# Patient Record
Sex: FEMALE | Race: WHITE | Employment: OTHER | URBAN - METROPOLITAN AREA
[De-identification: names, ages, dates, MRNs, and addresses within clinical notes are randomized per-mention and may not be internally consistent; named-entity substitution may affect disease eponyms.]

---

## 2019-02-12 ENCOUNTER — HOSPITAL ENCOUNTER (OUTPATIENT)
Dept: MAMMOGRAPHY | Age: 74
Discharge: HOME | End: 2019-02-12
Attending: FAMILY MEDICINE
Payer: MEDICARE

## 2019-02-12 DIAGNOSIS — N63.20 BREAST MASS, LEFT: ICD-10-CM

## 2019-02-12 DIAGNOSIS — R92.8 ABNORMAL FINDING ON RADIOLOGICAL EXAMINATION OF BREAST: ICD-10-CM

## 2019-02-12 PROCEDURE — 76642 ULTRASOUND BREAST LIMITED: CPT

## 2019-02-12 PROCEDURE — 77066 DX MAMMO INCL CAD BI: CPT

## 2019-02-14 ENCOUNTER — HOSPITAL ENCOUNTER (OUTPATIENT)
Dept: MAMMOGRAPHY | Age: 74
Discharge: HOME | End: 2019-02-14
Attending: FAMILY MEDICINE
Payer: MEDICARE

## 2019-02-14 ENCOUNTER — HOSPITAL ENCOUNTER (OUTPATIENT)
Dept: MAMMOGRAPHY | Age: 74
Discharge: HOME | End: 2019-02-14
Payer: MEDICARE

## 2019-02-14 DIAGNOSIS — N63.20 LEFT BREAST MASS: ICD-10-CM

## 2019-02-14 PROCEDURE — 74011000250 HC RX REV CODE- 250: Performed by: RADIOLOGY

## 2019-02-14 PROCEDURE — 88360 TUMOR IMMUNOHISTOCHEM/MANUAL: CPT

## 2019-02-14 PROCEDURE — A4648 IMPLANTABLE TISSUE MARKER: HCPCS

## 2019-02-14 PROCEDURE — 77065 DX MAMMO INCL CAD UNI: CPT

## 2019-02-14 PROCEDURE — 74011250636 HC RX REV CODE- 250/636: Performed by: RADIOLOGY

## 2019-02-14 RX ORDER — LIDOCAINE HYDROCHLORIDE AND EPINEPHRINE 10; 10 MG/ML; UG/ML
8 INJECTION, SOLUTION INFILTRATION; PERINEURAL ONCE
Status: COMPLETED | OUTPATIENT
Start: 2019-02-14 | End: 2019-02-14

## 2019-02-14 RX ORDER — SODIUM BICARBONATE 42 MG/ML
5 INJECTION, SOLUTION INTRAVENOUS
Status: COMPLETED | OUTPATIENT
Start: 2019-02-14 | End: 2019-02-14

## 2019-02-14 RX ORDER — LIDOCAINE HYDROCHLORIDE 10 MG/ML
12 INJECTION INFILTRATION; PERINEURAL
Status: COMPLETED | OUTPATIENT
Start: 2019-02-14 | End: 2019-02-14

## 2019-02-14 RX ADMIN — LIDOCAINE HYDROCHLORIDE AND EPINEPHRINE 80 MG: 10; 10 INJECTION, SOLUTION INFILTRATION; PERINEURAL at 10:42

## 2019-02-14 RX ADMIN — SODIUM BICARBONATE 210 MG: 42 SOLUTION INTRAVENOUS at 10:39

## 2019-02-14 RX ADMIN — LIDOCAINE HYDROCHLORIDE 12 ML: 10 INJECTION, SOLUTION INFILTRATION; PERINEURAL at 10:39

## 2019-02-14 NOTE — PROGRESS NOTES
10:00 am patient received for left breast ultrasound guided biopsy. Procedure explained to patient as well as risks associated with procedure. Post biopsy discharge instructions reviewed with patient. Procedure complete patient tolerated well. Minimal bleeding noted. Pressure held to biopsy site for 5 minutes. Patient sent for breast clip mammogram.  Dressing remained dry and intact post mammogram.  Ice pack applied over transparent dressing. Post biopsy discharge instructions reviewed with patient. Patient prefers to be contacted by phone with pathology results. Patient given a copy of discharge instructions and additional ice packs.

## 2019-02-19 PROBLEM — C50.912 BREAST CANCER, LEFT (HCC): Status: ACTIVE | Noted: 2019-02-19

## 2019-02-21 ENCOUNTER — TELEPHONE (OUTPATIENT)
Dept: SURGERY | Age: 74
End: 2019-02-21

## 2019-02-21 ENCOUNTER — OFFICE VISIT (OUTPATIENT)
Dept: SURGERY | Age: 74
End: 2019-02-21

## 2019-02-21 VITALS
DIASTOLIC BLOOD PRESSURE: 69 MMHG | HEART RATE: 82 BPM | HEIGHT: 66 IN | SYSTOLIC BLOOD PRESSURE: 133 MMHG | BODY MASS INDEX: 29.25 KG/M2 | WEIGHT: 182 LBS

## 2019-02-21 DIAGNOSIS — Z17.0 MALIGNANT NEOPLASM OF UPPER-OUTER QUADRANT OF LEFT BREAST IN FEMALE, ESTROGEN RECEPTOR POSITIVE (HCC): Primary | ICD-10-CM

## 2019-02-21 DIAGNOSIS — C50.412 MALIGNANT NEOPLASM OF UPPER-OUTER QUADRANT OF LEFT BREAST IN FEMALE, ESTROGEN RECEPTOR POSITIVE (HCC): Primary | ICD-10-CM

## 2019-02-21 RX ORDER — PRAVASTATIN SODIUM 40 MG/1
40 TABLET ORAL
COMMUNITY
Start: 2018-12-18

## 2019-02-21 RX ORDER — LOSARTAN POTASSIUM AND HYDROCHLOROTHIAZIDE 25; 100 MG/1; MG/1
0.5 TABLET ORAL DAILY
COMMUNITY
Start: 2019-02-12 | End: 2020-09-30

## 2019-02-21 RX ORDER — ASPIRIN 81 MG/1
TABLET ORAL EVERY OTHER DAY
COMMUNITY

## 2019-02-21 RX ORDER — VENLAFAXINE 50 MG/1
50 TABLET ORAL DAILY
COMMUNITY
Start: 2018-12-18

## 2019-02-21 RX ORDER — GLUCOSAMINE SULFATE 1500 MG
2000 POWDER IN PACKET (EA) ORAL DAILY
COMMUNITY

## 2019-02-21 RX ORDER — MULTIVITAMIN
1 TABLET ORAL
COMMUNITY

## 2019-02-21 RX ORDER — TIOTROPIUM BROMIDE AND OLODATEROL 3.124; 2.736 UG/1; UG/1
2 SPRAY, METERED RESPIRATORY (INHALATION)
COMMUNITY
Start: 2019-02-13

## 2019-02-21 NOTE — PATIENT INSTRUCTIONS
Learning About Breast Cancer Surgery  What is breast cancer surgery? Surgery is a key part of treatment for breast cancer. The type of surgery you have depends on the size, location, and type of the cancer. It also depends on your health and what is important to you. Your doctor may combine treatments. This is a common way to treat breast cancer. You may have surgery to remove all the cancer that can be seen. After surgery you may also need radiation, chemotherapy, or hormone therapy. These treatments get rid of any cancer cells that may be left. During the surgery, the doctor may remove lymph nodes from the armpit. The lymph nodes will be looked at under a microscope. This is used to check if cancer has spread from the breast into the lymph nodes. What is a lumpectomy? Lumpectomy    Lumpectomy removes the tumor in the breast. The incision is made close to the tumor. This shows common places where the cut is made. Simple mastectomy    Simple mastectomy removes the whole breast, including nipple and skin. This shows where the cut is often made. Nipple-sparing mastectomy with inframammary cut    Nipple-sparing mastectomy removes the whole breast but leaves the skin and nipple. This shows the cut in the curve under the breast (inframammary). Nipple-sparing mastectomy with lateral cut    Nipple-sparing mastectomy removes the whole breast but leaves the skin and nipple. This shows the cut from the nipple along the side of the breast toward the armpit (lateral). Nipple-sparing mastectomy with periareolar cut    Nipple-sparing mastectomy removes the whole breast but leaves the skin and nipple. This shows the cut around the top of the nipple and along the side of the breast toward the armpit (periareolar).   Skin-sparing mastectomy with periareolar cut    Skin-sparing mastectomy removes the whole breast and the nipple, but it keeps the skin that covers the breast. This shows the cut around the nipple (periareloar). Skin-sparing mastectomy with teardrop cut    Skin-sparing mastectomy removes the whole breast and the nipple, but it keeps the skin that covers the breast. This shows the cut around the nipple in a teardrop shape. Skin-sparing mastectomy with tennis racket cut    Skin-sparing mastectomy removes the whole breast and the nipple, but it keeps the skin that covers the breast. This shows the cut around the nipple and along the side of the breast toward the armpit (tennis racket shape). What can you expect after surgery? Your doctor will send the breast tissue to a lab for testing. This will help the doctor know more about the type of cancer you have. It may take up to a week or more to get the results back. Your doctor will discuss the results with you. You may meet with a doctor who specializes in cancer treatment (an oncologist). This doctor can help you decide about any other treatment you may need. Your needs and values are important when choosing a treatment that is right for you. The amount of time you will need to recover depends on the type of surgery you had. It also depends on whether you need any more treatment. If you had a lumpectomy, you will probably look the same in a bra. But your breasts may not match in size or shape after surgery. This depends on the size of your breasts and how much tissue was removed. Surgery to create a new breast is called reconstruction. This may be done at the same time as a mastectomy. Or it may be done later. Some women choose not to do reconstruction at all. You choose what feels right for you. No matter what kind of surgery you have, you will get information about your treatment. This includes how to prepare, what to expect, and what to do afterward. Follow-up care is a key part of your treatment and safety. Be sure to make and go to all appointments, and call your doctor if you are having problems.  It's also a good idea to know your test results and keep a list of the medicines you take. Where can you learn more? Go to http://manuel-pricila.info/. Enter P020 in the search box to learn more about \"Learning About Breast Cancer Surgery. \"  Current as of: March 27, 2018  Content Version: 11.9  © 8507-6224 "[employer]. Care instructions adapted under license by [employer] (which disclaims liability or warranty for this information). If you have questions about a medical condition or this instruction, always ask your healthcare professional. [address] any warranty or liability for your use of this information.

## 2019-02-21 NOTE — H&P (VIEW-ONLY)
HISTORY OF PRESENT ILLNESS Radha Rosas is a 74 y.o. female. HPI NEW patient referral for consultation by Dr. Bernardino Hankins for a new LEFT breast cancer. Pt noticed a mass 3 weeks ago. She had a mammogram and ultrasound and biopsy. The patient denies any nipple inversion or discharge. No axillary pain. She is here to discuss her breast cancer surgical options. 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 %, Her 2 equivocal 
 
 
OB History Obstetric Comments Menarche 13, LMP age 52, # of children 1 of 1st delivery 25, Hysterectomy/oophorectomy no/no, Breast bx yes, history of breast feeding no, BCP none, Hormone therapy none Family history - paternal aunt  breast cancer Imaging - Diagnostic mammogram and LEFT ultrasound 2/12/2019 ULTRASOUND:  Corresponding to the mammographic density there is a 2.4 x 3.1 x 
3.7 cm hypoechoic lobulated mass at about the 12:00 position and 3 cm radial 
distance from the nipple. No other suspicious cystic or solid lesions 
identified. IMPRESSION: Suspicious left breast mass corresponding to site of clinical 
concern. BI-RADS Assessment Category 5: Highly suggestive of malignancy- 
Appropriate action should be taken Review of Systems Constitutional: Negative. Respiratory: Positive for cough. Gastrointestinal: Positive for heartburn. Genitourinary: Negative. Musculoskeletal: Positive for back pain and joint pain. Skin: Negative. Neurological: Negative. Endo/Heme/Allergies: Negative. Psychiatric/Behavioral: Negative. Physical Exam  
Constitutional: She is oriented to person, place, and time. She appears well-developed and well-nourished. Cardiovascular: Normal rate, regular rhythm and normal heart sounds. Pulmonary/Chest: Effort normal and breath sounds normal. Right breast exhibits no mass, no nipple discharge, no skin change and no tenderness. Left breast exhibits mass and skin change (ecchymosis at biopsy site). Left breast exhibits no nipple discharge and no tenderness. Breasts are symmetrical.  
 
 
Lymphadenopathy:  
  She has no cervical adenopathy. She has no axillary adenopathy. Right: No supraclavicular adenopathy present. Left: No supraclavicular adenopathy present. Neurological: She is alert and oriented to person, place, and time. Skin: Skin is warm and dry. BREAST ULTRASOUND Indication: Left  breast mass UOQ/periareolar Technique: The area was scanned using a high-frequency linear-array near-field transducer Findings: 3.7 cm lobulated mass. The actual size of the mass is likely larger due to satelite lesions. 2 large, suspicious axillary nodes identified on ultrasound. 1.7cm and 1.2cm Impression: LEFT breast cancer with clinically positive LEFT axillary lymph nodes Disposition: refer to med onc to discuss neoadjuvant chemotherapy Past Medical History:  
Diagnosis Date  Hypertension Past Surgical History:  
Procedure Laterality Date  HX BREAST BIOPSY Right years ago  
 neg; needle bx  HX ORTHOPAEDIC    
 carpal tunnel Family History Problem Relation Age of Onset  Breast Cancer Maternal Aunt  Hypertension Mother  Hypertension Father  Cancer Brother   
     leukemia Social History Tobacco Use  Smoking status: Current Every Day Smoker Packs/day: 0.50 Years: 50.00 Pack years: 25.00  Smokeless tobacco: Never Used Substance Use Topics  Alcohol use: Yes Drinks per session: 5 or 6 Binge frequency: Weekly Prior to Admission medications Medication Sig Start Date End Date Taking?  Authorizing Provider  
losartan-hydroCHLOROthiazide [facility]) 100-25 mg per tablet  2/12/19  Yes Provider, Historical  
pravastatin (PRAVACHOL) 40 mg tablet  12/18/18  Yes Provider, Historical  
 STIOLTO RESPIMAT 2.5-2.5 mcg/actuation inhaler  2/13/19  Yes Provider, Historical  
venlafaxine (EFFEXOR) 50 mg tablet  12/18/18  Yes Provider, Historical  
Cyanocobalamin-Cobamamide (B12) 5,000-100 mcg lozg by SubLINGual route. Yes Provider, Historical  
cholecalciferol (VITAMIN D3) 1,000 unit cap Take  by mouth daily. Yes Provider, Historical  
calcium-cholecalciferol, D3, (CALTRATE 600+D) tablet Take 1 Tab by mouth daily. Yes Provider, Historical  
aspirin delayed-release 81 mg tablet Take  by mouth daily. Yes Provider, Historical  
  
Allergies Allergen Reactions  Keflex [Cephalexin] Hives  Penicillins Rash ASSESSMENT and PLAN 
  ICD-10-CM ICD-9-CM 1. Malignant neoplasm of upper-outer quadrant of left breast in female, estrogen receptor positive (HCC) C50.412 174.4   
 Z17.0 V86.0   
 
 
30 minutes were spent face-to-face with the patient during this encounter and >50% of that time was spent on counseling and coordination of care. 1. Discussed lumpectomy and radiation vs mastectomy. The tumor is large, but could be easily removed with a lumpectomy. 2. Discussed lymph node dissection 3. Pt is Her2 equivocal.  If she is Her 2 positive she may benefit from neoadjuvant chemotherapy 4. Discussed post op radiation which she should have if she has a mastectomy or a lumpectomy. 5. She lives alone with her dog. Her daughter lives in the area. Plan; 
Her2 pending Med onc appt

## 2019-02-21 NOTE — PROGRESS NOTES
HISTORY OF PRESENT ILLNESS  Ansley Floyd is a 74 y.o. female. HPI NEW patient referral for consultation by Dr. Melanie Rinne for a new LEFT breast cancer. Pt noticed a mass 3 weeks ago. She had a mammogram and ultrasound and biopsy. The patient denies any nipple inversion or discharge. No axillary pain. She is here to discuss her breast cancer surgical options. 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 %, Her 2 equivocal      OB History     Obstetric Comments    Menarche 13, LMP age 52, # of children 1 of 1st delivery 25, Hysterectomy/oophorectomy no/no, Breast bx yes, history of breast feeding no, BCP none, Hormone therapy none      Family history - paternal aunt  breast cancer  Imaging - Diagnostic mammogram and LEFT ultrasound 2/12/2019  ULTRASOUND:  Corresponding to the mammographic density there is a 2.4 x 3.1 x  3.7 cm hypoechoic lobulated mass at about the 12:00 position and 3 cm radial  distance from the nipple. No other suspicious cystic or solid lesions  identified. IMPRESSION: Suspicious left breast mass corresponding to site of clinical  concern. BI-RADS Assessment Category 5: Highly suggestive of malignancy-  Appropriate action should be taken    Review of Systems   Constitutional: Negative. Respiratory: Positive for cough. Gastrointestinal: Positive for heartburn. Genitourinary: Negative. Musculoskeletal: Positive for back pain and joint pain. Skin: Negative. Neurological: Negative. Endo/Heme/Allergies: Negative. Psychiatric/Behavioral: Negative. Physical Exam   Constitutional: She is oriented to person, place, and time. She appears well-developed and well-nourished. Cardiovascular: Normal rate, regular rhythm and normal heart sounds. Pulmonary/Chest: Effort normal and breath sounds normal. Right breast exhibits no mass, no nipple discharge, no skin change and no tenderness.  Left breast exhibits mass and skin change (ecchymosis at biopsy site). Left breast exhibits no nipple discharge and no tenderness. Breasts are symmetrical.       Lymphadenopathy:     She has no cervical adenopathy. She has no axillary adenopathy. Right: No supraclavicular adenopathy present. Left: No supraclavicular adenopathy present. Neurological: She is alert and oriented to person, place, and time. Skin: Skin is warm and dry. BREAST ULTRASOUND  Indication: Left  breast mass UOQ/periareolar  Technique: The area was scanned using a high-frequency linear-array near-field transducer  Findings: 3.7 cm lobulated mass. The actual size of the mass is likely larger due to satelite lesions. 2 large, suspicious axillary nodes identified on ultrasound. 1.7cm and 1.2cm  Impression: LEFT breast cancer with clinically positive LEFT axillary lymph nodes   Disposition: refer to med onc to discuss neoadjuvant chemotherapy    Past Medical History:   Diagnosis Date    Hypertension      Past Surgical History:   Procedure Laterality Date    HX BREAST BIOPSY Right years ago    neg; needle bx    HX ORTHOPAEDIC      carpal tunnel      Family History   Problem Relation Age of Onset    Breast Cancer Maternal Aunt     Hypertension Mother     Hypertension Father     Cancer Brother         leukemia     Social History     Tobacco Use    Smoking status: Current Every Day Smoker     Packs/day: 0.50     Years: 50.00     Pack years: 25.00    Smokeless tobacco: Never Used   Substance Use Topics    Alcohol use: Yes     Drinks per session: 5 or 6     Binge frequency: Weekly      Prior to Admission medications    Medication Sig Start Date End Date Taking?  Authorizing Provider   losartan-hydroCHLOROthiazide [facility]) 100-25 mg per tablet  2/12/19  Yes Provider, Historical   pravastatin (PRAVACHOL) 40 mg tablet  12/18/18  Yes Provider, Historical   STIOLTO RESPIMAT 2.5-2.5 mcg/actuation inhaler  2/13/19  Yes Provider, Historical   venlafaxine (EFFEXOR) 50 mg tablet  12/18/18 Yes Provider, Historical   Cyanocobalamin-Cobamamide (B12) 5,000-100 mcg lozg by SubLINGual route. Yes Provider, Historical   cholecalciferol (VITAMIN D3) 1,000 unit cap Take  by mouth daily. Yes Provider, Historical   calcium-cholecalciferol, D3, (CALTRATE 600+D) tablet Take 1 Tab by mouth daily. Yes Provider, Historical   aspirin delayed-release 81 mg tablet Take  by mouth daily. Yes Provider, Historical      Allergies   Allergen Reactions    Keflex [Cephalexin] Hives    Penicillins Rash         ASSESSMENT and PLAN    ICD-10-CM ICD-9-CM    1. Malignant neoplasm of upper-outer quadrant of left breast in female, estrogen receptor positive (HCC) C50.412 174.4     Z17.0 V86.0        30 minutes were spent face-to-face with the patient during this encounter and >50% of that time was spent on counseling and coordination of care. 1. Discussed lumpectomy and radiation vs mastectomy. The tumor is large, but could be easily removed with a lumpectomy. 2. Discussed lymph node dissection  3. Pt is Her2 equivocal.  If she is Her 2 positive she may benefit from neoadjuvant chemotherapy  4. Discussed post op radiation which she should have if she has a mastectomy or a lumpectomy. 5. She lives alone with her dog. Her daughter lives in the area.     Plan;  Her2 pending  Med onc appt

## 2019-02-22 ENCOUNTER — TELEPHONE (OUTPATIENT)
Dept: SURGERY | Age: 74
End: 2019-02-22

## 2019-02-22 NOTE — TELEPHONE ENCOUNTER
Patient called, asking \"if she has a mastectomy, would she be able to forego chemotherapy and radiation? \" Per Dr. Dale Guerra breast talk note from yesterday, 2/21/19, it sounds like she will still need these things. I told her that our office is still working on getting med onc and rad onc appts set up for her. I will route this to Dr. Dale Guerra to make her aware that the patient called asking this.

## 2019-02-25 ENCOUNTER — OFFICE VISIT (OUTPATIENT)
Dept: ONCOLOGY | Age: 74
End: 2019-02-25

## 2019-02-25 VITALS
HEIGHT: 66 IN | BODY MASS INDEX: 29.57 KG/M2 | SYSTOLIC BLOOD PRESSURE: 126 MMHG | WEIGHT: 184 LBS | RESPIRATION RATE: 16 BRPM | OXYGEN SATURATION: 94 % | DIASTOLIC BLOOD PRESSURE: 78 MMHG | TEMPERATURE: 97.2 F | HEART RATE: 80 BPM

## 2019-02-25 DIAGNOSIS — Z17.0 MALIGNANT NEOPLASM OF CENTRAL PORTION OF LEFT BREAST IN FEMALE, ESTROGEN RECEPTOR POSITIVE (HCC): Primary | ICD-10-CM

## 2019-02-25 DIAGNOSIS — C50.912 MALIGNANT NEOPLASM OF LEFT FEMALE BREAST, UNSPECIFIED ESTROGEN RECEPTOR STATUS, UNSPECIFIED SITE OF BREAST (HCC): ICD-10-CM

## 2019-02-25 DIAGNOSIS — C50.112 MALIGNANT NEOPLASM OF CENTRAL PORTION OF LEFT BREAST IN FEMALE, ESTROGEN RECEPTOR POSITIVE (HCC): Primary | ICD-10-CM

## 2019-02-25 RX ORDER — PROCHLORPERAZINE MALEATE 10 MG
10 TABLET ORAL
Qty: 50 TAB | Refills: 5 | Status: SHIPPED | OUTPATIENT
Start: 2019-02-25 | End: 2019-11-20

## 2019-02-25 RX ORDER — DOXYCYCLINE 100 MG/1
100 CAPSULE ORAL 2 TIMES DAILY
Qty: 84 CAP | Refills: 0 | Status: ON HOLD | OUTPATIENT
Start: 2019-02-25 | End: 2019-03-13

## 2019-02-25 RX ORDER — ONDANSETRON HYDROCHLORIDE 8 MG/1
8 TABLET, FILM COATED ORAL
Qty: 24 TAB | Refills: 3 | Status: SHIPPED | OUTPATIENT
Start: 2019-02-25 | End: 2019-07-08

## 2019-02-25 RX ORDER — LIDOCAINE AND PRILOCAINE 25; 25 MG/G; MG/G
CREAM TOPICAL AS NEEDED
Qty: 30 G | Refills: 0 | Status: SHIPPED | OUTPATIENT
Start: 2019-02-25 | End: 2020-01-24

## 2019-02-25 RX ORDER — DEXAMETHASONE 4 MG/1
TABLET ORAL
Qty: 32 TAB | Refills: 3 | Status: ON HOLD | OUTPATIENT
Start: 2019-02-25 | End: 2019-03-13

## 2019-02-25 NOTE — PROGRESS NOTES
[facility] at [address], [address] [facility] [address] [facility] W: [phone]  F: [phone] Reason for Visit:  
Vanessa Barnes is a 74 y.o. female who is seen in consultation at the request of Dr. Kandee Ganser for evaluation of therapy for breast cancer. Treatment History:  
· 2/15/19 Left core bx:   IDC, gr 2, 1.1 cm, ER + at 100%, MT + at 20%, HER 2 POSITIVE (IHC 2+; FISH ratio 3.9; sig/cell 9.2) History of Present Illness:  
Pt noticed the left breast mass herself in Feb 2019, leading to the pathology above FH:  Paternal aunt breast cancer in her [de-identified]; no ovarian cancer, no prostate or pancreas cancer Past Medical History:  
Diagnosis Date  Hypertension Past Surgical History:  
Procedure Laterality Date  HX BREAST BIOPSY Right years ago  
 neg; needle bx  HX ORTHOPAEDIC    
 carpal tunnel Social History Tobacco Use  Smoking status: Current Every Day Smoker Packs/day: 0.50 Years: 55.00 Pack years: 27.50  Smokeless tobacco: Never Used Substance Use Topics  Alcohol use: Yes Drinks per session: 5 or 6 Binge frequency: Weekly Comment: Patient stated that she puts a splash of bourbon in her afternoon coffee around 5-6 days per week Family History Problem Relation Age of Onset  Breast Cancer Maternal Aunt  Hypertension Mother  Hypertension Father  Cancer Father Lung  Cancer Brother   
     leukemia  Diabetes Brother   
     2 brothers with diabetes  Hypertension Sister 2 sisters and 4 brothers with HTN Current Outpatient Medications Medication Sig  
 naproxen sodium (ALEVE PO) Take  by mouth.  losartan-hydroCHLOROthiazide (HYZAAR) 100-25 mg per tablet  pravastatin (PRAVACHOL) 40 mg tablet  STIOLTO RESPIMAT 2.5-2.5 mcg/actuation inhaler  venlafaxine (EFFEXOR) 50 mg tablet  Cyanocobalamin-Cobamamide (B12) 5,000-100 mcg lozg by SubLINGual route.  cholecalciferol (VITAMIN D3) 1,000 unit cap Take  by mouth daily.  calcium-cholecalciferol, D3, (CALTRATE 600+D) tablet Take 1 Tab by mouth daily.  aspirin delayed-release 81 mg tablet Take  by mouth daily. No current facility-administered medications for this visit. Allergies Allergen Reactions  Keflex [Cephalexin] Hives  Penicillins Rash Review of Systems: A complete review of systems was obtained, negative except as described above. Physical Exam:  
 
Visit Vitals /78 (BP 1 Location: Left arm, BP Patient Position: Sitting) Pulse 80 Temp 97.2 °F (36.2 °C) (Oral) Resp 16 Ht 5' 6\" (1.676 m) Wt 184 lb (83.5 kg) SpO2 94% BMI 29.70 kg/m² ECOG PS: 0 General: No distress Eyes: PERRLA, anicteric sclerae HENT: Atraumatic, OP clear Neck: Supple Lymphatic: No cervical, supraclavicular adenopathy Respiratory: exp wheezing CV: Normal rate, regular rhythm, no murmurs, no peripheral edema GI: Soft, nontender, nondistended, no masses, no hepatomegaly, no splenomegaly; + BS 
MS:  Digits without clubbing or cyanosis. Skin: No rashes, ecchymoses, or petechiae. Normal temperature, turgor, and texture. Psych: Alert, oriented, appropriate affect, normal judgment/insight Breasts:  L breast 12:00, 3 cm FN, 3.7 cm mass, 1.5 cm L ax LN Results: No results found for: WBC, HGB, HCT, PLT, MCV, ANEU, HGBPOC, HCTPOC, HGBEXT, HCTEXT, PLTEXT, HGBEXT, HCTEXT, PLTEXT No results found for: NA, K, CL, CO2, GLU, BUN, CREA, GFRAA, GFRNA, CA, NAPOC, KPOCT, CLPOC, GLUCPOC, IBUN, CREAPOC, ICAI No results found for: TBILI, ALT, SGOT, AP, TP, ALB, GLOB 
 
2/12/19 mammogram and US 
MAMMOGRAPHY:  The breast demonstrates stable scattered density breast 
architecture. Underlying the site of clinical concern in the left breast is 
masslike focal asymmetry in the anterior upper left breast. There is no other dominant mass lesion, architectural distortion, asymmetry, or calcification. There is no skin thickening or nipple retraction. 
  
ULTRASOUND:  Corresponding to the mammographic density there is a 2.4 x 3.1 x 
3.7 cm hypoechoic lobulated mass at about the 12:00 position and 3 cm radial 
distance from the nipple. No other suspicious cystic or solid lesions Identified. Dr. Mary Farley has identified 2 large, suspicious ax LN on US:  1.7 cm and 1.2 cm Records reviewed and summarized above. Pathology report(s) reviewed above. Radiology report(s) reviewed above. Assessment/plan: 1. Left central IDC, gr 2, ER +, HI +, HER 2 positive:  cT2 cN1a cM0, stage IIB, prognostic stage IB We explained to the patient that the goal of systemic adjuvant therapy is to improve the chances for cure and decrease the risk of relapse. We explained why a patient can have microscopic cancer spread now even though physical examination, laboratory studies and imaging studies are negative for cancer. We explained that the same treatments used now as adjuvant or preventive treatments rarely if ever are curative in women who develop metastases. We discussed that there is no difference in overall survival between neoadjuvant and adjuvant chemotherapy. We discussed the rationale for neoadjuvant chemotherapy, if chemotherapy is warranted, as it is in this case: to avoid any potential delays in giving chemotherapy following surgery, to be able to see the response of the tumor to chemotherapy, and to potentially downstage the tumor prior to surgery. Using eprognosis. org, her 5 year risk of all cause mortality is 20%, her risk in 10 years is 52-58%, average OS is 9-10 years. Therapy discussion is warranted. Nilam Sharpe suggests equivalency between q.3 week Adriamycin, Cytoxan followed by weekly paclitaxel and trastuzumab compared with the [facility] regimen.  However, this study was not powered to show a difference between these two regimens, and in the [facility] [place] publication, the AC-TH arm showed a numerically advantange (though not statistically significant) to the [facility] arm. In this patient, it is completely reasonable to use [facility] approach and avoid the potential cardiotoxicity of the anthracyclines as well as the potential for leukemia. We discussed the toxicities of docetaxel and carboplatin chemotherapy in detail. This chemotherapy frequently causes a low white blood cell count and hospital admissions for treatment of neutropenic fever. We explained that we consider the use of growth factors to minimize this risk. We explained to the patient that some side effects if they occur only last a few days including nausea, vomiting, stomatitis, arthralgia, myalgia,and allergic reactions to Taxotere. We told the patient that severe nausea and vomiting were uncommon and that some side effects,if they occur, will last longer; this includes hair loss, which will be seen in all patients treated with these agents and fatigue,which will be seen in most.  We also informed that for the patient that heart damage is rare with these agents. We explained that carboplatin can rarely cause kidney damage and high frequency hearing loss. We provided the patient in detail her information concerning the toxicities of this regimen in addition to her overall discussion. Rational for therapy with pertuzumab was also discussed with the patient including a nearly 20% improvement seen in pCR in the neoadjuvant setting with the addition of this medication. Additional AE discussed including an acne rash (and the use of doxycyline 100 mg bid to help prevent) and diarrhea and consideration of additional cardiomyopathy.  
 
Rationale for therapy with trastuzumab was also discussed with the patient including a 50% proportional improvement in disease free survival and also an improvement in overall survival in patients receiving trastuzumab and chemotherapy for HER-2 positive breast cancer. The side effects of trastuzumab were discussed including a 4%-5% risk of dropping her ejection fraction while on treatment and about a 1% risk of CHF. We discussed that this drug will be used every 3 weeks for remainder of a year following the chemotherapy cycles. We will check her EF before chemotherapy and every 3 months while she is receiving trastuzumab. · TCH-P (Trastuzumab 8mg/kg load with cycle 1 then 6mg/kg, Docetaxel 75mg/m2, Carboplatin AUC 6, Pertuzumab 840mg load with cycle 1 then 420mg) given every 3 weeks x 6 cycles · Labs: CBC, CMP prior to each treatment. · Antiemetic Prophylaxis: Palonosetron and dexamethasone prior to chemo · PRN Antiemetics: Ondansetron, Compazine · Swelling prophylaxis: Dexmethasone 8mg bid the day before and day after chemotherapy · TTE prior to chemotherapy and every 12 weeks while on Trastuzumab · Neulasta 24-72 hours after each treatment The patient was given presciptions for emla cream, decadron to take 8 mg bid the day before and day after chemo, zofran and compazine. Neulasta the following day. After this discussion, she is agreeable to TCH-P, will start on 3/6, she will sign informed consent at the next visit. Cryotherapy discussed with docetaxel as well I will ask Dr. Ashlie Robles if she can FNA the LN. TTE ordered She will need a port with Dr. Ashlie Robles We will plan to see the patient in follow up at least once per cycle, or sooner if symptoms warrant. 2. Emotional well being:  She has excellent support and is coping well with her disease 3. tob use:  Smokes 1/2 ppd; counseled to quit 
 
> 60 min were spent with this patient with > 50% of that time spent in face to face counseling. I appreciate the opportunity to participate in Ms. Monica Krause's care. Signed By: Buddy Swift MD   
 
No orders of the defined types were placed in this encounter.

## 2019-02-25 NOTE — PATIENT INSTRUCTIONS
Stage 2, estrogen and progesterone receptor positive, HER 2 receptor positive Common Side Effects of Chemotherapy Decreased Blood Counts Your blood counts can decrease temporarily due to chemotherapy, they will recover over time. This is an expected side effect that your Doctor will be monitoring. 
- If you experience fevers (temperature >100.4°F), bleeding or unexplained bruising, please call the office right away Risk of Infection Your white blood cells can decrease temporarily due to chemotherapy and can put you at higher risk of infection. Washing hands frequently with soap and avoiding sick contacts can reduce your risk of infection. 
- If you experience fevers (temperature >100.4°F), shaking chills, or any signs of infection, please call the office immediately Anemia Chemotherapy can cause your red blood cells to temporarily decrease; this is an expected side effect that your Doctor will be monitoring. 
- You may experience fatigue if this occurs, please notify the office if you experience bleeding, shortness of breath with minimal exertion or at rest, rapid heartbeat, or feeling as though you may lose consciousness. Hair Loss Chemotherapy can affect your hair follicles and cause you to lose hair. This can occur on your scalp hair but also all over your body including eyebrows and eye lashes Nausea  You have been prescribed nausea medication to take if needed. Please follow the directions given to you by your Doctor. - Please call the office if the medications you have been given are not relieving nausea. Vomiting Make sure you are taking anti-nausea medication as prescribed. Eating small amounts of bland foods frequently can help. - Please call the office right away if you are vomiting more than 4 times per day or are unable to keep down food or fluids Diarrhea Eating small amounts of bland foods frequently can help, increase your fluid intake. It is usually ok to take Imodium for diarrhea. - Please call the office right away if you experience more than 4 episodes of watery diarrhea or if you are feeling dehydrated. Female patients of childbearing age need to avoid pregnancy during chemotherapy. You can reach Medical Oncology at [address] [place] [address] with further questions or concerns at: [phone]. 
- Calls during normal business hours will reach our office. 
- Calls after hours or on the weekend will reach an answering service and the on-call Oncologist will return your call. Prognosis: Good This is our best current assessment. Cancers respond differently to treatment. Overall prognosis depends on many factors including other conditions, cancer stage, side effects, and other unforeseen events. Goal of therapy: Curative Expected response to treatment:  Very good: Anticipate remission (no sign of cancer) and possible cancer cure Treatment benefits and harms:  We discussed potential short term side effects to include:see handout Long term side effects of treatment:  see handout Quality of life: Quality of life concerns have been addressed. Treatment as outlined is expected to have minimal impact on patients quality of life. The patient was given presciptions for a wig, emla cream, decadron to take 8 mg twice a day, the day before and day after chemo, zofran and compazine. Also doxycycline Chemo first, then month break, then surgery, then herceptin continues for a year, with radiation, then anti-estrogen pill

## 2019-02-25 NOTE — PROGRESS NOTES
2/25/19 5:32 PM: Provided patient with a chemotherapy treatment education packet including a handout on breast cancer diagnosis printed from cancer. net and a handout on TCHP chemotherapy regimen. Packet also included a handout on Common Side Effects of Chemotherapy. Patient stated she was feeling overwhelmed and did not wish to review the education packet with RN at this time. Patient stated she would review the packet at home and call this office with questions or concerns.

## 2019-02-26 ENCOUNTER — TELEPHONE (OUTPATIENT)
Dept: SURGERY | Age: 74
End: 2019-02-26

## 2019-02-26 ENCOUNTER — TELEPHONE (OUTPATIENT)
Dept: CARDIOLOGY CLINIC | Age: 74
End: 2019-02-26

## 2019-02-26 DIAGNOSIS — Z17.0 MALIGNANT NEOPLASM OF UPPER-OUTER QUADRANT OF LEFT BREAST IN FEMALE, ESTROGEN RECEPTOR POSITIVE (HCC): Primary | ICD-10-CM

## 2019-02-26 DIAGNOSIS — C50.412 MALIGNANT NEOPLASM OF UPPER-OUTER QUADRANT OF LEFT BREAST IN FEMALE, ESTROGEN RECEPTOR POSITIVE (HCC): Primary | ICD-10-CM

## 2019-02-26 NOTE — TELEPHONE ENCOUNTER
----- Message from Venancio Mancuso MD sent at 2/26/2019  4:10 PM EST -----  Mena Moreira, Please schedule. Thanks. VR.       ----- Message -----  From: Misa Thorpe MD  Sent: 2/25/2019   5:18 PM  To:  Venancio Mancuso MD    Another echo, please by end of of next week

## 2019-02-26 NOTE — TELEPHONE ENCOUNTER
SCHEDULED SURGERY AT [facility] IN [place] ON 3-1-19 AT 12:30 PM; PATIENT WILL ARRIVE AT 10:30 AM AND CHECK IN ON THE 2ND FL AT PATIENT REGISTRATION    PREADMISSION TESTING AT [facility] ON 2-28-19 AT 11 AM; PATIENT WILL ARRIVE AT 10:30 AM AND CHECK IN ON THE 2ND FL AT PATIENT REGISTRATION    PATIENT HAS BEEN CALLED AND GIVEN INSTRUCTIONS

## 2019-02-26 NOTE — TELEPHONE ENCOUNTER
Pt needs Onc Echo by end of next week. Please call and schedule per Dr. Sharda Maya. DX: BCA, Cardiac monitoring for cardiotoxic tx     Moe to place order; Lisandra to read.

## 2019-02-27 ENCOUNTER — TELEPHONE (OUTPATIENT)
Dept: SURGERY | Age: 74
End: 2019-02-27

## 2019-02-27 NOTE — TELEPHONE ENCOUNTER
Patient called to inquire if she could work on Saturday, 3/2/19, after having a portacath insertion and LEFT axillary lymph node biopsy on Friday, 3/1/19. I explained that pain varies from person to person, but that she would probably be fairly sore from both sites. 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 She agreed and was appreciative of call.

## 2019-02-28 ENCOUNTER — HOSPITAL ENCOUNTER (OUTPATIENT)
Dept: PREADMISSION TESTING | Age: 74
Discharge: HOME | End: 2019-02-28
Payer: MEDICARE

## 2019-02-28 ENCOUNTER — HOSPITAL ENCOUNTER (OUTPATIENT)
Dept: NON INVASIVE DIAGNOSTICS | Age: 74
Discharge: HOME | End: 2019-02-28
Attending: SURGERY
Payer: MEDICARE

## 2019-02-28 ENCOUNTER — ANESTHESIA EVENT (OUTPATIENT)
Dept: SURGERY | Age: 74
End: 2019-02-28
Payer: MEDICARE

## 2019-02-28 VITALS
RESPIRATION RATE: 20 BRPM | HEIGHT: 66 IN | TEMPERATURE: 97.8 F | OXYGEN SATURATION: 97 % | SYSTOLIC BLOOD PRESSURE: 133 MMHG | HEART RATE: 81 BPM | DIASTOLIC BLOOD PRESSURE: 75 MMHG | BODY MASS INDEX: 29.5 KG/M2 | WEIGHT: 183.56 LBS

## 2019-02-28 DIAGNOSIS — Z17.0 MALIGNANT NEOPLASM OF UPPER-OUTER QUADRANT OF LEFT BREAST IN FEMALE, ESTROGEN RECEPTOR POSITIVE (HCC): ICD-10-CM

## 2019-02-28 DIAGNOSIS — C50.412 MALIGNANT NEOPLASM OF UPPER-OUTER QUADRANT OF LEFT BREAST IN FEMALE, ESTROGEN RECEPTOR POSITIVE (HCC): ICD-10-CM

## 2019-02-28 LAB
ANION GAP SERPL CALC-SCNC: 6 MMOL/L (ref 5–15)
ATRIAL RATE: 74 BPM
BUN SERPL-MCNC: 19 MG/DL (ref 6–20)
BUN/CREAT SERPL: 21 (ref 12–20)
CALCIUM SERPL-MCNC: 9.9 MG/DL (ref 8.5–10.1)
CALCULATED P AXIS, ECG09: 14 DEGREES
CALCULATED R AXIS, ECG10: -2 DEGREES
CALCULATED T AXIS, ECG11: 54 DEGREES
CHLORIDE SERPL-SCNC: 102 MMOL/L (ref 97–108)
CO2 SERPL-SCNC: 30 MMOL/L (ref 21–32)
CREAT SERPL-MCNC: 0.9 MG/DL (ref 0.55–1.02)
DIAGNOSIS, 93000: NORMAL
GLUCOSE SERPL-MCNC: 89 MG/DL (ref 65–100)
P-R INTERVAL, ECG05: 162 MS
POTASSIUM SERPL-SCNC: 4 MMOL/L (ref 3.5–5.1)
Q-T INTERVAL, ECG07: 372 MS
QRS DURATION, ECG06: 96 MS
QTC CALCULATION (BEZET), ECG08: 412 MS
SODIUM SERPL-SCNC: 138 MMOL/L (ref 136–145)
VENTRICULAR RATE, ECG03: 74 BPM

## 2019-02-28 PROCEDURE — 36415 COLL VENOUS BLD VENIPUNCTURE: CPT

## 2019-02-28 PROCEDURE — 80048 BASIC METABOLIC PNL TOTAL CA: CPT

## 2019-02-28 PROCEDURE — 93005 ELECTROCARDIOGRAM TRACING: CPT

## 2019-02-28 RX ORDER — ONDANSETRON 2 MG/ML
8 INJECTION INTRAMUSCULAR; INTRAVENOUS AS NEEDED
Status: CANCELLED | OUTPATIENT
Start: 2019-03-06

## 2019-02-28 RX ORDER — DIPHENHYDRAMINE HYDROCHLORIDE 50 MG/ML
50 INJECTION, SOLUTION INTRAMUSCULAR; INTRAVENOUS AS NEEDED
Status: CANCELLED
Start: 2019-03-06

## 2019-02-28 RX ORDER — DIPHENHYDRAMINE HYDROCHLORIDE 50 MG/ML
12.5 INJECTION, SOLUTION INTRAMUSCULAR; INTRAVENOUS AS NEEDED
Status: CANCELLED | OUTPATIENT
Start: 2019-02-28 | End: 2019-02-28

## 2019-02-28 RX ORDER — SODIUM CHLORIDE 0.9 % (FLUSH) 0.9 %
10 SYRINGE (ML) INJECTION AS NEEDED
Status: CANCELLED
Start: 2019-03-06

## 2019-02-28 RX ORDER — HYDROMORPHONE HYDROCHLORIDE 1 MG/ML
.25-1 INJECTION, SOLUTION INTRAMUSCULAR; INTRAVENOUS; SUBCUTANEOUS
Status: CANCELLED | OUTPATIENT
Start: 2019-02-28

## 2019-02-28 RX ORDER — SODIUM CHLORIDE 9 MG/ML
25 INJECTION, SOLUTION INTRAVENOUS CONTINUOUS
Status: CANCELLED | OUTPATIENT
Start: 2019-03-06

## 2019-02-28 RX ORDER — ALBUTEROL SULFATE 0.83 MG/ML
2.5 SOLUTION RESPIRATORY (INHALATION) AS NEEDED
Status: CANCELLED
Start: 2019-03-06

## 2019-02-28 RX ORDER — HYDROCORTISONE SODIUM SUCCINATE 100 MG/2ML
100 INJECTION, POWDER, FOR SOLUTION INTRAMUSCULAR; INTRAVENOUS AS NEEDED
Status: CANCELLED | OUTPATIENT
Start: 2019-03-06

## 2019-02-28 RX ORDER — PALONOSETRON 0.05 MG/ML
0.25 INJECTION, SOLUTION INTRAVENOUS ONCE
Status: CANCELLED
Start: 2019-03-06 | End: 2019-03-06

## 2019-02-28 RX ORDER — SODIUM CHLORIDE 9 MG/ML
10 INJECTION INTRAMUSCULAR; INTRAVENOUS; SUBCUTANEOUS AS NEEDED
Status: CANCELLED | OUTPATIENT
Start: 2019-03-06

## 2019-02-28 RX ORDER — SODIUM CHLORIDE, SODIUM LACTATE, POTASSIUM CHLORIDE, CALCIUM CHLORIDE 600; 310; 30; 20 MG/100ML; MG/100ML; MG/100ML; MG/100ML
125 INJECTION, SOLUTION INTRAVENOUS CONTINUOUS
Status: CANCELLED | OUTPATIENT
Start: 2019-02-28

## 2019-02-28 RX ORDER — DEXAMETHASONE SODIUM PHOSPHATE 100 MG/10ML
10 INJECTION INTRAMUSCULAR; INTRAVENOUS ONCE
Status: CANCELLED | OUTPATIENT
Start: 2019-03-06 | End: 2019-03-06

## 2019-02-28 RX ORDER — EPINEPHRINE 1 MG/ML
0.3 INJECTION, SOLUTION, CONCENTRATE INTRAVENOUS AS NEEDED
Status: CANCELLED | OUTPATIENT
Start: 2019-03-06

## 2019-02-28 RX ORDER — HEPARIN 100 UNIT/ML
300-500 SYRINGE INTRAVENOUS AS NEEDED
Status: CANCELLED
Start: 2019-03-06

## 2019-02-28 RX ORDER — ACETAMINOPHEN 325 MG/1
650 TABLET ORAL AS NEEDED
Status: CANCELLED
Start: 2019-03-06

## 2019-02-28 NOTE — PERIOP NOTES
[address], [address] [place] [address] MAIN OR                                  [phone] MAIN PRE OP                          [phone]                                                                                AMBULATORY PRE OP          [phone] PRE-ADMISSION TESTING    21  Surgery Date:   Friday 3/1/19 Is surgery arrival time given by surgeon? NO If [facility] staff will call you (Thursday) between 3 and 7pm the day before your surgery with your arrival time. (If your surgery is on a Monday, we will call you the Friday before.) Call [phone] after 7pm Monday-Friday if you did not receive your arrival time. INSTRUCTIONS BEFORE YOUR SURGERY When You 
Arrive Arrive at the 2nd [address] [facility] on the day of your surgery Have your insurance card, photo ID, and any copayment (if needed) Food 
 and  
Drink NO food or drink after midnight the night before surgery This means NO water, gum, mints, coffee, juice, etc. 
No alcohol (beer, wine, liquor) 24 hours before and after surgery Medications to TAKE Morning of Surgery 1) Home medications reviewed and verified with patient during PAT encounter: 
 
2) MEDICATIONS TO TAKE THE MORNING OF SURGERY WITH A SIP OF WATER:  
? Velafaxine Medications To 
STOP      7 days before surgery ? Non-Steroidal anti-inflammatory Drugs (NSAID's): for example, Ibuprofen (Advil, Motrin), Naproxen (Aleve) ? Aspirin, if taking for pain ? Herbal supplements, vitamins, and fish oil Blood Thinners ? If you take  Aspirin, Plavix, Coumadin, or any blood-thinning or anti-blood clot medicine, talk to the doctor who prescribed the medications for pre-operative instructions. Bathing Clothing Jewelry Valuables ?   If you shower the morning of surgery, please do not apply anything to your skin (lotions, powders, deodorant, or makeup, especially mascara) ? Do not shave or trim anywhere 24 hours before surgery ? Wear your hair loose or down; no pony-tails, buns, or metal hair clips ? Wear loose, comfortable, clean clothes ? Wear glasses instead of contacts ? Leave money, valuables, and jewelry, including body piercings, at home Going Home       or Spending the Night ? SAME-DAY SURGERY: You must have a responsible adult drive you home and stay with you 24 hours after surgery ? ADMITS: If your doctor is keeping you into the hospital after surgery, leave personal belongings/luggage in your car until you have a hospital room number. Hospital discharge time is 12 noon Drivers must be here before 12 noon unless you are told differently Special Instructions Free  parking 7a-5p. Bring completed medication list on day of surgery. Follow all instructions so your surgery wont be cancelled. Please, be on time. If a situation occurs and you are delayed the day of surgery, call [phone] or          [phone]. If your physical condition changes (like a fever, cold, flu, etc.) call your surgeon. The patient was contacted  in person. Home medication reviewed and verified during PAT appointment. The patient verbalizes understanding of all instructions and does not  need reinforcement.

## 2019-03-01 ENCOUNTER — APPOINTMENT (OUTPATIENT)
Dept: GENERAL RADIOLOGY | Age: 74
End: 2019-03-01
Attending: SURGERY
Payer: MEDICARE

## 2019-03-01 ENCOUNTER — ANESTHESIA (OUTPATIENT)
Dept: SURGERY | Age: 74
End: 2019-03-01
Payer: MEDICARE

## 2019-03-01 ENCOUNTER — HOSPITAL ENCOUNTER (OUTPATIENT)
Age: 74
Setting detail: OUTPATIENT SURGERY
Discharge: HOME | End: 2019-03-01
Attending: SURGERY | Admitting: SURGERY
Payer: MEDICARE

## 2019-03-01 VITALS
DIASTOLIC BLOOD PRESSURE: 81 MMHG | HEART RATE: 67 BPM | HEIGHT: 67 IN | SYSTOLIC BLOOD PRESSURE: 124 MMHG | WEIGHT: 192.02 LBS | RESPIRATION RATE: 18 BRPM | BODY MASS INDEX: 30.14 KG/M2 | TEMPERATURE: 97.5 F | OXYGEN SATURATION: 96 %

## 2019-03-01 DIAGNOSIS — C50.112 MALIGNANT NEOPLASM OF CENTRAL PORTION OF LEFT BREAST IN FEMALE, ESTROGEN RECEPTOR POSITIVE (HCC): Primary | ICD-10-CM

## 2019-03-01 DIAGNOSIS — Z17.0 MALIGNANT NEOPLASM OF CENTRAL PORTION OF LEFT BREAST IN FEMALE, ESTROGEN RECEPTOR POSITIVE (HCC): Primary | ICD-10-CM

## 2019-03-01 PROCEDURE — 77030039266 HC ADH SKN EXOFIN S2SG -A: Performed by: SURGERY

## 2019-03-01 PROCEDURE — 88374 M/PHMTRC ALYS ISHQUANT/SEMIQ: CPT

## 2019-03-01 PROCEDURE — 77030002996 HC SUT SLK J&J -A: Performed by: SURGERY

## 2019-03-01 PROCEDURE — 77030003497 HC NDL BIOP TISS BARD -B: Performed by: SURGERY

## 2019-03-01 PROCEDURE — 74011250636 HC RX REV CODE- 250/636

## 2019-03-01 PROCEDURE — 88305 TISSUE EXAM BY PATHOLOGIST: CPT

## 2019-03-01 PROCEDURE — 77030020782 HC GWN BAIR PAWS FLX 3M -B

## 2019-03-01 PROCEDURE — 88360 TUMOR IMMUNOHISTOCHEM/MANUAL: CPT

## 2019-03-01 PROCEDURE — 77030003497 HC NDL BIOP TISS BARD -B

## 2019-03-01 PROCEDURE — 77030002933 HC SUT MCRYL J&J -A: Performed by: SURGERY

## 2019-03-01 PROCEDURE — 76210000046 HC AMBSU PH II REC FIRST 0.5 HR: Performed by: SURGERY

## 2019-03-01 PROCEDURE — 74011250636 HC RX REV CODE- 250/636: Performed by: ANESTHESIOLOGY

## 2019-03-01 PROCEDURE — 88342 IMHCHEM/IMCYTCHM 1ST ANTB: CPT

## 2019-03-01 PROCEDURE — 76030000000 HC AMB SURG OR TIME 0.5 TO 1: Performed by: SURGERY

## 2019-03-01 PROCEDURE — 77030020256 HC SOL INJ NACL 0.9%  500ML: Performed by: SURGERY

## 2019-03-01 PROCEDURE — 77030031139 HC SUT VCRL2 J&J -A: Performed by: SURGERY

## 2019-03-01 PROCEDURE — 88377 M/PHMTRC ALYS ISHQUANT/SEMIQ: CPT

## 2019-03-01 PROCEDURE — 76210000034 HC AMBSU PH I REC 0.5 TO 1 HR: Performed by: SURGERY

## 2019-03-01 PROCEDURE — 76060000061 HC AMB SURG ANES 0.5 TO 1 HR: Performed by: SURGERY

## 2019-03-01 PROCEDURE — 77030032490 HC SLV COMPR SCD KNE COVD -B: Performed by: SURGERY

## 2019-03-01 PROCEDURE — C1788 PORT, INDWELLING, IMP: HCPCS | Performed by: SURGERY

## 2019-03-01 PROCEDURE — 88341 IMHCHEM/IMCYTCHM EA ADD ANTB: CPT

## 2019-03-01 PROCEDURE — 77030018836 HC SOL IRR NACL ICUM -A: Performed by: SURGERY

## 2019-03-01 PROCEDURE — 74011250636 HC RX REV CODE- 250/636: Performed by: SURGERY

## 2019-03-01 PROCEDURE — 76000 FLUOROSCOPY <1 HR PHYS/QHP: CPT

## 2019-03-01 DEVICE — SYSTEM INFUS PRT CATH 8FR L66CM INTRO 8FR CHST TI SGL LUMN: Type: IMPLANTABLE DEVICE | Site: CHEST | Status: FUNCTIONAL

## 2019-03-01 RX ORDER — SODIUM CHLORIDE, SODIUM LACTATE, POTASSIUM CHLORIDE, CALCIUM CHLORIDE 600; 310; 30; 20 MG/100ML; MG/100ML; MG/100ML; MG/100ML
125 INJECTION, SOLUTION INTRAVENOUS CONTINUOUS
Status: DISCONTINUED | OUTPATIENT
Start: 2019-03-01 | End: 2019-03-01 | Stop reason: HOSPADM

## 2019-03-01 RX ORDER — LIDOCAINE HYDROCHLORIDE 10 MG/ML
30 INJECTION INFILTRATION; PERINEURAL
Status: CANCELLED | OUTPATIENT
Start: 2019-03-01 | End: 2019-03-02

## 2019-03-01 RX ORDER — LIDOCAINE HYDROCHLORIDE 10 MG/ML
INJECTION INFILTRATION; PERINEURAL AS NEEDED
Status: DISCONTINUED | OUTPATIENT
Start: 2019-03-01 | End: 2019-03-01 | Stop reason: HOSPADM

## 2019-03-01 RX ORDER — NALOXONE HYDROCHLORIDE 0.4 MG/ML
0.2 INJECTION, SOLUTION INTRAMUSCULAR; INTRAVENOUS; SUBCUTANEOUS
Status: DISCONTINUED | OUTPATIENT
Start: 2019-03-01 | End: 2019-03-01 | Stop reason: HOSPADM

## 2019-03-01 RX ORDER — HEPARIN SODIUM (PORCINE) LOCK FLUSH IV SOLN 100 UNIT/ML 100 UNIT/ML
SOLUTION INTRAVENOUS AS NEEDED
Status: DISCONTINUED | OUTPATIENT
Start: 2019-03-01 | End: 2019-03-01 | Stop reason: HOSPADM

## 2019-03-01 RX ORDER — PROPOFOL 10 MG/ML
INJECTION, EMULSION INTRAVENOUS
Status: DISCONTINUED | OUTPATIENT
Start: 2019-03-01 | End: 2019-03-01 | Stop reason: HOSPADM

## 2019-03-01 RX ORDER — FLUMAZENIL 0.1 MG/ML
0.2 INJECTION INTRAVENOUS
Status: DISCONTINUED | OUTPATIENT
Start: 2019-03-01 | End: 2019-03-01 | Stop reason: HOSPADM

## 2019-03-01 RX ORDER — LIDOCAINE HYDROCHLORIDE 10 MG/ML
0.1 INJECTION, SOLUTION EPIDURAL; INFILTRATION; INTRACAUDAL; PERINEURAL AS NEEDED
Status: DISCONTINUED | OUTPATIENT
Start: 2019-03-01 | End: 2019-03-01 | Stop reason: HOSPADM

## 2019-03-01 RX ORDER — HYDROCODONE BITARTRATE AND ACETAMINOPHEN 5; 325 MG/1; MG/1
1 TABLET ORAL
Qty: 10 TAB | Refills: 0 | Status: SHIPPED | OUTPATIENT
Start: 2019-03-01 | End: 2019-03-04

## 2019-03-01 RX ORDER — HEPARIN SODIUM (PORCINE) LOCK FLUSH IV SOLN 100 UNIT/ML 100 UNIT/ML
500 SOLUTION INTRAVENOUS AS NEEDED
Status: CANCELLED | OUTPATIENT
Start: 2019-03-01

## 2019-03-01 RX ORDER — MIDAZOLAM HYDROCHLORIDE 1 MG/ML
INJECTION, SOLUTION INTRAMUSCULAR; INTRAVENOUS AS NEEDED
Status: DISCONTINUED | OUTPATIENT
Start: 2019-03-01 | End: 2019-03-01 | Stop reason: HOSPADM

## 2019-03-01 RX ORDER — FENTANYL CITRATE 50 UG/ML
INJECTION, SOLUTION INTRAMUSCULAR; INTRAVENOUS AS NEEDED
Status: DISCONTINUED | OUTPATIENT
Start: 2019-03-01 | End: 2019-03-01 | Stop reason: HOSPADM

## 2019-03-01 RX ADMIN — FENTANYL CITRATE 50 MCG: 50 INJECTION, SOLUTION INTRAMUSCULAR; INTRAVENOUS at 12:45

## 2019-03-01 RX ADMIN — SODIUM CHLORIDE, POTASSIUM CHLORIDE, SODIUM LACTATE AND CALCIUM CHLORIDE: 600; 310; 30; 20 INJECTION, SOLUTION INTRAVENOUS at 12:22

## 2019-03-01 RX ADMIN — MIDAZOLAM HYDROCHLORIDE 2 MG: 1 INJECTION, SOLUTION INTRAMUSCULAR; INTRAVENOUS at 12:45

## 2019-03-01 RX ADMIN — PROPOFOL 75 MCG/KG/MIN: 10 INJECTION, EMULSION INTRAVENOUS at 12:25

## 2019-03-01 RX ADMIN — MIDAZOLAM HYDROCHLORIDE 1 MG: 1 INJECTION, SOLUTION INTRAMUSCULAR; INTRAVENOUS at 12:50

## 2019-03-01 RX ADMIN — MIDAZOLAM HYDROCHLORIDE 2 MG: 1 INJECTION, SOLUTION INTRAMUSCULAR; INTRAVENOUS at 12:25

## 2019-03-01 RX ADMIN — FENTANYL CITRATE 50 MCG: 50 INJECTION, SOLUTION INTRAMUSCULAR; INTRAVENOUS at 12:25

## 2019-03-01 NOTE — DISCHARGE INSTRUCTIONS
DISCHARGE SUMMARY from your Nurse      PATIENT INSTRUCTIONS    After general anesthesia or intravenous sedation, for 24 hours or while taking prescription Narcotics:  · Limit your activities  · Do not drive and operate hazardous machinery  · Do not make important personal or business decisions  · Do  not drink alcoholic beverages  · If you have not urinated within 8 hours after discharge, please contact your surgeon on call. Report the following to your surgeon:  · Excessive pain, swelling, redness or odor of or around the surgical area  · Temperature over 100.5  · Nausea and vomiting lasting longer than 4 hours or if unable to take medications  · Any signs of decreased circulation or nerve impairment to extremity: change in color, persistent  numbness, tingling, coldness or increase pain  · Any questions      COUGH AND DEEP BREATHE    Breathing deeply and coughing are very important exercises to do after surgery. Deep breathing and coughing open the little air tubes and air sacks in your lungs. You take deep breaths every day. You may not even notice - it is just something you do when you sigh or yawn. It is a natural exercise you do to keep these air passages open. After surgery, take deep breaths and cough, on purpose. DIRECTIONS:  · Take 10 to 15 slow deep breaths every hour while awake. · Breathe in deeply, and hold it for 2 seconds. · Exhale slowly through puckered lips, like blowing up a balloon. · After every 4th or 5th deep breath, hug your pillow to your chest or belly and give a hard, deep cough. Yes, it will probably hurt. But doing this exercise is a very important part of healing after surgery. Take your pain medicine to help you do this exercise without too much pain. Coughing and deep breathing help prevent bronchitis and pneumonia after surgery.   If you had chest or belly surgery, use a pillow as a \"hug buddy\" and hold it tightly to your chest or belly when you cough.       ANKLE PUMPS    Ankle pumps increase the circulation of oxygenated blood to your lower extremities and decrease your risk for circulation problems such as blood clots. They also stretch the muscles, tendons and ligaments in your foot and ankle, and prevent joint contracture in the ankle and foot, especially after surgeries on the legs. It is important to do ankle pump exercises regularly after surgery because immobility increases your risk for developing a blood clot. Your doctor may also have you take an Aspirin for the next few days as well. If your doctor did not ask you to take an Aspirin, consult with him before starting Aspirin therapy on your own. The exercise is quite simple. · Slowly point your foot forward, feeling the muscles on the top of your lower leg stretch, and hold this position for 5 seconds. · Next, pull your foot back toward you as far as possible, stretching the calf muscles, and hold that position for 5 seconds. · Repeat with the other foot. · Perform 10 repetitions every hour while awake for both ankles if possible (down and then up with the foot once is one repetition). You should feel gentle stretching of the muscles in your lower leg when doing this exercise. If you feel pain, or your range of motion is limited, don't push too hard. Only go the limit your joint and muscles will let you go. If you have increasing pain, progressively worsening leg warmth or swelling, STOP the exercise and call your doctor. MEDICATION AND   SIDE EFFECT GUIDE    The [employer] MEDICATION AND SIDE EFFECT GUIDE was provided to the PATIENT AND CARE PROVIDER. Information provided includes instruction about drug purpose and common side effects for the following medications:   · Norco        These are general instructions for a healthy lifestyle:    *   Please give a list of your current medications to your Primary Care Provider.   * Please update this list whenever your medications are discontinued, doses are changed, or new medications (including over-the-counter products) are added. *   Please carry medication information at all times in case of emergency situations. About Smoking  No smoking / No tobacco products  Avoid exposure to second hand smoke     Surgeon General's Warning:  Quitting smoking now greatly reduces serious risk to your health. Obesity, smoking, and sedentary lifestyle greatly increases your risk for illness and disease. A healthy diet, regular physical exercise & weight monitoring are important for maintaining a healthy lifestyle. Congestive Heart Failure  You may be retaining fluid if you have a history of heart failure or if you experience any of the following symptoms:  Weight gain of 3 pounds or more overnight or 5 pounds in a week, increased swelling in your hands or feet or shortness of breath while lying flat in bed. Please call your doctor as soon as you notice any of these symptoms; do not wait until your next office visit. Recognize signs and symptoms of STROKE:  F -  Face looks uneven  A -  Arms unable to move or move evenly  S -  Speech slurred or non-existent  T -  Time-call 911 as soon as signs and symptoms begin-DO NOT go          back to bed or wait to see if you get better-TIME IS BRAIN. Warning Signs of HEART ATTACK   Call 911 if you have these symptoms:     Chest discomfort. Most heart attacks involve discomfort in the center of the chest that lasts more than a few minutes, or that goes away and comes back. It can feel like uncomfortable pressure, squeezing, fullness, or pain.  Discomfort in other areas of the upper body. Symptoms can include pain or discomfort in one or both arms, the back, neck, jaw, or stomach.  Shortness of breath with or without chest discomfort.  Other signs may include breaking out in a cold sweat, nausea, or lightheadedness.     Don't wait more than five minutes to call 911 - MINUTES MATTER! Fast action can save your life. Calling 911 is almost always the fastest way to get lifesaving treatment. Emergency Medical Services staff can begin treatment when they arrive -- up to an hour sooner than if someone gets to the hospital by car. The discharge information has been reviewed with the patient and friend. Any questions and concerns from the patient and friend have been addressed. The patient and friend verbalized understanding. Other information in your discharge envelope:  []     PRESCRIPTIONS  []     PHYSICAL THERAPY PRESCRIPTION  []     APPOINTMENT CARDS  []     Regional Anesthesia Pamphlet for block or block with On-Q Catheter from   Anesthesia Service  []     Medical device information sheets/pamphlets from their    []     School/work excuse note. []     /parent work excuse note. The following personal items collected during your admission are returned to you:   Dental Appliance: Dental Appliances: None  Vision: Visual Aid: Glasses  Hearing Aid:    Jewelry: Jewelry: None  Clothing: Clothing: Shirt, Undergarments, Pants, Footwear, Jacket/Coat  Other Valuables: Other Valuables: Eyeglasses  Valuables sent to safe: Personal Items Sent to Safe: none                    Discharge Instructions from Dr. Estelita Bess    Diet:Regular  Activity: As tolerated. Wound care: Okay to shower or take a bath. Dressing: The skin glue is waterproof. It is okay to wash normally at this site. If the glue is still present after 10 days you should peel it off. Pain:  You may use an ice pack and over the counter pain medication or hydrocodone if needed. Problems/Questions: Call Ginger Peña MD on my cell phone.  [phone]

## 2019-03-01 NOTE — ANESTHESIA PREPROCEDURE EVALUATION
Anesthetic History No history of anesthetic complications Review of Systems / Medical History Patient summary reviewed, nursing notes reviewed and pertinent labs reviewed Pulmonary Within defined limits Neuro/Psych Within defined limits Cardiovascular Within defined limits Hypertension Exercise tolerance: >4 METS 
  
GI/Hepatic/Renal 
Within defined limits GERD Endo/Other Cancer Other Findings Comments: Breast ca Physical Exam 
 
Airway Mallampati: II 
 
Neck ROM: normal range of motion Mouth opening: Normal 
 
 Cardiovascular Regular rate and rhythm,  S1 and S2 normal,  no murmur, click, rub, or gallop Rhythm: regular Rate: normal 
 
 
 
 Dental 
No notable dental hx Pulmonary Breath sounds clear to auscultation Abdominal 
GI exam deferred Other Findings Anesthetic Plan ASA: 2 Anesthesia type: MAC Induction: Intravenous Anesthetic plan and risks discussed with: Patient

## 2019-03-01 NOTE — INTERVAL H&P NOTE
H&P Update: Desiree Roberts was seen and examined. History and physical has been reviewed. The patient has been examined.  There have been no significant clinical changes since the completion of the originally dated History and Physical. 
 
Signed By: Kiki Flood MD   
 March 1, 2019 11:57 AM

## 2019-03-01 NOTE — ANESTHESIA POSTPROCEDURE EVALUATION
Procedure(s): PORTACATH INSERTION AND LEFT AXILLARY LYMPHNODE CORE BIOPSY. Anesthesia Post Evaluation Multimodal analgesia: multimodal analgesia not used between 6 hours prior to anesthesia start to PACU discharge Patient location during evaluation: bedside Patient participation: complete - patient participated Level of consciousness: awake Pain management: adequate Airway patency: patent Anesthetic complications: no 
Cardiovascular status: acceptable Respiratory status: acceptable Hydration status: acceptable Post anesthesia nausea and vomiting:  controlled Visit Vitals /81 Pulse 67 Temp 36.4 °C (97.5 °F) Resp 18 Ht 5' 6.5\" (1.689 m) Wt 87.1 kg (192 lb 0.3 oz) SpO2 96% BMI 30.53 kg/m²

## 2019-03-01 NOTE — OP NOTES
Mik Garcia [facility] [address] [facility] [address]    Name: Giana Merino  : 1945  Portacath Insertion   Operative Report     Date of Surgery: 3/1/2019  Preoperative Diagnosis: Left breast cancer, central breast, and enlarged LEFT axillary lymph node  Postoperative Diagnosis: same   Surgeon(s) and Role:     Gay Hayden MD - Primary   Anesthesia: MAC  Procedure: Portacath insertion and LEFT axillary lymph node needle biopsy    Procedure Note:   The patient was sedated with intravenous sedation. The RIGHT neck and chest and LEFT axillar were prepped and draped in the usual sterile fashion. The patient was placed in Trendelenburg. Lidocaine 1% plain was used for local anesthesia. The LEFT axillary lymph node was identified with ultrasound. 3 core biopsies were obtained with a 20 gauge needle. The specimen was sent to pathology. The RIGHT internal jugular vein was identified with ultrasound, and under ultrasound guidance an 18 gauge needle was inserted into the internal jugular vein. A guidewire was passed through the needle and under fluoroscopic guidance was advanced into the superior vena cava. A 3cm horizontal incision was made just inferior to the clavicle and a pocket was created for the port. The catheter was tunneled from the chest to the neck incision. A dilator with pull-away sheath was inserted over the wire and the wire and dilator were removed leaving the pull-away sheath in place. Under fluoroscopic guidance the catheter was inserted and positioned at the junction of the right atrium and the superior vena cava. The pull-away sheath was removed . The catheter was trimmed to size and attached to the port. The port was tacked to the pectoralis major fascia with 3-0 Vicryl suture. The port was flushed with Heparin. The dermis was re-approximated with 3-0 Vicryl and the skin was closed with 4-0 Monocryl.  The wound was dressed with skin glue and the patient was awakened and taken to the recovery room. Sponge needle and instrument counts were reported to be correct. A portable chest x-ray was ordered in the PACU.   Estimated Blood Loss:  10 cc  Complications: none  Implants: Angiodynamics Smartport 8 fr  Assistant: none    Signed:  Yvonne Arteaga MD

## 2019-03-05 DIAGNOSIS — Z79.899 ENCOUNTER FOR MONITORING CARDIOTOXIC DRUG THERAPY: ICD-10-CM

## 2019-03-05 DIAGNOSIS — Z51.81 ENCOUNTER FOR MONITORING CARDIOTOXIC DRUG THERAPY: ICD-10-CM

## 2019-03-05 DIAGNOSIS — C50.919 MALIGNANT NEOPLASM OF FEMALE BREAST, UNSPECIFIED ESTROGEN RECEPTOR STATUS, UNSPECIFIED LATERALITY, UNSPECIFIED SITE OF BREAST (HCC): Primary | ICD-10-CM

## 2019-03-05 RX ORDER — PALONOSETRON 0.05 MG/ML
0.25 INJECTION, SOLUTION INTRAVENOUS ONCE
Status: CANCELLED
Start: 2019-05-29 | End: 2019-05-08

## 2019-03-05 RX ORDER — DEXAMETHASONE SODIUM PHOSPHATE 100 MG/10ML
10 INJECTION INTRAMUSCULAR; INTRAVENOUS ONCE
Status: CANCELLED | OUTPATIENT
Start: 2019-05-08 | End: 2019-04-17

## 2019-03-05 RX ORDER — SODIUM CHLORIDE 9 MG/ML
25 INJECTION, SOLUTION INTRAVENOUS CONTINUOUS
Status: CANCELLED | OUTPATIENT
Start: 2019-05-08

## 2019-03-05 RX ORDER — SODIUM CHLORIDE 0.9 % (FLUSH) 0.9 %
10 SYRINGE (ML) INJECTION AS NEEDED
Status: CANCELLED
Start: 2019-05-29

## 2019-03-05 RX ORDER — DIPHENHYDRAMINE HYDROCHLORIDE 50 MG/ML
50 INJECTION, SOLUTION INTRAMUSCULAR; INTRAVENOUS AS NEEDED
Status: CANCELLED
Start: 2019-03-27

## 2019-03-05 RX ORDER — SODIUM CHLORIDE 9 MG/ML
25 INJECTION, SOLUTION INTRAVENOUS CONTINUOUS
Status: CANCELLED | OUTPATIENT
Start: 2019-07-10

## 2019-03-05 RX ORDER — DIPHENHYDRAMINE HYDROCHLORIDE 50 MG/ML
50 INJECTION, SOLUTION INTRAMUSCULAR; INTRAVENOUS AS NEEDED
Status: CANCELLED
Start: 2019-05-29

## 2019-03-05 RX ORDER — DEXAMETHASONE SODIUM PHOSPHATE 100 MG/10ML
10 INJECTION INTRAMUSCULAR; INTRAVENOUS ONCE
Status: CANCELLED | OUTPATIENT
Start: 2019-03-27 | End: 2019-03-27

## 2019-03-05 RX ORDER — SODIUM CHLORIDE 9 MG/ML
10 INJECTION INTRAMUSCULAR; INTRAVENOUS; SUBCUTANEOUS AS NEEDED
Status: CANCELLED | OUTPATIENT
Start: 2019-06-19

## 2019-03-05 RX ORDER — EPINEPHRINE 1 MG/ML
0.3 INJECTION, SOLUTION, CONCENTRATE INTRAVENOUS AS NEEDED
Status: CANCELLED | OUTPATIENT
Start: 2019-07-10

## 2019-03-05 RX ORDER — DEXAMETHASONE SODIUM PHOSPHATE 100 MG/10ML
10 INJECTION INTRAMUSCULAR; INTRAVENOUS ONCE
Status: CANCELLED | OUTPATIENT
Start: 2019-06-19 | End: 2019-05-29

## 2019-03-05 RX ORDER — ONDANSETRON 2 MG/ML
8 INJECTION INTRAMUSCULAR; INTRAVENOUS AS NEEDED
Status: CANCELLED | OUTPATIENT
Start: 2019-05-29

## 2019-03-05 RX ORDER — HEPARIN 100 UNIT/ML
300-500 SYRINGE INTRAVENOUS AS NEEDED
Status: CANCELLED
Start: 2019-06-19

## 2019-03-05 RX ORDER — ALBUTEROL SULFATE 0.83 MG/ML
2.5 SOLUTION RESPIRATORY (INHALATION) AS NEEDED
Status: CANCELLED
Start: 2019-06-19

## 2019-03-05 RX ORDER — DIPHENHYDRAMINE HYDROCHLORIDE 50 MG/ML
50 INJECTION, SOLUTION INTRAMUSCULAR; INTRAVENOUS
Status: CANCELLED | OUTPATIENT
Start: 2019-05-29

## 2019-03-05 RX ORDER — EPINEPHRINE 1 MG/ML
0.3 INJECTION, SOLUTION, CONCENTRATE INTRAVENOUS AS NEEDED
Status: CANCELLED | OUTPATIENT
Start: 2019-05-08

## 2019-03-05 RX ORDER — HEPARIN 100 UNIT/ML
300-500 SYRINGE INTRAVENOUS AS NEEDED
Status: CANCELLED
Start: 2019-05-08

## 2019-03-05 RX ORDER — DIPHENHYDRAMINE HYDROCHLORIDE 50 MG/ML
50 INJECTION, SOLUTION INTRAMUSCULAR; INTRAVENOUS
Status: CANCELLED | OUTPATIENT
Start: 2019-03-27

## 2019-03-05 RX ORDER — DIPHENHYDRAMINE HYDROCHLORIDE 50 MG/ML
50 INJECTION, SOLUTION INTRAMUSCULAR; INTRAVENOUS AS NEEDED
Status: CANCELLED
Start: 2019-07-10

## 2019-03-05 RX ORDER — SODIUM CHLORIDE 9 MG/ML
10 INJECTION INTRAMUSCULAR; INTRAVENOUS; SUBCUTANEOUS AS NEEDED
Status: CANCELLED | OUTPATIENT
Start: 2019-07-10

## 2019-03-05 RX ORDER — DIPHENHYDRAMINE HYDROCHLORIDE 50 MG/ML
50 INJECTION, SOLUTION INTRAMUSCULAR; INTRAVENOUS AS NEEDED
Status: CANCELLED
Start: 2019-05-08

## 2019-03-05 RX ORDER — DEXAMETHASONE SODIUM PHOSPHATE 100 MG/10ML
10 INJECTION INTRAMUSCULAR; INTRAVENOUS ONCE
Status: CANCELLED | OUTPATIENT
Start: 2019-05-29 | End: 2019-05-08

## 2019-03-05 RX ORDER — ACETAMINOPHEN 325 MG/1
650 TABLET ORAL AS NEEDED
Status: CANCELLED
Start: 2019-07-10

## 2019-03-05 RX ORDER — ACETAMINOPHEN 325 MG/1
650 TABLET ORAL
Status: CANCELLED | OUTPATIENT
Start: 2019-05-08

## 2019-03-05 RX ORDER — ACETAMINOPHEN 325 MG/1
650 TABLET ORAL
Status: CANCELLED | OUTPATIENT
Start: 2019-05-29

## 2019-03-05 RX ORDER — DIPHENHYDRAMINE HYDROCHLORIDE 50 MG/ML
50 INJECTION, SOLUTION INTRAMUSCULAR; INTRAVENOUS AS NEEDED
Status: CANCELLED
Start: 2019-06-19

## 2019-03-05 RX ORDER — ALBUTEROL SULFATE 0.83 MG/ML
2.5 SOLUTION RESPIRATORY (INHALATION) AS NEEDED
Status: CANCELLED
Start: 2019-05-08

## 2019-03-05 RX ORDER — DIPHENHYDRAMINE HYDROCHLORIDE 50 MG/ML
50 INJECTION, SOLUTION INTRAMUSCULAR; INTRAVENOUS
Status: CANCELLED | OUTPATIENT
Start: 2019-06-19

## 2019-03-05 RX ORDER — HYDROCORTISONE SODIUM SUCCINATE 100 MG/2ML
100 INJECTION, POWDER, FOR SOLUTION INTRAMUSCULAR; INTRAVENOUS AS NEEDED
Status: CANCELLED | OUTPATIENT
Start: 2019-03-27

## 2019-03-05 RX ORDER — HEPARIN 100 UNIT/ML
300-500 SYRINGE INTRAVENOUS AS NEEDED
Status: CANCELLED
Start: 2019-07-10

## 2019-03-05 RX ORDER — ONDANSETRON 2 MG/ML
8 INJECTION INTRAMUSCULAR; INTRAVENOUS AS NEEDED
Status: CANCELLED | OUTPATIENT
Start: 2019-03-27

## 2019-03-05 RX ORDER — SODIUM CHLORIDE 9 MG/ML
10 INJECTION INTRAMUSCULAR; INTRAVENOUS; SUBCUTANEOUS AS NEEDED
Status: CANCELLED | OUTPATIENT
Start: 2019-03-27

## 2019-03-05 RX ORDER — HYDROCORTISONE SODIUM SUCCINATE 100 MG/2ML
100 INJECTION, POWDER, FOR SOLUTION INTRAMUSCULAR; INTRAVENOUS AS NEEDED
Status: CANCELLED | OUTPATIENT
Start: 2019-05-08

## 2019-03-05 RX ORDER — HYDROCORTISONE SODIUM SUCCINATE 100 MG/2ML
100 INJECTION, POWDER, FOR SOLUTION INTRAMUSCULAR; INTRAVENOUS AS NEEDED
Status: CANCELLED | OUTPATIENT
Start: 2019-06-19

## 2019-03-05 RX ORDER — PALONOSETRON 0.05 MG/ML
0.25 INJECTION, SOLUTION INTRAVENOUS ONCE
Status: CANCELLED
Start: 2019-07-10 | End: 2019-06-19

## 2019-03-05 RX ORDER — EPINEPHRINE 1 MG/ML
0.3 INJECTION, SOLUTION, CONCENTRATE INTRAVENOUS AS NEEDED
Status: CANCELLED | OUTPATIENT
Start: 2019-05-29

## 2019-03-05 RX ORDER — PALONOSETRON 0.05 MG/ML
0.25 INJECTION, SOLUTION INTRAVENOUS ONCE
Status: CANCELLED
Start: 2019-06-19 | End: 2019-05-29

## 2019-03-05 RX ORDER — ONDANSETRON 2 MG/ML
8 INJECTION INTRAMUSCULAR; INTRAVENOUS AS NEEDED
Status: CANCELLED | OUTPATIENT
Start: 2019-07-10

## 2019-03-05 RX ORDER — DEXAMETHASONE SODIUM PHOSPHATE 100 MG/10ML
10 INJECTION INTRAMUSCULAR; INTRAVENOUS ONCE
Status: CANCELLED | OUTPATIENT
Start: 2019-07-10 | End: 2019-06-19

## 2019-03-05 RX ORDER — ACETAMINOPHEN 325 MG/1
650 TABLET ORAL
Status: CANCELLED | OUTPATIENT
Start: 2019-03-27

## 2019-03-05 RX ORDER — PALONOSETRON 0.05 MG/ML
0.25 INJECTION, SOLUTION INTRAVENOUS ONCE
Status: CANCELLED
Start: 2019-05-08 | End: 2019-04-17

## 2019-03-05 RX ORDER — SODIUM CHLORIDE 0.9 % (FLUSH) 0.9 %
10 SYRINGE (ML) INJECTION AS NEEDED
Status: CANCELLED
Start: 2019-07-10

## 2019-03-05 RX ORDER — ACETAMINOPHEN 325 MG/1
650 TABLET ORAL
Status: CANCELLED | OUTPATIENT
Start: 2019-06-19

## 2019-03-05 RX ORDER — SODIUM CHLORIDE 9 MG/ML
10 INJECTION INTRAMUSCULAR; INTRAVENOUS; SUBCUTANEOUS AS NEEDED
Status: CANCELLED | OUTPATIENT
Start: 2019-05-08

## 2019-03-05 RX ORDER — ACETAMINOPHEN 325 MG/1
650 TABLET ORAL
Status: CANCELLED | OUTPATIENT
Start: 2019-07-10

## 2019-03-05 RX ORDER — EPINEPHRINE 1 MG/ML
0.3 INJECTION, SOLUTION, CONCENTRATE INTRAVENOUS AS NEEDED
Status: CANCELLED | OUTPATIENT
Start: 2019-06-19

## 2019-03-05 RX ORDER — ALBUTEROL SULFATE 0.83 MG/ML
2.5 SOLUTION RESPIRATORY (INHALATION) AS NEEDED
Status: CANCELLED
Start: 2019-05-29

## 2019-03-05 RX ORDER — EPINEPHRINE 1 MG/ML
0.3 INJECTION, SOLUTION, CONCENTRATE INTRAVENOUS AS NEEDED
Status: CANCELLED | OUTPATIENT
Start: 2019-03-27

## 2019-03-05 RX ORDER — SODIUM CHLORIDE 9 MG/ML
25 INJECTION, SOLUTION INTRAVENOUS CONTINUOUS
Status: CANCELLED | OUTPATIENT
Start: 2019-05-29

## 2019-03-05 RX ORDER — SODIUM CHLORIDE 0.9 % (FLUSH) 0.9 %
10 SYRINGE (ML) INJECTION AS NEEDED
Status: CANCELLED
Start: 2019-03-27

## 2019-03-05 RX ORDER — ONDANSETRON 2 MG/ML
8 INJECTION INTRAMUSCULAR; INTRAVENOUS AS NEEDED
Status: CANCELLED | OUTPATIENT
Start: 2019-06-19

## 2019-03-05 RX ORDER — ACETAMINOPHEN 325 MG/1
650 TABLET ORAL AS NEEDED
Status: CANCELLED
Start: 2019-03-27

## 2019-03-05 RX ORDER — HEPARIN 100 UNIT/ML
300-500 SYRINGE INTRAVENOUS AS NEEDED
Status: CANCELLED
Start: 2019-05-29

## 2019-03-05 RX ORDER — HYDROCORTISONE SODIUM SUCCINATE 100 MG/2ML
100 INJECTION, POWDER, FOR SOLUTION INTRAMUSCULAR; INTRAVENOUS AS NEEDED
Status: CANCELLED | OUTPATIENT
Start: 2019-05-29

## 2019-03-05 RX ORDER — DIPHENHYDRAMINE HYDROCHLORIDE 50 MG/ML
50 INJECTION, SOLUTION INTRAMUSCULAR; INTRAVENOUS
Status: CANCELLED | OUTPATIENT
Start: 2019-05-08

## 2019-03-05 RX ORDER — HYDROCORTISONE SODIUM SUCCINATE 100 MG/2ML
100 INJECTION, POWDER, FOR SOLUTION INTRAMUSCULAR; INTRAVENOUS AS NEEDED
Status: CANCELLED | OUTPATIENT
Start: 2019-07-10

## 2019-03-05 RX ORDER — ACETAMINOPHEN 325 MG/1
650 TABLET ORAL AS NEEDED
Status: CANCELLED
Start: 2019-05-29

## 2019-03-05 RX ORDER — ONDANSETRON 2 MG/ML
8 INJECTION INTRAMUSCULAR; INTRAVENOUS AS NEEDED
Status: CANCELLED | OUTPATIENT
Start: 2019-05-08

## 2019-03-05 RX ORDER — ACETAMINOPHEN 325 MG/1
650 TABLET ORAL AS NEEDED
Status: CANCELLED
Start: 2019-06-19

## 2019-03-05 RX ORDER — PALONOSETRON 0.05 MG/ML
0.25 INJECTION, SOLUTION INTRAVENOUS ONCE
Status: CANCELLED
Start: 2019-03-27 | End: 2019-03-27

## 2019-03-05 RX ORDER — DIPHENHYDRAMINE HYDROCHLORIDE 50 MG/ML
50 INJECTION, SOLUTION INTRAMUSCULAR; INTRAVENOUS
Status: CANCELLED | OUTPATIENT
Start: 2019-07-10

## 2019-03-05 RX ORDER — SODIUM CHLORIDE 0.9 % (FLUSH) 0.9 %
10 SYRINGE (ML) INJECTION AS NEEDED
Status: CANCELLED
Start: 2019-06-19

## 2019-03-05 RX ORDER — ACETAMINOPHEN 325 MG/1
650 TABLET ORAL AS NEEDED
Status: CANCELLED
Start: 2019-05-08

## 2019-03-05 RX ORDER — SODIUM CHLORIDE 0.9 % (FLUSH) 0.9 %
10 SYRINGE (ML) INJECTION AS NEEDED
Status: CANCELLED
Start: 2019-05-08

## 2019-03-05 RX ORDER — SODIUM CHLORIDE 9 MG/ML
25 INJECTION, SOLUTION INTRAVENOUS CONTINUOUS
Status: CANCELLED | OUTPATIENT
Start: 2019-06-19

## 2019-03-05 RX ORDER — SODIUM CHLORIDE 9 MG/ML
10 INJECTION INTRAMUSCULAR; INTRAVENOUS; SUBCUTANEOUS AS NEEDED
Status: CANCELLED | OUTPATIENT
Start: 2019-05-29

## 2019-03-05 RX ORDER — HEPARIN 100 UNIT/ML
300-500 SYRINGE INTRAVENOUS AS NEEDED
Status: CANCELLED
Start: 2019-03-27

## 2019-03-05 RX ORDER — ALBUTEROL SULFATE 0.83 MG/ML
2.5 SOLUTION RESPIRATORY (INHALATION) AS NEEDED
Status: CANCELLED
Start: 2019-07-10

## 2019-03-05 RX ORDER — ALBUTEROL SULFATE 0.83 MG/ML
2.5 SOLUTION RESPIRATORY (INHALATION) AS NEEDED
Status: CANCELLED
Start: 2019-03-27

## 2019-03-05 RX ORDER — SODIUM CHLORIDE 9 MG/ML
25 INJECTION, SOLUTION INTRAVENOUS CONTINUOUS
Status: CANCELLED | OUTPATIENT
Start: 2019-03-27

## 2019-03-06 ENCOUNTER — OFFICE VISIT (OUTPATIENT)
Dept: ONCOLOGY | Age: 74
End: 2019-03-06

## 2019-03-06 ENCOUNTER — HOSPITAL ENCOUNTER (OUTPATIENT)
Dept: INFUSION THERAPY | Age: 74
Discharge: HOME | End: 2019-03-06
Payer: MEDICARE

## 2019-03-06 VITALS
SYSTOLIC BLOOD PRESSURE: 133 MMHG | BODY MASS INDEX: 28.63 KG/M2 | RESPIRATION RATE: 18 BRPM | TEMPERATURE: 96.9 F | DIASTOLIC BLOOD PRESSURE: 84 MMHG | HEART RATE: 89 BPM | OXYGEN SATURATION: 97 % | HEIGHT: 67 IN | WEIGHT: 182.4 LBS

## 2019-03-06 VITALS
SYSTOLIC BLOOD PRESSURE: 137 MMHG | RESPIRATION RATE: 18 BRPM | DIASTOLIC BLOOD PRESSURE: 77 MMHG | HEART RATE: 86 BPM | TEMPERATURE: 96.9 F | HEIGHT: 66 IN | BODY MASS INDEX: 29.32 KG/M2 | WEIGHT: 182.4 LBS

## 2019-03-06 DIAGNOSIS — Z17.0 MALIGNANT NEOPLASM OF CENTRAL PORTION OF LEFT BREAST IN FEMALE, ESTROGEN RECEPTOR POSITIVE (HCC): Primary | ICD-10-CM

## 2019-03-06 DIAGNOSIS — C50.912 MALIGNANT NEOPLASM OF LEFT FEMALE BREAST, UNSPECIFIED ESTROGEN RECEPTOR STATUS, UNSPECIFIED SITE OF BREAST (HCC): Primary | ICD-10-CM

## 2019-03-06 DIAGNOSIS — C50.112 MALIGNANT NEOPLASM OF CENTRAL PORTION OF LEFT BREAST IN FEMALE, ESTROGEN RECEPTOR POSITIVE (HCC): Primary | ICD-10-CM

## 2019-03-06 DIAGNOSIS — Z51.11 CHEMOTHERAPY MANAGEMENT, ENCOUNTER FOR: ICD-10-CM

## 2019-03-06 DIAGNOSIS — Z72.0 TOBACCO USE: ICD-10-CM

## 2019-03-06 LAB
ALBUMIN SERPL-MCNC: 3.7 G/DL (ref 3.5–5)
ALBUMIN/GLOB SERPL: 0.9 {RATIO} (ref 1.1–2.2)
ALP SERPL-CCNC: 76 U/L (ref 45–117)
ALT SERPL-CCNC: 30 U/L (ref 12–78)
ANION GAP SERPL CALC-SCNC: 9 MMOL/L (ref 5–15)
AST SERPL-CCNC: 14 U/L (ref 15–37)
BASOPHILS # BLD: 0 K/UL (ref 0–0.1)
BASOPHILS NFR BLD: 0 % (ref 0–1)
BILIRUB SERPL-MCNC: 0.5 MG/DL (ref 0.2–1)
BUN SERPL-MCNC: 21 MG/DL (ref 6–20)
BUN/CREAT SERPL: 23 (ref 12–20)
CALCIUM SERPL-MCNC: 9.4 MG/DL (ref 8.5–10.1)
CHLORIDE SERPL-SCNC: 104 MMOL/L (ref 97–108)
CO2 SERPL-SCNC: 29 MMOL/L (ref 21–32)
CREAT SERPL-MCNC: 0.9 MG/DL (ref 0.55–1.02)
DIFFERENTIAL METHOD BLD: ABNORMAL
EOSINOPHIL # BLD: 0 K/UL (ref 0–0.4)
EOSINOPHIL NFR BLD: 0 % (ref 0–7)
ERYTHROCYTE [DISTWIDTH] IN BLOOD BY AUTOMATED COUNT: 13.3 % (ref 11.5–14.5)
GLOBULIN SER CALC-MCNC: 4.1 G/DL (ref 2–4)
GLUCOSE SERPL-MCNC: 107 MG/DL (ref 65–100)
HCT VFR BLD AUTO: 45.2 % (ref 35–47)
HGB BLD-MCNC: 14.9 G/DL (ref 11.5–16)
IMM GRANULOCYTES # BLD AUTO: 0.1 K/UL (ref 0–0.04)
IMM GRANULOCYTES NFR BLD AUTO: 0 % (ref 0–0.5)
LYMPHOCYTES # BLD: 1.3 K/UL (ref 0.8–3.5)
LYMPHOCYTES NFR BLD: 10 % (ref 12–49)
MCH RBC QN AUTO: 29.9 PG (ref 26–34)
MCHC RBC AUTO-ENTMCNC: 33 G/DL (ref 30–36.5)
MCV RBC AUTO: 90.8 FL (ref 80–99)
MONOCYTES # BLD: 0.5 K/UL (ref 0–1)
MONOCYTES NFR BLD: 4 % (ref 5–13)
NEUTS SEG # BLD: 10.2 K/UL (ref 1.8–8)
NEUTS SEG NFR BLD: 85 % (ref 32–75)
NRBC # BLD: 0 K/UL (ref 0–0.01)
NRBC BLD-RTO: 0 PER 100 WBC
PLATELET # BLD AUTO: 350 K/UL (ref 150–400)
PMV BLD AUTO: 8.9 FL (ref 8.9–12.9)
POTASSIUM SERPL-SCNC: 3.9 MMOL/L (ref 3.5–5.1)
PROT SERPL-MCNC: 7.8 G/DL (ref 6.4–8.2)
RBC # BLD AUTO: 4.98 M/UL (ref 3.8–5.2)
SODIUM SERPL-SCNC: 142 MMOL/L (ref 136–145)
WBC # BLD AUTO: 12.1 K/UL (ref 3.6–11)

## 2019-03-06 PROCEDURE — 96367 TX/PROPH/DG ADDL SEQ IV INF: CPT

## 2019-03-06 PROCEDURE — 96377 APPLICATON ON-BODY INJECTOR: CPT

## 2019-03-06 PROCEDURE — 96415 CHEMO IV INFUSION ADDL HR: CPT

## 2019-03-06 PROCEDURE — 74011000258 HC RX REV CODE- 258: Performed by: INTERNAL MEDICINE

## 2019-03-06 PROCEDURE — 80053 COMPREHEN METABOLIC PANEL: CPT

## 2019-03-06 PROCEDURE — 74011250636 HC RX REV CODE- 250/636: Performed by: INTERNAL MEDICINE

## 2019-03-06 PROCEDURE — 36415 COLL VENOUS BLD VENIPUNCTURE: CPT

## 2019-03-06 PROCEDURE — 96417 CHEMO IV INFUS EACH ADDL SEQ: CPT

## 2019-03-06 PROCEDURE — 85025 COMPLETE CBC W/AUTO DIFF WBC: CPT

## 2019-03-06 PROCEDURE — 96413 CHEMO IV INFUSION 1 HR: CPT

## 2019-03-06 PROCEDURE — 77030012965 HC NDL HUBR BBMI -A

## 2019-03-06 PROCEDURE — 74011250636 HC RX REV CODE- 250/636

## 2019-03-06 PROCEDURE — 96375 TX/PRO/DX INJ NEW DRUG ADDON: CPT

## 2019-03-06 RX ORDER — PALONOSETRON 0.05 MG/ML
0.25 INJECTION, SOLUTION INTRAVENOUS ONCE
Status: COMPLETED | OUTPATIENT
Start: 2019-03-06 | End: 2019-03-06

## 2019-03-06 RX ORDER — SODIUM CHLORIDE 0.9 % (FLUSH) 0.9 %
10 SYRINGE (ML) INJECTION AS NEEDED
Status: ACTIVE | OUTPATIENT
Start: 2019-03-06 | End: 2019-03-06

## 2019-03-06 RX ORDER — HEPARIN 100 UNIT/ML
300-500 SYRINGE INTRAVENOUS AS NEEDED
Status: ACTIVE | OUTPATIENT
Start: 2019-03-06 | End: 2019-03-06

## 2019-03-06 RX ORDER — DEXAMETHASONE SODIUM PHOSPHATE 100 MG/10ML
10 INJECTION INTRAMUSCULAR; INTRAVENOUS ONCE
Status: COMPLETED | OUTPATIENT
Start: 2019-03-06 | End: 2019-03-06

## 2019-03-06 RX ORDER — SODIUM CHLORIDE 9 MG/ML
10 INJECTION INTRAMUSCULAR; INTRAVENOUS; SUBCUTANEOUS AS NEEDED
Status: ACTIVE | OUTPATIENT
Start: 2019-03-06 | End: 2019-03-06

## 2019-03-06 RX ORDER — SODIUM CHLORIDE 9 MG/ML
25 INJECTION, SOLUTION INTRAVENOUS CONTINUOUS
Status: DISPENSED | OUTPATIENT
Start: 2019-03-06 | End: 2019-03-06

## 2019-03-06 RX ADMIN — PERTUZUMAB 840 MG: 30 INJECTION, SOLUTION, CONCENTRATE INTRAVENOUS at 12:01

## 2019-03-06 RX ADMIN — PALONOSETRON 0.25 MG: 0.05 INJECTION, SOLUTION INTRAVENOUS at 09:53

## 2019-03-06 RX ADMIN — TRASTUZUMAB 668 MG: 150 INJECTION, POWDER, LYOPHILIZED, FOR SOLUTION INTRAVENOUS at 10:26

## 2019-03-06 RX ADMIN — SODIUM CHLORIDE 25 ML/HR: 900 INJECTION, SOLUTION INTRAVENOUS at 09:43

## 2019-03-06 RX ADMIN — Medication 10 ML: at 15:42

## 2019-03-06 RX ADMIN — SODIUM CHLORIDE 150 MG: 900 INJECTION, SOLUTION INTRAVENOUS at 10:04

## 2019-03-06 RX ADMIN — PEGFILGRASTIM 6 MG: KIT SUBCUTANEOUS at 15:51

## 2019-03-06 RX ADMIN — DEXAMETHASONE SODIUM PHOSPHATE 10 MG: 10 INJECTION INTRAMUSCULAR; INTRAVENOUS at 09:54

## 2019-03-06 RX ADMIN — DOCETAXEL ANHYDROUS 148 MG: 10 INJECTION, SOLUTION INTRAVENOUS at 14:05

## 2019-03-06 RX ADMIN — Medication 500 UNITS: at 15:42

## 2019-03-06 RX ADMIN — CARBOPLATIN 573 MG: 10 INJECTION, SOLUTION INTRAVENOUS at 15:09

## 2019-03-06 NOTE — PROGRESS NOTES
[facility] at [address], [address] [facility] [address] [facility]  Zenaida Netters: [phone]  F: [phone]      Reason for Visit:   Yolette Rushing is a 74 y.o. female who is seen in consultation at the request of Dr. Delfina Dooley for evaluation of therapy for breast cancer. Treatment History:   · 2/15/19 Left core bx:   IDC, gr 2, 1.1 cm, ER + at 100%, DC + at 20%, HER 2 POSITIVE (IHC 2+; FISH ratio 3.9; sig/cell 9.2)  · 3/1/19 Left ax LN core bx:  + for breast cancer, ER + at 100%, DC negative, HER 2 (IHC 2+, FISH PENDING)  · [facility]-P 3/6/19-    History of Present Illness:   Pt noticed the left breast mass herself in Feb 2019, leading to the pathology above    Interval history:  In today for follow up and treatment. Complains of gr 1 cough. FH:  Paternal aunt breast cancer in her [de-identified]; no ovarian cancer, no prostate or pancreas cancer    Past Medical History:   Diagnosis Date    Adverse effect of anesthesia     \"difficulty waking up from anesthethia\"    Cancer [facility]) 02/2019    left breast    Depression     GERD (gastroesophageal reflux disease)     High cholesterol     Hypertension       Past Surgical History:   Procedure Laterality Date    HX BREAST BIOPSY Right years ago    neg; needle bx    HX CATARACT REMOVAL      HX COLONOSCOPY      HX ORTHOPAEDIC      carpal tunnel      Social History     Tobacco Use    Smoking status: Current Every Day Smoker     Packs/day: 0.50     Years: 55.00     Pack years: 27.50    Smokeless tobacco: Never Used   Substance Use Topics    Alcohol use:  Yes     Alcohol/week: 0.6 oz     Types: 1 Shots of liquor per week     Drinks per session: 5 or 6     Binge frequency: Weekly     Comment: Patient stated that she puts a splash of bourbon in her afternoon coffee around 5-6 days per week      Family History   Problem Relation Age of Onset    Breast Cancer Maternal Aunt     Hypertension Mother     Hypertension Father     Cancer Father Lung    Cancer Brother         leukemia    Diabetes Brother         2 brothers with diabetes    Hypertension Sister         2 sisters and 4 brothers with HTN     Current Outpatient Medications   Medication Sig    dexamethasone (DECADRON) 4 mg tablet 8 mg bid the day before and day after chemo    doxycycline (VIBRAMYCIN) 100 mg capsule Take 1 Cap by mouth two (2) times a day for 42 days.  losartan-hydroCHLOROthiazide (HYZAAR) 100-25 mg per tablet Take 1 Tab by mouth daily.  pravastatin (PRAVACHOL) 40 mg tablet Take 40 mg by mouth nightly.  STIOLTO RESPIMAT 2.5-2.5 mcg/actuation inhaler Take 2 Puffs by inhalation nightly.  venlafaxine (EFFEXOR) 50 mg tablet Take 50 mg by mouth daily.  Cyanocobalamin-Cobamamide (B12) 5,000-100 mcg lozg by SubLINGual route daily.  cholecalciferol (VITAMIN D3) 1,000 unit cap Take  by mouth daily.  calcium-cholecalciferol, D3, (CALTRATE 600+D) tablet Take 1 Tab by mouth daily.  aspirin delayed-release 81 mg tablet Take  by mouth daily.  naproxen sodium (ALEVE PO) Take  by mouth two (2) times daily as needed.  lidocaine-prilocaine (EMLA) topical cream Apply  to affected area as needed for Pain.  prochlorperazine (COMPAZINE) 10 mg tablet Take 1 Tab by mouth every six (6) hours as needed for Nausea.  ondansetron hcl (ZOFRAN) 8 mg tablet Take 1 Tab by mouth every twelve (12) hours as needed for Nausea. No current facility-administered medications for this visit. Allergies   Allergen Reactions    Doxycycline Diarrhea    Keflex [Cephalexin] Hives    Penicillins Rash        Review of Systems: A complete review of systems was obtained, negative except as described above.     Physical Exam:     Visit Vitals  /84   Pulse 89   Temp 96.9 °F (36.1 °C) (Temporal)   Resp 18   Ht 5' 6.5\" (1.689 m)   Wt 182 lb 6.4 oz (82.7 kg)   SpO2 97%   BMI 29.00 kg/m²     ECOG PS: 0  General: No distress  Eyes: PERRLA, anicteric sclerae  HENT: Atraumatic, OP clear  Neck: Supple  Lymphatic: No cervical, supraclavicular adenopathy  Respiratory: exp wheezing  CV: Normal rate, regular rhythm, no murmurs, no peripheral edema  GI: Soft, nontender, nondistended, no masses, no hepatomegaly, no splenomegaly; + BS  MS:  Digits without clubbing or cyanosis. Skin: No rashes, ecchymoses, or petechiae. Normal temperature, turgor, and texture. Psych: Alert, oriented, appropriate affect, normal judgment/insight  Breasts:  L breast 12:00, 3 cm FN, 3.7 cm mass, 1.5 cm L ax LN (last exam, deferred today)    Results:     Lab Results   Component Value Date/Time    WBC 12.1 (H) 03/06/2019 08:14 AM    HGB 14.9 03/06/2019 08:14 AM    HCT 45.2 03/06/2019 08:14 AM    PLATELET 305 45/64/2444 08:14 AM    MCV 90.8 03/06/2019 08:14 AM    ABS. NEUTROPHILS 10.2 (H) 03/06/2019 08:14 AM     Lab Results   Component Value Date/Time    Sodium 138 02/28/2019 11:53 AM    Potassium 4.0 02/28/2019 11:53 AM    Chloride 102 02/28/2019 11:53 AM    CO2 30 02/28/2019 11:53 AM    Glucose 89 02/28/2019 11:53 AM    BUN 19 02/28/2019 11:53 AM    Creatinine 0.90 02/28/2019 11:53 AM    GFR est AA >60 02/28/2019 11:53 AM    GFR est non-AA >60 02/28/2019 11:53 AM    Calcium 9.9 02/28/2019 11:53 AM     No results found for: TBILI, ALT, SGOT, AP, TP, ALB, GLOB    2/12/19 mammogram and US  MAMMOGRAPHY:  The breast demonstrates stable scattered density breast  architecture. Underlying the site of clinical concern in the left breast is  masslike focal asymmetry in the anterior upper left breast. There is no other  dominant mass lesion, architectural distortion, asymmetry, or calcification. There is no skin thickening or nipple retraction.     ULTRASOUND:  Corresponding to the mammographic density there is a 2.4 x 3.1 x  3.7 cm hypoechoic lobulated mass at about the 12:00 position and 3 cm radial  distance from the nipple. No other suspicious cystic or solid lesions  Identified.     Dr. Josy Goodson has identified 2 large, suspicious ax LN on US:  1.7 cm and 1.2 cm    Records reviewed and summarized above. Pathology report(s) reviewed above. Radiology report(s) reviewed above. Assessment/plan:   1. Left central IDC, gr 2, ER +, MA +, HER 2 positive:  cT2 cN1a cM0, stage IIB, prognostic stage IB    We explained to the patient that the goal of systemic adjuvant therapy is to improve the chances for cure and decrease the risk of relapse. We explained why a patient can have microscopic cancer spread now even though physical examination, laboratory studies and imaging studies are negative for cancer. We explained that the same treatments used now as adjuvant or preventive treatments rarely if ever are curative in women who develop metastases. We discussed that there is no difference in overall survival between neoadjuvant and adjuvant chemotherapy. We discussed the rationale for neoadjuvant chemotherapy, if chemotherapy is warranted, as it is in this case: to avoid any potential delays in giving chemotherapy following surgery, to be able to see the response of the tumor to chemotherapy, and to potentially downstage the tumor prior to surgery. Using eprognosis. org, her 5 year risk of all cause mortality is 20%, her risk in 10 years is 52-58%, average OS is 9-10 years. Therapy discussion is warranted. Prince Amin suggests equivalency between q.3 week Adriamycin, Cytoxan followed by weekly paclitaxel and trastuzumab compared with the [facility] regimen. However, this study was not powered to show a difference between these two regimens, and in the [facility] publication, the AC-TH arm showed a numerically advantange (though not statistically significant) to the [facility] arm. In this patient, it is completely reasonable to use [facility] approach and avoid the potential cardiotoxicity of the anthracyclines as well as the potential for leukemia. We discussed the toxicities of docetaxel and carboplatin chemotherapy in detail.   This chemotherapy frequently causes a low white blood cell count and hospital admissions for treatment of neutropenic fever. We explained that we consider the use of growth factors to minimize this risk. We explained to the patient that some side effects if they occur only last a few days including nausea, vomiting, stomatitis, arthralgia, myalgia,and allergic reactions to Taxotere. We told the patient that severe nausea and vomiting were uncommon and that some side effects,if they occur, will last longer; this includes hair loss, which will be seen in all patients treated with these agents and fatigue,which will be seen in most.  We also informed that for the patient that heart damage is rare with these agents. We explained that carboplatin can rarely cause kidney damage and high frequency hearing loss. We provided the patient in detail her information concerning the toxicities of this regimen in addition to her overall discussion. Rational for therapy with pertuzumab was also discussed with the patient including a nearly 20% improvement seen in pCR in the neoadjuvant setting with the addition of this medication. Additional AE discussed including an acne rash (and the use of doxycyline 100 mg bid to help prevent) and diarrhea and consideration of additional cardiomyopathy. Rationale for therapy with trastuzumab was also discussed with the patient including a 50% proportional improvement in disease free survival and also an improvement in overall survival in patients receiving trastuzumab and chemotherapy for HER-2 positive breast cancer. The side effects of trastuzumab were discussed including a 4%-5% risk of dropping her ejection fraction while on treatment and about a 1% risk of CHF. We discussed that this drug will be used every 3 weeks for remainder of a year following the chemotherapy cycles. We will check her EF before chemotherapy and every 3 months while she is receiving trastuzumab.     · [facility]-P (Trastuzumab 8mg/kg load with cycle 1 then 6mg/kg, Docetaxel 75mg/m2, Carboplatin AUC 6, Pertuzumab 840mg load with cycle 1 then 420mg) given every 3 weeks x 6 cycles  · Labs: CBC, CMP prior to each treatment. · Antiemetic Prophylaxis: Palonosetron and dexamethasone prior to chemo  · PRN Antiemetics: Ondansetron, Compazine  · Swelling prophylaxis: Dexmethasone 8mg bid the day before and day after chemotherapy  · TTE prior to chemotherapy and every 12 weeks while on Trastuzumab  · Neulasta 24-72 hours after each treatment    The patient was given presciptions for emla cream, decadron to take 8 mg bid the day before and day after chemo, zofran and compazine. Neulasta the following day. After this discussion, she is agreeable to [facility], will start on 3/6, she has signed informed consent. Cryotherapy discussed with docetaxel as well    LN + by core, she may be eligible for the [facility] study if her LN clinically resolves    TTE 3/6/19 EF 66-70%. Port has been placed. We will plan to see the patient in follow up at least once per cycle, or sooner if symptoms warrant. 2. Emotional well being:  She has excellent support and is coping well with her disease    3. tob use:  Smokes 1/2 ppd; counseled to quit      > 25 minutes were spent with this patient with > 50% of that time spent in face to face counseling. I appreciate the opportunity to participate in Ms. Umm Krause's care. Signed By: Savana Cisse MD      No orders of the defined types were placed in this encounter.

## 2019-03-06 NOTE — PROGRESS NOTES
[facility]otherapy Progress Note    [id number] Pt admit to [address] [facility] ambulatory in stable condition. Assessment completed. No new concerns voiced. Port accessed with positive blood return. Labs drawn per order and sent. Line flushed, clamped, Curos Cap applied to end clave. Pt to MD for appointment  Labs reviewed, medications ordered  Chemo discussed with pt including potential side effects and possibility of infusion reactions. Pt verbalized understanding    Visit Vitals  /77   Pulse 86   Temp 96.9 °F (36.1 °C)   Resp 18   Ht 5' 6.5\" (1.689 m)   Wt 82.7 kg (182 lb 6.4 oz)   Breastfeeding? No   BMI 29.00 kg/m²       Medications:  NS @ KVO  Decadron  Aloxi  Emend  Herceptin  Perjeta  Docetaxel  Carboplatin  Neulasta OBI sq left arm\    Educated pt regarding Neulasta OBI. Instructed pt on medication delivery time, removal time, and what to do in the event her device malfuctions. Pt verbalized understanding    1555 Pt tolerated treatment well. Port maintained positive blood return throughout treatment, flushed with positive blood return at conclusion and heparinized and removed prior to discharge. D/c home ambulatory in no distress. Pt aware of next [facility] appointment scheduled for 3/27/19. Recent Results (from the past 12 hour(s))   CBC WITH AUTOMATED DIFF    Collection Time: 03/06/19  8:14 AM   Result Value Ref Range    WBC 12.1 (H) 3.6 - 11.0 K/uL    RBC 4.98 3.80 - 5.20 M/uL    HGB 14.9 11.5 - 16.0 g/dL    HCT 45.2 35.0 - 47.0 %    MCV 90.8 80.0 - 99.0 FL    MCH 29.9 26.0 - 34.0 PG    MCHC 33.0 30.0 - 36.5 g/dL    RDW 13.3 11.5 - 14.5 %    PLATELET 484 883 - 577 K/uL    MPV 8.9 8.9 - 12.9 FL    NRBC 0.0 0  WBC    ABSOLUTE NRBC 0.00 0.00 - 0.01 K/uL    NEUTROPHILS 85 (H) 32 - 75 %    LYMPHOCYTES 10 (L) 12 - 49 %    MONOCYTES 4 (L) 5 - 13 %    EOSINOPHILS 0 0 - 7 %    BASOPHILS 0 0 - 1 %    IMMATURE GRANULOCYTES 0 0.0 - 0.5 %    ABS.  NEUTROPHILS 10.2 (H) 1.8 - 8.0 K/UL ABS. LYMPHOCYTES 1.3 0.8 - 3.5 K/UL    ABS. MONOCYTES 0.5 0.0 - 1.0 K/UL    ABS. EOSINOPHILS 0.0 0.0 - 0.4 K/UL    ABS. BASOPHILS 0.0 0.0 - 0.1 K/UL    ABS. IMM. GRANS. 0.1 (H) 0.00 - 0.04 K/UL    DF AUTOMATED     METABOLIC PANEL, COMPREHENSIVE    Collection Time: 03/06/19  8:14 AM   Result Value Ref Range    Sodium 142 136 - 145 mmol/L    Potassium 3.9 3.5 - 5.1 mmol/L    Chloride 104 97 - 108 mmol/L    CO2 29 21 - 32 mmol/L    Anion gap 9 5 - 15 mmol/L    Glucose 107 (H) 65 - 100 mg/dL    BUN 21 (H) 6 - 20 MG/DL    Creatinine 0.90 0.55 - 1.02 MG/DL    BUN/Creatinine ratio 23 (H) 12 - 20      GFR est AA >60 >60 ml/min/1.73m2    GFR est non-AA >60 >60 ml/min/1.73m2    Calcium 9.4 8.5 - 10.1 MG/DL    Bilirubin, total 0.5 0.2 - 1.0 MG/DL    ALT (SGPT) 30 12 - 78 U/L    AST (SGOT) 14 (L) 15 - 37 U/L    Alk.  phosphatase 76 45 - 117 U/L    Protein, total 7.8 6.4 - 8.2 g/dL    Albumin 3.7 3.5 - 5.0 g/dL    Globulin 4.1 (H) 2.0 - 4.0 g/dL    A-G Ratio 0.9 (L) 1.1 - 2.2

## 2019-03-08 ENCOUNTER — HOSPITAL ENCOUNTER (EMERGENCY)
Age: 74
Discharge: HOME | End: 2019-03-09
Attending: EMERGENCY MEDICINE
Payer: MEDICARE

## 2019-03-08 ENCOUNTER — TELEPHONE (OUTPATIENT)
Dept: ONCOLOGY | Age: 74
End: 2019-03-08

## 2019-03-08 ENCOUNTER — APPOINTMENT (OUTPATIENT)
Dept: GENERAL RADIOLOGY | Age: 74
End: 2019-03-08
Attending: EMERGENCY MEDICINE
Payer: MEDICARE

## 2019-03-08 DIAGNOSIS — K57.92 DIVERTICULITIS: Primary | ICD-10-CM

## 2019-03-08 LAB
ALBUMIN SERPL-MCNC: 3.5 G/DL (ref 3.5–5)
ALBUMIN/GLOB SERPL: 1 {RATIO} (ref 1.1–2.2)
ALP SERPL-CCNC: 77 U/L (ref 45–117)
ALT SERPL-CCNC: 28 U/L (ref 12–78)
ANION GAP SERPL CALC-SCNC: 10 MMOL/L (ref 5–15)
APPEARANCE UR: CLEAR
AST SERPL-CCNC: 25 U/L (ref 15–37)
BACTERIA URNS QL MICRO: NEGATIVE /HPF
BASOPHILS # BLD: 0 K/UL (ref 0–0.1)
BASOPHILS NFR BLD: 0 % (ref 0–1)
BILIRUB SERPL-MCNC: 0.9 MG/DL (ref 0.2–1)
BILIRUB UR QL: NEGATIVE
BUN SERPL-MCNC: 23 MG/DL (ref 6–20)
BUN/CREAT SERPL: 26 (ref 12–20)
CALCIUM SERPL-MCNC: 8.9 MG/DL (ref 8.5–10.1)
CHLORIDE SERPL-SCNC: 96 MMOL/L (ref 97–108)
CO2 SERPL-SCNC: 29 MMOL/L (ref 21–32)
COLOR UR: NORMAL
CREAT SERPL-MCNC: 0.9 MG/DL (ref 0.55–1.02)
DIFFERENTIAL METHOD BLD: ABNORMAL
EOSINOPHIL # BLD: 0 K/UL (ref 0–0.4)
EOSINOPHIL NFR BLD: 0 % (ref 0–7)
EPITH CASTS URNS QL MICRO: NORMAL /LPF
ERYTHROCYTE [DISTWIDTH] IN BLOOD BY AUTOMATED COUNT: 13.5 % (ref 11.5–14.5)
GLOBULIN SER CALC-MCNC: 3.5 G/DL (ref 2–4)
GLUCOSE SERPL-MCNC: 112 MG/DL (ref 65–100)
GLUCOSE UR STRIP.AUTO-MCNC: NEGATIVE MG/DL
HCT VFR BLD AUTO: 45.5 % (ref 35–47)
HGB BLD-MCNC: 15.7 G/DL (ref 11.5–16)
HGB UR QL STRIP: NEGATIVE
IMM GRANULOCYTES # BLD AUTO: 0 K/UL (ref 0–0.04)
IMM GRANULOCYTES NFR BLD AUTO: 0 % (ref 0–0.5)
KETONES UR QL STRIP.AUTO: NEGATIVE MG/DL
LEUKOCYTE ESTERASE UR QL STRIP.AUTO: NEGATIVE
LYMPHOCYTES # BLD: 2.2 K/UL (ref 0.8–3.5)
LYMPHOCYTES NFR BLD: 7 % (ref 12–49)
MCH RBC QN AUTO: 30.8 PG (ref 26–34)
MCHC RBC AUTO-ENTMCNC: 34.5 G/DL (ref 30–36.5)
MCV RBC AUTO: 89.4 FL (ref 80–99)
METAMYELOCYTES NFR BLD MANUAL: 4 %
MONOCYTES # BLD: 0.3 K/UL (ref 0–1)
MONOCYTES NFR BLD: 1 % (ref 5–13)
MYELOCYTES NFR BLD MANUAL: 6 %
NEUTS BAND NFR BLD MANUAL: 4 %
NEUTS SEG # BLD: 25.4 K/UL (ref 1.8–8)
NEUTS SEG NFR BLD: 78 % (ref 32–75)
NITRITE UR QL STRIP.AUTO: NEGATIVE
PH UR STRIP: 6 [PH] (ref 5–8)
PLATELET # BLD AUTO: 250 K/UL (ref 150–400)
PMV BLD AUTO: 10 FL (ref 8.9–12.9)
POTASSIUM SERPL-SCNC: 3.2 MMOL/L (ref 3.5–5.1)
PROT SERPL-MCNC: 7 G/DL (ref 6.4–8.2)
PROT UR STRIP-MCNC: NEGATIVE MG/DL
RBC # BLD AUTO: 5.09 M/UL (ref 3.8–5.2)
RBC #/AREA URNS HPF: NORMAL /HPF (ref 0–5)
RBC MORPH BLD: ABNORMAL
RBC MORPH BLD: ABNORMAL
SODIUM SERPL-SCNC: 135 MMOL/L (ref 136–145)
SP GR UR REFRACTOMETRY: 1.02 (ref 1–1.03)
UR CULT HOLD, URHOLD: NORMAL
UROBILINOGEN UR QL STRIP.AUTO: 0.2 EU/DL (ref 0.2–1)
WBC # BLD AUTO: 31 K/UL (ref 3.6–11)
WBC URNS QL MICRO: NORMAL /HPF (ref 0–4)

## 2019-03-08 PROCEDURE — 99285 EMERGENCY DEPT VISIT HI MDM: CPT

## 2019-03-08 PROCEDURE — 74011250636 HC RX REV CODE- 250/636: Performed by: EMERGENCY MEDICINE

## 2019-03-08 PROCEDURE — 85025 COMPLETE CBC W/AUTO DIFF WBC: CPT

## 2019-03-08 PROCEDURE — 74019 RADEX ABDOMEN 2 VIEWS: CPT

## 2019-03-08 PROCEDURE — 81001 URINALYSIS AUTO W/SCOPE: CPT

## 2019-03-08 PROCEDURE — 96361 HYDRATE IV INFUSION ADD-ON: CPT

## 2019-03-08 PROCEDURE — 96374 THER/PROPH/DIAG INJ IV PUSH: CPT

## 2019-03-08 PROCEDURE — 80053 COMPREHEN METABOLIC PANEL: CPT

## 2019-03-08 PROCEDURE — 36415 COLL VENOUS BLD VENIPUNCTURE: CPT

## 2019-03-08 RX ORDER — ONDANSETRON 2 MG/ML
4 INJECTION INTRAMUSCULAR; INTRAVENOUS
Status: COMPLETED | OUTPATIENT
Start: 2019-03-08 | End: 2019-03-08

## 2019-03-08 RX ADMIN — ONDANSETRON 4 MG: 2 INJECTION INTRAMUSCULAR; INTRAVENOUS at 22:48

## 2019-03-08 RX ADMIN — SODIUM CHLORIDE 1000 ML: 900 INJECTION, SOLUTION INTRAVENOUS at 22:47

## 2019-03-08 NOTE — TELEPHONE ENCOUNTER
[facility]  [phone]      03/08/19 11:10 AM Spoke with patient. States her last bowel movement was Wednesday (03/06). Advised that patient can take an over the counter stool softener and to make sure she is drinking plenty of fluids. Patient verbalized understanding. Asked that patient call office if the above does not alleviate symptoms. Will also forward above to Pratik Wong, NP in case of any further recommendations. 11:54 AM Called patient, advised per Pratikromain Wong, that patient may also take an over the counter senna in addition to a stool softener. Explained that this medication is a laxative. Also encouraged patient to walk as well to increase motility. Patient verbalized understanding. No further questions or concerns at this time.

## 2019-03-08 NOTE — TELEPHONE ENCOUNTER
Patient called and stated that she had her first chemo on Wednesday and is now constipated. Patient would like to know what she can take.  # [phone]

## 2019-03-09 ENCOUNTER — APPOINTMENT (OUTPATIENT)
Dept: CT IMAGING | Age: 74
End: 2019-03-09
Attending: EMERGENCY MEDICINE
Payer: MEDICARE

## 2019-03-09 VITALS
OXYGEN SATURATION: 95 % | TEMPERATURE: 98.2 F | SYSTOLIC BLOOD PRESSURE: 128 MMHG | BODY MASS INDEX: 28.94 KG/M2 | HEART RATE: 77 BPM | DIASTOLIC BLOOD PRESSURE: 48 MMHG | RESPIRATION RATE: 20 BRPM | WEIGHT: 182 LBS

## 2019-03-09 PROCEDURE — 74011636320 HC RX REV CODE- 636/320: Performed by: EMERGENCY MEDICINE

## 2019-03-09 PROCEDURE — 74011250637 HC RX REV CODE- 250/637: Performed by: EMERGENCY MEDICINE

## 2019-03-09 PROCEDURE — 74177 CT ABD & PELVIS W/CONTRAST: CPT

## 2019-03-09 RX ORDER — METRONIDAZOLE 500 MG/1
500 TABLET ORAL 3 TIMES DAILY
Qty: 30 TAB | Refills: 0 | Status: SHIPPED | OUTPATIENT
Start: 2019-03-09 | End: 2019-03-21

## 2019-03-09 RX ORDER — LEVOFLOXACIN 500 MG/1
500 TABLET, FILM COATED ORAL
Status: COMPLETED | OUTPATIENT
Start: 2019-03-09 | End: 2019-03-09

## 2019-03-09 RX ORDER — LEVOFLOXACIN 500 MG/1
500 TABLET, FILM COATED ORAL DAILY
Qty: 10 TAB | Refills: 0 | Status: ON HOLD | OUTPATIENT
Start: 2019-03-09 | End: 2019-03-13

## 2019-03-09 RX ORDER — METRONIDAZOLE 250 MG/1
500 TABLET ORAL
Status: COMPLETED | OUTPATIENT
Start: 2019-03-09 | End: 2019-03-09

## 2019-03-09 RX ADMIN — METRONIDAZOLE 500 MG: 250 TABLET ORAL at 02:10

## 2019-03-09 RX ADMIN — LEVOFLOXACIN 500 MG: 500 TABLET, FILM COATED ORAL at 02:10

## 2019-03-09 RX ADMIN — IOPAMIDOL 100 ML: 755 INJECTION, SOLUTION INTRAVENOUS at 00:30

## 2019-03-09 NOTE — ED PROVIDER NOTES
Gwen Wang is a 75 yo F who was recently diagnosed with breast cancer and started chemotherapy this week who presents to the ED with 3 day history of constipation and nausea that has worsened this evening. She has had nausea but no vomiting. She took one of her nausea medications last night but she felt like it made her crazy. She denies fever or chills. Past Medical History:   Diagnosis Date    Adverse effect of anesthesia     \"difficulty waking up from anesthethia\"    Cancer ([address] 02/2019    left breast    Depression     GERD (gastroesophageal reflux disease)     High cholesterol     Hypertension        Past Surgical History:   Procedure Laterality Date    HX BREAST BIOPSY Right years ago    neg; needle bx    HX CATARACT REMOVAL      HX COLONOSCOPY      HX ORTHOPAEDIC      carpal tunnel         Family History:   Problem Relation Age of Onset    Breast Cancer Maternal Aunt     Hypertension Mother     Hypertension Father     Cancer Father         Lung    Cancer Brother         leukemia    Diabetes Brother         2 brothers with diabetes    Hypertension Sister         2 sisters and 4 brothers with HTN       Social History     Socioeconomic History    Marital status:      Spouse name: Not on file    Number of children: Not on file    Years of education: Not on file    Highest education level: Not on file   Social Needs    Financial resource strain: Not on file    Food insecurity - worry: Not on file    Food insecurity - inability: Not on file   [employer] needs - medical: Not on file   [employer] needs - non-medical: Not on file   Occupational History    Not on file   Tobacco Use    Smoking status: Current Every Day Smoker     Packs/day: 0.50     Years: 55.00     Pack years: 27.50    Smokeless tobacco: Never Used   Substance and Sexual Activity    Alcohol use:  Yes     Alcohol/week: 0.6 oz     Types: 1 Shots of liquor per week     Drinks per session: 5 or 6     Binge frequency: Weekly     Comment: Patient stated that she puts a splash of bourbon in her afternoon coffee around 5-6 days per week    Drug use: No    Sexual activity: Not on file   Other Topics Concern    Not on file   Social History Narrative    Not on file         ALLERGIES: Doxycycline; Keflex [cephalexin]; and Penicillins    Review of Systems   Constitutional: Negative for fever. HENT: Negative for sore throat. Eyes: Negative for visual disturbance. Respiratory: Negative for cough. Cardiovascular: Negative for chest pain. Gastrointestinal: Positive for abdominal pain, constipation and nausea. Genitourinary: Negative for dysuria. Musculoskeletal: Negative for back pain. Skin: Negative for rash. Neurological: Negative for headaches. Vitals:    03/09/19 0001 03/09/19 0030 03/09/19 0045 03/09/19 0106   BP: 112/56 177/72 (!) 135/94 128/48   Pulse:       Resp:  20 20 20   Temp:       SpO2: 95% 96% 94% 95%   Weight:                Physical Exam   Constitutional: She appears well-developed and well-nourished. No distress. HENT:   Head: Normocephalic and atraumatic. Mouth/Throat: Oropharynx is clear and moist.   Eyes: Conjunctivae and EOM are normal.   Neck: Normal range of motion and phonation normal.   Cardiovascular: Normal rate. Pulmonary/Chest: Effort normal. No respiratory distress. portacath site healing well, no erythema   Abdominal: Normal appearance. She exhibits no distension. There is no tenderness. There is no rebound and no guarding. Musculoskeletal: Normal range of motion. She exhibits no tenderness. Neurological: She is alert. She is not disoriented. She exhibits normal muscle tone. Skin: Skin is warm and dry. Capillary refill takes less than 2 seconds. Nursing note and vitals reviewed. [facility]     11:51 PM  Patient reassessed and states that she is feeling a little better but pain now more focused in LLQ.   WBC elevated at 31,000 but patient confirms that she has received neulasta following her chemo on 3/6    2:01 AM  CT abd/pelvis reveals mild diverticulitis. PAtient reassessed and remains afabrile, in no distress. Discussed with Dr. Marquis Phillips, oncology, Agrees with plan for discharge home with levaquin and metronidazole (patient is allergic to amoxicillin)  Discussed patient on course of doxycycline prophylacticly. He recommends holding doxycycline until she completes her ABX for diverticulitis.     Procedures

## 2019-03-09 NOTE — DISCHARGE INSTRUCTIONS

## 2019-03-09 NOTE — ED TRIAGE NOTES
Triage note:  Pt arrived via [place] [id number] ambulance with c/o abd pain. Pt stated she started Chemo 2 days ago and hasn't had a BM since.

## 2019-03-09 NOTE — ED NOTES
Discharge note: The patient was discharged home in stable condition. The patient is alert and oriented, is in no respiratory distress and has vital signs within normal limits. The patient's diagnosis, condition and treatment were explained to patient by Dr Hellen Salas. The patient expressed understanding of discharge instructions, prescriptions, and plan of care. A discharge plan has been developed. A  was not involved in the process. Patient offered a wheelchair to ED lobby for discharge but declined at this time. Patient ambulated with a steady gate to ED lobby to wait for ride home.

## 2019-03-10 ENCOUNTER — TELEPHONE (OUTPATIENT)
Dept: ONCOLOGY | Age: 74
End: 2019-03-10

## 2019-03-10 NOTE — TELEPHONE ENCOUNTER
Heme/Onc On Call Note  Patient reports no bowel movement since Wednesday, the day of her chemotherapy. She usually goes 2-3x/day. Having lower abdominal pain, feeling miserable. Has tried suppository and enema and colace. She was in the ED Friday night, diagnosed with diverticulitis, prescribed abx which she is taking. We discussed taking senna and miralax this afternoon. I encouraged her to push PO fluids. Call the office in the morning if no success.

## 2019-03-11 ENCOUNTER — TELEPHONE (OUTPATIENT)
Dept: ONCOLOGY | Age: 74
End: 2019-03-11

## 2019-03-11 NOTE — TELEPHONE ENCOUNTER
3/11/19 9:48 AM: Called patient to check on her after she was diagnosed with diverticulitis over the weekend. Patient stated that she is miserable and that she has not had a bowel movement since Wednesday morning. Stated that she vomited last night. Also stated that the on-call physician advised her to take Miralax and she was able to pass gas after taking it. Also stated that she is \"very sore and weak\" but has been able to \"drink Jello. \" Advised patient that per Dr. Venkata Morel, she should take docusate, senna, and Miralax, and if she still hasn't had a bowel movement by this afternoon, to try magnesium citrate. Also advised that if she hasn't had a bowel movement by 4:30 today, she should call this office back to be added on to tomorrow's schedule for an office appointment. Patient verbalized understanding and denied further questions or concerns. 3/11/19 5:22 PM: Called patient to see if she was able to have a bowel movement today.  Patient stated that she hasn't had a bowel movement yet but didn't take

## 2019-03-12 ENCOUNTER — APPOINTMENT (OUTPATIENT)
Dept: CT IMAGING | Age: 74
DRG: 330 | End: 2019-03-12
Attending: PHYSICIAN ASSISTANT
Payer: MEDICARE

## 2019-03-12 ENCOUNTER — APPOINTMENT (OUTPATIENT)
Dept: GENERAL RADIOLOGY | Age: 74
DRG: 330 | End: 2019-03-12
Attending: PHYSICIAN ASSISTANT
Payer: MEDICARE

## 2019-03-12 ENCOUNTER — HOSPITAL ENCOUNTER (INPATIENT)
Age: 74
LOS: 9 days | Discharge: NURSING, REHAB OR LONG-TERM CARE | DRG: 330 | End: 2019-03-21
Attending: STUDENT IN AN ORGANIZED HEALTH CARE EDUCATION/TRAINING PROGRAM | Admitting: SURGERY
Payer: MEDICARE

## 2019-03-12 ENCOUNTER — OFFICE VISIT (OUTPATIENT)
Dept: ONCOLOGY | Age: 74
End: 2019-03-12

## 2019-03-12 VITALS
OXYGEN SATURATION: 95 % | WEIGHT: 178 LBS | SYSTOLIC BLOOD PRESSURE: 119 MMHG | HEART RATE: 99 BPM | BODY MASS INDEX: 27.94 KG/M2 | RESPIRATION RATE: 18 BRPM | HEIGHT: 67 IN | TEMPERATURE: 97.6 F | DIASTOLIC BLOOD PRESSURE: 66 MMHG

## 2019-03-12 DIAGNOSIS — K59.00 CONSTIPATION, UNSPECIFIED CONSTIPATION TYPE: ICD-10-CM

## 2019-03-12 DIAGNOSIS — Z17.0 MALIGNANT NEOPLASM OF CENTRAL PORTION OF LEFT BREAST IN FEMALE, ESTROGEN RECEPTOR POSITIVE (HCC): ICD-10-CM

## 2019-03-12 DIAGNOSIS — C50.112 MALIGNANT NEOPLASM OF CENTRAL PORTION OF LEFT BREAST IN FEMALE, ESTROGEN RECEPTOR POSITIVE (HCC): ICD-10-CM

## 2019-03-12 DIAGNOSIS — K65.1 INTRA-ABDOMINAL ABSCESS (HCC): Primary | ICD-10-CM

## 2019-03-12 DIAGNOSIS — Z72.0 TOBACCO USE: ICD-10-CM

## 2019-03-12 DIAGNOSIS — C50.912 MALIGNANT NEOPLASM OF LEFT FEMALE BREAST, UNSPECIFIED ESTROGEN RECEPTOR STATUS, UNSPECIFIED SITE OF BREAST (HCC): Primary | ICD-10-CM

## 2019-03-12 DIAGNOSIS — R10.9 ABDOMINAL PAIN, UNSPECIFIED ABDOMINAL LOCATION: ICD-10-CM

## 2019-03-12 PROBLEM — I10 HYPERTENSION: Status: ACTIVE | Noted: 2019-03-12

## 2019-03-12 PROBLEM — L02.91 ABSCESS: Status: ACTIVE | Noted: 2019-03-12

## 2019-03-12 PROBLEM — C80.1 CANCER (HCC): Status: ACTIVE | Noted: 2019-02-01

## 2019-03-12 PROBLEM — E87.1 HYPONATREMIA: Status: ACTIVE | Noted: 2019-03-12

## 2019-03-12 PROBLEM — K57.92 DIVERTICULITIS: Status: ACTIVE | Noted: 2019-03-12

## 2019-03-12 PROBLEM — K21.9 GERD (GASTROESOPHAGEAL REFLUX DISEASE): Status: ACTIVE | Noted: 2019-03-12

## 2019-03-12 PROBLEM — E78.00 HIGH CHOLESTEROL: Status: ACTIVE | Noted: 2019-03-12

## 2019-03-12 PROBLEM — E87.6 HYPOKALEMIA: Status: ACTIVE | Noted: 2019-03-12

## 2019-03-12 PROBLEM — T41.45XA ADVERSE EFFECT OF ANESTHESIA: Status: ACTIVE | Noted: 2019-03-12

## 2019-03-12 PROBLEM — F32.A DEPRESSION: Status: ACTIVE | Noted: 2019-03-12

## 2019-03-12 LAB
ALBUMIN SERPL-MCNC: 2.6 G/DL (ref 3.5–5)
ALBUMIN/GLOB SERPL: 0.6 {RATIO} (ref 1.1–2.2)
ALP SERPL-CCNC: 83 U/L (ref 45–117)
ALT SERPL-CCNC: 25 U/L (ref 12–78)
ANION GAP SERPL CALC-SCNC: 7 MMOL/L (ref 5–15)
APPEARANCE UR: CLEAR
AST SERPL-CCNC: 21 U/L (ref 15–37)
BACTERIA URNS QL MICRO: NEGATIVE /HPF
BASOPHILS # BLD: 0.1 K/UL (ref 0–0.1)
BASOPHILS NFR BLD: 3 % (ref 0–1)
BILIRUB SERPL-MCNC: 0.8 MG/DL (ref 0.2–1)
BILIRUB UR QL: NEGATIVE
BUN SERPL-MCNC: 19 MG/DL (ref 6–20)
BUN/CREAT SERPL: 21 (ref 12–20)
CALCIUM SERPL-MCNC: 8.6 MG/DL (ref 8.5–10.1)
CHLORIDE SERPL-SCNC: 87 MMOL/L (ref 97–108)
CO2 SERPL-SCNC: 33 MMOL/L (ref 21–32)
COLOR UR: ABNORMAL
CREAT SERPL-MCNC: 0.91 MG/DL (ref 0.55–1.02)
DIFFERENTIAL METHOD BLD: ABNORMAL
EOSINOPHIL # BLD: 0.1 K/UL (ref 0–0.4)
EOSINOPHIL NFR BLD: 3 % (ref 0–7)
EPITH CASTS URNS QL MICRO: ABNORMAL /LPF
ERYTHROCYTE [DISTWIDTH] IN BLOOD BY AUTOMATED COUNT: 13 % (ref 11.5–14.5)
GLOBULIN SER CALC-MCNC: 4.3 G/DL (ref 2–4)
GLUCOSE SERPL-MCNC: 111 MG/DL (ref 65–100)
GLUCOSE UR STRIP.AUTO-MCNC: NEGATIVE MG/DL
HCT VFR BLD AUTO: 40.2 % (ref 35–47)
HGB BLD-MCNC: 14.2 G/DL (ref 11.5–16)
HGB UR QL STRIP: NEGATIVE
HYALINE CASTS URNS QL MICRO: ABNORMAL /LPF (ref 0–5)
IMM GRANULOCYTES # BLD AUTO: 0 K/UL
IMM GRANULOCYTES NFR BLD AUTO: 0 %
KETONES UR QL STRIP.AUTO: NEGATIVE MG/DL
LACTATE BLD-SCNC: 1.23 MMOL/L (ref 0.4–2)
LEUKOCYTE ESTERASE UR QL STRIP.AUTO: ABNORMAL
LIPASE SERPL-CCNC: 35 U/L (ref 73–393)
LYMPHOCYTES # BLD: 0.9 K/UL (ref 0.8–3.5)
LYMPHOCYTES NFR BLD: 30 % (ref 12–49)
MAGNESIUM SERPL-MCNC: 2.5 MG/DL (ref 1.6–2.4)
MCH RBC QN AUTO: 31.2 PG (ref 26–34)
MCHC RBC AUTO-ENTMCNC: 35.3 G/DL (ref 30–36.5)
MCV RBC AUTO: 88.4 FL (ref 80–99)
MONOCYTES # BLD: 0.5 K/UL (ref 0–1)
MONOCYTES NFR BLD: 16 % (ref 5–13)
NEUTS SEG # BLD: 1.4 K/UL (ref 1.8–8)
NEUTS SEG NFR BLD: 48 % (ref 32–75)
NITRITE UR QL STRIP.AUTO: NEGATIVE
NRBC # BLD: 0 K/UL (ref 0–0.01)
NRBC BLD-RTO: 0 PER 100 WBC
PH UR STRIP: 6 [PH] (ref 5–8)
PLATELET # BLD AUTO: 228 K/UL (ref 150–400)
PMV BLD AUTO: 10.1 FL (ref 8.9–12.9)
POTASSIUM SERPL-SCNC: 2.7 MMOL/L (ref 3.5–5.1)
PROT SERPL-MCNC: 6.9 G/DL (ref 6.4–8.2)
PROT UR STRIP-MCNC: 30 MG/DL
RBC # BLD AUTO: 4.55 M/UL (ref 3.8–5.2)
RBC #/AREA URNS HPF: ABNORMAL /HPF (ref 0–5)
RBC MORPH BLD: ABNORMAL
SODIUM SERPL-SCNC: 127 MMOL/L (ref 136–145)
SP GR UR REFRACTOMETRY: 1.01 (ref 1–1.03)
UR CULT HOLD, URHOLD: NORMAL
UROBILINOGEN UR QL STRIP.AUTO: 0.2 EU/DL (ref 0.2–1)
WBC # BLD AUTO: 3 K/UL (ref 3.6–11)
WBC URNS QL MICRO: ABNORMAL /HPF (ref 0–4)

## 2019-03-12 PROCEDURE — 74011250636 HC RX REV CODE- 250/636: Performed by: PHYSICIAN ASSISTANT

## 2019-03-12 PROCEDURE — 83605 ASSAY OF LACTIC ACID: CPT

## 2019-03-12 PROCEDURE — 99285 EMERGENCY DEPT VISIT HI MDM: CPT

## 2019-03-12 PROCEDURE — 96361 HYDRATE IV INFUSION ADD-ON: CPT

## 2019-03-12 PROCEDURE — 74011250636 HC RX REV CODE- 250/636: Performed by: INTERNAL MEDICINE

## 2019-03-12 PROCEDURE — 80053 COMPREHEN METABOLIC PANEL: CPT

## 2019-03-12 PROCEDURE — 83735 ASSAY OF MAGNESIUM: CPT

## 2019-03-12 PROCEDURE — 74011250637 HC RX REV CODE- 250/637: Performed by: INTERNAL MEDICINE

## 2019-03-12 PROCEDURE — 74011250636 HC RX REV CODE- 250/636: Performed by: SURGERY

## 2019-03-12 PROCEDURE — 74011000258 HC RX REV CODE- 258: Performed by: INTERNAL MEDICINE

## 2019-03-12 PROCEDURE — 74011636320 HC RX REV CODE- 636/320

## 2019-03-12 PROCEDURE — 96375 TX/PRO/DX INJ NEW DRUG ADDON: CPT

## 2019-03-12 PROCEDURE — 65270000029 HC RM PRIVATE

## 2019-03-12 PROCEDURE — 74019 RADEX ABDOMEN 2 VIEWS: CPT

## 2019-03-12 PROCEDURE — 83690 ASSAY OF LIPASE: CPT

## 2019-03-12 PROCEDURE — 74177 CT ABD & PELVIS W/CONTRAST: CPT

## 2019-03-12 PROCEDURE — 36415 COLL VENOUS BLD VENIPUNCTURE: CPT

## 2019-03-12 PROCEDURE — 74011250636 HC RX REV CODE- 250/636: Performed by: STUDENT IN AN ORGANIZED HEALTH CARE EDUCATION/TRAINING PROGRAM

## 2019-03-12 PROCEDURE — 74011250637 HC RX REV CODE- 250/637: Performed by: PHYSICIAN ASSISTANT

## 2019-03-12 PROCEDURE — 96365 THER/PROPH/DIAG IV INF INIT: CPT

## 2019-03-12 PROCEDURE — 81001 URINALYSIS AUTO W/SCOPE: CPT

## 2019-03-12 PROCEDURE — 85025 COMPLETE CBC W/AUTO DIFF WBC: CPT

## 2019-03-12 RX ORDER — ALBUTEROL SULFATE 0.83 MG/ML
2.5 SOLUTION RESPIRATORY (INHALATION)
Status: DISCONTINUED | OUTPATIENT
Start: 2019-03-12 | End: 2019-03-21 | Stop reason: HOSPADM

## 2019-03-12 RX ORDER — POTASSIUM CHLORIDE 750 MG/1
40 TABLET, FILM COATED, EXTENDED RELEASE ORAL
Status: COMPLETED | OUTPATIENT
Start: 2019-03-12 | End: 2019-03-12

## 2019-03-12 RX ORDER — ENOXAPARIN SODIUM 100 MG/ML
40 INJECTION SUBCUTANEOUS EVERY 24 HOURS
Status: DISCONTINUED | OUTPATIENT
Start: 2019-03-12 | End: 2019-03-21 | Stop reason: HOSPADM

## 2019-03-12 RX ORDER — SODIUM CHLORIDE 0.9 % (FLUSH) 0.9 %
5-40 SYRINGE (ML) INJECTION AS NEEDED
Status: DISCONTINUED | OUTPATIENT
Start: 2019-03-12 | End: 2019-03-21 | Stop reason: HOSPADM

## 2019-03-12 RX ORDER — CIPROFLOXACIN 2 MG/ML
400 INJECTION, SOLUTION INTRAVENOUS
Status: DISCONTINUED | OUTPATIENT
Start: 2019-03-12 | End: 2019-03-12 | Stop reason: CLARIF

## 2019-03-12 RX ORDER — VENLAFAXINE 25 MG/1
50 TABLET ORAL DAILY
Status: DISCONTINUED | OUTPATIENT
Start: 2019-03-13 | End: 2019-03-21 | Stop reason: HOSPADM

## 2019-03-12 RX ORDER — SODIUM CHLORIDE 0.9 % (FLUSH) 0.9 %
5-40 SYRINGE (ML) INJECTION EVERY 8 HOURS
Status: DISCONTINUED | OUTPATIENT
Start: 2019-03-12 | End: 2019-03-21 | Stop reason: HOSPADM

## 2019-03-12 RX ORDER — LEVOFLOXACIN 5 MG/ML
750 INJECTION, SOLUTION INTRAVENOUS EVERY 24 HOURS
Status: DISCONTINUED | OUTPATIENT
Start: 2019-03-13 | End: 2019-03-15 | Stop reason: ALTCHOICE

## 2019-03-12 RX ORDER — CIPROFLOXACIN 2 MG/ML
400 INJECTION, SOLUTION INTRAVENOUS EVERY 12 HOURS
Status: DISCONTINUED | OUTPATIENT
Start: 2019-03-12 | End: 2019-03-12 | Stop reason: CLARIF

## 2019-03-12 RX ORDER — NALOXONE HYDROCHLORIDE 0.4 MG/ML
0.4 INJECTION, SOLUTION INTRAMUSCULAR; INTRAVENOUS; SUBCUTANEOUS AS NEEDED
Status: DISCONTINUED | OUTPATIENT
Start: 2019-03-12 | End: 2019-03-13 | Stop reason: SDUPTHER

## 2019-03-12 RX ORDER — LEVOFLOXACIN 5 MG/ML
750 INJECTION, SOLUTION INTRAVENOUS
Status: COMPLETED | OUTPATIENT
Start: 2019-03-12 | End: 2019-03-12

## 2019-03-12 RX ORDER — SODIUM CHLORIDE 0.9 % (FLUSH) 0.9 %
5-40 SYRINGE (ML) INJECTION AS NEEDED
Status: DISCONTINUED | OUTPATIENT
Start: 2019-03-12 | End: 2019-03-13

## 2019-03-12 RX ORDER — METRONIDAZOLE 500 MG/100ML
500 INJECTION, SOLUTION INTRAVENOUS EVERY 12 HOURS
Status: DISCONTINUED | OUTPATIENT
Start: 2019-03-12 | End: 2019-03-20

## 2019-03-12 RX ORDER — POTASSIUM CHLORIDE AND SODIUM CHLORIDE 900; 300 MG/100ML; MG/100ML
INJECTION, SOLUTION INTRAVENOUS CONTINUOUS
Status: DISCONTINUED | OUTPATIENT
Start: 2019-03-12 | End: 2019-03-16

## 2019-03-12 RX ORDER — HYDROCODONE BITARTRATE AND ACETAMINOPHEN 5; 325 MG/1; MG/1
1 TABLET ORAL
Status: DISCONTINUED | OUTPATIENT
Start: 2019-03-12 | End: 2019-03-21 | Stop reason: HOSPADM

## 2019-03-12 RX ORDER — HYDROMORPHONE HYDROCHLORIDE 2 MG/ML
0.5 INJECTION, SOLUTION INTRAMUSCULAR; INTRAVENOUS; SUBCUTANEOUS
Status: DISCONTINUED | OUTPATIENT
Start: 2019-03-12 | End: 2019-03-21 | Stop reason: HOSPADM

## 2019-03-12 RX ORDER — ONDANSETRON 2 MG/ML
4 INJECTION INTRAMUSCULAR; INTRAVENOUS
Status: DISCONTINUED | OUTPATIENT
Start: 2019-03-12 | End: 2019-03-21 | Stop reason: HOSPADM

## 2019-03-12 RX ORDER — PRAVASTATIN SODIUM 20 MG/1
40 TABLET ORAL
Status: DISCONTINUED | OUTPATIENT
Start: 2019-03-12 | End: 2019-03-21 | Stop reason: HOSPADM

## 2019-03-12 RX ORDER — ONDANSETRON 2 MG/ML
4 INJECTION INTRAMUSCULAR; INTRAVENOUS
Status: COMPLETED | OUTPATIENT
Start: 2019-03-12 | End: 2019-03-12

## 2019-03-12 RX ORDER — ACETAMINOPHEN 325 MG/1
650 TABLET ORAL
Status: DISCONTINUED | OUTPATIENT
Start: 2019-03-12 | End: 2019-03-21 | Stop reason: HOSPADM

## 2019-03-12 RX ORDER — METRONIDAZOLE 500 MG/100ML
500 INJECTION, SOLUTION INTRAVENOUS
Status: DISCONTINUED | OUTPATIENT
Start: 2019-03-12 | End: 2019-03-12

## 2019-03-12 RX ORDER — DEXTROSE, SODIUM CHLORIDE, AND POTASSIUM CHLORIDE 5; .45; .15 G/100ML; G/100ML; G/100ML
100 INJECTION INTRAVENOUS CONTINUOUS
Status: DISCONTINUED | OUTPATIENT
Start: 2019-03-12 | End: 2019-03-12

## 2019-03-12 RX ORDER — NALOXONE HYDROCHLORIDE 0.4 MG/ML
0.4 INJECTION, SOLUTION INTRAMUSCULAR; INTRAVENOUS; SUBCUTANEOUS AS NEEDED
Status: DISCONTINUED | OUTPATIENT
Start: 2019-03-12 | End: 2019-03-21 | Stop reason: HOSPADM

## 2019-03-12 RX ORDER — POLYETHYLENE GLYCOL 3350 17 G/17G
17 POWDER, FOR SOLUTION ORAL DAILY
Status: ON HOLD | COMMUNITY
End: 2019-03-21 | Stop reason: SDUPTHER

## 2019-03-12 RX ORDER — LEVOFLOXACIN 5 MG/ML
500 INJECTION, SOLUTION INTRAVENOUS
Status: DISCONTINUED | OUTPATIENT
Start: 2019-03-12 | End: 2019-03-12

## 2019-03-12 RX ORDER — METRONIDAZOLE 500 MG/100ML
500 INJECTION, SOLUTION INTRAVENOUS EVERY 8 HOURS
Status: DISCONTINUED | OUTPATIENT
Start: 2019-03-12 | End: 2019-03-12 | Stop reason: SDUPTHER

## 2019-03-12 RX ORDER — DIPHENHYDRAMINE HYDROCHLORIDE 50 MG/ML
12.5 INJECTION, SOLUTION INTRAMUSCULAR; INTRAVENOUS
Status: ACTIVE | OUTPATIENT
Start: 2019-03-12 | End: 2019-03-13

## 2019-03-12 RX ADMIN — POTASSIUM CHLORIDE 40 MEQ: 750 TABLET, EXTENDED RELEASE ORAL at 18:48

## 2019-03-12 RX ADMIN — SODIUM CHLORIDE 1000 ML: 900 INJECTION, SOLUTION INTRAVENOUS at 18:40

## 2019-03-12 RX ADMIN — AZTREONAM 1 G: 1 INJECTION, POWDER, LYOPHILIZED, FOR SOLUTION INTRAMUSCULAR; INTRAVENOUS at 23:23

## 2019-03-12 RX ADMIN — IOPAMIDOL 100 ML: 755 INJECTION, SOLUTION INTRAVENOUS at 18:26

## 2019-03-12 RX ADMIN — LEVOFLOXACIN 750 MG: 5 INJECTION, SOLUTION INTRAVENOUS at 19:37

## 2019-03-12 RX ADMIN — PRAVASTATIN SODIUM 40 MG: 20 TABLET ORAL at 23:23

## 2019-03-12 RX ADMIN — ENOXAPARIN SODIUM 40 MG: 40 INJECTION SUBCUTANEOUS at 23:23

## 2019-03-12 RX ADMIN — Medication 10 ML: at 23:26

## 2019-03-12 RX ADMIN — METRONIDAZOLE 500 MG: 500 INJECTION, SOLUTION INTRAVENOUS at 22:00

## 2019-03-12 RX ADMIN — ONDANSETRON 4 MG: 2 INJECTION INTRAMUSCULAR; INTRAVENOUS at 18:40

## 2019-03-12 RX ADMIN — LEVOFLOXACIN 500 MG: 5 INJECTION, SOLUTION INTRAVENOUS at 19:42

## 2019-03-12 RX ADMIN — SODIUM CHLORIDE AND POTASSIUM CHLORIDE: 9; 2.98 INJECTION, SOLUTION INTRAVENOUS at 23:23

## 2019-03-12 NOTE — PROGRESS NOTES
[facility] [place] [facility] 88  [address] [facility], [address]  Lina Medico: [phone]  F: [phone]      Reason for Visit:   Radha Rosas is a 74 y.o. female who is seen in consultation at the request of Dr. Josy Goodson for evaluation of therapy for breast cancer. Treatment History:   · 2/15/19 Left core bx:   IDC, gr 2, 1.1 cm, ER + at 100%, OR + at 20%, HER 2 POSITIVE (IHC 2+; FISH ratio 3.9; sig/cell 9.2)  · 3/1/19 Left ax LN core bx:  + for breast cancer, ER + at 100%, OR negative, HER 2 POSITIVE (IHC 2+, FISH ratio 2; sig/cell 5.8)  · TCH-P 3/6/19-    History of Present Illness:   Pt noticed the left breast mass herself in Feb 2019, leading to the pathology above    Interval history:  In today for follow up. Complains of gr 2 constipation, gr 2 nausea, gr 1 vomiting, gr 3 insomnia, gr 2 pain 8/10 pain to lower abdomen, gr 1 cough. She was seen in ED on 3/8/19 for abdominal pain and constipation CT abd/pelvis showed diverticulitis and received levaquin and flagyl. KUB also showed nonobstructive bowel gas pattern. Reports last good BM was 3/6 prior to chemo. She states on 3/10 and 3/12 had small watery BM. FH:  Paternal aunt breast cancer in her [de-identified]; no ovarian cancer, no prostate or pancreas cancer    Past Medical History:   Diagnosis Date    Adverse effect of anesthesia     \"difficulty waking up from anesthethia\"    Cancer [facility]) 02/2019    left breast    Depression     GERD (gastroesophageal reflux disease)     High cholesterol     Hypertension       Past Surgical History:   Procedure Laterality Date    HX BREAST BIOPSY Right years ago    neg; needle bx    HX CATARACT REMOVAL      HX COLONOSCOPY      HX ORTHOPAEDIC      carpal tunnel      Social History     Tobacco Use    Smoking status: Current Every Day Smoker     Packs/day: 0.50     Years: 55.00     Pack years: 27.50    Smokeless tobacco: Never Used   Substance Use Topics    Alcohol use:  Yes Alcohol/week: 0.6 oz     Types: 1 Shots of liquor per week     Drinks per session: 5 or 6     Binge frequency: Weekly     Comment: Patient stated that she puts a splash of bourbon in her afternoon coffee around 5-6 days per week      Family History   Problem Relation Age of Onset    Breast Cancer Maternal Aunt     Hypertension Mother     Hypertension Father     Cancer Father         Lung    Cancer Brother         leukemia    Diabetes Brother         2 brothers with diabetes    Hypertension Sister         2 sisters and 4 brothers with HTN     Current Outpatient Medications   Medication Sig    polyethylene glycol (MIRALAX) 17 gram packet Take 17 g by mouth daily.  metroNIDAZOLE (FLAGYL) 500 mg tablet Take 1 Tab by mouth three (3) times daily for 10 days.  levoFLOXacin (LEVAQUIN) 500 mg tablet Take 1 Tab by mouth daily for 10 days.  dexamethasone (DECADRON) 4 mg tablet 8 mg bid the day before and day after chemo    doxycycline (VIBRAMYCIN) 100 mg capsule Take 1 Cap by mouth two (2) times a day for 42 days.  losartan-hydroCHLOROthiazide (HYZAAR) 100-25 mg per tablet Take 1 Tab by mouth daily.  pravastatin (PRAVACHOL) 40 mg tablet Take 40 mg by mouth nightly.  STIOLTO RESPIMAT 2.5-2.5 mcg/actuation inhaler Take 2 Puffs by inhalation nightly.  venlafaxine (EFFEXOR) 50 mg tablet Take 50 mg by mouth daily.  naproxen sodium (ALEVE PO) Take  by mouth two (2) times daily as needed.  lidocaine-prilocaine (EMLA) topical cream Apply  to affected area as needed for Pain.  prochlorperazine (COMPAZINE) 10 mg tablet Take 1 Tab by mouth every six (6) hours as needed for Nausea.  ondansetron hcl (ZOFRAN) 8 mg tablet Take 1 Tab by mouth every twelve (12) hours as needed for Nausea.  Cyanocobalamin-Cobamamide (B12) 5,000-100 mcg lozg by SubLINGual route daily.  cholecalciferol (VITAMIN D3) 1,000 unit cap Take  by mouth daily.     calcium-cholecalciferol, D3, (CALTRATE 600+D) tablet Take 1 Tab by mouth daily.  aspirin delayed-release 81 mg tablet Take  by mouth daily. No current facility-administered medications for this visit. Allergies   Allergen Reactions    Doxycycline Diarrhea    Keflex [Cephalexin] Hives    Penicillins Rash        Review of Systems: A complete review of systems was obtained, negative except as described above. Physical Exam:     Visit Vitals  /66   Pulse 99   Temp 97.6 °F (36.4 °C) (Oral)   Resp 18   Ht 5' 6.5\" (1.689 m)   Wt 178 lb (80.7 kg)   BMI 28.30 kg/m²     ECOG PS: 0  General: No distress  Eyes: PERRLA, anicteric sclerae  HENT: Atraumatic, OP clear  Neck: Supple  Lymphatic: No cervical, supraclavicular adenopathy  Respiratory: exp wheezing  CV: Normal rate, regular rhythm, no murmurs, no peripheral edema  GI: Soft, tender, distended, no masses, +BS  MS:  Digits without clubbing or cyanosis. Skin: No rashes, ecchymoses, or petechiae. Normal temperature, turgor, and texture. Psych: Alert, oriented, appropriate affect, normal judgment/insight  Breasts:  L breast 12:00, 3 cm FN, 3.7 cm mass, 1.5 cm L ax LN (last exam, deferred today)    Results:     Lab Results   Component Value Date/Time    WBC 31.0 (H) 03/08/2019 10:39 PM    HGB 15.7 03/08/2019 10:39 PM    HCT 45.5 03/08/2019 10:39 PM    PLATELET 190 78/73/1904 10:39 PM    MCV 89.4 03/08/2019 10:39 PM    ABS.  NEUTROPHILS 25.4 (H) 03/08/2019 10:39 PM     Lab Results   Component Value Date/Time    Sodium 135 (L) 03/08/2019 11:11 PM    Potassium 3.2 (L) 03/08/2019 11:11 PM    Chloride 96 (L) 03/08/2019 11:11 PM    CO2 29 03/08/2019 11:11 PM    Glucose 112 (H) 03/08/2019 11:11 PM    BUN 23 (H) 03/08/2019 11:11 PM    Creatinine 0.90 03/08/2019 11:11 PM    GFR est AA >60 03/08/2019 11:11 PM    GFR est non-AA >60 03/08/2019 11:11 PM    Calcium 8.9 03/08/2019 11:11 PM     Lab Results   Component Value Date/Time    Bilirubin, total 0.9 03/08/2019 11:11 PM    ALT (SGPT) 28 03/08/2019 11:11 PM    AST (SGOT) 25 03/08/2019 11:11 PM    Alk. phosphatase 77 03/08/2019 11:11 PM    Protein, total 7.0 03/08/2019 11:11 PM    Albumin 3.5 03/08/2019 11:11 PM    Globulin 3.5 03/08/2019 11:11 PM       2/12/19 mammogram and US  MAMMOGRAPHY:  The breast demonstrates stable scattered density breast  architecture. Underlying the site of clinical concern in the left breast is  masslike focal asymmetry in the anterior upper left breast. There is no other  dominant mass lesion, architectural distortion, asymmetry, or calcification. There is no skin thickening or nipple retraction.     ULTRASOUND:  Corresponding to the mammographic density there is a 2.4 x 3.1 x  3.7 cm hypoechoic lobulated mass at about the 12:00 position and 3 cm radial  distance from the nipple. No other suspicious cystic or solid lesions  Identified. Dr. Max Sanchez has identified 2 large, suspicious ax LN on US:  1.7 cm and 1.2 cm    Records reviewed and summarized above. Pathology report(s) reviewed above. Radiology report(s) reviewed above. Assessment/plan:   1. Left central IDC, gr 2, ER +, MS +, HER 2 positive:  cT2 cN1a cM0, stage IIB, prognostic stage IB    We explained to the patient that the goal of systemic adjuvant therapy is to improve the chances for cure and decrease the risk of relapse. We explained why a patient can have microscopic cancer spread now even though physical examination, laboratory studies and imaging studies are negative for cancer. We explained that the same treatments used now as adjuvant or preventive treatments rarely if ever are curative in women who develop metastases. We discussed that there is no difference in overall survival between neoadjuvant and adjuvant chemotherapy.   We discussed the rationale for neoadjuvant chemotherapy, if chemotherapy is warranted, as it is in this case: to avoid any potential delays in giving chemotherapy following surgery, to be able to see the response of the tumor to chemotherapy, and to potentially downstage the tumor prior to surgery. Using eprognosis. org, her 5 year risk of all cause mortality is 20%, her risk in 10 years is 52-58%, average OS is 9-10 years. Therapy discussion is warranted. Cristel Atwood suggests equivalency between q.3 week Adriamycin, Cytoxan followed by weekly paclitaxel and trastuzumab compared with the [facility] regimen. However, this study was not powered to show a difference between these two regimens, and in the [place] publication, the AC-TH arm showed a numerically advantange (though not statistically significant) to the [facility] arm. In this patient, it is completely reasonable to use [facility] approach and avoid the potential cardiotoxicity of the anthracyclines as well as the potential for leukemia. We discussed the toxicities of docetaxel and carboplatin chemotherapy in detail. This chemotherapy frequently causes a low white blood cell count and hospital admissions for treatment of neutropenic fever. We explained that we consider the use of growth factors to minimize this risk. We explained to the patient that some side effects if they occur only last a few days including nausea, vomiting, stomatitis, arthralgia, myalgia,and allergic reactions to Taxotere. We told the patient that severe nausea and vomiting were uncommon and that some side effects,if they occur, will last longer; this includes hair loss, which will be seen in all patients treated with these agents and fatigue,which will be seen in most.  We also informed that for the patient that heart damage is rare with these agents. We explained that carboplatin can rarely cause kidney damage and high frequency hearing loss. We provided the patient in detail her information concerning the toxicities of this regimen in addition to her overall discussion.       Rational for therapy with pertuzumab was also discussed with the patient including a nearly 20% improvement seen in pCR in the neoadjuvant setting with the addition of this medication. Additional AE discussed including an acne rash (and the use of doxycyline 100 mg bid to help prevent) and diarrhea and consideration of additional cardiomyopathy. Rationale for therapy with trastuzumab was also discussed with the patient including a 50% proportional improvement in disease free survival and also an improvement in overall survival in patients receiving trastuzumab and chemotherapy for HER-2 positive breast cancer. The side effects of trastuzumab were discussed including a 4%-5% risk of dropping her ejection fraction while on treatment and about a 1% risk of CHF. We discussed that this drug will be used every 3 weeks for remainder of a year following the chemotherapy cycles. We will check her EF before chemotherapy and every 3 months while she is receiving trastuzumab. · TCH-P (Trastuzumab 8mg/kg load with cycle 1 then 6mg/kg, Docetaxel 75mg/m2, Carboplatin AUC 6, Pertuzumab 840mg load with cycle 1 then 420mg) given every 3 weeks x 6 cycles  · Labs: CBC, CMP prior to each treatment. · Antiemetic Prophylaxis: Palonosetron and dexamethasone prior to chemo  · PRN Antiemetics: Ondansetron, Compazine  · Swelling prophylaxis: Dexmethasone 8mg bid the day before and day after chemotherapy  · TTE prior to chemotherapy and every 12 weeks while on Trastuzumab  · Neulasta 24-72 hours after each treatment    The patient was given presciptions for emla cream, decadron to take 8 mg bid the day before and day after chemo, zofran and compazine. Neulasta the following day. After this discussion, she is agreeable to TCH-P, cycle 1 given on 3/6, she has signed informed consent. Cryotherapy discussed with docetaxel as well    LN + by core, she may be eligible for the [facility] SLN study if her LN clinically resolves    TTE 3/6/19 EF 66-70%. Port has been placed. We will plan to see the patient in follow up at least once per cycle, or sooner if symptoms warrant.     2. Emotional well being:  She has excellent support and is coping well with her disease    3. tob use:  Smokes 1/2 ppd; counseled to quit    4. Abdominal pain:  Severe and worsening; Seen in ED 3/8/19 for abdominal pain and constipation. CT abd/pelvis showed diverticulitis and KUB showed nonobstructive bowel gas pattern and small amount of stool. She continues her Levaquin and flagyl. Her symptoms have worsened since her last ED visit on 3/8/19, will send patient to ED for further evaluation. Recommend repeat KUB and possible CT to eval for obstruction. She states she is not passing gas    5. Constipation with bloating:  Last good BM was 3/6/19 prior to chemo. Reports watery stool on 3/10 and 3/12. States she is not passing gas. Currently taking Miralax, colace, and senna, and prune juice every day with no relief. Also reports taking Mag citrate with no relief. Her symptoms have worsened since her last ED visit on 3/8/19, will  send patient to ED for further evaluation. 6. Expiratory wheeze: Denies SOB. She has inhalers at home. States she has not been using lately due to her abdominal pain. Encouraged patient to utilize inhalers. Will monitor. 7. Fatigue: 2/2 her abdominal pain and constipation. Plan for IV hydration later this week if needed. Encouraged hydration. Will monitor. This patient was seen in conjunction with Umm Veliz NP. Discussed with ED. I appreciate the opportunity to participate in Ms. Vianey Krause's care. Signed By: Shabbir Baker MD      No orders of the defined types were placed in this encounter.

## 2019-03-12 NOTE — ED NOTES
Bedside and Verbal shift change report given to Cathryn Tovar RN (oncoming nurse) by Giovany Walton RN (offgoing nurse). Report included the following information SBAR, ED Summary, Procedure Summary, Intake/Output, MAR and Recent Results.

## 2019-03-12 NOTE — PROGRESS NOTES
Diverticulitis failing abx, now with abscess. CT a/p shows the following:    BOWEL AND MESENTERY: Small hiatus hernia. Stomach otherwise unremarkable. No  small bowel abnormalities. Colonic diverticulosis with inflammatory change  adjacent to the sigmoid colon. No focal fluid collection containing a tiny  amount of gas located in the cul-de-sac adjacent to the inflamed sigmoid colon,  and consistent with a diverticular abscess. This measures 4.1 cm AP, 3.6 cm  transverse, and 2.7 cm craniocaudad. No mesenteric mass or adenopathy. Appendix  normal. No bowel obstruction.       PERITONEUM: Localized cul-de-sac fluid collection as described above. Suspect 2  additional fluid and gas collections which are not as clearly defined, best seen  posterior to the uterus on the right on series 2 image 69 and coronal image 62  and on the left on series 2, image 71 and on coronal image 62. .     RETROPERITONEUM: Aorta  tapers without aneurysm. No retroperitoneal adenopathy  or mass. No pelvic mass or adenopathy.     PELVIS:  Reproductive organs:  Unremarkable. Bladder: No abnormality.      BONES AND SOFT TISSUES: Degenerative changes in the spine. No destructive  osseous lesions.     IMPRESSION  IMPRESSION:      Acute diverticulitis of the sigmoid colon. Development of a focal fluid and gas  collection in the cul-de-sac, consistent with an abscess. Suspected 2 additional  smaller developing abscesses in the pelvis. Recommend evaluation by admission to general surgery team.   Hospitalist service will be happy to see as consult.

## 2019-03-12 NOTE — ED PROVIDER NOTES
74 y.o. female with past medical history significant for breast CA (followed by Dr. Doe Hoang oncology) presents as a referral from oncology d/t Pressure\" in lower abdomen since chemotherapy last Wednesday. Mild nausea. Dec appetite. Watery stools this AM. Evaluated at [facility] ED on 03/08/19 for diverticulitis. No other complaints at this time. I have evaluated the patient as the Provider in Triage. I have reviewed Her vital signs and the triage nurse assessment. I have talked with the patient and any available family and advised that I am the provider in triage and have ordered the appropriate study to initiate their work up based on the clinical presentation during my assessment. I have advised that the patient will be accommodated in the Main ED as soon as possible. I have also requested to contact the triage nurse or myself immediately if the patient experiences any changes in their condition during this brief waiting period.     Note written by Amador Mccann, as dictated by Markos Cruz MD 4:42 PM               Past Medical History:   Diagnosis Date    Adverse effect of anesthesia     \"difficulty waking up from anesthethia\"    Cancer [facility]) 02/2019    left breast    Depression     GERD (gastroesophageal reflux disease)     High cholesterol     Hypertension        Past Surgical History:   Procedure Laterality Date    HX BREAST BIOPSY Right years ago    neg; needle bx    HX CATARACT REMOVAL      HX COLONOSCOPY      HX ORTHOPAEDIC      carpal tunnel         Family History:   Problem Relation Age of Onset    Breast Cancer Maternal Aunt     Hypertension Mother     Hypertension Father     Cancer Father         Lung    Cancer Brother         leukemia    Diabetes Brother         2 brothers with diabetes    Hypertension Sister         2 sisters and 4 brothers with HTN       Social History     Socioeconomic History    Marital status:      Spouse name: Not on file    Number of children: Not on file    Years of education: Not on file    Highest education level: Not on file   Social Needs    Financial resource strain: Not on file    Food insecurity - worry: Not on file    Food insecurity - inability: Not on file    Transportation needs - medical: Not on file   [employer] needs - non-medical: Not on file   Occupational History    Not on file   Tobacco Use    Smoking status: Current Every Day Smoker     Packs/day: 0.50     Years: 55.00     Pack years: 27.50    Smokeless tobacco: Never Used   Substance and Sexual Activity    Alcohol use: Yes     Alcohol/week: 0.6 oz     Types: 1 Shots of liquor per week     Drinks per session: 5 or 6     Binge frequency: Weekly     Comment: Patient stated that she puts a splash of bourbon in her afternoon coffee around 5-6 days per week    Drug use: No    Sexual activity: Not on file   Other Topics Concern    Not on file   Social History Narrative    Not on file         ALLERGIES: Doxycycline; Keflex [cephalexin]; and Penicillins    Review of Systems   Constitutional: Negative for chills, diaphoresis and fever. HENT: Negative for congestion, postnasal drip, rhinorrhea and sore throat. Eyes: Negative for photophobia, discharge, redness and visual disturbance. Respiratory: Negative for cough, chest tightness, shortness of breath and wheezing. Cardiovascular: Negative for chest pain, palpitations and leg swelling. Gastrointestinal: Positive for abdominal pain and nausea. Negative for abdominal distention, blood in stool, constipation, diarrhea and vomiting. Genitourinary: Negative for difficulty urinating, dysuria, frequency, hematuria and urgency. Musculoskeletal: Negative for arthralgias, back pain, joint swelling and myalgias. Skin: Negative for color change and rash. Neurological: Negative for dizziness, speech difficulty, weakness, light-headedness, numbness and headaches.    Psychiatric/Behavioral: Negative for confusion. The patient is not nervous/anxious. All other systems reviewed and are negative. There were no vitals filed for this visit. Physical Exam   Constitutional: She is oriented to person, place, and time. She appears well-developed and well-nourished. No distress. HENT:   Head: Normocephalic and atraumatic. Head is without raccoon's eyes, without Barbosa's sign and without laceration. Right Ear: Hearing, tympanic membrane, external ear and ear canal normal. No foreign bodies. Tympanic membrane is not bulging. No hemotympanum. Left Ear: Hearing, tympanic membrane, external ear and ear canal normal. No foreign bodies. Tympanic membrane is not bulging. No hemotympanum. Nose: Nose normal. No mucosal edema or rhinorrhea. Right sinus exhibits no maxillary sinus tenderness and no frontal sinus tenderness. Left sinus exhibits no maxillary sinus tenderness and no frontal sinus tenderness. Mouth/Throat: Uvula is midline, oropharynx is clear and moist and mucous membranes are normal. No tonsillar abscesses. Eyes: Conjunctivae and EOM are normal. Pupils are equal, round, and reactive to light. Right eye exhibits no discharge. Left eye exhibits no discharge. Neck: Normal range of motion. Neck supple. Cardiovascular: Normal rate, regular rhythm and normal heart sounds. Exam reveals no gallop and no friction rub. No murmur heard. Regular rate and rhythm. No murmurs, gallops, rubs, or clicks. Pulmonary/Chest: Effort normal and breath sounds normal. No respiratory distress. She has no wheezes. She has no rales. No stridor or wheezes. No accessory muscle usage. No nasal flaring. Breath Sounds equal bilaterally. Abdominal: Soft. Bowel sounds are normal. She exhibits no distension. There is tenderness. There is no rebound and no guarding. + TTP over lower abdomen. Musculoskeletal: Normal range of motion. She exhibits no edema, tenderness or deformity.    Neurological: She is alert and oriented to person, place, and time. Skin: She is not diaphoretic. Nursing note and vitals reviewed. MDM  Number of Diagnoses or Management Options  Intra-abdominal abscess [facility]):   Diagnosis management comments: CT revealed diverticulitis with abscess. Started IV abx. Spoke to general surgery (Dr. Angelika Blum) who will admit to his service.   Jeannie Adler PA-C         Procedures

## 2019-03-13 ENCOUNTER — APPOINTMENT (OUTPATIENT)
Dept: CT IMAGING | Age: 74
DRG: 330 | End: 2019-03-13
Attending: PHYSICIAN ASSISTANT
Payer: MEDICARE

## 2019-03-13 LAB
ALBUMIN SERPL-MCNC: 2.3 G/DL (ref 3.5–5)
ALBUMIN/GLOB SERPL: 0.6 {RATIO} (ref 1.1–2.2)
ALP SERPL-CCNC: 69 U/L (ref 45–117)
ALT SERPL-CCNC: 20 U/L (ref 12–78)
ANION GAP SERPL CALC-SCNC: 2 MMOL/L (ref 5–15)
AST SERPL-CCNC: 18 U/L (ref 15–37)
BASOPHILS # BLD: 0.1 K/UL (ref 0–0.1)
BASOPHILS NFR BLD: 2 % (ref 0–1)
BILIRUB SERPL-MCNC: 0.5 MG/DL (ref 0.2–1)
BUN SERPL-MCNC: 15 MG/DL (ref 6–20)
BUN/CREAT SERPL: 20 (ref 12–20)
CALCIUM SERPL-MCNC: 8.1 MG/DL (ref 8.5–10.1)
CHLORIDE SERPL-SCNC: 95 MMOL/L (ref 97–108)
CO2 SERPL-SCNC: 36 MMOL/L (ref 21–32)
CREAT SERPL-MCNC: 0.75 MG/DL (ref 0.55–1.02)
DIFFERENTIAL METHOD BLD: ABNORMAL
EOSINOPHIL # BLD: 0.1 K/UL (ref 0–0.4)
EOSINOPHIL NFR BLD: 3 % (ref 0–7)
ERYTHROCYTE [DISTWIDTH] IN BLOOD BY AUTOMATED COUNT: 12.9 % (ref 11.5–14.5)
GLOBULIN SER CALC-MCNC: 3.6 G/DL (ref 2–4)
GLUCOSE BLD STRIP.AUTO-MCNC: 110 MG/DL (ref 65–100)
GLUCOSE BLD STRIP.AUTO-MCNC: 98 MG/DL (ref 65–100)
GLUCOSE SERPL-MCNC: 104 MG/DL (ref 65–100)
HCT VFR BLD AUTO: 38.2 % (ref 35–47)
HGB BLD-MCNC: 12.7 G/DL (ref 11.5–16)
IMM GRANULOCYTES # BLD AUTO: 0 K/UL
IMM GRANULOCYTES NFR BLD AUTO: 0 %
LYMPHOCYTES # BLD: 0.8 K/UL (ref 0.8–3.5)
LYMPHOCYTES NFR BLD: 22 % (ref 12–49)
MAGNESIUM SERPL-MCNC: 2.5 MG/DL (ref 1.6–2.4)
MCH RBC QN AUTO: 29.6 PG (ref 26–34)
MCHC RBC AUTO-ENTMCNC: 33.2 G/DL (ref 30–36.5)
MCV RBC AUTO: 89 FL (ref 80–99)
METAMYELOCYTES NFR BLD MANUAL: 3 %
MONOCYTES # BLD: 1 K/UL (ref 0–1)
MONOCYTES NFR BLD: 28 % (ref 5–13)
MYELOCYTES NFR BLD MANUAL: 2 %
NEUTS BAND NFR BLD MANUAL: 5 % (ref 0–6)
NEUTS SEG # BLD: 1.4 K/UL (ref 1.8–8)
NEUTS SEG NFR BLD: 35 % (ref 32–75)
NRBC # BLD: 0 K/UL (ref 0–0.01)
NRBC BLD-RTO: 0 PER 100 WBC
PHOSPHATE SERPL-MCNC: 1.3 MG/DL (ref 2.6–4.7)
PLATELET # BLD AUTO: 214 K/UL (ref 150–400)
PMV BLD AUTO: 9.9 FL (ref 8.9–12.9)
POTASSIUM SERPL-SCNC: 3 MMOL/L (ref 3.5–5.1)
PROT SERPL-MCNC: 5.9 G/DL (ref 6.4–8.2)
RBC # BLD AUTO: 4.29 M/UL (ref 3.8–5.2)
RBC MORPH BLD: ABNORMAL
SERVICE CMNT-IMP: ABNORMAL
SERVICE CMNT-IMP: NORMAL
SODIUM SERPL-SCNC: 133 MMOL/L (ref 136–145)
WBC # BLD AUTO: 3.5 K/UL (ref 3.6–11)

## 2019-03-13 PROCEDURE — 77030010546 HC BG URIN DRNG URES -B

## 2019-03-13 PROCEDURE — 0W9J3ZX DRAINAGE OF PELVIC CAVITY, PERCUTANEOUS APPROACH, DIAGNOSTIC: ICD-10-PCS | Performed by: RADIOLOGY

## 2019-03-13 PROCEDURE — C1769 GUIDE WIRE: HCPCS

## 2019-03-13 PROCEDURE — 77030014115

## 2019-03-13 PROCEDURE — 74011250636 HC RX REV CODE- 250/636: Performed by: RADIOLOGY

## 2019-03-13 PROCEDURE — 74011250637 HC RX REV CODE- 250/637: Performed by: INTERNAL MEDICINE

## 2019-03-13 PROCEDURE — 99153 MOD SED SAME PHYS/QHP EA: CPT

## 2019-03-13 PROCEDURE — C1729 CATH, DRAINAGE: HCPCS

## 2019-03-13 PROCEDURE — 74011000250 HC RX REV CODE- 250: Performed by: INTERNAL MEDICINE

## 2019-03-13 PROCEDURE — 77030003526 HC NDL BLNT PERC MDT -B

## 2019-03-13 PROCEDURE — 84100 ASSAY OF PHOSPHORUS: CPT

## 2019-03-13 PROCEDURE — 74011000258 HC RX REV CODE- 258: Performed by: INTERNAL MEDICINE

## 2019-03-13 PROCEDURE — 87077 CULTURE AEROBIC IDENTIFY: CPT

## 2019-03-13 PROCEDURE — 87186 SC STD MICRODIL/AGAR DIL: CPT

## 2019-03-13 PROCEDURE — 85025 COMPLETE CBC W/AUTO DIFF WBC: CPT

## 2019-03-13 PROCEDURE — 87205 SMEAR GRAM STAIN: CPT

## 2019-03-13 PROCEDURE — 77030011229 HC DIL VESL COON COOK -A

## 2019-03-13 PROCEDURE — 82962 GLUCOSE BLOOD TEST: CPT

## 2019-03-13 PROCEDURE — 80053 COMPREHEN METABOLIC PANEL: CPT

## 2019-03-13 PROCEDURE — 77030002996 HC SUT SLK J&J -A

## 2019-03-13 PROCEDURE — 74011250636 HC RX REV CODE- 250/636: Performed by: SURGERY

## 2019-03-13 PROCEDURE — 65270000029 HC RM PRIVATE

## 2019-03-13 PROCEDURE — 49406 IMAGE CATH FLUID PERI/RETRO: CPT

## 2019-03-13 PROCEDURE — 87075 CULTR BACTERIA EXCEPT BLOOD: CPT

## 2019-03-13 PROCEDURE — 99152 MOD SED SAME PHYS/QHP 5/>YRS: CPT

## 2019-03-13 PROCEDURE — 74011250636 HC RX REV CODE- 250/636

## 2019-03-13 PROCEDURE — 36415 COLL VENOUS BLD VENIPUNCTURE: CPT

## 2019-03-13 PROCEDURE — 83735 ASSAY OF MAGNESIUM: CPT

## 2019-03-13 PROCEDURE — 87185 SC STD ENZYME DETCJ PER NZM: CPT

## 2019-03-13 PROCEDURE — 74011250636 HC RX REV CODE- 250/636: Performed by: INTERNAL MEDICINE

## 2019-03-13 RX ORDER — MIDAZOLAM HYDROCHLORIDE 1 MG/ML
5 INJECTION, SOLUTION INTRAMUSCULAR; INTRAVENOUS
Status: DISCONTINUED | OUTPATIENT
Start: 2019-03-13 | End: 2019-03-13

## 2019-03-13 RX ORDER — FENTANYL CITRATE 50 UG/ML
100 INJECTION, SOLUTION INTRAMUSCULAR; INTRAVENOUS
Status: DISCONTINUED | OUTPATIENT
Start: 2019-03-13 | End: 2019-03-13

## 2019-03-13 RX ORDER — LIDOCAINE HYDROCHLORIDE 10 MG/ML
10 INJECTION, SOLUTION EPIDURAL; INFILTRATION; INTRACAUDAL; PERINEURAL
Status: COMPLETED | OUTPATIENT
Start: 2019-03-13 | End: 2019-03-13

## 2019-03-13 RX ORDER — LIDOCAINE HYDROCHLORIDE 10 MG/ML
INJECTION, SOLUTION EPIDURAL; INFILTRATION; INTRACAUDAL; PERINEURAL
Status: COMPLETED
Start: 2019-03-13 | End: 2019-03-13

## 2019-03-13 RX ORDER — POTASSIUM CHLORIDE 7.45 MG/ML
10 INJECTION INTRAVENOUS
Status: DISCONTINUED | OUTPATIENT
Start: 2019-03-13 | End: 2019-03-13

## 2019-03-13 RX ADMIN — METRONIDAZOLE 500 MG: 500 INJECTION, SOLUTION INTRAVENOUS at 11:43

## 2019-03-13 RX ADMIN — SODIUM CHLORIDE AND POTASSIUM CHLORIDE: 9; 2.98 INJECTION, SOLUTION INTRAVENOUS at 11:43

## 2019-03-13 RX ADMIN — AZTREONAM 1 G: 1 INJECTION, POWDER, LYOPHILIZED, FOR SOLUTION INTRAMUSCULAR; INTRAVENOUS at 17:14

## 2019-03-13 RX ADMIN — METRONIDAZOLE 500 MG: 500 INJECTION, SOLUTION INTRAVENOUS at 23:02

## 2019-03-13 RX ADMIN — SODIUM CHLORIDE: 900 INJECTION, SOLUTION INTRAVENOUS at 10:04

## 2019-03-13 RX ADMIN — Medication 10 ML: at 23:03

## 2019-03-13 RX ADMIN — PRAVASTATIN SODIUM 40 MG: 20 TABLET ORAL at 23:02

## 2019-03-13 RX ADMIN — LIDOCAINE HYDROCHLORIDE: 10 INJECTION, SOLUTION EPIDURAL; INFILTRATION; INTRACAUDAL; PERINEURAL at 15:00

## 2019-03-13 RX ADMIN — VENLAFAXINE 50 MG: 25 TABLET ORAL at 10:04

## 2019-03-13 RX ADMIN — MIDAZOLAM 1 MG: 1 INJECTION INTRAMUSCULAR; INTRAVENOUS at 14:16

## 2019-03-13 RX ADMIN — Medication 10 ML: at 21:05

## 2019-03-13 RX ADMIN — FENTANYL CITRATE 25 MCG: 50 INJECTION, SOLUTION INTRAMUSCULAR; INTRAVENOUS at 14:24

## 2019-03-13 RX ADMIN — ACETAMINOPHEN 650 MG: 325 TABLET ORAL at 11:43

## 2019-03-13 RX ADMIN — FENTANYL CITRATE 25 MCG: 50 INJECTION, SOLUTION INTRAMUSCULAR; INTRAVENOUS at 14:16

## 2019-03-13 RX ADMIN — Medication 5 ML: at 14:00

## 2019-03-13 RX ADMIN — MIDAZOLAM 1 MG: 1 INJECTION INTRAMUSCULAR; INTRAVENOUS at 14:08

## 2019-03-13 RX ADMIN — AZTREONAM 1 G: 1 INJECTION, POWDER, LYOPHILIZED, FOR SOLUTION INTRAMUSCULAR; INTRAVENOUS at 23:03

## 2019-03-13 RX ADMIN — LIDOCAINE HYDROCHLORIDE 10 ML: 10 INJECTION, SOLUTION EPIDURAL; INFILTRATION; INTRACAUDAL; PERINEURAL at 14:17

## 2019-03-13 RX ADMIN — Medication 10 ML: at 17:16

## 2019-03-13 RX ADMIN — FENTANYL CITRATE 25 MCG: 50 INJECTION, SOLUTION INTRAMUSCULAR; INTRAVENOUS at 14:08

## 2019-03-13 RX ADMIN — AZTREONAM 1 G: 1 INJECTION, POWDER, LYOPHILIZED, FOR SOLUTION INTRAMUSCULAR; INTRAVENOUS at 06:00

## 2019-03-13 RX ADMIN — ENOXAPARIN SODIUM 40 MG: 40 INJECTION SUBCUTANEOUS at 22:50

## 2019-03-13 RX ADMIN — LEVOFLOXACIN 750 MG: 5 INJECTION, SOLUTION INTRAVENOUS at 20:58

## 2019-03-13 NOTE — PROGRESS NOTES
Patient had questioned the abx ordered for her condition. This nurse contacted family practice spoke with Dr Bharathi Hunter, leave abx as ordered.

## 2019-03-13 NOTE — PROGRESS NOTES
Daily Progress Note: 3/13/2019  Collin Mackay MD    Assessment/Plan:   Diverticulitis: failing antibiotics. Now with abscess evident on CT a/p. General surgery to see. ---Keep NPO   ---HOLDING DVT chemoprophylaxis and ASA.   ---Would like to use Zosyn or meropenem however allergy to PCN/Keflex(hives) noted. Agree           with IV Levaquin and Flagyl, but will add aztreonam too, for add'l GN coverage.     Hyponatremia / Hypokalemia:   ---suspect from IVVD and thiazide diuretic use.   ---HOLD HCTZ.   ---Start IVF with KCl.   ---Follow BMP  ---Check MAg     Hypertension: controlled.   ---Hold home antihypertensives for now     High cholesterol:   ---continue statin     Depression: continue Cymbalta     GERD (gastroesophageal reflux disease):   ---PPI PRN     Cancer: left breast. Left central IDC, gr 2, ER +, AK +, HER 2 positive:  cT2 cN1a cM0, stage IIB, prognostic stage IB.   ---Followed by Dr. Lucero Cerna.   ---Outpatient follow up     Wheeze: mild. Smoker. Advise smoking cessation. Albuterol PRN     Adverse effect of anesthesia: Overview: \"difficulty waking up from anesthethia\".  Will need to monitor closely in PACU as appropriate                     Problem List:  Problem List as of 3/13/2019 Date Reviewed: 3/12/2019          Codes Class Noted - Resolved    Hypertension ICD-10-CM: I10  ICD-9-CM: 401.9  3/12/2019 - Present        High cholesterol ICD-10-CM: E78.00  ICD-9-CM: 272.0  3/12/2019 - Present        GERD (gastroesophageal reflux disease) ICD-10-CM: K21.9  ICD-9-CM: 530.81  3/12/2019 - Present        Depression ICD-10-CM: F32.9  ICD-9-CM: 577  3/12/2019 - Present        Adverse effect of anesthesia ICD-10-CM: T41.45XA  ICD-9-CM: 995.22  3/12/2019 - Present    Overview Signed 3/12/2019  7:39 PM by Parker Cobb MD     \"difficulty waking up from anesthethia\"             Diverticulitis ICD-10-CM: R02.24  ICD-9-CM: 562.11  3/12/2019 - Present        Hyponatremia ICD-10-CM: E87.1  ICD-9-CM: 276.1  3/12/2019 - Present        Hypokalemia ICD-10-CM: E87.6  ICD-9-CM: 276.8  3/12/2019 - Present        Abscess ICD-10-CM: L02.91  ICD-9-CM: 682.9  3/12/2019 - Present        Breast cancer, left ([facility] [address] ICD-10-CM: C50.912  ICD-9-CM: 174.9  2019 - Present    Overview Addendum 3/12/2019  4:58 PM by Scar Adams MD     2019 LEFT invasive ductal carcinoma, grade 2, %. KS 20%, Her 2 positive  Positive axillary node, %. KS neg, Her 2 positive  TCH-P               Cancer [facility]) ICD-10-CM: C80.1  ICD-9-CM: 199.1  2019 - Present    Overview Signed 3/12/2019  7:39 PM by Kimber Chávez MD     left breast                   HPI:   Ms. Darvin Riggs is a 74 y.o.  female who is admitted to the Gen Surg Service for diverticulitis with abscess. We are asked to evaluate for medical mgmt. Ms. Darvin Riggs is complaining of abdominal pain. Ongoing for about a week now. Seen in ED a few days ago, sent home on oral abx (levaquin/Flagyl) however did not improve. (Dr Pradeep Johnson)    3/13: Feeling a little better. Abd feels less tight. No BM for about 6 days but has had little po. K+ low so will replete. Review of Systems:   A comprehensive review of systems was negative except for that written in the HPI. Objective:   Physical Exam:     Visit Vitals  /62 (BP 1 Location: Right arm, BP Patient Position: At rest)   Pulse 82   Temp 98 °F (36.7 °C)   Resp 15   Ht 5' 6\" (1.676 m)   Wt 178 lb (80.7 kg)   SpO2 94%   BMI 28.73 kg/m²      O2 Device: Room air    Temp (24hrs), Av.2 °F (36.8 °C), Min:97.6 °F (36.4 °C), Max:98.8 °F (37.1 °C)    No intake/output data recorded. 1 -  0700  In: 1200 [I.V.:1200]  Out: -     General:  Alert, cooperative, no distress, appears stated age. Head:  Normocephalic, without obvious abnormality, atraumatic. Eyes:  Conjunctivae/corneas clear. PERRL, EOMs intact. Nose: Nares normal. Septum midline. Mucosa normal. No drainage or sinus tenderness.    Throat: Lips, mucosa, and tongue normal. Teeth and gums normal.   Neck: Supple, symmetrical, trachea midline, no adenopathy, thyroid: no enlargement/tenderness/nodules, no carotid bruit and no JVD. Back:   Symmetric, no curvature. ROM normal. No CVA tenderness. Lungs:   Clear to auscultation bilaterally. Chest wall:  No tenderness or deformity. Heart:  Regular rate and rhythm, S1, S2 normal, no murmur, click, rub or gallop. Abdomen:   Soft, tender LLQ, no rebound or guarding. Bowel sounds present. No masses,  No organomegaly. Extremities: Extremities normal, atraumatic, no cyanosis or edema. No calf tenderness or cords. Pulses: 2+ and symmetric all extremities. Skin: Skin color, texture, turgor normal. No rashes or lesions   Neurologic: CNII-XII intact. Alert and oriented X 3. Fine motor of hands and fingers normal.   equal.  No cogwheeling or rigidity. Gait not tested at this time. Sensation grossly normal to touch. Gross motor of extremities normal.       Data Review:       Recent Days:  Recent Labs     03/13/19  0506 03/12/19  1652   WBC 3.5* 3.0*   HGB 12.7 14.2   HCT 38.2 40.2    228     Recent Labs     03/13/19  0506 03/12/19  1652   * 127*   K 3.0* 2.7*   CL 95* 87*   CO2 36* 33*   * 111*   BUN 15 19   CREA 0.75 0.91   CA 8.1* 8.6   MG 2.5* 2.5*   PHOS 1.3*  --    ALB 2.3* 2.6*   TBILI 0.5 0.8   SGOT 18 21   ALT 20 25     No results for input(s): PH, PCO2, PO2, HCO3, FIO2 in the last 72 hours.     24 Hour Results:  Recent Results (from the past 24 hour(s))   CBC WITH AUTOMATED DIFF    Collection Time: 03/12/19  4:52 PM   Result Value Ref Range    WBC 3.0 (L) 3.6 - 11.0 K/uL    RBC 4.55 3.80 - 5.20 M/uL    HGB 14.2 11.5 - 16.0 g/dL    HCT 40.2 35.0 - 47.0 %    MCV 88.4 80.0 - 99.0 FL    MCH 31.2 26.0 - 34.0 PG    MCHC 35.3 30.0 - 36.5 g/dL    RDW 13.0 11.5 - 14.5 %    PLATELET 456 261 - 581 K/uL    MPV 10.1 8.9 - 12.9 FL    NRBC 0.0 0  WBC    ABSOLUTE NRBC 0.00 0.00 - 0.01 K/uL    NEUTROPHILS 48 32 - 75 %    LYMPHOCYTES 30 12 - 49 %    MONOCYTES 16 (H) 5 - 13 %    EOSINOPHILS 3 0 - 7 %    BASOPHILS 3 (H) 0 - 1 %    IMMATURE GRANULOCYTES 0 %    ABS. NEUTROPHILS 1.4 (L) 1.8 - 8.0 K/UL    ABS. LYMPHOCYTES 0.9 0.8 - 3.5 K/UL    ABS. MONOCYTES 0.5 0.0 - 1.0 K/UL    ABS. EOSINOPHILS 0.1 0.0 - 0.4 K/UL    ABS. BASOPHILS 0.1 0.0 - 0.1 K/UL    ABS. IMM. GRANS. 0.0 K/UL    DF MANUAL      RBC COMMENTS NORMOCYTIC, NORMOCHROMIC     METABOLIC PANEL, COMPREHENSIVE    Collection Time: 03/12/19  4:52 PM   Result Value Ref Range    Sodium 127 (L) 136 - 145 mmol/L    Potassium 2.7 (LL) 3.5 - 5.1 mmol/L    Chloride 87 (L) 97 - 108 mmol/L    CO2 33 (H) 21 - 32 mmol/L    Anion gap 7 5 - 15 mmol/L    Glucose 111 (H) 65 - 100 mg/dL    BUN 19 6 - 20 MG/DL    Creatinine 0.91 0.55 - 1.02 MG/DL    BUN/Creatinine ratio 21 (H) 12 - 20      GFR est AA >60 >60 ml/min/1.73m2    GFR est non-AA >60 >60 ml/min/1.73m2    Calcium 8.6 8.5 - 10.1 MG/DL    Bilirubin, total 0.8 0.2 - 1.0 MG/DL    ALT (SGPT) 25 12 - 78 U/L    AST (SGOT) 21 15 - 37 U/L    Alk.  phosphatase 83 45 - 117 U/L    Protein, total 6.9 6.4 - 8.2 g/dL    Albumin 2.6 (L) 3.5 - 5.0 g/dL    Globulin 4.3 (H) 2.0 - 4.0 g/dL    A-G Ratio 0.6 (L) 1.1 - 2.2     LIPASE    Collection Time: 03/12/19  4:52 PM   Result Value Ref Range    Lipase 35 (L) 73 - 393 U/L   MAGNESIUM    Collection Time: 03/12/19  4:52 PM   Result Value Ref Range    Magnesium 2.5 (H) 1.6 - 2.4 mg/dL   POC LACTIC ACID    Collection Time: 03/12/19  5:01 PM   Result Value Ref Range    Lactic Acid (POC) 1.23 0.40 - 2.00 mmol/L   URINALYSIS W/MICROSCOPIC    Collection Time: 03/12/19  5:09 PM   Result Value Ref Range    Color DARK YELLOW      Appearance CLEAR CLEAR      Specific gravity 1.015 1.003 - 1.030      pH (UA) 6.0 5.0 - 8.0      Protein 30 (A) NEG mg/dL    Glucose NEGATIVE  NEG mg/dL    Ketone NEGATIVE  NEG mg/dL    Bilirubin NEGATIVE  NEG      Blood NEGATIVE  NEG      Urobilinogen 0.2 0.2 - 1.0 EU/dL    Nitrites NEGATIVE  NEG      Leukocyte Esterase TRACE (A) NEG      WBC 5-10 0 - 4 /hpf    RBC 0-5 0 - 5 /hpf    Epithelial cells FEW FEW /lpf    Bacteria NEGATIVE  NEG /hpf    Hyaline cast 2-5 0 - 5 /lpf   URINE CULTURE HOLD SAMPLE    Collection Time: 03/12/19  5:09 PM   Result Value Ref Range    Urine culture hold        URINE ON HOLD IN MICROBIOLOGY DEPT FOR 3 DAYS. IF UNPRESERVED URINE IS SUBMITTED, IT CANNOT BE USED FOR ADDITIONAL TESTING AFTER 24 HRS, RECOLLECTION WILL BE REQUIRED. METABOLIC PANEL, COMPREHENSIVE    Collection Time: 03/13/19  5:06 AM   Result Value Ref Range    Sodium 133 (L) 136 - 145 mmol/L    Potassium 3.0 (L) 3.5 - 5.1 mmol/L    Chloride 95 (L) 97 - 108 mmol/L    CO2 36 (H) 21 - 32 mmol/L    Anion gap 2 (L) 5 - 15 mmol/L    Glucose 104 (H) 65 - 100 mg/dL    BUN 15 6 - 20 MG/DL    Creatinine 0.75 0.55 - 1.02 MG/DL    BUN/Creatinine ratio 20 12 - 20      GFR est AA >60 >60 ml/min/1.73m2    GFR est non-AA >60 >60 ml/min/1.73m2    Calcium 8.1 (L) 8.5 - 10.1 MG/DL    Bilirubin, total 0.5 0.2 - 1.0 MG/DL    ALT (SGPT) 20 12 - 78 U/L    AST (SGOT) 18 15 - 37 U/L    Alk.  phosphatase 69 45 - 117 U/L    Protein, total 5.9 (L) 6.4 - 8.2 g/dL    Albumin 2.3 (L) 3.5 - 5.0 g/dL    Globulin 3.6 2.0 - 4.0 g/dL    A-G Ratio 0.6 (L) 1.1 - 2.2     CBC WITH AUTOMATED DIFF    Collection Time: 03/13/19  5:06 AM   Result Value Ref Range    WBC 3.5 (L) 3.6 - 11.0 K/uL    RBC 4.29 3.80 - 5.20 M/uL    HGB 12.7 11.5 - 16.0 g/dL    HCT 38.2 35.0 - 47.0 %    MCV 89.0 80.0 - 99.0 FL    MCH 29.6 26.0 - 34.0 PG    MCHC 33.2 30.0 - 36.5 g/dL    RDW 12.9 11.5 - 14.5 %    PLATELET 504 230 - 084 K/uL    MPV 9.9 8.9 - 12.9 FL    NRBC 0.0 0  WBC    ABSOLUTE NRBC 0.00 0.00 - 0.01 K/uL    NEUTROPHILS 35 32 - 75 %    BAND NEUTROPHILS 5 0 - 6 %    LYMPHOCYTES 22 12 - 49 %    MONOCYTES 28 (H) 5 - 13 %    EOSINOPHILS 3 0 - 7 %    BASOPHILS 2 (H) 0 - 1 %    METAMYELOCYTES 3 (H) 0 %    MYELOCYTES 2 (H) 0 % IMMATURE GRANULOCYTES 0 %    ABS. NEUTROPHILS 1.4 (L) 1.8 - 8.0 K/UL    ABS. LYMPHOCYTES 0.8 0.8 - 3.5 K/UL    ABS. MONOCYTES 1.0 0.0 - 1.0 K/UL    ABS. EOSINOPHILS 0.1 0.0 - 0.4 K/UL    ABS. BASOPHILS 0.1 0.0 - 0.1 K/UL    ABS. IMM.  GRANS. 0.0 K/UL    DF MANUAL      RBC COMMENTS NORMOCYTIC, NORMOCHROMIC     MAGNESIUM    Collection Time: 03/13/19  5:06 AM   Result Value Ref Range    Magnesium 2.5 (H) 1.6 - 2.4 mg/dL   PHOSPHORUS    Collection Time: 03/13/19  5:06 AM   Result Value Ref Range    Phosphorus 1.3 (L) 2.6 - 4.7 MG/DL   GLUCOSE, POC    Collection Time: 03/13/19  7:23 AM   Result Value Ref Range    Glucose (POC) 110 (H) 65 - 100 mg/dL    Performed by Jimena Persaud (PCT)        Medications reviewed  Current Facility-Administered Medications   Medication Dose Route Frequency    sodium chloride (NS) flush 5-40 mL  5-40 mL IntraVENous Q8H    sodium chloride (NS) flush 5-40 mL  5-40 mL IntraVENous PRN    acetaminophen (TYLENOL) tablet 650 mg  650 mg Oral Q6H PRN    naloxone (NARCAN) injection 0.4 mg  0.4 mg IntraVENous PRN    sodium chloride (NS) flush 5-40 mL  5-40 mL IntraVENous Q8H    sodium chloride (NS) flush 5-40 mL  5-40 mL IntraVENous PRN    naloxone (NARCAN) injection 0.4 mg  0.4 mg IntraVENous PRN    HYDROcodone-acetaminophen (NORCO) 5-325 mg per tablet 1 Tab  1 Tab Oral Q4H PRN    HYDROmorphone (PF) (DILAUDID) injection 0.5 mg  0.5 mg IntraVENous Q4H PRN    ondansetron (ZOFRAN) injection 4 mg  4 mg IntraVENous Q4H PRN    diphenhydrAMINE (BENADRYL) injection 12.5 mg  12.5 mg IntraVENous ONCE PRN    enoxaparin (LOVENOX) injection 40 mg  40 mg SubCUTAneous Q24H    pravastatin (PRAVACHOL) tablet 40 mg  40 mg Oral QHS    venlafaxine (EFFEXOR) tablet 50 mg  50 mg Oral DAILY    levoFLOXacin (LEVAQUIN) 750 mg in D5W IVPB  750 mg IntraVENous Q24H    metroNIDAZOLE (FLAGYL) IVPB premix 500 mg  500 mg IntraVENous Q12H    0.9% sodium chloride with KCl 40 mEq/L infusion   IntraVENous CONTINUOUS  aztreonam (AZACTAM) 1 g in 0.9% sodium chloride (MBP/ADV) 100 mL  1 g IntraVENous Q8H    albuterol (PROVENTIL VENTOLIN) nebulizer solution 2.5 mg  2.5 mg Nebulization Q6H PRN    Cyanocobalamin-Cobamamide 5,000-100 mcg lozg 1 Tab (Patient Supplied)  1 Tab SubLINGual DAILY       Care Plan discussed with: Patient/Family    Total time spent with patient and review of records: 30 minutes.     Sam Bowens MD

## 2019-03-13 NOTE — ED NOTES
TRANSFER - OUT REPORT:    Verbal report given to Brendia Meckel (name) on [facility]  being transferred to 4th floor(unit) for routine progression of care       Report consisted of patients Situation, Background, Assessment and   Recommendations(SBAR). Information from the following report(s) SBAR, ED Summary, MAR, Recent Results and Cardiac Rhythm NSR was reviewed with the receiving nurse. Lines:   Venous Access Device Portacath Upper chest (subclavicular area, right (Active)       Peripheral IV 03/12/19 Left Wrist (Active)   Site Assessment Clean, dry, & intact 3/12/2019  5:00 PM   Phlebitis Assessment 0 3/12/2019  5:00 PM   Infiltration Assessment 0 3/12/2019  5:00 PM   Dressing Status Clean, dry, & intact 3/12/2019  5:00 PM   Dressing Type Tape;Transparent 3/12/2019  5:00 PM   Hub Color/Line Status Pink;Flushed 3/12/2019  5:00 PM   Action Taken Blood drawn 3/12/2019  5:00 PM   Alcohol Cap Used Yes 3/12/2019  5:00 PM        Opportunity for questions and clarification was provided.       Patient transported with:   [employer]

## 2019-03-13 NOTE — PROGRESS NOTES
Gave verbal sign-out over the phone at 7:16 AM to Dr. Raúl Gottlieb whose practice has accepted care of this patient.       Demond Newman MD  3/13/2019

## 2019-03-13 NOTE — ACP (ADVANCE CARE PLANNING)
Request by patient to assist with advance medical directive. Consulted with patients nurse. Explained document to patient and [place], who were present in the room. Assisted patient in completing the document. Made copy for patients chart and placed a copy in the patient's chart. Returned original document to the patient. Rev.  Todd Nicolas, [address] [place] [address] paging service: [phone]

## 2019-03-13 NOTE — PROGRESS NOTES
Medication Reconciliation   Unable to complete medication reconciliation prior to departure at [place] [id number] unit secretary and left message for Felicia Arroyo to complete medication history during her shift. Darren Levi, Pharm. D.   [address]

## 2019-03-13 NOTE — H&P
Surgery History and Physical    Subjective: Marcia Duggan is a 74 y.o. female w/ history of breast cancer who presented to the ED on date of admission w/ c/o abdominal pain. She was diagnosed with breast cancer last month and underwent her first neoadjuvent chemo treatment a week ago. That day she developed lower abdominal cramping and \"fullness\" which has persisted. She was seen in the ED on 3/9 and CT showed mild diverticulitis and she was discharged home on Levaquin/Flagyl. Symptoms did not improve and on re-imaging last night CT showed sigmoid diverticulitis with development of several pelvic abscesses. Currently patient states she feels about the same. She has mild nausea and continue abdominal fullness and pain. No vomiting. Denies fever. She has no prior hx of diverticulitis. Last colonoscopy 8-10 years ago.      Past Medical History:   Diagnosis Date    Adverse effect of anesthesia     \"difficulty waking up from anesthethia\"    Cancer ([address] 02/2019    left breast    Depression     GERD (gastroesophageal reflux disease)     High cholesterol     Hypertension      Past Surgical History:   Procedure Laterality Date    HX BREAST BIOPSY Right years ago    neg; needle bx    HX CATARACT REMOVAL      HX COLONOSCOPY      HX ORTHOPAEDIC      carpal tunnel      Family History   Problem Relation Age of Onset    Breast Cancer Maternal Aunt     Hypertension Mother     Hypertension Father     Cancer Father         Lung    Cancer Brother         leukemia    Diabetes Brother         2 brothers with diabetes    Hypertension Sister         2 sisters and 4 brothers with HTN     Social History     Socioeconomic History    Marital status:      Spouse name: Not on file    Number of children: Not on file    Years of education: Not on file    Highest education level: Not on file   Tobacco Use    Smoking status: Current Every Day Smoker     Packs/day: 0.50     Years: 55.00     Pack years: 27.50    Smokeless tobacco: Never Used   Substance and Sexual Activity    Alcohol use:  Yes     Alcohol/week: 0.6 oz     Types: 1 Shots of liquor per week     Drinks per session: 5 or 6     Binge frequency: Weekly     Comment: Patient stated that she puts a splash of bourbon in her afternoon coffee around 5-6 days per week    Drug use: No      Current Facility-Administered Medications   Medication Dose Route Frequency    potassium phosphate 30 mmol in 0.9% sodium chloride 250 mL infusion   IntraVENous ONCE    sodium chloride (NS) flush 5-40 mL  5-40 mL IntraVENous Q8H    sodium chloride (NS) flush 5-40 mL  5-40 mL IntraVENous PRN    acetaminophen (TYLENOL) tablet 650 mg  650 mg Oral Q6H PRN    naloxone (NARCAN) injection 0.4 mg  0.4 mg IntraVENous PRN    sodium chloride (NS) flush 5-40 mL  5-40 mL IntraVENous Q8H    sodium chloride (NS) flush 5-40 mL  5-40 mL IntraVENous PRN    naloxone (NARCAN) injection 0.4 mg  0.4 mg IntraVENous PRN    HYDROcodone-acetaminophen (NORCO) 5-325 mg per tablet 1 Tab  1 Tab Oral Q4H PRN    HYDROmorphone (PF) (DILAUDID) injection 0.5 mg  0.5 mg IntraVENous Q4H PRN    ondansetron (ZOFRAN) injection 4 mg  4 mg IntraVENous Q4H PRN    diphenhydrAMINE (BENADRYL) injection 12.5 mg  12.5 mg IntraVENous ONCE PRN    enoxaparin (LOVENOX) injection 40 mg  40 mg SubCUTAneous Q24H    pravastatin (PRAVACHOL) tablet 40 mg  40 mg Oral QHS    venlafaxine (EFFEXOR) tablet 50 mg  50 mg Oral DAILY    levoFLOXacin (LEVAQUIN) 750 mg in D5W IVPB  750 mg IntraVENous Q24H    metroNIDAZOLE (FLAGYL) IVPB premix 500 mg  500 mg IntraVENous Q12H    0.9% sodium chloride with KCl 40 mEq/L infusion   IntraVENous CONTINUOUS    aztreonam (AZACTAM) 1 g in 0.9% sodium chloride (MBP/ADV) 100 mL  1 g IntraVENous Q8H    albuterol (PROVENTIL VENTOLIN) nebulizer solution 2.5 mg  2.5 mg Nebulization Q6H PRN    Cyanocobalamin-Cobamamide 5,000-100 mcg lozg 1 Tab (Patient Supplied)  1 Tab SubLINGual DAILY Allergies   Allergen Reactions    Doxycycline Diarrhea    Keflex [Cephalexin] Hives    Penicillins Rash       Review of Systems:REVIEW OF SYSTEMS:     []     Unable to obtain  ROS due to  []    mental status change  []    sedated   []    intubated   []    Total of 12 systems reviewed as follows:    Constitutional: neg for fevers, chills, weight loss, malaise  Eyes: negative for blurry vision  Ears, nose, mouth, throat, and face: negative for sore throat  Respiratory: negative for SOB  Cardiovascular: negative for CP  Gastrointestinal: + for nausea, abdominal pain, negative for vomiting, diarrhea, constipation, melena, hematochezia  Genitourinary: negative for dysuria  Integument/breast: neg for skin rash  Hematologic/lymphatic: neg for bruising  Musculoskeletal: negative for muscle aches  Neurological: no dizziness or h/a    Objective:        Patient Vitals for the past 8 hrs:   BP Temp Pulse Resp SpO2   19 0702 117/62 98 °F (36.7 °C) 82 15 94 %   19 0330 114/51 98.6 °F (37 °C) 81 16 98 %       Temp (24hrs), Av.2 °F (36.8 °C), Min:97.6 °F (36.4 °C), Max:98.8 °F (37.1 °C)      Physical Exam:  General:  Alert, cooperative, no distress, appears stated age. Eyes:  Conjunctivae/corneas clear. Nose: Nares normal. Septum midline   Mouth/Throat: Lips, mucosa, and tongue normal.    Neck: Supple, symmetrical, trachea midline   Lungs:   Clear to auscultation bilaterally. Heart:  Regular rate and rhythm   Abdomen:   Soft, very mild LLQ and suprapubic tenderness w/o guarding or rebound, non-distended   Extremities: Extremities normal, atraumatic, no cyanosis or edema.    Skin: Skin color, texture, turgor normal. No rashes or lesions   Neuro: Alert, oriented, speech clear     Labs:   Recent Labs     19  0506   WBC 3.5*   HGB 12.7   HCT 38.2        Recent Labs     19  0506   *   K 3.0*   CL 95*   CO2 36*   *   BUN 15   CREA 0.75   CA 8.1*   MG 2.5*   PHOS 1.3*   ALB 2.3* TBILI 0.5   SGOT 18   ALT 20     No results for input(s): INR in the last 72 hours. No lab exists for component: INREXT      Assessment and Plan:     Acute sigmoid diverticulitis w/ abscess   Breast CA- stage IIb  Immunocompromised state- undergoing neoadjuvant chemo     Sigmoid diverticulitis with developing abscesses. Abdomen is soft, minimal tenderness but she is at risk for worsening given immunocompromised state. She does not require emergent surgery at this time but would have low threshold for surgery should her condition decline. Continue liquids as tolerated, IV ABX, agree with broader coverage, replete lytes. I reviewed CT with radiology. Only one of the three collections is accessible by percutaneous drainage. Typically would attempt continued conservative treatment and consider elective resection in several months but this would significantly delay her chemotherapy. Will discuss further with Dr. Angelika Blum and have oncology see as well. Further surgical plan to follow.            Signed By: ROSALBA Dexter Si     March 13, 2019

## 2019-03-13 NOTE — CONSULTS
[facility]  [address] [facility], [facility] [address]  [phone]    Hospitalist Consult Note      NAME:  Jasson Ryan   :   1945   MRN:  [id number]     Requesting Physician Marina Liao MD   Reason for Consult: Diverticulitis with abscess     PCP:  Isauro Babb MD     Date/Time:  3/12/2019 9:25 PM         Assessment / Plan:         Diverticulitis: failing antibiotics. Now with abscess evident on CT a/p. General surgery to see. Keep NPO, more likely than not will need surgical intervention. HOLDING DVT chemoprophylaxis and ASA. Would like to use Zosyn or meropenem however allergy to PCN/Keflex(hives) noted. Agree with IV Levaquin and Flagyl, but will add aztreonam too, for add'l GN coverage. Hyponatremia / Hypokalemia: suspect from IVVD and thiazide diuretic use. HOLD HCTZ. Start IVF with KCl. Already given PO K+ in the ED. Follow BMP, would check Mg as well. Hypertension: controlled. Hold home antihypertensives for now      High cholesterol: continue statin      Depression: continue Cymbalta      GERD (gastroesophageal reflux disease): PPI PRN      Cancer: left breast. Left central IDC, gr 2, ER +, CO +, HER 2 positive:  cT2 cN1a cM0, stage IIB, prognostic stage IB. Followed by Dr. Michael Paz. Outpatient follow up      Wheeze: mild. Smoker. Advise smoking cessation. Albuterol PRN      Adverse effect of anesthesia: Overview: \"difficulty waking up from anesthethia\". Will need to monitor closely in PACU as appropriate         70 minutes was spent in the care of this patient including reviewing records, discussing with nursing, obtaining history and examining the patient, and discussing treatment plans, with >50% time spent counseling/coordinating care. Discussed with ED team and advised need for gen surgery evaluation. Discussed with Dr. Michael Paz    Thank you for the opportunity to participate in Ms. Krause's care.          Subjective:     CHIEF COMPLAINT: abdominal pain    HISTORY OF PRESENT ILLNESS:     Ms. Jaiden Romero is a 74 y.o.  female who is admitted to the Gen Surg Service for diverticulitis with abscess. We are asked to evaluate for medical mgmt. Ms. Jaiden Romero is complaining of abdominal pain. Ongoing for about a week now. Seen in ED a few days ago, sent home on oral abx (levaquin/Flagyl) however did not improve. Past Medical History:   Diagnosis Date    Adverse effect of anesthesia     \"difficulty waking up from anesthethia\"    Cancer [facility]) 02/2019    left breast    Depression     GERD (gastroesophageal reflux disease)     High cholesterol     Hypertension         Past Surgical History:   Procedure Laterality Date    HX BREAST BIOPSY Right years ago    neg; needle bx    HX CATARACT REMOVAL      HX COLONOSCOPY      HX ORTHOPAEDIC      carpal tunnel       Social History     Tobacco Use    Smoking status: Current Every Day Smoker     Packs/day: 0.50     Years: 55.00     Pack years: 27.50    Smokeless tobacco: Never Used   Substance Use Topics    Alcohol use: Yes     Alcohol/week: 0.6 oz     Types: 1 Shots of liquor per week     Drinks per session: 5 or 6     Binge frequency: Weekly     Comment: Patient stated that she puts a splash of bourbon in her afternoon coffee around 5-6 days per week        Family History   Problem Relation Age of Onset    Breast Cancer Maternal Aunt     Hypertension Mother     Hypertension Father     Cancer Father         Lung    Cancer Brother         leukemia    Diabetes Brother         2 brothers with diabetes    Hypertension Sister         2 sisters and 4 brothers with HTN        Allergies   Allergen Reactions    Doxycycline Diarrhea    Keflex [Cephalexin] Hives    Penicillins Rash        Prior to Admission medications    Medication Sig Start Date End Date Taking? Authorizing Provider   polyethylene glycol (MIRALAX) 17 gram packet Take 17 g by mouth daily.     Provider, Historical   metroNIDAZOLE (FLAGYL) 500 mg tablet Take 1 Tab by mouth three (3) times daily for 10 days. 3/9/19 3/19/19  Camryn Padilla MD   levoFLOXacin (LEVAQUIN) 500 mg tablet Take 1 Tab by mouth daily for 10 days. 3/9/19 3/19/19  Camryn Padilla MD   naproxen sodium (ALEVE PO) Take  by mouth two (2) times daily as needed. Provider, Historical   lidocaine-prilocaine (EMLA) topical cream Apply  to affected area as needed for Pain. 2/25/19   Cecilio Seth MD   dexamethasone (DECADRON) 4 mg tablet 8 mg bid the day before and day after chemo 2/25/19   Cecilio Seth MD   prochlorperazine (COMPAZINE) 10 mg tablet Take 1 Tab by mouth every six (6) hours as needed for Nausea. 2/25/19   Cecilio Seth MD   ondansetron hcl [facility]) 8 mg tablet Take 1 Tab by mouth every twelve (12) hours as needed for Nausea. 2/25/19   Cecilio Seth MD   doxycycline (VIBRAMYCIN) 100 mg capsule Take 1 Cap by mouth two (2) times a day for 42 days. 2/25/19 4/8/19  Cecilio Seth MD   losartan-hydroCHLOROthiazide [facility]) 100-25 mg per tablet Take 1 Tab by mouth daily. 2/12/19   Provider, Historical   pravastatin (PRAVACHOL) 40 mg tablet Take 40 mg by mouth nightly. 12/18/18   Provider, Historical   STIOLTO RESPIMAT 2.5-2.5 mcg/actuation inhaler Take 2 Puffs by inhalation nightly. 2/13/19   Provider, Historical   venlafaxine (EFFEXOR) 50 mg tablet Take 50 mg by mouth daily. 12/18/18   Provider, Historical   Cyanocobalamin-Cobamamide (B12) 5,000-100 mcg lozg by SubLINGual route daily. Provider, Historical   cholecalciferol (VITAMIN D3) 1,000 unit cap Take  by mouth daily. Provider, Historical   calcium-cholecalciferol, D3, (CALTRATE 600+D) tablet Take 1 Tab by mouth daily. Provider, Historical   aspirin delayed-release 81 mg tablet Take  by mouth daily.     Provider, Historical       Current Facility-Administered Medications   Medication Dose Route Frequency    sodium chloride (NS) flush 5-40 mL  5-40 mL IntraVENous Q8H    sodium chloride (NS) flush 5-40 mL  5-40 mL IntraVENous PRN    acetaminophen (TYLENOL) tablet 650 mg  650 mg Oral Q6H PRN    naloxone (NARCAN) injection 0.4 mg  0.4 mg IntraVENous PRN     Current Outpatient Medications   Medication Sig    polyethylene glycol (MIRALAX) 17 gram packet Take 17 g by mouth daily.  metroNIDAZOLE (FLAGYL) 500 mg tablet Take 1 Tab by mouth three (3) times daily for 10 days.  levoFLOXacin (LEVAQUIN) 500 mg tablet Take 1 Tab by mouth daily for 10 days.  naproxen sodium (ALEVE PO) Take  by mouth two (2) times daily as needed.  lidocaine-prilocaine (EMLA) topical cream Apply  to affected area as needed for Pain.  dexamethasone (DECADRON) 4 mg tablet 8 mg bid the day before and day after chemo    prochlorperazine (COMPAZINE) 10 mg tablet Take 1 Tab by mouth every six (6) hours as needed for Nausea.  ondansetron hcl (ZOFRAN) 8 mg tablet Take 1 Tab by mouth every twelve (12) hours as needed for Nausea.  doxycycline (VIBRAMYCIN) 100 mg capsule Take 1 Cap by mouth two (2) times a day for 42 days.  losartan-hydroCHLOROthiazide (HYZAAR) 100-25 mg per tablet Take 1 Tab by mouth daily.  pravastatin (PRAVACHOL) 40 mg tablet Take 40 mg by mouth nightly.  STIOLTO RESPIMAT 2.5-2.5 mcg/actuation inhaler Take 2 Puffs by inhalation nightly.  venlafaxine (EFFEXOR) 50 mg tablet Take 50 mg by mouth daily.  Cyanocobalamin-Cobamamide (B12) 5,000-100 mcg lozg by SubLINGual route daily.  cholecalciferol (VITAMIN D3) 1,000 unit cap Take  by mouth daily.  calcium-cholecalciferol, D3, (CALTRATE 600+D) tablet Take 1 Tab by mouth daily.  aspirin delayed-release 81 mg tablet Take  by mouth daily.          Review of Systems:  (bold if positive, if negative)    Gen:  Eyes:  ENT:  CVS:  Pulm:  GI:  Abdominal pain, nausea,constipation  :    MS:  Skin:  Psych:  Endo:    Hem:  Renal:    Neuro:            Objective:      VITALS:    Vital signs reviewed; most recent are:    Visit Vitals  /67   Pulse 88   Temp 98.8 °F (37.1 °C)   Resp 18   Ht 5' 6\" (1.676 m)   Wt 80.7 kg (178 lb)   SpO2 95%   BMI 28.73 kg/m²     SpO2 Readings from Last 6 Encounters:   03/12/19 95%   03/12/19 95%   03/09/19 95%   03/06/19 97%   03/01/19 96%   02/28/19 97%            Intake/Output Summary (Last 24 hours) at 3/12/2019 2125  Last data filed at 3/12/2019 2051  Gross per 24 hour   Intake 1100 ml   Output    Net 1100 ml            Exam:     Physical Exam:    Gen:  Well-developed, well-nourished, in no acute distress  HEENT:  No scleral icterus, hearing intact to voice, moist mucous membranes  Neck:  Supple, without masses. Resp:  No accessory muscle use, clear breath sounds with minimal wheezing. No rales, or rhonchi  Card: Rate 80s, regular rhythm. Normal S1 and S2 without murmurs, rubs, or gallops. No peripheral lower extremity edema  Abd:  Normoactive bowel sounds. Soft, TTP, lower quadrants. Non-distended. No rebound, voluntary guarding without involuntary guarding  Musc:  No cyanosis or clubbing  Skin:  No rashes or ulcers; turgor intact   Neuro:  Cranial nerves are grossly intact, no focal motor weakness, follows commands appropriately  Psych:  Good insight, normal affect. Alert, oriented x 3. Answers questions appropriately       Preoperative Labs:    Recent Labs     03/12/19  1652   WBC 3.0*   HGB 14.2   HCT 40.2        Recent Labs     03/12/19  1652   *   K 2.7*   CL 87*   CO2 33*   *   BUN 19   CREA 0.91   CA 8.6   MG 2.5*   ALB 2.6*   SGOT 21   ALT 25     No components found for: GLPOC  No results for input(s): PH, PCO2, PO2, HCO3, FIO2 in the last 72 hours. No results for input(s): INR in the last 72 hours.     No lab exists for component: INREXT    No results found for: SDES  No results found for: CULT    ___________________________________________________    Attending Physician: Grayson Simon MD

## 2019-03-13 NOTE — PROGRESS NOTES
[facility] Dosing Services: 3/12/19    Pharmacist Renal Dosing Progress Note for Dr Bedolla Salvage      The following medication: Lovenox was automatically dose-adjusted per [facility] P&T Committee Protocol, with respect to renal function. Pt Weight:   Wt Readings from Last 1 Encounters:   03/12/19 80.7 kg (178 lb)         Previous Regimen  30mg sq q 24h   Serum Creatinine Lab Results   Component Value Date/Time    Creatinine 0.91 03/12/2019 04:52 PM       Creatinine Clearance Estimated Creatinine Clearance: 57.3 mL/min (based on SCr of 0.91 mg/dL). BUN Lab Results   Component Value Date/Time    BUN 19 03/12/2019 04:52 PM       Dosage changed to:  40mg sq q24h          Pharmacy to continue to monitor patient daily. Will make dosage adjustments based upon changing renal function.   Migel Neville, PHARMD. Contact information:  [phone]

## 2019-03-13 NOTE — PROGRESS NOTES
Spiritual Care Assessment/Progress Note  [address]      NAME: Bryan Fish      MRN: [id number]  AGE: 74 y.o.  SEX: female  Adventism Affiliation: Amish   Language: English     3/13/2019     Total Time (in minutes): 37     Spiritual Assessment begun in [facility] POST SURG ORT 2 through conversation with:         [x]Patient        [x] Family    [] Friend(s)        Reason for Consult: Advance medical directive consult, Initial/Spiritual assessment, patient floor     Spiritual beliefs: (Please include comment if needed)     [x] Identifies with a kathi tradition: Amish        [] Supported by a kathi community:            [] Claims no spiritual orientation:           [] Seeking spiritual identity:                [] Adheres to an individual form of spirituality:           [] Not able to assess:                           Identified resources for coping:      [] Prayer                               [] Music                  [] Guided Imagery     [x] Family/friends                 [] Pet visits     [] Devotional reading                         [] Unknown     [] Other:                                               Interventions offered during this visit: (See comments for more details)    Patient Interventions: Advance medical directive completed, Advance medical directive consult, Affirmation of emotions/emotional suffering, Catharsis/review of pertinent events in supportive environment, Coping skills reviewed/reinforced, End of life issues discussed, Iconic (affirming the presence of God/Higher Power)     Family/Friend(s): Advance medical directive completed, Advance medical directive consult, End of life issues discussed, Iconic (affirming the presence of God/Higher Power)(Meredith Pinto)     Plan of Care:     [] Support spiritual and/or cultural needs    [x] Support AMD and/or advance care planning process      [] Support grieving process   [] Coordinate Rites and/or Rituals    [] Coordination with community clergy   [] No spiritual needs identified at this time   [] Detailed Plan of Care below (See Comments)  [] Make referral to Music Therapy  [] Make referral to Pet Therapy     [] Make referral to Addiction services  [] Make referral to [facility]  [] Make referral to Spiritual Care Partner  [] No future visits requested        [x] Follow up visits as needed     Comments:  visit for initial spiritual assessment and Advance Directive (AMD) consult. Patient sitting up in bed, good eye contact, smiling, friendly. Says she is feeling okay today. Provided spiritual presence and listening as she spoke of her present thoughts, feelings, and concerns. Describes active kathi and good family support. Request by patient to assist with advance medical directive. Consulted with patients nurse. Explained document to patient and [place], who were present in the room. Assisted patient in completing the document. Made copy for patients chart and placed a copy in the patient's chart. Returned original document to the patient. Rev.  Kathleen Lei, [address] [place] [address] paging service: [phone])

## 2019-03-13 NOTE — PROGRESS NOTES
3/13/2019 10:06 AM Met with pt. Charted address and phone numbers confirmed. Reason for Admission: Emergency admit, diverticulitis                      RRAT Score: 5                    Plan for utilizing home health:  Tbd, no history                     Likelihood of Readmission:  low                         Transition of Care Plan: home with outpatient follow up    Pt lives alone in a 1 story home in [place], there are 3 steps to enter. Pt does not own any dme. Pt was independent with adls and driving prior to admission. Pt has rx coverage and fills her scripts at [place]. Pt's friend, Shayan Crystal will transport pt home. CM will follow for final discharge needs.  TIRSO Mckenzie  Care Management Interventions  PCP Verified by CM: Carol Aguilera, no nurse navigator )  Transition of Care Consult (CM Consult): Discharge Planning  MyChart Signup: No  Discharge Durable Medical Equipment: No  Physical Therapy Consult: No  Occupational Therapy Consult: No  Speech Therapy Consult: No  Current Support Network: Own Home, Lives Alone

## 2019-03-13 NOTE — CONSULTS
[facility] at [address], [address] [facility] [address] [facility]  Phillip Simsra: [phone]  F: [phone]      Reason for Visit:   Ansley Floyd is a 74 y.o. female who is seen in consultation at the request of Dr. Maria Elena Blood for continuity of care. Hematology / Oncology Treatment History:   · 2/15/19 Left core bx:   IDC, gr 2, 1.1 cm, ER + at 100%, ID + at 20%, HER 2 POSITIVE (IHC 2+; FISH ratio 3.9; sig/cell 9.2)  · 3/1/19 Left ax LN core bx:  + for breast cancer, ER + at 100%, ID negative, HER 2 POSITIVE (IHC 2+, FISH ratio 2; sig/cell 5.8)  · [facility]-P 3/6/19-      History of Present Illness:     Seen in the ED on 3/8/2019 for abd pain and constipation. ED CT  Sowed diverticulitis; was initiated on levaquin and flagyl. Seen  In our clinic yesterday with worsening abd pain; therefore was directed to ED for further eval. ED CT scan shows acute diverticulitis with abscesses. Admitted for further eval and management. Ms Linda Hunter feeling better this morning. Reports abd \"discomfort/ bloating/pressure\". Rates pain 6/10. Does not want hydrocodone but will try some Tylenol. Had some loose stool this am. Denies any SOB. Denies N/V at present. Sister at bedside. Past Medical History:   Diagnosis Date    Adverse effect of anesthesia     \"difficulty waking up from anesthethia\"    Cancer [facility]) 02/2019    left breast    Depression     GERD (gastroesophageal reflux disease)     High cholesterol     Hypertension       Past Surgical History:   Procedure Laterality Date    HX BREAST BIOPSY Right years ago    neg; needle bx    HX CATARACT REMOVAL      HX COLONOSCOPY      HX ORTHOPAEDIC      carpal tunnel      Social History     Tobacco Use    Smoking status: Current Every Day Smoker     Packs/day: 0.50     Years: 55.00     Pack years: 27.50    Smokeless tobacco: Never Used   Substance Use Topics    Alcohol use:  Yes     Alcohol/week: 0.6 oz     Types: 1 Shots of liquor per week     Drinks per session: 5 or 6     Binge frequency: Weekly     Comment: Patient stated that she puts a splash of bourbon in her afternoon coffee around 5-6 days per week      Family History   Problem Relation Age of Onset    Breast Cancer Maternal Aunt     Hypertension Mother     Hypertension Father     Cancer Father         Lung    Cancer Brother         leukemia    Diabetes Brother         2 brothers with diabetes    Hypertension Sister         2 sisters and 4 brothers with HTN     Current Facility-Administered Medications   Medication Dose Route Frequency    potassium phosphate 30 mmol in 0.9% sodium chloride 250 mL infusion   IntraVENous ONCE    sodium chloride (NS) flush 5-40 mL  5-40 mL IntraVENous Q8H    sodium chloride (NS) flush 5-40 mL  5-40 mL IntraVENous PRN    acetaminophen (TYLENOL) tablet 650 mg  650 mg Oral Q6H PRN    naloxone (NARCAN) injection 0.4 mg  0.4 mg IntraVENous PRN    sodium chloride (NS) flush 5-40 mL  5-40 mL IntraVENous Q8H    sodium chloride (NS) flush 5-40 mL  5-40 mL IntraVENous PRN    naloxone (NARCAN) injection 0.4 mg  0.4 mg IntraVENous PRN    HYDROcodone-acetaminophen (NORCO) 5-325 mg per tablet 1 Tab  1 Tab Oral Q4H PRN    HYDROmorphone (PF) (DILAUDID) injection 0.5 mg  0.5 mg IntraVENous Q4H PRN    ondansetron (ZOFRAN) injection 4 mg  4 mg IntraVENous Q4H PRN    diphenhydrAMINE (BENADRYL) injection 12.5 mg  12.5 mg IntraVENous ONCE PRN    enoxaparin (LOVENOX) injection 40 mg  40 mg SubCUTAneous Q24H    pravastatin (PRAVACHOL) tablet 40 mg  40 mg Oral QHS    venlafaxine (EFFEXOR) tablet 50 mg  50 mg Oral DAILY    levoFLOXacin (LEVAQUIN) 750 mg in D5W IVPB  750 mg IntraVENous Q24H    metroNIDAZOLE (FLAGYL) IVPB premix 500 mg  500 mg IntraVENous Q12H    0.9% sodium chloride with KCl 40 mEq/L infusion   IntraVENous CONTINUOUS    aztreonam (AZACTAM) 1 g in 0.9% sodium chloride (MBP/ADV) 100 mL  1 g IntraVENous Q8H    albuterol (PROVENTIL VENTOLIN) nebulizer solution 2.5 mg  2.5 mg Nebulization Q6H PRN    Cyanocobalamin-Cobamamide 5,000-100 mcg lozg 1 Tab (Patient Supplied)  1 Tab SubLINGual DAILY      Allergies   Allergen Reactions    Doxycycline Diarrhea    Keflex [Cephalexin] Hives    Penicillins Rash        Review of Systems: A complete review of systems was obtained, negative except as described above. Physical Exam:     Visit Vitals  /62 (BP 1 Location: Right arm, BP Patient Position: At rest)   Pulse 82   Temp 98 °F (36.7 °C)   Resp 15   Ht 5' 6\" (1.676 m)   Wt 80.7 kg (178 lb)   SpO2 94%   Breastfeeding? No   BMI 28.73 kg/m²     ECOG PS: 1  General: No distress  Eyes: PERRLA, anicteric sclerae  HENT: Atraumatic with normal appearance of ears and nose; OP clear  Neck: Supple; no thyromegaly   Lymphatic: No cervical, supraclavicular, or axillary adenopathy  Respiratory: Exp wheezing bilaterally in upper bilateral lobes;  normal respiratory effort  CV: Normal rate, regular rhythm, no murmurs, no peripheral edema  GI: Soft, nontender, nondistended, no masses, no hepatomegaly, no splenomegaly  MS: Digits without clubbing or cyanosis. Skin: No rashes, ecchymoses, or petechiae. Normal temperature, turgor, and texture. Neuro/Psych: Alert, oriented, appropriate affect, normal judgment/insight      Results:     Lab Results   Component Value Date/Time    WBC 3.5 (L) 03/13/2019 05:06 AM    HGB 12.7 03/13/2019 05:06 AM    HCT 38.2 03/13/2019 05:06 AM    PLATELET 720 92/08/2274 05:06 AM    MCV 89.0 03/13/2019 05:06 AM    ABS.  NEUTROPHILS 1.4 (L) 03/13/2019 05:06 AM     Lab Results   Component Value Date/Time    Sodium 133 (L) 03/13/2019 05:06 AM    Potassium 3.0 (L) 03/13/2019 05:06 AM    Chloride 95 (L) 03/13/2019 05:06 AM    CO2 36 (H) 03/13/2019 05:06 AM    Glucose 104 (H) 03/13/2019 05:06 AM    BUN 15 03/13/2019 05:06 AM    Creatinine 0.75 03/13/2019 05:06 AM    GFR est AA >60 03/13/2019 05:06 AM    GFR est non-AA >60 03/13/2019 05:06 AM Calcium 8.1 (L) 03/13/2019 05:06 AM    Glucose (POC) 110 (H) 03/13/2019 07:23 AM     Lab Results   Component Value Date/Time    Bilirubin, total 0.5 03/13/2019 05:06 AM    ALT (SGPT) 20 03/13/2019 05:06 AM    AST (SGOT) 18 03/13/2019 05:06 AM    Alk. phosphatase 69 03/13/2019 05:06 AM    Protein, total 5.9 (L) 03/13/2019 05:06 AM    Albumin 2.3 (L) 03/13/2019 05:06 AM    Globulin 3.6 03/13/2019 05:06 AM     Lab Results   Component Value Date/Time    Lipase 35 (L) 03/12/2019 04:52 PM     No results found for: INR, APTT, DDIMSQ, DDIME, 524034, Linnell Guild, FDPLT, Edwinna Carlos A, [address] [facility], [address] [facility], [address] [facility] [address], [address] [facility], [id number], [facility] 5334, [id number], [id number], [id number], [id number]    No results found for: CEA, 381381, C199LT, C125, JVFY780, 2729LT, C153LT, PSA, PSALT, LDH, [id number], HCGTLT, AFP, CHRGLT    TTE 3/6/19 EF 66-70%. 3/12/2019 XR ABD ACUTE  IMPRESSION: No acute process in the abdomen. 2/12/2019 CT ABD PELV W CONT    IMPRESSION:    Acute diverticulitis of the sigmoid colon. Development of a focal fluid and gas  collection in the cul-de-sac, consistent with an abscess. Suspected 2 additional  smaller developing abscesses in the pelvis. Assessment and Recommendations:     1. Diverticulitis/Abd abscesses  Failing oral abx  Gen surgery consulted: discussed with PA      2. Left central IDC, gr 2, ER +, NE +, HER 2 positive:  cT2 cN1a cM0, stage IIB, prognostic stage IB   The goal of systemic adjuvant therapy is to improve the chances for cure and decrease the risk of relapse    3/6 [id number] (Trastuzumab,  Docetaxel,  Carboplatin , Pertuzumab) given every 3 weeks x 6 cycles; Neulasta following each treatment. Next cycle scheduled for 3/27. 3. Hypokalemia  Receiving repletion    4.  Pain  Currently managed with prn meds      The above exam and treatment plan were reviewed with Dr Williams Frank    Signed By: Yajaira Jung NP

## 2019-03-13 NOTE — PROGRESS NOTES
1315 pt brought to [facility] for drain placement. Confirmed NPO since 8am.  Had little juice and broth at 8am.   Jenny Pike. S1S2.    1338 Dr Abundio Rojas in [facility] to [facility] procedure and sedation plan with patient and RN. Allergies reviewed. Consent signed. 1405 To CT for procedure. Time out at 1423. Procedure started at 1425 and ended at [phone]. Pt received a total of 2mg of versed and 75mcg of fentanyl over the course of the procedure. Pt tolerated procedure well. Pain 0/10. Resolve 8.5 Fr pigtail catheter accordian drain placed in right gluteal buttock. 30cc brown drainage of output. Order received to flush drain with 7cc NS every 12hours. VS monitored throughout procedure. 1502 Pt returned to [facility]. Fluids offered. Pt declined. Pt denies pain. Dressing to drain site CDI. 1530 Pt resting. 1545 report called to [facility]. 1558 pt transported back to floor.

## 2019-03-14 LAB
ANION GAP SERPL CALC-SCNC: 6 MMOL/L (ref 5–15)
BASOPHILS # BLD: 0.1 K/UL (ref 0–0.1)
BASOPHILS NFR BLD: 1 % (ref 0–1)
BUN SERPL-MCNC: 11 MG/DL (ref 6–20)
BUN/CREAT SERPL: 19 (ref 12–20)
CALCIUM SERPL-MCNC: 7.7 MG/DL (ref 8.5–10.1)
CHLORIDE SERPL-SCNC: 100 MMOL/L (ref 97–108)
CO2 SERPL-SCNC: 31 MMOL/L (ref 21–32)
CREAT SERPL-MCNC: 0.59 MG/DL (ref 0.55–1.02)
DIFFERENTIAL METHOD BLD: ABNORMAL
EOSINOPHIL # BLD: 0.2 K/UL (ref 0–0.4)
EOSINOPHIL NFR BLD: 2 % (ref 0–7)
ERYTHROCYTE [DISTWIDTH] IN BLOOD BY AUTOMATED COUNT: 13.5 % (ref 11.5–14.5)
GLUCOSE BLD STRIP.AUTO-MCNC: 89 MG/DL (ref 65–100)
GLUCOSE BLD STRIP.AUTO-MCNC: 89 MG/DL (ref 65–100)
GLUCOSE BLD STRIP.AUTO-MCNC: 92 MG/DL (ref 65–100)
GLUCOSE BLD STRIP.AUTO-MCNC: 97 MG/DL (ref 65–100)
GLUCOSE SERPL-MCNC: 91 MG/DL (ref 65–100)
HCT VFR BLD AUTO: 34.9 % (ref 35–47)
HGB BLD-MCNC: 11.8 G/DL (ref 11.5–16)
IMM GRANULOCYTES # BLD AUTO: 0 K/UL
IMM GRANULOCYTES NFR BLD AUTO: 0 %
LYMPHOCYTES # BLD: 2.1 K/UL (ref 0.8–3.5)
LYMPHOCYTES NFR BLD: 19 % (ref 12–49)
MCH RBC QN AUTO: 30.9 PG (ref 26–34)
MCHC RBC AUTO-ENTMCNC: 33.8 G/DL (ref 30–36.5)
MCV RBC AUTO: 91.4 FL (ref 80–99)
METAMYELOCYTES NFR BLD MANUAL: 1 %
MONOCYTES # BLD: 1.6 K/UL (ref 0–1)
MONOCYTES NFR BLD: 15 % (ref 5–13)
NEUTS SEG # BLD: 6.7 K/UL (ref 1.8–8)
NEUTS SEG NFR BLD: 62 % (ref 32–75)
NRBC # BLD: 0 K/UL (ref 0–0.01)
NRBC BLD-RTO: 0 PER 100 WBC
PLATELET # BLD AUTO: 222 K/UL (ref 150–400)
PMV BLD AUTO: 9.7 FL (ref 8.9–12.9)
POTASSIUM SERPL-SCNC: 3.4 MMOL/L (ref 3.5–5.1)
RBC # BLD AUTO: 3.82 M/UL (ref 3.8–5.2)
RBC MORPH BLD: ABNORMAL
SERVICE CMNT-IMP: NORMAL
SODIUM SERPL-SCNC: 137 MMOL/L (ref 136–145)
WBC # BLD AUTO: 10.8 K/UL (ref 3.6–11)

## 2019-03-14 PROCEDURE — 74011250636 HC RX REV CODE- 250/636: Performed by: FAMILY MEDICINE

## 2019-03-14 PROCEDURE — 65270000029 HC RM PRIVATE

## 2019-03-14 PROCEDURE — 74011250636 HC RX REV CODE- 250/636: Performed by: SURGERY

## 2019-03-14 PROCEDURE — 80048 BASIC METABOLIC PNL TOTAL CA: CPT

## 2019-03-14 PROCEDURE — 85025 COMPLETE CBC W/AUTO DIFF WBC: CPT

## 2019-03-14 PROCEDURE — 74011250637 HC RX REV CODE- 250/637: Performed by: NURSE PRACTITIONER

## 2019-03-14 PROCEDURE — 82962 GLUCOSE BLOOD TEST: CPT

## 2019-03-14 PROCEDURE — 74011250637 HC RX REV CODE- 250/637: Performed by: INTERNAL MEDICINE

## 2019-03-14 PROCEDURE — 74011000258 HC RX REV CODE- 258: Performed by: INTERNAL MEDICINE

## 2019-03-14 PROCEDURE — 36415 COLL VENOUS BLD VENIPUNCTURE: CPT

## 2019-03-14 PROCEDURE — 74011250637 HC RX REV CODE- 250/637: Performed by: SURGERY

## 2019-03-14 PROCEDURE — 51798 US URINE CAPACITY MEASURE: CPT

## 2019-03-14 PROCEDURE — 74011250636 HC RX REV CODE- 250/636: Performed by: INTERNAL MEDICINE

## 2019-03-14 RX ORDER — POTASSIUM CHLORIDE 14.9 MG/ML
10 INJECTION INTRAVENOUS
Status: COMPLETED | OUTPATIENT
Start: 2019-03-14 | End: 2019-03-14

## 2019-03-14 RX ORDER — POLYETHYLENE GLYCOL 3350 17 G/17G
17 POWDER, FOR SOLUTION ORAL DAILY PRN
Status: DISCONTINUED | OUTPATIENT
Start: 2019-03-14 | End: 2019-03-16

## 2019-03-14 RX ORDER — AMOXICILLIN 250 MG
1 CAPSULE ORAL 2 TIMES DAILY
Status: DISCONTINUED | OUTPATIENT
Start: 2019-03-14 | End: 2019-03-16

## 2019-03-14 RX ADMIN — VENLAFAXINE 50 MG: 25 TABLET ORAL at 08:36

## 2019-03-14 RX ADMIN — AZTREONAM 1 G: 1 INJECTION, POWDER, LYOPHILIZED, FOR SOLUTION INTRAMUSCULAR; INTRAVENOUS at 06:22

## 2019-03-14 RX ADMIN — ACETAMINOPHEN 650 MG: 325 TABLET ORAL at 08:35

## 2019-03-14 RX ADMIN — HYDROCODONE BITARTRATE AND ACETAMINOPHEN 1 TABLET: 5; 325 TABLET ORAL at 20:13

## 2019-03-14 RX ADMIN — HYDROCODONE BITARTRATE AND ACETAMINOPHEN 1 TABLET: 5; 325 TABLET ORAL at 15:47

## 2019-03-14 RX ADMIN — METRONIDAZOLE 500 MG: 500 INJECTION, SOLUTION INTRAVENOUS at 22:37

## 2019-03-14 RX ADMIN — Medication 10 ML: at 15:48

## 2019-03-14 RX ADMIN — Medication 10 ML: at 22:38

## 2019-03-14 RX ADMIN — AZTREONAM 1 G: 1 INJECTION, POWDER, LYOPHILIZED, FOR SOLUTION INTRAMUSCULAR; INTRAVENOUS at 14:58

## 2019-03-14 RX ADMIN — POTASSIUM CHLORIDE 10 MEQ: 200 INJECTION, SOLUTION INTRAVENOUS at 08:45

## 2019-03-14 RX ADMIN — POTASSIUM CHLORIDE 10 MEQ: 200 INJECTION, SOLUTION INTRAVENOUS at 10:52

## 2019-03-14 RX ADMIN — LEVOFLOXACIN 750 MG: 5 INJECTION, SOLUTION INTRAVENOUS at 20:09

## 2019-03-14 RX ADMIN — AZTREONAM 1 G: 1 INJECTION, POWDER, LYOPHILIZED, FOR SOLUTION INTRAMUSCULAR; INTRAVENOUS at 22:37

## 2019-03-14 RX ADMIN — ENOXAPARIN SODIUM 40 MG: 40 INJECTION SUBCUTANEOUS at 22:37

## 2019-03-14 RX ADMIN — PRAVASTATIN SODIUM 40 MG: 20 TABLET ORAL at 22:37

## 2019-03-14 RX ADMIN — Medication 10 ML: at 06:22

## 2019-03-14 RX ADMIN — HYDROCODONE BITARTRATE AND ACETAMINOPHEN 1 TABLET: 5; 325 TABLET ORAL at 09:40

## 2019-03-14 RX ADMIN — SENNOSIDES AND DOCUSATE SODIUM 1 TABLET: 8.6; 5 TABLET ORAL at 17:23

## 2019-03-14 RX ADMIN — SENNOSIDES AND DOCUSATE SODIUM 1 TABLET: 8.6; 5 TABLET ORAL at 10:47

## 2019-03-14 RX ADMIN — METRONIDAZOLE 500 MG: 500 INJECTION, SOLUTION INTRAVENOUS at 10:47

## 2019-03-14 NOTE — PROGRESS NOTES
Daily Progress Note: 3/14/2019  Manisha Carpio MD    Assessment/Plan:   Diverticulitis: failing antibiotics. Now with abscess evident on CT a/p. General surgery to see. ---Keep NPO   ---HOLDING DVT chemoprophylaxis and ASA.   ---IV Levaquin, Flagyl, aztreonam     Hyponatremia / Hypokalemia:   ---suspect from IVVD and thiazide diuretic use.   ---HOLD HCTZ.   ---Start IVF with KCl.   ---Follow BMP  ---Check MAg     Hypertension: controlled.   ---Hold home antihypertensives for now     High cholesterol:   ---continue statin     Depression: continue Cymbalta     GERD (gastroesophageal reflux disease):   ---PPI PRN     Cancer: left breast. Left central IDC, gr 2, ER +, MT +, HER 2 positive:  cT2 cN1a cM0, stage IIB, prognostic stage IB.   ---Followed by Dr. Paula Anderson.   ---Outpatient follow up     Wheeze: mild. Smoker. Advise smoking cessation. Albuterol PRN     Adverse effect of anesthesia: Overview: \"difficulty waking up from anesthethia\".  Will need to monitor closely in PACU as appropriate                     Problem List:  Problem List as of 3/14/2019 Date Reviewed: 3/12/2019          Codes Class Noted - Resolved    Hypertension ICD-10-CM: I10  ICD-9-CM: 401.9  3/12/2019 - Present        High cholesterol ICD-10-CM: E78.00  ICD-9-CM: 272.0  3/12/2019 - Present        GERD (gastroesophageal reflux disease) ICD-10-CM: K21.9  ICD-9-CM: 530.81  3/12/2019 - Present        Depression ICD-10-CM: F32.9  ICD-9-CM: 451  3/12/2019 - Present        Adverse effect of anesthesia ICD-10-CM: T41.45XA  ICD-9-CM: 995.22  3/12/2019 - Present    Overview Signed 3/12/2019  7:39 PM by Benita Dukes MD     \"difficulty waking up from anesthethia\"             Diverticulitis ICD-10-CM: Y80.54  ICD-9-CM: 562.11  3/12/2019 - Present        Hyponatremia ICD-10-CM: E87.1  ICD-9-CM: 276.1  3/12/2019 - Present        Hypokalemia ICD-10-CM: E87.6  ICD-9-CM: 276.8  3/12/2019 - Present        Abscess ICD-10-CM: L02.91  ICD-9-CM: 682.9 3/12/2019 - Present        Breast cancer, left ([facility] [address] ICD-10-CM: C50.912  ICD-9-CM: 174.9  2019 - Present    Overview Addendum 3/12/2019  4:58 PM by Alize David MD     2019 LEFT invasive ductal carcinoma, grade 2, %. OK 20%, Her 2 positive  Positive axillary node, %. OK neg, Her 2 positive  TCH-P               Cancer [facility]) ICD-10-CM: C80.1  ICD-9-CM: 199.1  2019 - Present    Overview Signed 3/12/2019  7:39 PM by Tariq Vargas MD     left breast                   HPI:   Ms. Jumaan Abebe is a 74 y.o.  female who is admitted to the Gen Surg Service for diverticulitis with abscess. We are asked to evaluate for medical mgmt. Ms. Jumana Abebe is complaining of abdominal pain. Ongoing for about a week now. Seen in ED a few days ago, sent home on oral abx (levaquin/Flagyl) however did not improve. (Dr Xu Guevara)    3/13: Feeling a little better. Abd feels less tight. No BM for about 6 days but has had little po. K+ low so will replete. 3/14:  K+ better at 3.4. Afeb. Feeling a little better. Review of Systems:   A comprehensive review of systems was negative except for that written in the HPI. Objective:   Physical Exam:     Visit Vitals  /53 (BP 1 Location: Right arm, BP Patient Position: Lying right side)   Pulse 90   Temp 98.4 °F (36.9 °C)   Resp 18   Ht 5' 6\" (1.676 m)   Wt 80.7 kg (178 lb)   SpO2 91%   Breastfeeding? No   BMI 28.73 kg/m²    O2 Flow Rate (L/min): 2 l/min O2 Device: Room air    Temp (24hrs), Av °F (36.7 °C), Min:97.6 °F (36.4 °C), Max:98.4 °F (36.9 °C)    No intake/output data recorded.  1901 -  0700  In: 1200 [I.V.:1200]  Out: 30 [Drains:30]    General:  Alert, cooperative, no distress, appears stated age. Head:  Normocephalic, without obvious abnormality, atraumatic. Eyes:  Conjunctivae/corneas clear. PERRL, EOMs intact. Nose: Nares normal. Septum midline. Mucosa normal. No drainage or sinus tenderness.    Throat: Lips, mucosa, and tongue normal. Teeth and gums normal.   Neck: Supple, symmetrical, trachea midline, no adenopathy, thyroid: no enlargement/tenderness/nodules, no carotid bruit and no JVD. Back:   Symmetric, no curvature. ROM normal. No CVA tenderness. Lungs:   Clear to auscultation bilaterally. Chest wall:  No tenderness or deformity. Heart:  Regular rate and rhythm, S1, S2 normal, no murmur, click, rub or gallop. Abdomen:   Soft, tender LLQ, no rebound or guarding. Bowel sounds present. No masses,  No organomegaly. Extremities: Extremities normal, atraumatic, no cyanosis or edema. No calf tenderness or cords. Pulses: 2+ and symmetric all extremities. Skin: Skin color, texture, turgor normal. No rashes or lesions   Neurologic: CNII-XII intact. Alert and oriented X 3. Fine motor of hands and fingers normal.   equal.  No cogwheeling or rigidity. Gait not tested at this time. Sensation grossly normal to touch. Gross motor of extremities normal.       Data Review:       Recent Days:  Recent Labs     03/14/19  0310 03/13/19  0506 03/12/19  1652   WBC 10.8 3.5* 3.0*   HGB 11.8 12.7 14.2   HCT 34.9* 38.2 40.2    214 228     Recent Labs     03/14/19  0310 03/13/19  0506 03/12/19  1652    133* 127*   K 3.4* 3.0* 2.7*    95* 87*   CO2 31 36* 33*   GLU 91 104* 111*   BUN 11 15 19   CREA 0.59 0.75 0.91   CA 7.7* 8.1* 8.6   MG  --  2.5* 2.5*   PHOS  --  1.3*  --    ALB  --  2.3* 2.6*   TBILI  --  0.5 0.8   SGOT  --  18 21   ALT  --  20 25     No results for input(s): PH, PCO2, PO2, HCO3, FIO2 in the last 72 hours.     24 Hour Results:  Recent Results (from the past 24 hour(s))   CULTURE, BODY FLUID W GRAM STAIN    Collection Time: 03/13/19  3:01 PM   Result Value Ref Range    Special Requests: NO SPECIAL REQUESTS      GRAM STAIN FEW WBCS SEEN      GRAM STAIN 2+ GRAM NEGATIVE RODS      GRAM STAIN 1+ GRAM POSITIVE RODS      GRAM STAIN MODERATE GRAM POSITIVE COCCI IN CHAINS      Culture result: PENDING GLUCOSE, POC    Collection Time: 03/13/19  9:03 PM   Result Value Ref Range    Glucose (POC) 98 65 - 100 mg/dL    Performed by Saúl Dao (PCT)    CBC WITH AUTOMATED DIFF    Collection Time: 03/14/19  3:10 AM   Result Value Ref Range    WBC 10.8 3.6 - 11.0 K/uL    RBC 3.82 3.80 - 5.20 M/uL    HGB 11.8 11.5 - 16.0 g/dL    HCT 34.9 (L) 35.0 - 47.0 %    MCV 91.4 80.0 - 99.0 FL    MCH 30.9 26.0 - 34.0 PG    MCHC 33.8 30.0 - 36.5 g/dL    RDW 13.5 11.5 - 14.5 %    PLATELET 407 247 - 079 K/uL    MPV 9.7 8.9 - 12.9 FL    NRBC 0.0 0  WBC    ABSOLUTE NRBC 0.00 0.00 - 0.01 K/uL    NEUTROPHILS 62 32 - 75 %    LYMPHOCYTES 19 12 - 49 %    MONOCYTES 15 (H) 5 - 13 %    EOSINOPHILS 2 0 - 7 %    BASOPHILS 1 0 - 1 %    METAMYELOCYTES 1 (H) 0 %    IMMATURE GRANULOCYTES 0 %    ABS. NEUTROPHILS 6.7 1.8 - 8.0 K/UL    ABS. LYMPHOCYTES 2.1 0.8 - 3.5 K/UL    ABS. MONOCYTES 1.6 (H) 0.0 - 1.0 K/UL    ABS. EOSINOPHILS 0.2 0.0 - 0.4 K/UL    ABS. BASOPHILS 0.1 0.0 - 0.1 K/UL    ABS. IMM.  GRANS. 0.0 K/UL    DF MANUAL      RBC COMMENTS NORMOCYTIC, NORMOCHROMIC     METABOLIC PANEL, BASIC    Collection Time: 03/14/19  3:10 AM   Result Value Ref Range    Sodium 137 136 - 145 mmol/L    Potassium 3.4 (L) 3.5 - 5.1 mmol/L    Chloride 100 97 - 108 mmol/L    CO2 31 21 - 32 mmol/L    Anion gap 6 5 - 15 mmol/L    Glucose 91 65 - 100 mg/dL    BUN 11 6 - 20 MG/DL    Creatinine 0.59 0.55 - 1.02 MG/DL    BUN/Creatinine ratio 19 12 - 20      GFR est AA >60 >60 ml/min/1.73m2    GFR est non-AA >60 >60 ml/min/1.73m2    Calcium 7.7 (L) 8.5 - 10.1 MG/DL   GLUCOSE, POC    Collection Time: 03/14/19  6:48 AM   Result Value Ref Range    Glucose (POC) 92 65 - 100 mg/dL    Performed by Saúl Dao (PCT)        Medications reviewed  Current Facility-Administered Medications   Medication Dose Route Frequency    sodium chloride (NS) flush 5-40 mL  5-40 mL IntraVENous Q8H    acetaminophen (TYLENOL) tablet 650 mg  650 mg Oral Q6H PRN    sodium chloride (NS) flush 5-40 mL  5-40 mL IntraVENous Q8H    sodium chloride (NS) flush 5-40 mL  5-40 mL IntraVENous PRN    naloxone (NARCAN) injection 0.4 mg  0.4 mg IntraVENous PRN    HYDROcodone-acetaminophen (NORCO) 5-325 mg per tablet 1 Tab  1 Tab Oral Q4H PRN    HYDROmorphone (PF) (DILAUDID) injection 0.5 mg  0.5 mg IntraVENous Q4H PRN    ondansetron (ZOFRAN) injection 4 mg  4 mg IntraVENous Q4H PRN    enoxaparin (LOVENOX) injection 40 mg  40 mg SubCUTAneous Q24H    pravastatin (PRAVACHOL) tablet 40 mg  40 mg Oral QHS    venlafaxine (EFFEXOR) tablet 50 mg  50 mg Oral DAILY    levoFLOXacin (LEVAQUIN) 750 mg in D5W IVPB  750 mg IntraVENous Q24H    metroNIDAZOLE (FLAGYL) IVPB premix 500 mg  500 mg IntraVENous Q12H    0.9% sodium chloride with KCl 40 mEq/L infusion   IntraVENous CONTINUOUS    aztreonam (AZACTAM) 1 g in 0.9% sodium chloride (MBP/ADV) 100 mL  1 g IntraVENous Q8H    albuterol (PROVENTIL VENTOLIN) nebulizer solution 2.5 mg  2.5 mg Nebulization Q6H PRN       Care Plan discussed with: Patient/Family    Total time spent with patient and review of records: 30 minutes.     Norberto Bates MD

## 2019-03-14 NOTE — PROGRESS NOTES
Verbal shift change report given to Brittany Epperson (oncoming nurse) by Kevin Diallo (offgoing nurse). Report included the following information SBAR, Kardex, Intake/Output, MAR and Recent Results.

## 2019-03-14 NOTE — PROGRESS NOTES
Bedside and Verbal shift change report given to Traci Rea RN (oncoming nurse) by Waldemar Barry RN (offgoing nurse). Report included the following information SBAR, Kardex, ED Summary, Procedure Summary, Intake/Output, MAR and Recent Results.

## 2019-03-14 NOTE — PROGRESS NOTES
Daily Progress Note: 3/14/2019  Pete Gibson MD    Assessment/Plan:   Diverticulitis: failing antibiotics. Now with abscess evident on CT a/p. General surgery to see. ---Keep NPO   ---HOLDING DVT chemoprophylaxis and ASA.   ---Would like to use Zosyn or meropenem however allergy to PCN/Keflex(hives) noted. Agree           with IV Levaquin and Flagyl, but will add aztreonam too, for add'l GN coverage. ---Drain placed 3/13 by IR    Hyponatremia / Hypokalemia:   ---suspect from IVVD and thiazide diuretic use.   ---HOLD HCTZ.   ---Start IVF with KCl.   ---Follow BMP  ---Check MAg     Hypertension: controlled.   ---Hold home antihypertensives for now     High cholesterol:   ---continue statin     Depression: continue Cymbalta     GERD (gastroesophageal reflux disease):   ---PPI PRN     Cancer: left breast. Left central IDC, gr 2, ER +, AK +, HER 2 positive:  cT2 cN1a cM0, stage IIB, prognostic stage IB.   ---Followed by Dr. Castano .   ---Outpatient follow up     Wheeze: mild. Smoker. Advise smoking cessation. Albuterol PRN     Adverse effect of anesthesia: Overview: \"difficulty waking up from anesthethia\".  Will need to monitor closely in PACU as appropriate                     Problem List:  Problem List as of 3/14/2019 Date Reviewed: 3/12/2019          Codes Class Noted - Resolved    Hypertension ICD-10-CM: I10  ICD-9-CM: 401.9  3/12/2019 - Present        High cholesterol ICD-10-CM: E78.00  ICD-9-CM: 272.0  3/12/2019 - Present        GERD (gastroesophageal reflux disease) ICD-10-CM: K21.9  ICD-9-CM: 530.81  3/12/2019 - Present        Depression ICD-10-CM: F32.9  ICD-9-CM: 386  3/12/2019 - Present        Adverse effect of anesthesia ICD-10-CM: T41.45XA  ICD-9-CM: 995.22  3/12/2019 - Present    Overview Signed 3/12/2019  7:39 PM by Ravi Li MD     \"difficulty waking up from anesthethia\"             Diverticulitis ICD-10-CM: B03.73  ICD-9-CM: 562.11  3/12/2019 - Present        Hyponatremia ICD-10-CM: E87.1  ICD-9-CM: 276.1  3/12/2019 - Present        Hypokalemia ICD-10-CM: E87.6  ICD-9-CM: 276.8  3/12/2019 - Present        Abscess ICD-10-CM: L02.91  ICD-9-CM: 682.9  3/12/2019 - Present        Breast cancer, left ([address] ICD-10-CM: C50.912  ICD-9-CM: 174.9  2019 - Present    Overview Addendum 3/12/2019  4:58 PM by Eyad Simpson MD     2019 LEFT invasive ductal carcinoma, grade 2, %. DE 20%, Her 2 positive  Positive axillary node, %. DE neg, Her 2 positive  TCH-P               Cancer [facility]) ICD-10-CM: C80.1  ICD-9-CM: 199.1  2019 - Present    Overview Signed 3/12/2019  7:39 PM by Luis A Diaz MD     left breast                   HPI:   Ms. Adelina Samano is a 74 y.o.  female who is admitted to the Gen Surg Service for diverticulitis with abscess. We are asked to evaluate for medical mgmt. Ms. Adelina Samano is complaining of abdominal pain. Ongoing for about a week now. Seen in ED a few days ago, sent home on oral abx (levaquin/Flagyl) however did not improve. (Dr Sue Tovar)    3/13: Feeling a little better. Abd feels less tight. No BM for about 6 days but has had little po. K+ low so will replete. 3/14: Feeling pretty miserable this am. She is c/o increased pressure in the LLQ. K+ improved but still low so will replete further. Afebrile. Review of Systems:   A comprehensive review of systems was negative except for that written in the HPI. Objective:   Physical Exam:     Visit Vitals  /53 (BP 1 Location: Right arm, BP Patient Position: Lying right side)   Pulse 90   Temp 98.4 °F (36.9 °C)   Resp 18   Ht 5' 6\" (1.676 m)   Wt 178 lb (80.7 kg)   SpO2 91%   Breastfeeding? No   BMI 28.73 kg/m²    O2 Flow Rate (L/min): 2 l/min O2 Device: Room air    Temp (24hrs), Av °F (36.7 °C), Min:97.6 °F (36.4 °C), Max:98.4 °F (36.9 °C)    No intake/output data recorded.     1901 -  0700  In: 1200 [I.V.:1200]  Out: 30 [Drains:30]    General:  Alert, cooperative, appears uncomfortable, appears stated age. Head:  Normocephalic, without obvious abnormality, atraumatic. Eyes:  Conjunctivae/corneas clear. PERRL, EOMs intact. Nose: Nares normal. Septum midline. Mucosa normal. No drainage or sinus tenderness. Throat: Lips, mucosa, and tongue normal. Teeth and gums normal.   Neck: Supple, symmetrical, trachea midline, no adenopathy, thyroid: no enlargement/tenderness/nodules, no carotid bruit and no JVD. Back:   Symmetric, no curvature. ROM normal. No CVA tenderness. Lungs:   Clear to auscultation bilaterally. Chest wall:  No tenderness or deformity. Heart:  Regular rate and rhythm, S1, S2 normal, no murmur, click, rub or gallop. Abdomen:   Soft, tender LLQ, no rebound or guarding. Bowel sounds present. No masses,  No organomegaly. Extremities: Extremities normal, atraumatic, no cyanosis or edema. No calf tenderness or cords. Pulses: 2+ and symmetric all extremities. Skin: Skin color, texture, turgor normal. No rashes or lesions   Neurologic: CNII-XII intact. Alert and oriented X 3. Fine motor of hands and fingers normal.   equal.  No cogwheeling or rigidity. Gait not tested at this time. Sensation grossly normal to touch. Gross motor of extremities normal.       Data Review:       Recent Days:  Recent Labs     03/14/19  0310 03/13/19  0506 03/12/19  1652   WBC 10.8 3.5* 3.0*   HGB 11.8 12.7 14.2   HCT 34.9* 38.2 40.2    214 228     Recent Labs     03/14/19  0310 03/13/19  0506 03/12/19  1652    133* 127*   K 3.4* 3.0* 2.7*    95* 87*   CO2 31 36* 33*   GLU 91 104* 111*   BUN 11 15 19   CREA 0.59 0.75 0.91   CA 7.7* 8.1* 8.6   MG  --  2.5* 2.5*   PHOS  --  1.3*  --    ALB  --  2.3* 2.6*   TBILI  --  0.5 0.8   SGOT  --  18 21   ALT  --  20 25     No results for input(s): PH, PCO2, PO2, HCO3, FIO2 in the last 72 hours.     24 Hour Results:  Recent Results (from the past 24 hour(s))   CULTURE, BODY FLUID W GRAM STAIN    Collection Time: 03/13/19  3:01 PM   Result Value Ref Range    Special Requests: NO SPECIAL REQUESTS      GRAM STAIN FEW WBCS SEEN      GRAM STAIN 2+ GRAM NEGATIVE RODS      GRAM STAIN 1+ GRAM POSITIVE RODS      GRAM STAIN MODERATE GRAM POSITIVE COCCI IN CHAINS      Culture result: PENDING    GLUCOSE, POC    Collection Time: 03/13/19  9:03 PM   Result Value Ref Range    Glucose (POC) 98 65 - 100 mg/dL    Performed by Ottoniel Putnam (PCT)    CBC WITH AUTOMATED DIFF    Collection Time: 03/14/19  3:10 AM   Result Value Ref Range    WBC 10.8 3.6 - 11.0 K/uL    RBC 3.82 3.80 - 5.20 M/uL    HGB 11.8 11.5 - 16.0 g/dL    HCT 34.9 (L) 35.0 - 47.0 %    MCV 91.4 80.0 - 99.0 FL    MCH 30.9 26.0 - 34.0 PG    MCHC 33.8 30.0 - 36.5 g/dL    RDW 13.5 11.5 - 14.5 %    PLATELET 538 377 - 272 K/uL    MPV 9.7 8.9 - 12.9 FL    NRBC 0.0 0  WBC    ABSOLUTE NRBC 0.00 0.00 - 0.01 K/uL    NEUTROPHILS 62 32 - 75 %    LYMPHOCYTES 19 12 - 49 %    MONOCYTES 15 (H) 5 - 13 %    EOSINOPHILS 2 0 - 7 %    BASOPHILS 1 0 - 1 %    METAMYELOCYTES 1 (H) 0 %    IMMATURE GRANULOCYTES 0 %    ABS. NEUTROPHILS 6.7 1.8 - 8.0 K/UL    ABS. LYMPHOCYTES 2.1 0.8 - 3.5 K/UL    ABS. MONOCYTES 1.6 (H) 0.0 - 1.0 K/UL    ABS. EOSINOPHILS 0.2 0.0 - 0.4 K/UL    ABS. BASOPHILS 0.1 0.0 - 0.1 K/UL    ABS. IMM.  GRANS. 0.0 K/UL    DF MANUAL      RBC COMMENTS NORMOCYTIC, NORMOCHROMIC     METABOLIC PANEL, BASIC    Collection Time: 03/14/19  3:10 AM   Result Value Ref Range    Sodium 137 136 - 145 mmol/L    Potassium 3.4 (L) 3.5 - 5.1 mmol/L    Chloride 100 97 - 108 mmol/L    CO2 31 21 - 32 mmol/L    Anion gap 6 5 - 15 mmol/L    Glucose 91 65 - 100 mg/dL    BUN 11 6 - 20 MG/DL    Creatinine 0.59 0.55 - 1.02 MG/DL    BUN/Creatinine ratio 19 12 - 20      GFR est AA >60 >60 ml/min/1.73m2    GFR est non-AA >60 >60 ml/min/1.73m2    Calcium 7.7 (L) 8.5 - 10.1 MG/DL   GLUCOSE, POC    Collection Time: 03/14/19  6:48 AM   Result Value Ref Range    Glucose (POC) 92 65 - 100 mg/dL    Performed by Ottoniel Putnam (PCT)        Medications reviewed  Current Facility-Administered Medications   Medication Dose Route Frequency    sodium chloride (NS) flush 5-40 mL  5-40 mL IntraVENous Q8H    acetaminophen (TYLENOL) tablet 650 mg  650 mg Oral Q6H PRN    sodium chloride (NS) flush 5-40 mL  5-40 mL IntraVENous Q8H    sodium chloride (NS) flush 5-40 mL  5-40 mL IntraVENous PRN    naloxone (NARCAN) injection 0.4 mg  0.4 mg IntraVENous PRN    HYDROcodone-acetaminophen (NORCO) 5-325 mg per tablet 1 Tab  1 Tab Oral Q4H PRN    HYDROmorphone (PF) (DILAUDID) injection 0.5 mg  0.5 mg IntraVENous Q4H PRN    ondansetron (ZOFRAN) injection 4 mg  4 mg IntraVENous Q4H PRN    enoxaparin (LOVENOX) injection 40 mg  40 mg SubCUTAneous Q24H    pravastatin (PRAVACHOL) tablet 40 mg  40 mg Oral QHS    venlafaxine (EFFEXOR) tablet 50 mg  50 mg Oral DAILY    levoFLOXacin (LEVAQUIN) 750 mg in D5W IVPB  750 mg IntraVENous Q24H    metroNIDAZOLE (FLAGYL) IVPB premix 500 mg  500 mg IntraVENous Q12H    0.9% sodium chloride with KCl 40 mEq/L infusion   IntraVENous CONTINUOUS    aztreonam (AZACTAM) 1 g in 0.9% sodium chloride (MBP/ADV) 100 mL  1 g IntraVENous Q8H    albuterol (PROVENTIL VENTOLIN) nebulizer solution 2.5 mg  2.5 mg Nebulization Q6H PRN       Care Plan discussed with: Patient/Family    Total time spent with patient and review of records: 30 minutes.     Alfred David MD

## 2019-03-14 NOTE — PROGRESS NOTES
Problem: Falls - Risk of  Goal: *Absence of Falls  Document David Fall Risk and appropriate interventions in the flowsheet.   Outcome: Progressing Towards Goal  Fall Risk Interventions:            Medication Interventions: Patient to call before getting OOB, Teach patient to arise slowly

## 2019-03-14 NOTE — PROGRESS NOTES
General Surgery Daily Progress Note    Patient: Raymond Nguyen MRN: [id number]  SSN: [id number]    YOB: 1945  Age: 74 y.o. Sex: female      Admit Date: 3/12/2019    Subjective:   No improvement in pain. + BM. Denies N/V. Current Facility-Administered Medications   Medication Dose Route Frequency    potassium chloride 10 mEq in 50 ml IVPB  10 mEq IntraVENous Q1H    sodium chloride (NS) flush 5-40 mL  5-40 mL IntraVENous Q8H    acetaminophen (TYLENOL) tablet 650 mg  650 mg Oral Q6H PRN    sodium chloride (NS) flush 5-40 mL  5-40 mL IntraVENous Q8H    sodium chloride (NS) flush 5-40 mL  5-40 mL IntraVENous PRN    naloxone (NARCAN) injection 0.4 mg  0.4 mg IntraVENous PRN    HYDROcodone-acetaminophen (NORCO) 5-325 mg per tablet 1 Tab  1 Tab Oral Q4H PRN    HYDROmorphone (PF) (DILAUDID) injection 0.5 mg  0.5 mg IntraVENous Q4H PRN    ondansetron (ZOFRAN) injection 4 mg  4 mg IntraVENous Q4H PRN    enoxaparin (LOVENOX) injection 40 mg  40 mg SubCUTAneous Q24H    pravastatin (PRAVACHOL) tablet 40 mg  40 mg Oral QHS    venlafaxine (EFFEXOR) tablet 50 mg  50 mg Oral DAILY    levoFLOXacin (LEVAQUIN) 750 mg in D5W IVPB  750 mg IntraVENous Q24H    metroNIDAZOLE (FLAGYL) IVPB premix 500 mg  500 mg IntraVENous Q12H    0.9% sodium chloride with KCl 40 mEq/L infusion   IntraVENous CONTINUOUS    aztreonam (AZACTAM) 1 g in 0.9% sodium chloride (MBP/ADV) 100 mL  1 g IntraVENous Q8H    albuterol (PROVENTIL VENTOLIN) nebulizer solution 2.5 mg  2.5 mg Nebulization Q6H PRN        Objective:   No intake/output data recorded.   03/12 1901 - 03/14 0700  In: 1200 [I.V.:1200]  Out: 30 [Drains:30]  Patient Vitals for the past 8 hrs:   BP Temp Pulse Resp SpO2   03/14/19 0744 119/52 98 °F (36.7 °C) 84 18 93 %   03/14/19 0308 108/53 98.4 °F (36.9 °C) 90 18 91 %       Physical Exam:  General: Alert, cooperative, uncomfortable  Lungs: Unlabored  Heart:  Regular rate and  rhythm  Abdomen: Soft, mild lower abdominal tenderness, no peritonitis, no guarding.  IR drain in place- SS drainage  Extremities: Warm, moves all, no edema  Skin:  Warm and dry, no rash    Labs:   Recent Labs     03/14/19  0310   WBC 10.8   HGB 11.8   HCT 34.9*        Recent Labs     03/14/19  0310 03/13/19  0506    133*   K 3.4* 3.0*    95*   CO2 31 36*   GLU 91 104*   BUN 11 15   CREA 0.59 0.75   CA 7.7* 8.1*   MG  --  2.5*   PHOS  --  1.3*   ALB  --  2.3*   TBILI  --  0.5   SGOT  --  18   ALT  --  20       Assessment / Plan:   · Sigmoid diverticulitis w/ 3 abscesses s/p IR drainage of 1 collection   · Breast CA  · Continued pain- no better or worse  · Mild lower abdominal tenderness  · White count up from 3 to 10  · Continue ABX  · Fluid cultures pending   · Low threshold for surgery should she worsen or fail to improve

## 2019-03-14 NOTE — PROGRESS NOTES
Cancer [place] at [facility] 88  [address] [facility], [address]  Kristina Tani: [phone]  F: [phone]      Reason for Visit:   Kaelyn Peres is a 74 y.o. female who is seen in consultation at the request of Dr. Heather Jimenez for continuity of care. Hematology / Oncology Treatment History:   · 2/15/19 Left core bx:   IDC, gr 2, 1.1 cm, ER + at 100%, GA + at 20%, HER 2 POSITIVE (IHC 2+; FISH ratio 3.9; sig/cell 9.2)  · 3/1/19 Left ax LN core bx:  + for breast cancer, ER + at 100%, GA negative, HER 2 POSITIVE (IHC 2+, FISH ratio 2; sig/cell 5.8)  · TCH-P 3/6/19-      History of Present Illness:     Seen in the ED on 3/8/2019 for abd pain and constipation. ED CT  Sowed diverticulitis; was initiated on levaquin and flagyl. Seen  In our clinic yesterday with worsening abd pain; therefore was directed to ED for further eval. ED CT scan shows acute diverticulitis with abscesses. Admitted for further eval and management. Ms Torres Ing feeling better this morning. Reports abd \"discomfort/ bloating/pressure\". Rates pain 6/10. Does not want hydrocodone but will try some Tylenol. Had some loose stool this am. Denies any SOB. Denies N/V at present. Sister at bedside. Interval History:    Continues with lower abd pain/\"pressure\" ; Tylenol not working; Reluctant to take anything stronger b/c it will cause constipation. No family at bedside.        Past Medical History:   Diagnosis Date    Adverse effect of anesthesia     \"difficulty waking up from anesthethia\"    Cancer [facility]) 02/2019    left breast    Depression     GERD (gastroesophageal reflux disease)     High cholesterol     Hypertension       Past Surgical History:   Procedure Laterality Date    HX BREAST BIOPSY Right years ago    neg; needle bx    HX CATARACT REMOVAL      HX COLONOSCOPY      HX ORTHOPAEDIC      carpal tunnel      Social History     Tobacco Use    Smoking status: Current Every Day Smoker Packs/day: 0.50     Years: 55.00     Pack years: 27.50    Smokeless tobacco: Never Used   Substance Use Topics    Alcohol use:  Yes     Alcohol/week: 0.6 oz     Types: 1 Shots of liquor per week     Drinks per session: 5 or 6     Binge frequency: Weekly     Comment: Patient stated that she puts a splash of bourbon in her afternoon coffee around 5-6 days per week      Family History   Problem Relation Age of Onset    Breast Cancer Maternal Aunt     Hypertension Mother     Hypertension Father     Cancer Father         Lung    Cancer Brother         leukemia    Diabetes Brother         2 brothers with diabetes    Hypertension Sister         2 sisters and 4 brothers with HTN     Current Facility-Administered Medications   Medication Dose Route Frequency    senna-docusate (PERICOLACE) 8.6-50 mg per tablet 1 Tab  1 Tab Oral BID    polyethylene glycol (MIRALAX) packet 17 g  17 g Oral DAILY PRN    sodium chloride (NS) flush 5-40 mL  5-40 mL IntraVENous Q8H    acetaminophen (TYLENOL) tablet 650 mg  650 mg Oral Q6H PRN    sodium chloride (NS) flush 5-40 mL  5-40 mL IntraVENous Q8H    sodium chloride (NS) flush 5-40 mL  5-40 mL IntraVENous PRN    naloxone (NARCAN) injection 0.4 mg  0.4 mg IntraVENous PRN    HYDROcodone-acetaminophen (NORCO) 5-325 mg per tablet 1 Tab  1 Tab Oral Q4H PRN    HYDROmorphone (PF) (DILAUDID) injection 0.5 mg  0.5 mg IntraVENous Q4H PRN    ondansetron (ZOFRAN) injection 4 mg  4 mg IntraVENous Q4H PRN    enoxaparin (LOVENOX) injection 40 mg  40 mg SubCUTAneous Q24H    pravastatin (PRAVACHOL) tablet 40 mg  40 mg Oral QHS    venlafaxine (EFFEXOR) tablet 50 mg  50 mg Oral DAILY    levoFLOXacin (LEVAQUIN) 750 mg in D5W IVPB  750 mg IntraVENous Q24H    metroNIDAZOLE (FLAGYL) IVPB premix 500 mg  500 mg IntraVENous Q12H    0.9% sodium chloride with KCl 40 mEq/L infusion   IntraVENous CONTINUOUS    aztreonam (AZACTAM) 1 g in 0.9% sodium chloride (MBP/ADV) 100 mL  1 g IntraVENous Q8H  albuterol (PROVENTIL VENTOLIN) nebulizer solution 2.5 mg  2.5 mg Nebulization Q6H PRN      Allergies   Allergen Reactions    Doxycycline Diarrhea    Keflex [Cephalexin] Hives    Penicillins Rash        Review of Systems: A complete review of systems was obtained, negative except as described above. Physical Exam:     Visit Vitals  /62 (BP 1 Location: Right arm, BP Patient Position: At rest)   Pulse 73   Temp 98.3 °F (36.8 °C)   Resp 18   Ht 5' 6\" (1.676 m)   Wt 80.7 kg (178 lb)   SpO2 95%   Breastfeeding? No   BMI 28.73 kg/m²     ECOG PS: 1  General: No distress  Eyes: PERRLA, anicteric sclerae  HENT: Atraumatic with normal appearance of ears and nose; OP clear  Neck: Supple; no thyromegaly   Lymphatic: No cervical, supraclavicular, or axillary adenopathy  Respiratory: Exp wheezing bilaterally in upper bilateral lobes;  normal respiratory effort  CV: Normal rate, regular rhythm, no murmurs, no peripheral edema  GI: Soft, nontender, nondistended, no masses, no hepatomegaly, no splenomegaly  MS: Digits without clubbing or cyanosis. Skin: collection drainage apparatus noted; No rashes, ecchymoses, or petechiae. Normal temperature, turgor, and texture. Neuro/Psych: Alert, oriented, appropriate affect, normal judgment/insight      Results:     Lab Results   Component Value Date/Time    WBC 10.8 03/14/2019 03:10 AM    HGB 11.8 03/14/2019 03:10 AM    HCT 34.9 (L) 03/14/2019 03:10 AM    PLATELET 054 30/91/3205 03:10 AM    MCV 91.4 03/14/2019 03:10 AM    ABS.  NEUTROPHILS 6.7 03/14/2019 03:10 AM     Lab Results   Component Value Date/Time    Sodium 137 03/14/2019 03:10 AM    Potassium 3.4 (L) 03/14/2019 03:10 AM    Chloride 100 03/14/2019 03:10 AM    CO2 31 03/14/2019 03:10 AM    Glucose 91 03/14/2019 03:10 AM    BUN 11 03/14/2019 03:10 AM    Creatinine 0.59 03/14/2019 03:10 AM    GFR est AA >60 03/14/2019 03:10 AM    GFR est non-AA >60 03/14/2019 03:10 AM    Calcium 7.7 (L) 03/14/2019 03:10 AM    Glucose (POC) 97 03/14/2019 10:54 AM     Lab Results   Component Value Date/Time    Bilirubin, total 0.5 03/13/2019 05:06 AM    ALT (SGPT) 20 03/13/2019 05:06 AM    AST (SGOT) 18 03/13/2019 05:06 AM    Alk. phosphatase 69 03/13/2019 05:06 AM    Protein, total 5.9 (L) 03/13/2019 05:06 AM    Albumin 2.3 (L) 03/13/2019 05:06 AM    Globulin 3.6 03/13/2019 05:06 AM     Lab Results   Component Value Date/Time    Lipase 35 (L) 03/12/2019 04:52 PM     No results found for: INR, APTT, DDIMSQ, DDIME, 617651, [place], FDPLT, Belita [place], [address] [facility], [address] [facility], [address] [facility] [address], [address], [id number], Harjit Taye 5334, [id number], [id number], [id number], [id number]    No results found for: CEA, 111675, C199LT, C125, YPGP989, 2729LT, C153LT, PSA, PSALT, LDH, [id number], HCGTLT, AFP, CHRGLT    TTE 3/6/19 EF 66-70%. 3/12/2019 XR ABD ACUTE  IMPRESSION: No acute process in the abdomen. 2/12/2019 CT ABD PELV W CONT    IMPRESSION:    Acute diverticulitis of the sigmoid colon. Development of a focal fluid and gas  collection in the cul-de-sac, consistent with an abscess. Suspected 2 additional  smaller developing abscesses in the pelvis. 3/13/2019 CT guided perc drainage  IMPRESSION: CT guided pelvic/cul-de-sac feculent fluid collection drainage  performed. 30 cc removed; Laboratory results pending. Cath placed for drainage    Assessment and Recommendations:     1. Diverticulitis/Abd abscesses/ 3 noted  Failed outpatient  oral abx  3/13 S/p IR drainage of 1 collection: cultures pending. Gen surgery following: continuing abx if no improvement plan for surgery; sigmoid colectomy requiring colostomy      2. Left central IDC, gr 2, ER +, VA +, HER 2 positive:  cT2 cN1a cM0, stage IIB, prognostic stage IB   The goal of systemic adjuvant therapy is to improve the chances for cure and decrease the risk of relapse  3/6 D7MLA-L (Trastuzumab,  Docetaxel,  Carboplatin , Pertuzumab) given every 3 weeks x 6 cycles; Neulasta following each treatment.  Next cycle scheduled for 3/27.       3. Hypokalemia  Receiving repletion    4. Pain  Currently not controlled; pt reluctant to take anything stronger than Tylenol due to concerns of constipation: bowel regimen added  Encourage use of pain medication prn: discussed with nursing.        The above exam and treatment plan were reviewed with Dr Gustavo Swift    Signed By: Lopez Gibson NP

## 2019-03-15 ENCOUNTER — ANESTHESIA EVENT (OUTPATIENT)
Dept: SURGERY | Age: 74
DRG: 330 | End: 2019-03-15
Payer: MEDICARE

## 2019-03-15 ENCOUNTER — APPOINTMENT (OUTPATIENT)
Dept: GENERAL RADIOLOGY | Age: 74
DRG: 330 | End: 2019-03-15
Attending: SURGERY
Payer: MEDICARE

## 2019-03-15 ENCOUNTER — ANESTHESIA (OUTPATIENT)
Dept: SURGERY | Age: 74
DRG: 330 | End: 2019-03-15
Payer: MEDICARE

## 2019-03-15 LAB
ABO + RH BLD: NORMAL
ANION GAP SERPL CALC-SCNC: 6 MMOL/L (ref 5–15)
BASOPHILS # BLD: 0 K/UL (ref 0–0.1)
BASOPHILS NFR BLD: 0 % (ref 0–1)
BLOOD GROUP ANTIBODIES SERPL: NORMAL
BUN SERPL-MCNC: 6 MG/DL (ref 6–20)
BUN/CREAT SERPL: 12 (ref 12–20)
CALCIUM SERPL-MCNC: 7.4 MG/DL (ref 8.5–10.1)
CHLORIDE SERPL-SCNC: 106 MMOL/L (ref 97–108)
CO2 SERPL-SCNC: 25 MMOL/L (ref 21–32)
CREAT SERPL-MCNC: 0.51 MG/DL (ref 0.55–1.02)
DIFFERENTIAL METHOD BLD: ABNORMAL
EOSINOPHIL # BLD: 0 K/UL (ref 0–0.4)
EOSINOPHIL NFR BLD: 0 % (ref 0–7)
ERYTHROCYTE [DISTWIDTH] IN BLOOD BY AUTOMATED COUNT: 13.7 % (ref 11.5–14.5)
GLUCOSE BLD STRIP.AUTO-MCNC: 124 MG/DL (ref 65–100)
GLUCOSE BLD STRIP.AUTO-MCNC: 88 MG/DL (ref 65–100)
GLUCOSE BLD STRIP.AUTO-MCNC: 89 MG/DL (ref 65–100)
GLUCOSE SERPL-MCNC: 76 MG/DL (ref 65–100)
HCT VFR BLD AUTO: 35.4 % (ref 35–47)
HGB BLD-MCNC: 11.5 G/DL (ref 11.5–16)
IMM GRANULOCYTES # BLD AUTO: 0 K/UL
IMM GRANULOCYTES NFR BLD AUTO: 0 %
LYMPHOCYTES # BLD: 1.2 K/UL (ref 0.8–3.5)
LYMPHOCYTES NFR BLD: 5 % (ref 12–49)
MCH RBC QN AUTO: 30.2 PG (ref 26–34)
MCHC RBC AUTO-ENTMCNC: 32.5 G/DL (ref 30–36.5)
MCV RBC AUTO: 92.9 FL (ref 80–99)
METAMYELOCYTES NFR BLD MANUAL: 3 %
MONOCYTES # BLD: 1.4 K/UL (ref 0–1)
MONOCYTES NFR BLD: 6 % (ref 5–13)
MYELOCYTES NFR BLD MANUAL: 2 %
NEUTS BAND NFR BLD MANUAL: 10 % (ref 0–6)
NEUTS SEG # BLD: 19.7 K/UL (ref 1.8–8)
NEUTS SEG NFR BLD: 74 % (ref 32–75)
NRBC # BLD: 0.02 K/UL (ref 0–0.01)
NRBC BLD-RTO: 0.1 PER 100 WBC
PLATELET # BLD AUTO: 224 K/UL (ref 150–400)
PMV BLD AUTO: 9.6 FL (ref 8.9–12.9)
POTASSIUM SERPL-SCNC: 4 MMOL/L (ref 3.5–5.1)
RBC # BLD AUTO: 3.81 M/UL (ref 3.8–5.2)
RBC MORPH BLD: ABNORMAL
SERVICE CMNT-IMP: ABNORMAL
SERVICE CMNT-IMP: NORMAL
SERVICE CMNT-IMP: NORMAL
SODIUM SERPL-SCNC: 137 MMOL/L (ref 136–145)
SPECIMEN EXP DATE BLD: NORMAL
WBC # BLD AUTO: 23.5 K/UL (ref 3.6–11)

## 2019-03-15 PROCEDURE — 77030026438 HC STYL ET INTUB CARD -A: Performed by: NURSE ANESTHETIST, CERTIFIED REGISTERED

## 2019-03-15 PROCEDURE — 77030034849: Performed by: SURGERY

## 2019-03-15 PROCEDURE — 74011250636 HC RX REV CODE- 250/636: Performed by: ANESTHESIOLOGY

## 2019-03-15 PROCEDURE — 74011250636 HC RX REV CODE- 250/636

## 2019-03-15 PROCEDURE — 65270000029 HC RM PRIVATE

## 2019-03-15 PROCEDURE — 74011250637 HC RX REV CODE- 250/637: Performed by: SURGERY

## 2019-03-15 PROCEDURE — 85025 COMPLETE CBC W/AUTO DIFF WBC: CPT

## 2019-03-15 PROCEDURE — 77030036998 HC STPLR CRV CUT CNTOUR J&J -F: Performed by: SURGERY

## 2019-03-15 PROCEDURE — 0D1M0Z4 BYPASS DESCENDING COLON TO CUTANEOUS, OPEN APPROACH: ICD-10-PCS | Performed by: SURGERY

## 2019-03-15 PROCEDURE — 77030009962 HC RELD STPLR CR J&J -C: Performed by: SURGERY

## 2019-03-15 PROCEDURE — 74011250637 HC RX REV CODE- 250/637: Performed by: INTERNAL MEDICINE

## 2019-03-15 PROCEDURE — 76010000131 HC OR TIME 2 TO 2.5 HR: Performed by: SURGERY

## 2019-03-15 PROCEDURE — 77030002986 HC SUT PROL J&J -A: Performed by: SURGERY

## 2019-03-15 PROCEDURE — 77030002966 HC SUT PDS J&J -A: Performed by: SURGERY

## 2019-03-15 PROCEDURE — 74011250636 HC RX REV CODE- 250/636: Performed by: SURGERY

## 2019-03-15 PROCEDURE — 77030008684 HC TU ET CUF COVD -B: Performed by: NURSE ANESTHETIST, CERTIFIED REGISTERED

## 2019-03-15 PROCEDURE — 77030031139 HC SUT VCRL2 J&J -A: Performed by: SURGERY

## 2019-03-15 PROCEDURE — 74011000250 HC RX REV CODE- 250: Performed by: SURGERY

## 2019-03-15 PROCEDURE — 74011000258 HC RX REV CODE- 258: Performed by: INTERNAL MEDICINE

## 2019-03-15 PROCEDURE — 77030011278 HC ELECTRD LIG IMPT COVD -F: Performed by: SURGERY

## 2019-03-15 PROCEDURE — 82962 GLUCOSE BLOOD TEST: CPT

## 2019-03-15 PROCEDURE — 77030018836 HC SOL IRR NACL ICUM -A: Performed by: SURGERY

## 2019-03-15 PROCEDURE — 88307 TISSUE EXAM BY PATHOLOGIST: CPT

## 2019-03-15 PROCEDURE — 77030008771 HC TU NG SALEM SUMP -A: Performed by: NURSE ANESTHETIST, CERTIFIED REGISTERED

## 2019-03-15 PROCEDURE — 77030013079 HC BLNKT BAIR HGGR 3M -A: Performed by: SURGERY

## 2019-03-15 PROCEDURE — 77030008463 HC STPLR SKN PROX J&J -B: Performed by: SURGERY

## 2019-03-15 PROCEDURE — 77030032490 HC SLV COMPR SCD KNE COVD -B: Performed by: SURGERY

## 2019-03-15 PROCEDURE — 74011000250 HC RX REV CODE- 250

## 2019-03-15 PROCEDURE — C9290 INJ, BUPIVACAINE LIPOSOME: HCPCS | Performed by: SURGERY

## 2019-03-15 PROCEDURE — 77030002933 HC SUT MCRYL J&J -A: Performed by: SURGERY

## 2019-03-15 PROCEDURE — 76210000002 HC OR PH I REC 3 TO 3.5 HR: Performed by: SURGERY

## 2019-03-15 PROCEDURE — C1765 ADHESION BARRIER: HCPCS | Performed by: SURGERY

## 2019-03-15 PROCEDURE — 76060000035 HC ANESTHESIA 2 TO 2.5 HR: Performed by: SURGERY

## 2019-03-15 PROCEDURE — 77030020782 HC GWN BAIR PAWS FLX 3M -B

## 2019-03-15 PROCEDURE — 36415 COLL VENOUS BLD VENIPUNCTURE: CPT

## 2019-03-15 PROCEDURE — 86900 BLOOD TYPING SEROLOGIC ABO: CPT

## 2019-03-15 PROCEDURE — 77030012406 HC DRN WND PENRS BARD -A: Performed by: SURGERY

## 2019-03-15 PROCEDURE — 74011250636 HC RX REV CODE- 250/636: Performed by: INTERNAL MEDICINE

## 2019-03-15 PROCEDURE — 74018 RADEX ABDOMEN 1 VIEW: CPT

## 2019-03-15 PROCEDURE — 74011000258 HC RX REV CODE- 258: Performed by: SURGERY

## 2019-03-15 PROCEDURE — 0DTN0ZZ RESECTION OF SIGMOID COLON, OPEN APPROACH: ICD-10-PCS | Performed by: SURGERY

## 2019-03-15 PROCEDURE — 80048 BASIC METABOLIC PNL TOTAL CA: CPT

## 2019-03-15 PROCEDURE — 77030010541: Performed by: SURGERY

## 2019-03-15 PROCEDURE — 77030011640 HC PAD GRND REM COVD -A: Performed by: SURGERY

## 2019-03-15 PROCEDURE — 77030032490 HC SLV COMPR SCD KNE COVD -B

## 2019-03-15 RX ORDER — ONDANSETRON 2 MG/ML
4 INJECTION INTRAMUSCULAR; INTRAVENOUS AS NEEDED
Status: DISCONTINUED | OUTPATIENT
Start: 2019-03-15 | End: 2019-03-15 | Stop reason: HOSPADM

## 2019-03-15 RX ORDER — SODIUM CHLORIDE, SODIUM LACTATE, POTASSIUM CHLORIDE, CALCIUM CHLORIDE 600; 310; 30; 20 MG/100ML; MG/100ML; MG/100ML; MG/100ML
125 INJECTION, SOLUTION INTRAVENOUS CONTINUOUS
Status: DISCONTINUED | OUTPATIENT
Start: 2019-03-15 | End: 2019-03-16

## 2019-03-15 RX ORDER — SODIUM CHLORIDE, SODIUM LACTATE, POTASSIUM CHLORIDE, CALCIUM CHLORIDE 600; 310; 30; 20 MG/100ML; MG/100ML; MG/100ML; MG/100ML
125 INJECTION, SOLUTION INTRAVENOUS CONTINUOUS
Status: DISCONTINUED | OUTPATIENT
Start: 2019-03-15 | End: 2019-03-15 | Stop reason: HOSPADM

## 2019-03-15 RX ORDER — ONDANSETRON 2 MG/ML
INJECTION INTRAMUSCULAR; INTRAVENOUS AS NEEDED
Status: DISCONTINUED | OUTPATIENT
Start: 2019-03-15 | End: 2019-03-15 | Stop reason: HOSPADM

## 2019-03-15 RX ORDER — DEXAMETHASONE SODIUM PHOSPHATE 4 MG/ML
INJECTION, SOLUTION INTRA-ARTICULAR; INTRALESIONAL; INTRAMUSCULAR; INTRAVENOUS; SOFT TISSUE AS NEEDED
Status: DISCONTINUED | OUTPATIENT
Start: 2019-03-15 | End: 2019-03-15 | Stop reason: HOSPADM

## 2019-03-15 RX ORDER — HYDROMORPHONE HYDROCHLORIDE 1 MG/ML
0.5 INJECTION, SOLUTION INTRAMUSCULAR; INTRAVENOUS; SUBCUTANEOUS
Status: DISCONTINUED | OUTPATIENT
Start: 2019-03-15 | End: 2019-03-15 | Stop reason: HOSPADM

## 2019-03-15 RX ORDER — FENTANYL CITRATE 50 UG/ML
INJECTION, SOLUTION INTRAMUSCULAR; INTRAVENOUS AS NEEDED
Status: DISCONTINUED | OUTPATIENT
Start: 2019-03-15 | End: 2019-03-15 | Stop reason: HOSPADM

## 2019-03-15 RX ORDER — ALBUTEROL SULFATE 0.83 MG/ML
2.5 SOLUTION RESPIRATORY (INHALATION) AS NEEDED
Status: DISCONTINUED | OUTPATIENT
Start: 2019-03-15 | End: 2019-03-15 | Stop reason: HOSPADM

## 2019-03-15 RX ORDER — HYDROMORPHONE HYDROCHLORIDE 2 MG/ML
INJECTION, SOLUTION INTRAMUSCULAR; INTRAVENOUS; SUBCUTANEOUS AS NEEDED
Status: DISCONTINUED | OUTPATIENT
Start: 2019-03-15 | End: 2019-03-15 | Stop reason: HOSPADM

## 2019-03-15 RX ORDER — DEXTROSE, SODIUM CHLORIDE, AND POTASSIUM CHLORIDE 5; .45; .15 G/100ML; G/100ML; G/100ML
100 INJECTION INTRAVENOUS CONTINUOUS
Status: DISCONTINUED | OUTPATIENT
Start: 2019-03-15 | End: 2019-03-18

## 2019-03-15 RX ORDER — SODIUM CHLORIDE, SODIUM LACTATE, POTASSIUM CHLORIDE, CALCIUM CHLORIDE 600; 310; 30; 20 MG/100ML; MG/100ML; MG/100ML; MG/100ML
INJECTION, SOLUTION INTRAVENOUS
Status: DISCONTINUED | OUTPATIENT
Start: 2019-03-15 | End: 2019-03-15 | Stop reason: HOSPADM

## 2019-03-15 RX ORDER — DIPHENHYDRAMINE HYDROCHLORIDE 50 MG/ML
12.5 INJECTION, SOLUTION INTRAMUSCULAR; INTRAVENOUS AS NEEDED
Status: DISCONTINUED | OUTPATIENT
Start: 2019-03-15 | End: 2019-03-15 | Stop reason: HOSPADM

## 2019-03-15 RX ORDER — MIDAZOLAM HYDROCHLORIDE 1 MG/ML
INJECTION, SOLUTION INTRAMUSCULAR; INTRAVENOUS AS NEEDED
Status: DISCONTINUED | OUTPATIENT
Start: 2019-03-15 | End: 2019-03-15 | Stop reason: HOSPADM

## 2019-03-15 RX ORDER — METOPROLOL TARTRATE 5 MG/5ML
INJECTION INTRAVENOUS AS NEEDED
Status: DISCONTINUED | OUTPATIENT
Start: 2019-03-15 | End: 2019-03-15 | Stop reason: HOSPADM

## 2019-03-15 RX ORDER — ROCURONIUM BROMIDE 10 MG/ML
INJECTION, SOLUTION INTRAVENOUS AS NEEDED
Status: DISCONTINUED | OUTPATIENT
Start: 2019-03-15 | End: 2019-03-15 | Stop reason: HOSPADM

## 2019-03-15 RX ORDER — LORAZEPAM 2 MG/ML
1 INJECTION INTRAMUSCULAR
Status: DISCONTINUED | OUTPATIENT
Start: 2019-03-15 | End: 2019-03-15 | Stop reason: HOSPADM

## 2019-03-15 RX ORDER — SUCCINYLCHOLINE CHLORIDE 20 MG/ML
INJECTION INTRAMUSCULAR; INTRAVENOUS AS NEEDED
Status: DISCONTINUED | OUTPATIENT
Start: 2019-03-15 | End: 2019-03-15 | Stop reason: HOSPADM

## 2019-03-15 RX ORDER — PROPOFOL 10 MG/ML
INJECTION, EMULSION INTRAVENOUS AS NEEDED
Status: DISCONTINUED | OUTPATIENT
Start: 2019-03-15 | End: 2019-03-15 | Stop reason: HOSPADM

## 2019-03-15 RX ORDER — VANCOMYCIN 2 GRAM/500 ML IN 0.9 % SODIUM CHLORIDE INTRAVENOUS
AS NEEDED
Status: DISCONTINUED | OUTPATIENT
Start: 2019-03-15 | End: 2019-03-15 | Stop reason: HOSPADM

## 2019-03-15 RX ADMIN — PRAVASTATIN SODIUM 40 MG: 20 TABLET ORAL at 21:42

## 2019-03-15 RX ADMIN — Medication 10 ML: at 06:07

## 2019-03-15 RX ADMIN — HYDROMORPHONE HYDROCHLORIDE 0.5 MG: 1 INJECTION, SOLUTION INTRAMUSCULAR; INTRAVENOUS; SUBCUTANEOUS at 17:33

## 2019-03-15 RX ADMIN — FENTANYL CITRATE 50 MCG: 50 INJECTION, SOLUTION INTRAMUSCULAR; INTRAVENOUS at 15:43

## 2019-03-15 RX ADMIN — ROCURONIUM BROMIDE 20 MG: 10 INJECTION, SOLUTION INTRAVENOUS at 16:04

## 2019-03-15 RX ADMIN — DEXAMETHASONE SODIUM PHOSPHATE 4 MG: 4 INJECTION, SOLUTION INTRA-ARTICULAR; INTRALESIONAL; INTRAMUSCULAR; INTRAVENOUS; SOFT TISSUE at 15:19

## 2019-03-15 RX ADMIN — METOPROLOL TARTRATE 2 MG: 5 INJECTION INTRAVENOUS at 16:48

## 2019-03-15 RX ADMIN — HYDROMORPHONE HYDROCHLORIDE 0.5 MG: 2 INJECTION, SOLUTION INTRAMUSCULAR; INTRAVENOUS; SUBCUTANEOUS at 16:09

## 2019-03-15 RX ADMIN — FENTANYL CITRATE 50 MCG: 50 INJECTION, SOLUTION INTRAMUSCULAR; INTRAVENOUS at 14:57

## 2019-03-15 RX ADMIN — SODIUM CHLORIDE, SODIUM LACTATE, POTASSIUM CHLORIDE, AND CALCIUM CHLORIDE 125 ML/HR: 600; 310; 30; 20 INJECTION, SOLUTION INTRAVENOUS at 14:49

## 2019-03-15 RX ADMIN — VENLAFAXINE 50 MG: 25 TABLET ORAL at 09:41

## 2019-03-15 RX ADMIN — DEXTROSE MONOHYDRATE, SODIUM CHLORIDE, AND POTASSIUM CHLORIDE 100 ML/HR: 50; 4.5; 1.49 INJECTION, SOLUTION INTRAVENOUS at 17:45

## 2019-03-15 RX ADMIN — PROPOFOL 100 MG: 10 INJECTION, EMULSION INTRAVENOUS at 15:06

## 2019-03-15 RX ADMIN — FENTANYL CITRATE 50 MCG: 50 INJECTION, SOLUTION INTRAMUSCULAR; INTRAVENOUS at 15:48

## 2019-03-15 RX ADMIN — MEROPENEM 500 MG: 500 INJECTION, POWDER, FOR SOLUTION INTRAVENOUS at 15:35

## 2019-03-15 RX ADMIN — VANCOMYCIN 2 GRAM/500 ML IN 0.9 % SODIUM CHLORIDE INTRAVENOUS 2 G: at 16:03

## 2019-03-15 RX ADMIN — ROCURONIUM BROMIDE 35 MG: 10 INJECTION, SOLUTION INTRAVENOUS at 15:10

## 2019-03-15 RX ADMIN — FENTANYL CITRATE 100 MCG: 50 INJECTION, SOLUTION INTRAMUSCULAR; INTRAVENOUS at 15:06

## 2019-03-15 RX ADMIN — ROCURONIUM BROMIDE 5 MG: 10 INJECTION, SOLUTION INTRAVENOUS at 15:06

## 2019-03-15 RX ADMIN — ROCURONIUM BROMIDE 10 MG: 10 INJECTION, SOLUTION INTRAVENOUS at 15:35

## 2019-03-15 RX ADMIN — MIDAZOLAM HYDROCHLORIDE 1 MG: 1 INJECTION, SOLUTION INTRAMUSCULAR; INTRAVENOUS at 14:57

## 2019-03-15 RX ADMIN — HYDROCODONE BITARTRATE AND ACETAMINOPHEN 1 TABLET: 5; 325 TABLET ORAL at 06:02

## 2019-03-15 RX ADMIN — HYDROCODONE BITARTRATE AND ACETAMINOPHEN 1 TABLET: 5; 325 TABLET ORAL at 02:25

## 2019-03-15 RX ADMIN — SODIUM CHLORIDE, SODIUM LACTATE, POTASSIUM CHLORIDE, CALCIUM CHLORIDE: 600; 310; 30; 20 INJECTION, SOLUTION INTRAVENOUS at 14:30

## 2019-03-15 RX ADMIN — METRONIDAZOLE 500 MG: 500 INJECTION, SOLUTION INTRAVENOUS at 21:43

## 2019-03-15 RX ADMIN — ONDANSETRON 4 MG: 2 INJECTION INTRAMUSCULAR; INTRAVENOUS at 16:55

## 2019-03-15 RX ADMIN — SUCCINYLCHOLINE CHLORIDE 100 MG: 20 INJECTION INTRAMUSCULAR; INTRAVENOUS at 15:06

## 2019-03-15 RX ADMIN — METRONIDAZOLE 500 MG: 500 INJECTION, SOLUTION INTRAVENOUS at 09:43

## 2019-03-15 RX ADMIN — MEROPENEM 500 MG: 500 INJECTION, POWDER, FOR SOLUTION INTRAVENOUS at 22:46

## 2019-03-15 RX ADMIN — SODIUM CHLORIDE AND POTASSIUM CHLORIDE: 9; 2.98 INJECTION, SOLUTION INTRAVENOUS at 05:35

## 2019-03-15 RX ADMIN — AZTREONAM 1 G: 1 INJECTION, POWDER, LYOPHILIZED, FOR SOLUTION INTRAMUSCULAR; INTRAVENOUS at 06:02

## 2019-03-15 RX ADMIN — LORAZEPAM 1 MG: 2 INJECTION INTRAMUSCULAR; INTRAVENOUS at 17:33

## 2019-03-15 RX ADMIN — ENOXAPARIN SODIUM 40 MG: 40 INJECTION SUBCUTANEOUS at 22:46

## 2019-03-15 NOTE — PROGRESS NOTES
Problem: Falls - Risk of  Goal: *Absence of Falls  Document David Fall Risk and appropriate interventions in the flowsheet.   Outcome: Progressing Towards Goal  Fall Risk Interventions:            Medication Interventions: Teach patient to arise slowly

## 2019-03-15 NOTE — PROGRESS NOTES
Nutrition Assessment:    RECOMMENDATIONS/INTERVENTION(S):   1. Recommend advancing diet order to GI lite as medically appropriate. 2. Will monitor plan of care, GI status, diet order for advancement and pt's tolerance/intakes. ASSESSMENT:   3/15: 73 yo female admitted for diverticulitis. Pt was c/o abd pain and nausea upon admission. Found to have sigmoid diverticulitis with abscess. RD assessment for LOS. PMhx: Breast CA (dx one month ago) s/p one round of chemo, GERD, HTN, hypercholesteremia. Weight WNL per BMI per advanced age. Pt off floor at time of visit in OR for sigmoid colectomy with colostomy. Unable to speak with pt to obtain weight hx or nutrition hx PTA. Prior to today pt was on clear liquid diet with noted poor intakes. Labs and meds reviewed. NaCl with 40mEqKcl ordered at 100ml/hr. Diet Order: NPO  % Eaten:  No data found. Pertinent Medications: [x] Reviewed    Labs: [x] Reviewed    Anthropometrics: Height: 5' 6\" (167.6 cm) Weight: 80.7 kg (178 lb)    IBW (%IBW):   ( ) UBW (%UBW):   (  %)      BMI: Body mass index is 28.73 kg/m². This BMI is indicative of:   [] Underweight    [x] Normal  - per age  [] Overweight    []  Obesity    []  Extreme Obesity (BMI>40)  Estimated Nutrition Needs (Based on): 1701 Kcals/day(BMR 1309 x AF 1.3) , 81 g(81-97gm (1-1.2gm/kg/d)) Protein  Carbohydrate: At Least 130 g/day  Fluids: 1701 mL/day (1 ml/kcal)    Last BM: 3/14   [x]Active     []Hyperactive  []Hypoactive       [] Absent   BS  Skin:    [x] Intact   [] Incision  [] Breakdown   [] DTI   [] Tears/Excoriation/Abrasion  []Edema [] Other:    Wt Readings from Last 30 Encounters:   03/12/19 80.7 kg (178 lb)   03/12/19 80.7 kg (178 lb)   03/08/19 82.6 kg (182 lb)   03/06/19 82.7 kg (182 lb 6.4 oz)   03/06/19 82.7 kg (182 lb 6.4 oz)   03/05/19 87.1 kg (192 lb)   03/01/19 87.1 kg (192 lb 0.3 oz)   02/28/19 83.3 kg (183 lb 9 oz)   02/25/19 83.5 kg (184 lb)   02/21/19 82.6 kg (182 lb) NUTRITION DIAGNOSES:   Problem:  Increased nutrient needs     Etiology: related to increased demand for kcals/pro      Signs/Symptoms: as evidenced by condition associated with increased needs: CA with current chemo       NUTRITION INTERVENTIONS:  Meals/Snacks: General/healthful diet                  GOAL:   Advance PO diet to GI lite with intakes > 50% within next 2-4 days    Cultural, Samaritan, or Ethnic Dietary Needs: None     EDUCATION & DISCHARGE NEEDS:    [x] None Identified   [] Identified and Education Provided/Documented   [] Identified and Pt declined/was not appropriate      [x] Interdisciplinary Care Plan Reviewed/Documented    [x] Discharge Needs:    Likely need to follow low fiber diet at discharge with possible ONS   [] No Nutrition Related Discharge Needs    NUTRITION RISK:   Pt Is At Nutrition Risk  [x]     No Nutrition Risk Identified  []       PT SEEN FOR:    []  MD Consult: []Calorie Count      []Diabetic Diet Education        []Diet Education     []Electrolyte Management     []General Nutrition Management and Supplements     []Management of Tube Feeding     []TPN Recommendations    []  RN Referral:  []MST score >=2     []Enteral/Parenteral Nutrition PTA     []Pregnant: Gestational DM or Multigestation                 [] Pressure Ulcer    []  Low BMI      [x]  Length of Stay       [] Dysphagia Diet         [] Ventilator  []  Follow-up     Previous Recommendations:   [] Implemented          [] Not Implemented          [x] Not Applicable    Previous Goal:   [] Met              [] Progressing Towards Goal              [] Not Progressing Towards Goal   [x] Not Applicable            Ale Jha, [address]  Pager [phone]  Phone [phone]

## 2019-03-15 NOTE — ANESTHESIA PREPROCEDURE EVALUATION
Anesthetic History   No history of anesthetic complications            Review of Systems / Medical History  Patient summary reviewed, nursing notes reviewed and pertinent labs reviewed    Pulmonary  Within defined limits                 Neuro/Psych         Psychiatric history     Cardiovascular    Hypertension              Exercise tolerance: >4 METS     GI/Hepatic/Renal     GERD          Comments: ? Intestinal obstruction Endo/Other        Cancer    Comments: Breast Cancer treated by lumpectomy & chemo.  Other Findings              Physical Exam    Airway  Mallampati: II    Neck ROM: normal range of motion   Mouth opening: Normal     Cardiovascular    Rhythm: regular  Rate: normal         Dental  No notable dental hx       Pulmonary  Breath sounds clear to auscultation               Abdominal    Distended     Other Findings            Anesthetic Plan    ASA: 3, emergent  Anesthesia type: general          Induction: Intravenous  Anesthetic plan and risks discussed with: Patient

## 2019-03-15 NOTE — PERIOP NOTES
Patient is awake and alert  Communicating well, she however is insisting that she needs to get out of bed to pee, patient reassured on numerous occasions but still insists that she needs to get out of bed to pee,Medardo Cardenas informed of patient status, urine out put since admission to pacu is satisfactory 275 mls, I was advised  By Medardo Cardenas to discontinue valenzuela.

## 2019-03-15 NOTE — PROGRESS NOTES
I was contacted by Dr Estephania Morelos and he informed me that patient was having a lot of pain and he felt that she was failing medical management  He thought it would be reasonable to consider surgery. He also informed me that patient was also keen on getting something done because she was not getting better. I went back to see her and discussed her progress and my discussion with Dr Estephania Morelos.    She also informed me that she should like to go ahead with the surgery even if it meant that she will have a colostomy  I discussed the procedure again with her including the risks and complications and the colostomy  She is agreeable and I have scheduled this for later today

## 2019-03-15 NOTE — PROGRESS NOTES
[facility] Dosing Services: Antimicrobial Stewardship Progress Note    Consult for antibiotic dosing of Vancomycin, Meropenem and Metronidazole by Dr. Nellie Ayala. Pharmacist reviewed antibiotic appropriateness for 74year old , female  for indication of abdominal abscesses. Day of Therapy: 1  (Previously received Levofloxacin and Aztreonam 3/12 PM through 3/15)    Plan:  Vancomycin therapy:  Start Vancomycin therapy, with loading dose of 2,000 mg IV at 1600 on 3/15/2019. Follow with maintenance dose of 1,250 mg IV at 0400 on 3/16/2019, every 12 hours. Dose calculated to approximate a therapeutic trough of 15-20 mcg/mL. Pharmacist will follow daily and will make changes to dose and/or frequency based on clinical status. Date Dose & Interval Measured (mcg/mL) Extrapolated (mcg/mL)   ? ? ? ?   ? ? ? ?   ? ? ? ? Non-Kinetic Antimicrobial Dosing:   Assessment/Plan: CrCl greater than 100 mL/min. Begin Meropenem 500 mg IV every 6 hours    Non-Kinetic Antimicrobial Dosing (Metronidazole):   3/13/2019: ALT: 20      AST: 18    Alk Phos: 69     T Bili: 0.5  Assessment/Plan: Begin Metronidazole 500 mg IV every 12 hours    Other Antimicrobial  (not dosed by pharmacist)   none   Cultures      3/13/2019 Abscess: heavy E coli - ESBL  (sens to Meropenem); possible second gram negative nate; heavy possible enterococcus species (pending)   3/13/2019 Abscess for anaerobes: possible mixed anaerobic gram negative rods (pending)   Serum Creatinine     Lab Results   Component Value Date/Time    Creatinine 0.51 (L) 03/15/2019 05:18 AM       Creatinine Clearance Estimated Creatinine Clearance: 102.2 mL/min (A) (based on SCr of 0.51 mg/dL (L)).      Temp   98 °F (36.7 °C)    WBC   Lab Results   Component Value Date/Time    WBC 23.5 (H) 03/15/2019 05:18 AM       H/H   Lab Results   Component Value Date/Time    HGB 11.5 03/15/2019 05:18 AM        Platelets   Lab Results   Component Value Date/Time    PLATELET 949 03/15/2019 05:18 AM      Pharmacist: Tanya Linn

## 2019-03-15 NOTE — BRIEF OP NOTE
BRIEF OPERATIVE NOTE    Date of Procedure: 3/15/2019   Preoperative Diagnosis: PERFORATED SIGMOID DIVERTICULITIS  Postoperative Diagnosis: PERFORATED SIGMOID DIVERTICULITIS    Procedure(s):  LAPAROTOMY EXPLORATORY SIGMOID COLECTOMY AND COLOSTOMY- HARTMANS PROCEDURE  Surgeon(s) and Role:     * Farideh Srivastava MD - Primary         Surgical Assistant: [facility]    Surgical Staff:  Circ-1: Sunita Tomas RN  Circ-Relief: Keiry Upton RN  Scrub Tech-1: Speedy Asencio  Surg Asst-1: Halima Moreau Staff: Dwayne Cruz RN; Zo LUONG  Event Time In Time Out   Incision Start 03/15/2019 1538    Incision Close 03/15/2019 1652      Anesthesia: General   Estimated Blood Loss: 25  Specimens:   ID Type Source Tests Collected by Time Destination   1 : sigmoid colon Preservative Sigmoid  Farideh Srivastava MD 3/15/2019 1600 Pathology      Findings: There was localized perforation of the sigmoid colon with an abscess deep in the pelvis - where the drain was located. There was marked thickening of the sigmoid colon adjacent to the perforation.  No fecal drainage and no generalized peritonitis    Complications: none   Implants: * No implants in log *

## 2019-03-15 NOTE — PROGRESS NOTES
Order received to discontinue ACHS blood sugars. There is no order for blood sugar checks. Patient has been WNL for every check. Verified with attending team: patient has no diabetic history and there is no need to continue to check blood sugars.

## 2019-03-15 NOTE — PROGRESS NOTES
Bedside and Verbal shift change report given to Miguel Rainey RN (oncoming nurse) by Jared Kendall RN (offgoing nurse). Report included the following information SBAR, Kardex, ED Summary, Procedure Summary, Intake/Output, MAR and Recent Results.

## 2019-03-15 NOTE — PROGRESS NOTES
General Surgery Daily Progress Note    Patient: King Ortega MRN: [id number]  SSN: [id number]    YOB: 1945  Age: 74 y.o. Sex: female      Admit Date: 3/12/2019    Subjective:   Feels about the same, pain improved with pain medication. No N/V. Tolerating clear liquids. Current Facility-Administered Medications   Medication Dose Route Frequency    senna-docusate (PERICOLACE) 8.6-50 mg per tablet 1 Tab  1 Tab Oral BID    polyethylene glycol (MIRALAX) packet 17 g  17 g Oral DAILY PRN    sodium chloride (NS) flush 5-40 mL  5-40 mL IntraVENous Q8H    acetaminophen (TYLENOL) tablet 650 mg  650 mg Oral Q6H PRN    sodium chloride (NS) flush 5-40 mL  5-40 mL IntraVENous Q8H    sodium chloride (NS) flush 5-40 mL  5-40 mL IntraVENous PRN    naloxone (NARCAN) injection 0.4 mg  0.4 mg IntraVENous PRN    HYDROcodone-acetaminophen (NORCO) 5-325 mg per tablet 1 Tab  1 Tab Oral Q4H PRN    HYDROmorphone (PF) (DILAUDID) injection 0.5 mg  0.5 mg IntraVENous Q4H PRN    ondansetron (ZOFRAN) injection 4 mg  4 mg IntraVENous Q4H PRN    enoxaparin (LOVENOX) injection 40 mg  40 mg SubCUTAneous Q24H    pravastatin (PRAVACHOL) tablet 40 mg  40 mg Oral QHS    venlafaxine (EFFEXOR) tablet 50 mg  50 mg Oral DAILY    levoFLOXacin (LEVAQUIN) 750 mg in D5W IVPB  750 mg IntraVENous Q24H    metroNIDAZOLE (FLAGYL) IVPB premix 500 mg  500 mg IntraVENous Q12H    0.9% sodium chloride with KCl 40 mEq/L infusion   IntraVENous CONTINUOUS    aztreonam (AZACTAM) 1 g in 0.9% sodium chloride (MBP/ADV) 100 mL  1 g IntraVENous Q8H    albuterol (PROVENTIL VENTOLIN) nebulizer solution 2.5 mg  2.5 mg Nebulization Q6H PRN        Objective:   No intake/output data recorded.   03/13 1901 - 03/15 0700  In: 14   Out: 40 [Drains:40]  Patient Vitals for the past 8 hrs:   BP Temp Pulse Resp SpO2   03/15/19 0703 104/64 98.1 °F (36.7 °C) 75 18 94 %   03/15/19 0333 128/69 98.2 °F (36.8 °C) 84 18 96 %   03/15/19 0223 150/69 97.5 °F (36.4 °C) 87 18 96 %       Physical Exam:  General: Alert, cooperative, uncomfortable  Lungs: Unlabored  Heart:  Regular rate and  rhythm  Abdomen: Soft, mild lower abdominal tenderness, no peritonitis, no guarding. IR drain in place- drainage no feculent   Extremities: Warm, moves all, no edema  Skin:  Warm and dry, no rash    Labs:   Recent Labs     03/15/19  0518   WBC 23.5*   HGB 11.5   HCT 35.4        Recent Labs     03/15/19  0518  03/13/19  0506      < > 133*   K 4.0   < > 3.0*      < > 95*   CO2 25   < > 36*   GLU 76   < > 104*   BUN 6   < > 15   CREA 0.51*   < > 0.75   CA 7.4*   < > 8.1*   MG  --   --  2.5*   PHOS  --   --  1.3*   ALB  --   --  2.3*   TBILI  --   --  0.5   SGOT  --   --  18   ALT  --   --  20    < > = values in this interval not displayed.        Assessment / Plan:   · Sigmoid diverticulitis w/ 3 abscesses s/p IR drainage of 1 collection   · Breast CA  · Continued pain- no better or worse  · Mild lower abdominal tenderness  · Leukocytosis today   · Continue ABX  · Fluid cultures pending- prelim heavy gram neg rods  · Low threshold for surgery should she worsen or fail to improve

## 2019-03-15 NOTE — PROGRESS NOTES
Daily Progress Note: 3/15/2019  Shraddha Gallego MD    Assessment/Plan:   Diverticulitis: failing antibiotics. Now with abscess evident on CT a/p. General surgery to see. ---Keep NPO   ---HOLDING DVT chemoprophylaxis and ASA. --- IV Levaquin, Flagyl, and aztreonam   ---Drain placed 3/13 by IR  ---WBC up to 23 today    Hyponatremia / Hypokalemia:   ---suspect from IVVD and thiazide diuretic use.   ---HOLDING HCTZ.   ---Start IVF with KCl.   ---Follow BMP     Hypertension: controlled.   ---Hold home antihypertensives for now     High cholesterol:   ---continue statin     Depression: continue Cymbalta     GERD (gastroesophageal reflux disease):   ---PPI PRN     Cancer: left breast. Left central IDC, gr 2, ER +, OR +, HER 2 positive:  cT2 cN1a cM0, stage IIB, prognostic stage IB.   ---Followed by Dr. Rosie Shelton.   ---Outpatient follow up     Wheeze: mild. Smoker. Advise smoking cessation. Albuterol PRN     Adverse effect of anesthesia: Overview: \"difficulty waking up from anesthethia\".  Will need to monitor closely in PACU as appropriate                     Problem List:  Problem List as of 3/15/2019 Date Reviewed: 3/12/2019          Codes Class Noted - Resolved    Hypertension ICD-10-CM: I10  ICD-9-CM: 401.9  3/12/2019 - Present        High cholesterol ICD-10-CM: E78.00  ICD-9-CM: 272.0  3/12/2019 - Present        GERD (gastroesophageal reflux disease) ICD-10-CM: K21.9  ICD-9-CM: 530.81  3/12/2019 - Present        Depression ICD-10-CM: F32.9  ICD-9-CM: 905  3/12/2019 - Present        Adverse effect of anesthesia ICD-10-CM: T41.45XA  ICD-9-CM: 995.22  3/12/2019 - Present    Overview Signed 3/12/2019  7:39 PM by Ashwini Paula MD     \"difficulty waking up from anesthethia\"             Diverticulitis ICD-10-CM: M82.64  ICD-9-CM: 562.11  3/12/2019 - Present        Hyponatremia ICD-10-CM: E87.1  ICD-9-CM: 276.1  3/12/2019 - Present        Hypokalemia ICD-10-CM: E87.6  ICD-9-CM: 276.8  3/12/2019 - Present Abscess ICD-10-CM: L02.91  ICD-9-CM: 682.9  3/12/2019 - Present        Breast cancer, left ([address] ICD-10-CM: C50.912  ICD-9-CM: 174.9  2019 - Present    Overview Addendum 3/12/2019  4:58 PM by Flo Kiser MD     2019 LEFT invasive ductal carcinoma, grade 2, %. CO 20%, Her 2 positive  Positive axillary node, %. CO neg, Her 2 positive  TCH-P               Cancer [facility]) ICD-10-CM: C80.1  ICD-9-CM: 199.1  2019 - Present    Overview Signed 3/12/2019  7:39 PM by Brendan Billingsley MD     left breast                   HPI:   Ms. Mitch Austin is a 74 y.o.  female who is admitted to the Gen Surg Service for diverticulitis with abscess. We are asked to evaluate for medical mgmt. Ms. Mitch Austin is complaining of abdominal pain. Ongoing for about a week now. Seen in ED a few days ago, sent home on oral abx (levaquin/Flagyl) however did not improve. (Dr Jovany Nina)    3/13: Feeling a little better. Abd feels less tight. No BM for about 6 days but has had little po. K+ low so will replete. 3/14: Feeling pretty miserable this am. She is c/o increased pressure in the LLQ. K+ improved but still low so will replete further. Afebrile. 3/15:  Less pain today but WBC is up to 23. No fever. Abd soft but tender. Drain patent. K+ normal.     Review of Systems:   A comprehensive review of systems was negative except for that written in the HPI. Objective:   Physical Exam:     Visit Vitals  /64 (BP 1 Location: Right arm, BP Patient Position: At rest)   Pulse 75   Temp 98.1 °F (36.7 °C)   Resp 18   Ht 5' 6\" (1.676 m)   Wt 80.7 kg (178 lb)   SpO2 94%   Breastfeeding? No   BMI 28.73 kg/m²    O2 Flow Rate (L/min): 2 l/min O2 Device: Room air    Temp (24hrs), Av.1 °F (36.7 °C), Min:97.5 °F (36.4 °C), Max:98.3 °F (36.8 °C)    No intake/output data recorded. 1 - 03/15 0700  In: 14   Out: 40 [Drains:40]    General:  Alert, cooperative, appears uncomfortable, appears stated age.    Head: Normocephalic, without obvious abnormality, atraumatic. Eyes:  Conjunctivae/corneas clear. PERRL, EOMs intact. Nose: Nares normal. Septum midline. Mucosa normal. No drainage or sinus tenderness. Throat: Lips, mucosa, and tongue normal. Teeth and gums normal.   Neck: Supple, symmetrical, trachea midline, no adenopathy, thyroid: no enlargement/tenderness/nodules, no carotid bruit and no JVD. Back:   Symmetric, no curvature. ROM normal. No CVA tenderness. Lungs:   Clear to auscultation bilaterally. Chest wall:  No tenderness or deformity. Heart:  Regular rate and rhythm, S1, S2 normal, no murmur, click, rub or gallop. Abdomen:   Soft, tender LLQ, no rebound or guarding. Bowel sounds present. No masses,  No organomegaly. Extremities: Extremities normal, atraumatic, no cyanosis or edema. No calf tenderness or cords. Pulses: 2+ and symmetric all extremities. Skin: Skin color, texture, turgor normal. No rashes or lesions   Neurologic: CNII-XII intact. Alert and oriented X 3. Fine motor of hands and fingers normal.   equal.  No cogwheeling or rigidity. Gait not tested at this time. Sensation grossly normal to touch. Gross motor of extremities normal.       Data Review:       Recent Days:  Recent Labs     03/15/19  0518 03/14/19  0310 03/13/19  0506   WBC 23.5* 10.8 3.5*   HGB 11.5 11.8 12.7   HCT 35.4 34.9* 38.2    222 214     Recent Labs     03/15/19  0518 03/14/19  0310 03/13/19  0506 03/12/19  1652    137 133* 127*   K 4.0 3.4* 3.0* 2.7*    100 95* 87*   CO2 25 31 36* 33*   GLU 76 91 104* 111*   BUN 6 11 15 19   CREA 0.51* 0.59 0.75 0.91   CA 7.4* 7.7* 8.1* 8.6   MG  --   --  2.5* 2.5*   PHOS  --   --  1.3*  --    ALB  --   --  2.3* 2.6*   TBILI  --   --  0.5 0.8   SGOT  --   --  18 21   ALT  --   --  20 25     No results for input(s): PH, PCO2, PO2, HCO3, FIO2 in the last 72 hours.     24 Hour Results:  Recent Results (from the past 24 hour(s))   GLUCOSE, POC Collection Time: 03/14/19 10:54 AM   Result Value Ref Range    Glucose (POC) 97 65 - 100 mg/dL    Performed by [facility]    GLUCOSE, POC    Collection Time: 03/14/19  4:55 PM   Result Value Ref Range    Glucose (POC) 89 65 - 100 mg/dL    Performed by Jimena Persaud (PCT)    GLUCOSE, POC    Collection Time: 03/14/19  9:21 PM   Result Value Ref Range    Glucose (POC) 89 65 - 100 mg/dL    Performed by Jimena Persaud (PCT)    CBC WITH AUTOMATED DIFF    Collection Time: 03/15/19  5:18 AM   Result Value Ref Range    WBC 23.5 (H) 3.6 - 11.0 K/uL    RBC 3.81 3.80 - 5.20 M/uL    HGB 11.5 11.5 - 16.0 g/dL    HCT 35.4 35.0 - 47.0 %    MCV 92.9 80.0 - 99.0 FL    MCH 30.2 26.0 - 34.0 PG    MCHC 32.5 30.0 - 36.5 g/dL    RDW 13.7 11.5 - 14.5 %    PLATELET 739 488 - 388 K/uL    MPV 9.6 8.9 - 12.9 FL    NRBC 0.1 (H) 0  WBC    ABSOLUTE NRBC 0.02 (H) 0.00 - 0.01 K/uL    NEUTROPHILS 74 32 - 75 %    BAND NEUTROPHILS 10 (H) 0 - 6 %    LYMPHOCYTES 5 (L) 12 - 49 %    MONOCYTES 6 5 - 13 %    EOSINOPHILS 0 0 - 7 %    BASOPHILS 0 0 - 1 %    METAMYELOCYTES 3 (H) 0 %    MYELOCYTES 2 (H) 0 %    IMMATURE GRANULOCYTES 0 %    ABS. NEUTROPHILS 19.7 (H) 1.8 - 8.0 K/UL    ABS. LYMPHOCYTES 1.2 0.8 - 3.5 K/UL    ABS. MONOCYTES 1.4 (H) 0.0 - 1.0 K/UL    ABS. EOSINOPHILS 0.0 0.0 - 0.4 K/UL    ABS. BASOPHILS 0.0 0.0 - 0.1 K/UL    ABS. IMM.  GRANS. 0.0 K/UL    DF MANUAL      RBC COMMENTS NORMOCYTIC, NORMOCHROMIC     METABOLIC PANEL, BASIC    Collection Time: 03/15/19  5:18 AM   Result Value Ref Range    Sodium 137 136 - 145 mmol/L    Potassium 4.0 3.5 - 5.1 mmol/L    Chloride 106 97 - 108 mmol/L    CO2 25 21 - 32 mmol/L    Anion gap 6 5 - 15 mmol/L    Glucose 76 65 - 100 mg/dL    BUN 6 6 - 20 MG/DL    Creatinine 0.51 (L) 0.55 - 1.02 MG/DL    BUN/Creatinine ratio 12 12 - 20      GFR est AA >60 >60 ml/min/1.73m2    GFR est non-AA >60 >60 ml/min/1.73m2    Calcium 7.4 (L) 8.5 - 10.1 MG/DL   GLUCOSE, POC    Collection Time: 03/15/19  6:54 AM Result Value Ref Range    Glucose (POC) 88 65 - 100 mg/dL    Performed by Jhon Hankins (PCT)        Medications reviewed  Current Facility-Administered Medications   Medication Dose Route Frequency    senna-docusate (PERICOLACE) 8.6-50 mg per tablet 1 Tab  1 Tab Oral BID    polyethylene glycol (MIRALAX) packet 17 g  17 g Oral DAILY PRN    sodium chloride (NS) flush 5-40 mL  5-40 mL IntraVENous Q8H    acetaminophen (TYLENOL) tablet 650 mg  650 mg Oral Q6H PRN    sodium chloride (NS) flush 5-40 mL  5-40 mL IntraVENous Q8H    sodium chloride (NS) flush 5-40 mL  5-40 mL IntraVENous PRN    naloxone (NARCAN) injection 0.4 mg  0.4 mg IntraVENous PRN    HYDROcodone-acetaminophen (NORCO) 5-325 mg per tablet 1 Tab  1 Tab Oral Q4H PRN    HYDROmorphone (PF) (DILAUDID) injection 0.5 mg  0.5 mg IntraVENous Q4H PRN    ondansetron (ZOFRAN) injection 4 mg  4 mg IntraVENous Q4H PRN    enoxaparin (LOVENOX) injection 40 mg  40 mg SubCUTAneous Q24H    pravastatin (PRAVACHOL) tablet 40 mg  40 mg Oral QHS    venlafaxine (EFFEXOR) tablet 50 mg  50 mg Oral DAILY    levoFLOXacin (LEVAQUIN) 750 mg in D5W IVPB  750 mg IntraVENous Q24H    metroNIDAZOLE (FLAGYL) IVPB premix 500 mg  500 mg IntraVENous Q12H    0.9% sodium chloride with KCl 40 mEq/L infusion   IntraVENous CONTINUOUS    aztreonam (AZACTAM) 1 g in 0.9% sodium chloride (MBP/ADV) 100 mL  1 g IntraVENous Q8H    albuterol (PROVENTIL VENTOLIN) nebulizer solution 2.5 mg  2.5 mg Nebulization Q6H PRN       Care Plan discussed with: Patient/Family    Total time spent with patient and review of records: 30 minutes.     Tristan Barfield MD

## 2019-03-15 NOTE — PROGRESS NOTES
[facility] [place] [address]  [address] [facility], [address] [facility] [address] [facility]  Juan F [place]: [phone]  F: [phone]      Reason for Visit:   Giana Merino is a 74 y.o. female who is seen in consultation at the request of Dr. Tali Boyd for continuity of care. Hematology / Oncology Treatment History:   · 2/15/19 Left core bx:   IDC, gr 2, 1.1 cm, ER + at 100%, IL + at 20%, HER 2 POSITIVE (IHC 2+; FISH ratio 3.9; sig/cell 9.2)  · 3/1/19 Left ax LN core bx:  + for breast cancer, ER + at 100%, IL negative, HER 2 POSITIVE (IHC 2+, FISH ratio 2; sig/cell 5.8)  · TCH-P 3/6/19-      History of Present Illness:     Seen in the ED on 3/8/2019 for abd pain and constipation. ED CT  Sowed diverticulitis; was initiated on levaquin and flagyl. Seen  In our clinic yesterday with worsening abd pain; therefore was directed to ED for further eval. ED CT scan shows acute diverticulitis with abscesses. Admitted for further eval and management. Ms Jenifer Felder feeling better this morning. Reports abd \"discomfort/ bloating/pressure\". Rates pain 6/10. Does not want hydrocodone but will try some Tylenol. Had some loose stool this am. Denies any SOB. Denies N/V at present. Sister at bedside. Interval History:   Feels worse to no change today, purulent drainage from drain. WBC increasing     No family at bedside.        Past Medical History:   Diagnosis Date    Adverse effect of anesthesia     \"difficulty waking up from anesthethia\"    Cancer ([facility] [address] 02/2019    left breast    Depression     GERD (gastroesophageal reflux disease)     High cholesterol     Hypertension       Past Surgical History:   Procedure Laterality Date    HX BREAST BIOPSY Right years ago    neg; needle bx    HX CATARACT REMOVAL      HX COLONOSCOPY      HX ORTHOPAEDIC      carpal tunnel      Social History     Tobacco Use    Smoking status: Current Every Day Smoker     Packs/day: 0.50     Years: 55.00     Pack years: 27.50    Smokeless tobacco: Never Used   Substance Use Topics    Alcohol use:  Yes     Alcohol/week: 0.6 oz     Types: 1 Shots of liquor per week     Drinks per session: 5 or 6     Binge frequency: Weekly     Comment: Patient stated that she puts a splash of bourbon in her afternoon coffee around 5-6 days per week      Family History   Problem Relation Age of Onset    Breast Cancer Maternal Aunt     Hypertension Mother     Hypertension Father     Cancer Father         Lung    Cancer Brother         leukemia    Diabetes Brother         2 brothers with diabetes    Hypertension Sister         2 sisters and 4 brothers with HTN     Current Facility-Administered Medications   Medication Dose Route Frequency    senna-docusate (PERICOLACE) 8.6-50 mg per tablet 1 Tab  1 Tab Oral BID    polyethylene glycol (MIRALAX) packet 17 g  17 g Oral DAILY PRN    sodium chloride (NS) flush 5-40 mL  5-40 mL IntraVENous Q8H    acetaminophen (TYLENOL) tablet 650 mg  650 mg Oral Q6H PRN    sodium chloride (NS) flush 5-40 mL  5-40 mL IntraVENous Q8H    sodium chloride (NS) flush 5-40 mL  5-40 mL IntraVENous PRN    naloxone (NARCAN) injection 0.4 mg  0.4 mg IntraVENous PRN    HYDROcodone-acetaminophen (NORCO) 5-325 mg per tablet 1 Tab  1 Tab Oral Q4H PRN    HYDROmorphone (PF) (DILAUDID) injection 0.5 mg  0.5 mg IntraVENous Q4H PRN    ondansetron (ZOFRAN) injection 4 mg  4 mg IntraVENous Q4H PRN    enoxaparin (LOVENOX) injection 40 mg  40 mg SubCUTAneous Q24H    pravastatin (PRAVACHOL) tablet 40 mg  40 mg Oral QHS    venlafaxine (EFFEXOR) tablet 50 mg  50 mg Oral DAILY    levoFLOXacin (LEVAQUIN) 750 mg in D5W IVPB  750 mg IntraVENous Q24H    metroNIDAZOLE (FLAGYL) IVPB premix 500 mg  500 mg IntraVENous Q12H    0.9% sodium chloride with KCl 40 mEq/L infusion   IntraVENous CONTINUOUS    aztreonam (AZACTAM) 1 g in 0.9% sodium chloride (MBP/ADV) 100 mL  1 g IntraVENous Q8H    albuterol (PROVENTIL VENTOLIN) nebulizer solution 2.5 mg 2.5 mg Nebulization Q6H PRN      Allergies   Allergen Reactions    Doxycycline Diarrhea    Keflex [Cephalexin] Hives    Penicillins Rash        Review of Systems: A complete review of systems was obtained, negative except as described above. Physical Exam:     Visit Vitals  /64   Pulse 76   Temp 98.1 °F (36.7 °C)   Resp 15   Ht 5' 6\" (1.676 m)   Wt 178 lb (80.7 kg)   SpO2 97%   Breastfeeding? No   BMI 28.73 kg/m²     ECOG PS: 1  General: No distress  Eyes: PERRLA, anicteric sclerae  HENT: Atraumatic with normal appearance of ears and nose; OP clear  Neck: Supple; no thyromegaly   Lymphatic: No cervical, supraclavicular, or axillary adenopathy  Respiratory: Exp wheezing bilaterally in upper bilateral lobes;  normal respiratory effort  CV: Normal rate, regular rhythm, no murmurs, no peripheral edema  GI: Soft, slightly tender, nondistended, no masses, no hepatomegaly, no splenomegaly  MS: Digits without clubbing or cyanosis. Skin: collection drainage apparatus noted; No rashes, ecchymoses, or petechiae. Normal temperature, turgor, and texture. Neuro/Psych: Alert, oriented, appropriate affect, normal judgment/insight      Results:     Lab Results   Component Value Date/Time    WBC 23.5 (H) 03/15/2019 05:18 AM    HGB 11.5 03/15/2019 05:18 AM    HCT 35.4 03/15/2019 05:18 AM    PLATELET 773 97/92/4971 05:18 AM    MCV 92.9 03/15/2019 05:18 AM    ABS.  NEUTROPHILS 19.7 (H) 03/15/2019 05:18 AM     Lab Results   Component Value Date/Time    Sodium 137 03/15/2019 05:18 AM    Potassium 4.0 03/15/2019 05:18 AM    Chloride 106 03/15/2019 05:18 AM    CO2 25 03/15/2019 05:18 AM    Glucose 76 03/15/2019 05:18 AM    BUN 6 03/15/2019 05:18 AM    Creatinine 0.51 (L) 03/15/2019 05:18 AM    GFR est AA >60 03/15/2019 05:18 AM    GFR est non-AA >60 03/15/2019 05:18 AM    Calcium 7.4 (L) 03/15/2019 05:18 AM    Glucose (POC) 89 03/15/2019 11:01 AM     Lab Results   Component Value Date/Time    Bilirubin, total 0.5 03/13/2019 05:06 AM    ALT (SGPT) 20 03/13/2019 05:06 AM    AST (SGOT) 18 03/13/2019 05:06 AM    Alk. phosphatase 69 03/13/2019 05:06 AM    Protein, total 5.9 (L) 03/13/2019 05:06 AM    Albumin 2.3 (L) 03/13/2019 05:06 AM    Globulin 3.6 03/13/2019 05:06 AM     Lab Results   Component Value Date/Time    Lipase 35 (L) 03/12/2019 04:52 PM     No results found for: INR, APTT, DDIMSQ, DDIME, 421032, Annmarie Cobia, FDPLT, Threasa Kroner, [address] [facility], [address], [address] [facility] [address], [address] [facility] [id number], Harjit Taye [address]334, [id number], 121136, 700692, 847300    No results found for: CEA, 085661, C199LT, C125, RDCP313, 2729LT, C153LT, PSA, PSALT, LDH, [id number], HCGTLT, AFP, CHRGLT    TTE 3/6/19 EF 66-70%. 3/12/2019 XR ABD ACUTE  IMPRESSION: No acute process in the abdomen. 2/12/2019 CT ABD PELV W CONT    IMPRESSION:    Acute diverticulitis of the sigmoid colon. Development of a focal fluid and gas  collection in the cul-de-sac, consistent with an abscess. Suspected 2 additional  smaller developing abscesses in the pelvis. 3/13/2019 CT guided perc drainage  IMPRESSION: CT guided pelvic/cul-de-sac feculent fluid collection drainage  performed. 30 cc removed; Laboratory results pending. Cath placed for drainage    Assessment and Recommendations:     1. Diverticulitis/Abd abscesses/ 3 noted  Failed outpatient  oral abx  3/13 S/p IR drainage of 1 collection  Gen surgery following: discussed with Dr. Raphael Mcdonald, he will operate on her today    2. Left central IDC, gr 2, ER +, ME +, HER 2 positive:  cT2 cN1a cM0, stage IIB, prognostic stage IB   The goal of systemic adjuvant therapy is to improve the chances for cure and decrease the risk of relapse  3/6 D8SKC-T (Trastuzumab,  Docetaxel,  Carboplatin , Pertuzumab) given every 3 weeks x 6 cycles; Neulasta following each treatment. Next cycle scheduled for 3/27, will need to delay    3. Pain  To be controlled post-op      Thank you for this consult. All of the patient's questions were answered today.       Signed By: Cheryl Rojas MD

## 2019-03-15 NOTE — ANESTHESIA POSTPROCEDURE EVALUATION
Procedure(s):  LAPAROTOMY EXPLORATORY COLECTOMY AND COLOSTOMY. Anesthesia Post Evaluation      Multimodal analgesia: multimodal analgesia not used between 6 hours prior to anesthesia start to PACU discharge  Patient location during evaluation: bedside  Patient participation: complete - patient participated  Level of consciousness: awake  Pain management: adequate  Airway patency: patent  Anesthetic complications: no  Cardiovascular status: acceptable  Respiratory status: acceptable  Hydration status: acceptable  Post anesthesia nausea and vomiting:  controlled      Visit Vitals  /70   Pulse 85   Temp 36.6 °C (97.8 °F)   Resp 22   Ht 5' 6\" (1.676 m)   Wt 80.7 kg (178 lb)   SpO2 96%   Breastfeeding?  No   BMI 28.73 kg/m²

## 2019-03-15 NOTE — PROGRESS NOTES
Bedside and Verbal shift change report given to Taco Escobar  (oncoming nurse) by Destinee Rao (offgoing nurse). Report included the following information SBAR, Kardex, ED Summary, Intake/Output, MAR and Recent Results.

## 2019-03-16 LAB
ANION GAP SERPL CALC-SCNC: 4 MMOL/L (ref 5–15)
BACTERIA SPEC CULT: ABNORMAL
BASOPHILS # BLD: 0 K/UL (ref 0–0.1)
BASOPHILS NFR BLD: 0 % (ref 0–1)
BUN SERPL-MCNC: 5 MG/DL (ref 6–20)
BUN/CREAT SERPL: 9 (ref 12–20)
CALCIUM SERPL-MCNC: 7.3 MG/DL (ref 8.5–10.1)
CHLORIDE SERPL-SCNC: 107 MMOL/L (ref 97–108)
CO2 SERPL-SCNC: 27 MMOL/L (ref 21–32)
CREAT SERPL-MCNC: 0.54 MG/DL (ref 0.55–1.02)
DATE LAST DOSE: NORMAL
DIFFERENTIAL METHOD BLD: ABNORMAL
EOSINOPHIL # BLD: 0 K/UL (ref 0–0.4)
EOSINOPHIL NFR BLD: 0 % (ref 0–7)
ERYTHROCYTE [DISTWIDTH] IN BLOOD BY AUTOMATED COUNT: 13.9 % (ref 11.5–14.5)
GLUCOSE SERPL-MCNC: 104 MG/DL (ref 65–100)
HCT VFR BLD AUTO: 37 % (ref 35–47)
HGB BLD-MCNC: 11.9 G/DL (ref 11.5–16)
IMM GRANULOCYTES # BLD AUTO: 0 K/UL
IMM GRANULOCYTES NFR BLD AUTO: 0 %
LYMPHOCYTES # BLD: 2.3 K/UL (ref 0.8–3.5)
LYMPHOCYTES NFR BLD: 8 % (ref 12–49)
MCH RBC QN AUTO: 29.9 PG (ref 26–34)
MCHC RBC AUTO-ENTMCNC: 32.2 G/DL (ref 30–36.5)
MCV RBC AUTO: 93 FL (ref 80–99)
METAMYELOCYTES NFR BLD MANUAL: 3 %
MONOCYTES # BLD: 1.7 K/UL (ref 0–1)
MONOCYTES NFR BLD: 6 % (ref 5–13)
MYELOCYTES NFR BLD MANUAL: 1 %
NEUTS BAND NFR BLD MANUAL: 9 % (ref 0–6)
NEUTS SEG # BLD: 23.6 K/UL (ref 1.8–8)
NEUTS SEG NFR BLD: 73 % (ref 32–75)
NRBC # BLD: 0 K/UL (ref 0–0.01)
NRBC BLD-RTO: 0 PER 100 WBC
PLATELET # BLD AUTO: 259 K/UL (ref 150–400)
PMV BLD AUTO: 9.2 FL (ref 8.9–12.9)
POTASSIUM SERPL-SCNC: 4.2 MMOL/L (ref 3.5–5.1)
RBC # BLD AUTO: 3.98 M/UL (ref 3.8–5.2)
RBC MORPH BLD: ABNORMAL
REPORTED DOSE,DOSE: NORMAL UNITS
REPORTED DOSE/TIME,TMG: NORMAL
SERVICE CMNT-IMP: ABNORMAL
SODIUM SERPL-SCNC: 138 MMOL/L (ref 136–145)
VANCOMYCIN TROUGH SERPL-MCNC: 10 UG/ML (ref 5–10)
WBC # BLD AUTO: 28.8 K/UL (ref 3.6–11)

## 2019-03-16 PROCEDURE — 80202 ASSAY OF VANCOMYCIN: CPT

## 2019-03-16 PROCEDURE — 65270000029 HC RM PRIVATE

## 2019-03-16 PROCEDURE — 80048 BASIC METABOLIC PNL TOTAL CA: CPT

## 2019-03-16 PROCEDURE — 74011250636 HC RX REV CODE- 250/636: Performed by: SURGERY

## 2019-03-16 PROCEDURE — 74011250636 HC RX REV CODE- 250/636: Performed by: INTERNAL MEDICINE

## 2019-03-16 PROCEDURE — 36415 COLL VENOUS BLD VENIPUNCTURE: CPT

## 2019-03-16 PROCEDURE — 74011250637 HC RX REV CODE- 250/637: Performed by: SURGERY

## 2019-03-16 PROCEDURE — 74011000258 HC RX REV CODE- 258: Performed by: SURGERY

## 2019-03-16 PROCEDURE — 94760 N-INVAS EAR/PLS OXIMETRY 1: CPT

## 2019-03-16 PROCEDURE — 77030027138 HC INCENT SPIROMETER -A

## 2019-03-16 PROCEDURE — 77030038269 HC DRN EXT URIN PURWCK BARD -A

## 2019-03-16 PROCEDURE — 74011250637 HC RX REV CODE- 250/637: Performed by: INTERNAL MEDICINE

## 2019-03-16 PROCEDURE — 77010033678 HC OXYGEN DAILY

## 2019-03-16 PROCEDURE — 85025 COMPLETE CBC W/AUTO DIFF WBC: CPT

## 2019-03-16 RX ORDER — VANCOMYCIN/0.9 % SOD CHLORIDE 1.5G/250ML
1500 PLASTIC BAG, INJECTION (ML) INTRAVENOUS EVERY 12 HOURS
Status: DISCONTINUED | OUTPATIENT
Start: 2019-03-17 | End: 2019-03-21 | Stop reason: HOSPADM

## 2019-03-16 RX ADMIN — HYDROCODONE BITARTRATE AND ACETAMINOPHEN 1 TABLET: 5; 325 TABLET ORAL at 21:07

## 2019-03-16 RX ADMIN — MEROPENEM 500 MG: 500 INJECTION, POWDER, FOR SOLUTION INTRAVENOUS at 04:25

## 2019-03-16 RX ADMIN — HYDROMORPHONE HYDROCHLORIDE 0.5 MG: 2 INJECTION INTRAMUSCULAR; INTRAVENOUS; SUBCUTANEOUS at 11:22

## 2019-03-16 RX ADMIN — HYDROCODONE BITARTRATE AND ACETAMINOPHEN 1 TABLET: 5; 325 TABLET ORAL at 14:06

## 2019-03-16 RX ADMIN — DEXTROSE MONOHYDRATE, SODIUM CHLORIDE, AND POTASSIUM CHLORIDE 100 ML/HR: 50; 4.5; 1.49 INJECTION, SOLUTION INTRAVENOUS at 21:04

## 2019-03-16 RX ADMIN — HYDROMORPHONE HYDROCHLORIDE 0.5 MG: 2 INJECTION INTRAMUSCULAR; INTRAVENOUS; SUBCUTANEOUS at 04:27

## 2019-03-16 RX ADMIN — VANCOMYCIN HYDROCHLORIDE 1250 MG: 10 INJECTION, POWDER, LYOPHILIZED, FOR SOLUTION INTRAVENOUS at 05:10

## 2019-03-16 RX ADMIN — HYDROMORPHONE HYDROCHLORIDE 0.5 MG: 2 INJECTION INTRAMUSCULAR; INTRAVENOUS; SUBCUTANEOUS at 00:26

## 2019-03-16 RX ADMIN — Medication 10 ML: at 04:39

## 2019-03-16 RX ADMIN — METRONIDAZOLE 500 MG: 500 INJECTION, SOLUTION INTRAVENOUS at 21:11

## 2019-03-16 RX ADMIN — Medication 10 ML: at 21:11

## 2019-03-16 RX ADMIN — MEROPENEM 500 MG: 500 INJECTION, POWDER, FOR SOLUTION INTRAVENOUS at 09:43

## 2019-03-16 RX ADMIN — HYDROMORPHONE HYDROCHLORIDE 0.5 MG: 2 INJECTION INTRAMUSCULAR; INTRAVENOUS; SUBCUTANEOUS at 17:09

## 2019-03-16 RX ADMIN — DEXTROSE MONOHYDRATE, SODIUM CHLORIDE, AND POTASSIUM CHLORIDE 100 ML/HR: 50; 4.5; 1.49 INJECTION, SOLUTION INTRAVENOUS at 04:25

## 2019-03-16 RX ADMIN — PRAVASTATIN SODIUM 40 MG: 20 TABLET ORAL at 21:07

## 2019-03-16 RX ADMIN — MEROPENEM 500 MG: 500 INJECTION, POWDER, FOR SOLUTION INTRAVENOUS at 15:59

## 2019-03-16 RX ADMIN — VENLAFAXINE 50 MG: 25 TABLET ORAL at 09:29

## 2019-03-16 RX ADMIN — ENOXAPARIN SODIUM 40 MG: 40 INJECTION SUBCUTANEOUS at 22:17

## 2019-03-16 RX ADMIN — VANCOMYCIN HYDROCHLORIDE 1250 MG: 10 INJECTION, POWDER, LYOPHILIZED, FOR SOLUTION INTRAVENOUS at 17:20

## 2019-03-16 RX ADMIN — METRONIDAZOLE 500 MG: 500 INJECTION, SOLUTION INTRAVENOUS at 09:37

## 2019-03-16 RX ADMIN — MEROPENEM 500 MG: 500 INJECTION, POWDER, FOR SOLUTION INTRAVENOUS at 21:05

## 2019-03-16 NOTE — PERIOP NOTES
TRANSFER - OUT REPORT:    Verbal report given to RN Garima hines on Katherine Sierra  being transferred to [address] [place] [address] for routine post - op       Report consisted of patients Situation, Background, Assessment and   Recommendations(SBAR). Information from the following report(s) SBAR, OR Summary, Procedure Summary, Intake/Output and MAR was reviewed with the receiving nurse. Lines:   Venous Access Device Portacath Upper chest (subclavicular area, right (Active)   Central Line Being Utilized No 3/15/2019  6:40 PM   Criteria for Appropriate Use Other (comment) 3/15/2019  6:40 PM   Site Assessment Clean, dry, & intact 3/15/2019  6:40 PM       Peripheral IV 03/12/19 Left Wrist (Active)   Site Assessment Clean, dry, & intact 3/15/2019  6:40 PM   Phlebitis Assessment 0 3/15/2019  6:40 PM   Infiltration Assessment 0 3/15/2019  6:40 PM   Dressing Status Clean, dry, & intact; Occlusive 3/15/2019  6:40 PM   Dressing Type Tape;Transparent 3/15/2019  6:40 PM   Hub Color/Line Status Pink; Infusing 3/15/2019  6:40 PM   Action Taken Open ports on tubing capped 3/15/2019  8:10 AM   Alcohol Cap Used Yes 3/15/2019  2:16 PM       Peripheral IV 03/15/19 Anterior;Proximal;Right Forearm (Active)   Site Assessment Clean, dry, & intact 3/15/2019  6:40 PM   Phlebitis Assessment 0 3/15/2019  6:40 PM   Infiltration Assessment 0 3/15/2019  6:40 PM   Dressing Status Clean, dry, & intact; Occlusive 3/15/2019  6:40 PM   Dressing Type Tape;Transparent 3/15/2019  6:40 PM   Hub Color/Line Status Pink;Capped 3/15/2019  6:40 PM   Alcohol Cap Used Yes 3/15/2019  2:39 PM        Opportunity for questions and clarification was provided.       Patient transported with:   O2 @ 2 liters  Registered Nurse     sbar  Included  Need for contact precautions and a sitter because of patient insistence on getting out of bed

## 2019-03-16 NOTE — PROGRESS NOTES
[facility] Dosing Services: Antimicrobial Stewardship Daily Doc 3/16/2019    Consult for antibiotic dosing of Vancomycin, Meropenem, Metronidazole by Dr. Macario Nguyễn  Indication: Diverticulitis and abdominal abscesses. Patient is POD1 for colectomy with colostomy for diverticulitis and perforation. Hx metastatic breast cancer, currently undergoing chemotherapy. Day of Therapy 2    Ht Readings from Last 1 Encounters:   03/12/19 167.6 cm (66\")        Wt Readings from Last 1 Encounters:   03/12/19 80.7 kg (178 lb)      Vancomycin therapy:  Current maintenance dose: 1250 (mg) every 12 hours   Dose calculated to approximate a therapeutic trough of 15-20 mcg/mL. Last trough level: Initial dosing  Plan for level / Adjustment in Therapy: Continue vancomycin ~15 mg/kg Q12 hours as appropriate for renal function and indication. Patient only with documented 0.1 ml/kg/hr UOP. Will draw an early trough level this evening to evaluate need to reduce vancomycin dose. Calculated trough ~19 mcg/ml with Scr rounded to 0.7 mg/dl. Dose administration notes:   Doses given appropriately as scheduled    Date Dose & Interval Measured (mcg/mL) Extrapolated (mcg/mL)   ? ? ? ?   ? ? ? ?   ? ? ? ? Non-Kinetic Antimicrobial Dosing:   Assessment/Plan: CrCl greater than 100 mL/min. Continue Meropenem 500 mg IV every 6 hours     Non-Kinetic Antimicrobial Dosing (Metronidazole):   3/13/2019: ALT: 20      AST: 18    Alk Phos: 69     T Bili: 0.5  Assessment/Plan: Continue Metronidazole 500 mg IV every 12 hours per protocol    Other Antimicrobial   (not dosed by pharmacist) None   Cultures 3/13 Body fluid (abdominal abscess): Heavy ESBL E.Coli (S meropenem, zosyn, cefepime MERCEDES = 8), Heavy possible enterococcus (susceptibility pending- vancomycin on board)  3/13: Anaerobic Body fluid: Heavy mixed anaerobic gram negative rods, beta lactamase positive.     Serum Creatinine Lab Results   Component Value Date/Time    Creatinine 0.54 (L) 03/16/2019 04:47 AM         Creatinine Clearance Estimated Creatinine Clearance: 96.5 mL/min (A) (based on SCr of 0.54 mg/dL (L)).      Temp Temp: 99 °F (37.2 °C)       WBC Lab Results   Component Value Date/Time    WBC 28.8 (H) 03/16/2019 04:47 AM        H/H Lab Results   Component Value Date/Time    HGB 11.9 03/16/2019 04:47 AM        Platelets    Lab Results   Component Value Date/Time    PLATELET 909 47/29/0917 04:47 AM            Pharmacist CECILIA VázquezD Contact information: [phone]

## 2019-03-16 NOTE — PROGRESS NOTES
[facility] [place] [facility] [place] [address] [address] [facility] [address] [facility]  Lina Medico: [phone]  F: [phone]      Reason for Visit:   Radha Rosas is a 74 y.o. female who is seen in consultation at the request of Dr. Colette Qureshi for continuity of care. Hematology / Oncology Treatment History:   · 2/15/19 Left core bx:   IDC, gr 2, 1.1 cm, ER + at 100%, RI + at 20%, HER 2 POSITIVE (IHC 2+; FISH ratio 3.9; sig/cell 9.2)  · 3/1/19 Left ax LN core bx:  + for breast cancer, ER + at 100%, RI negative, HER 2 POSITIVE (IHC 2+, FISH ratio 2; sig/cell 5.8)  · TCH-P 3/6/19-      History of Present Illness:     Seen in the ED on 3/8/2019 for abd pain and constipation. ED CT  Sowed diverticulitis; was initiated on levaquin and flagyl. Seen  In our clinic yesterday with worsening abd pain; therefore was directed to ED for further eval. ED CT scan shows acute diverticulitis with abscesses. Admitted for further eval and management. Ms De Jesus Prim feeling better this morning. Reports abd \"discomfort/ bloating/pressure\". Rates pain 6/10. Does not want hydrocodone but will try some Tylenol. Had some loose stool this am. Denies any SOB. Denies N/V at present. Sister at bedside. Interval History:   OR 3/15/19 with colostomy for diverticulitis and perforation. Afebrile, she is feeling better, but drowsy.       Past Medical History:   Diagnosis Date    Adverse effect of anesthesia     \"difficulty waking up from anesthethia\"    Cancer ([facility] [address] 02/2019    left breast    Depression     GERD (gastroesophageal reflux disease)     High cholesterol     Hypertension       Past Surgical History:   Procedure Laterality Date    HX BREAST BIOPSY Right years ago    neg; needle bx    HX CATARACT REMOVAL      HX COLONOSCOPY      HX ORTHOPAEDIC      carpal tunnel      Social History     Tobacco Use    Smoking status: Current Every Day Smoker     Packs/day: 0.50     Years: 55.00     Pack years: 27.50    Smokeless tobacco: Never Used   Substance Use Topics    Alcohol use:  Yes     Alcohol/week: 0.6 oz     Types: 1 Shots of liquor per week     Drinks per session: 5 or 6     Binge frequency: Weekly     Comment: Patient stated that she puts a splash of bourbon in her afternoon coffee around 5-6 days per week      Family History   Problem Relation Age of Onset    Breast Cancer Maternal Aunt     Hypertension Mother     Hypertension Father     Cancer Father         Lung    Cancer Brother         leukemia    Diabetes Brother         2 brothers with diabetes    Hypertension Sister         2 sisters and 4 brothers with HTN     Current Facility-Administered Medications   Medication Dose Route Frequency    Vancomycin trough 3/16 prior to the 1700 dose   Other ONCE    meropenem (MERREM) 500 mg in 0.9% sodium chloride (MBP/ADV) 50 mL  0.5 g IntraVENous Q6H    vancomycin (VANCOCIN) 1,250 mg in 0.9% sodium chloride 250 mL IVPB  1,250 mg IntraVENous Q12H    dextrose 5% - 0.45% NaCl with KCl 20 mEq/L infusion  100 mL/hr IntraVENous CONTINUOUS    sodium chloride (NS) flush 5-40 mL  5-40 mL IntraVENous Q8H    acetaminophen (TYLENOL) tablet 650 mg  650 mg Oral Q6H PRN    sodium chloride (NS) flush 5-40 mL  5-40 mL IntraVENous Q8H    sodium chloride (NS) flush 5-40 mL  5-40 mL IntraVENous PRN    naloxone (NARCAN) injection 0.4 mg  0.4 mg IntraVENous PRN    HYDROcodone-acetaminophen (NORCO) 5-325 mg per tablet 1 Tab  1 Tab Oral Q4H PRN    HYDROmorphone (PF) (DILAUDID) injection 0.5 mg  0.5 mg IntraVENous Q4H PRN    ondansetron (ZOFRAN) injection 4 mg  4 mg IntraVENous Q4H PRN    enoxaparin (LOVENOX) injection 40 mg  40 mg SubCUTAneous Q24H    pravastatin (PRAVACHOL) tablet 40 mg  40 mg Oral QHS    venlafaxine (EFFEXOR) tablet 50 mg  50 mg Oral DAILY    metroNIDAZOLE (FLAGYL) IVPB premix 500 mg  500 mg IntraVENous Q12H    albuterol (PROVENTIL VENTOLIN) nebulizer solution 2.5 mg  2.5 mg Nebulization Q6H PRN Allergies   Allergen Reactions    Doxycycline Diarrhea    Keflex [Cephalexin] Hives    Penicillins Rash        Review of Systems: A complete review of systems was obtained, negative except as described above. Physical Exam:     Visit Vitals  /78   Pulse 78   Temp 97.8 °F (36.6 °C)   Resp 16   Ht 5' 6\" (1.676 m)   Wt 178 lb (80.7 kg)   SpO2 99%   Breastfeeding? No   BMI 28.73 kg/m²     ECOG PS: 1  General: No distress  Eyes: PERRLA, anicteric sclerae  HENT: Atraumatic with normal appearance of ears and nose; OP clear  Neck: Supple; no thyromegaly   Lymphatic: No cervical, supraclavicular, or axillary adenopathy  Respiratory: Exp wheezing bilaterally in upper bilateral lobes;  normal respiratory effort  CV: Normal rate, regular rhythm, no murmurs, no peripheral edema  GI: Soft, slightly tender, nondistended, no masses, no hepatomegaly, no splenomegaly  MS: Digits without clubbing or cyanosis. Skin: collection drainage apparatus noted; No rashes, ecchymoses, or petechiae. Normal temperature, turgor, and texture. Neuro/Psych: Alert, oriented, appropriate affect, normal judgment/insight      Results:     Lab Results   Component Value Date/Time    WBC 28.8 (H) 03/16/2019 04:47 AM    HGB 11.9 03/16/2019 04:47 AM    HCT 37.0 03/16/2019 04:47 AM    PLATELET 741 35/27/3571 04:47 AM    MCV 93.0 03/16/2019 04:47 AM    ABS.  NEUTROPHILS 23.6 (H) 03/16/2019 04:47 AM     Lab Results   Component Value Date/Time    Sodium 138 03/16/2019 04:47 AM    Potassium 4.2 03/16/2019 04:47 AM    Chloride 107 03/16/2019 04:47 AM    CO2 27 03/16/2019 04:47 AM    Glucose 104 (H) 03/16/2019 04:47 AM    BUN 5 (L) 03/16/2019 04:47 AM    Creatinine 0.54 (L) 03/16/2019 04:47 AM    GFR est AA >60 03/16/2019 04:47 AM    GFR est non-AA >60 03/16/2019 04:47 AM    Calcium 7.3 (L) 03/16/2019 04:47 AM    Glucose (POC) 124 (H) 03/15/2019 09:41 PM     Lab Results   Component Value Date/Time    Bilirubin, total 0.5 03/13/2019 05:06 AM    ALT (SGPT) 20 03/13/2019 05:06 AM    AST (SGOT) 18 03/13/2019 05:06 AM    Alk. phosphatase 69 03/13/2019 05:06 AM    Protein, total 5.9 (L) 03/13/2019 05:06 AM    Albumin 2.3 (L) 03/13/2019 05:06 AM    Globulin 3.6 03/13/2019 05:06 AM     Lab Results   Component Value Date/Time    Lipase 35 (L) 03/12/2019 04:52 PM     No results found for: INR, APTT, DDIMSQ, DDIME, 890479, Hafsa Dupes, FDPLT, Mirlande Lacer, [address] [facility], [address], [address] [facility], [facility], [address] [facility], [id number], Harjit Taye [address]334, [id number], [id number], [id number], 957141    No results found for: CEA, 028844, C199LT, C125, BXQT698, 2729LT, C153LT, PSA, PSALT, LDH, [id number], HCGTLT, AFP, CHRGLT    TTE 3/6/19 EF 66-70%. 3/12/2019 XR ABD ACUTE  IMPRESSION: No acute process in the abdomen. 2/12/2019 CT ABD PELV W CONT    IMPRESSION:    Acute diverticulitis of the sigmoid colon. Development of a focal fluid and gas  collection in the cul-de-sac, consistent with an abscess. Suspected 2 additional  smaller developing abscesses in the pelvis. 3/13/2019 CT guided perc drainage  IMPRESSION: CT guided pelvic/cul-de-sac feculent fluid collection drainage  performed. 30 cc removed; Laboratory results pending. Cath placed for drainage    Assessment and Recommendations:     1. Diverticulitis/Abd abscess/perforation: ecoli, extended spectrum beta lactamase ; Surgery on 3/15; on vanc, meropenem, flagyl    2. Left central IDC, gr 2, ER +, PA +, HER 2 positive:  cT2 cN1a cM0, stage IIB, prognostic stage IB   The goal of systemic adjuvant therapy is to improve the chances for cure and decrease the risk of relapse  3/6 P7SRC-E (Trastuzumab,  Docetaxel,  Carboplatin , Pertuzumab) given every 3 weeks x 6 cycles; Neulasta following each treatment. Next cycle scheduled for 3/27, will need to delay    3. Pain  controlled      Thank you for this consult. All of the patient's questions were answered today.       Signed By: Mely Waller MD

## 2019-03-16 NOTE — PROGRESS NOTES
Per Dr Maria Elena Blood verbal read back, radiology will pull drain today. Patient needs to get up and move around with staff and PT. IS will be ordered.

## 2019-03-16 NOTE — PROGRESS NOTES
[facility] Dosing Services: Antimicrobial Stewardship Daily Doc 3/16/2019    Consult for antibiotic dosing of Vancomycin, Meropenem, Metronidazole by Dr. Kalani Salazar  Indication: Diverticulitis and abdominal abscesses. Patient is POD1 for colectomy with colostomy for diverticulitis and perforation. Hx metastatic breast cancer, currently undergoing chemotherapy. Day of Therapy 2    Ht Readings from Last 1 Encounters:   03/12/19 167.6 cm (66\")        Wt Readings from Last 1 Encounters:   03/12/19 80.7 kg (178 lb)      Vancomycin therapy:  Current maintenance dose: 1250 (mg) every 12 hours   Dose calculated to approximate a therapeutic trough of 15-20 mcg/mL. Last trough level: Initial dosing  Plan for level / Adjustment in Therapy: Vancomycin trough resulted as 10 mcg/ml after 2g loading dose in OR and 1250 mg x 1 dose. Will adjust vancomycin to 1500 mg Q12 hours for anticipated therapeutic trough at steady state.   Dose administration notes:   Doses given appropriately as scheduled    Thanks,  Pharmacist ROSSANA Schaefer Contact information:

## 2019-03-16 NOTE — PROGRESS NOTES
3/16/2019 1:18 PM EMR reviewed. No discharge needs identified. CM will continue to follow.  TIRSO Christianson

## 2019-03-16 NOTE — PERIOP NOTES
Patient informed of procedure for removal off valenzuela catheter , same remove without any complication.

## 2019-03-16 NOTE — PROGRESS NOTES
General Surgery Daily Progress Note    Patient: Adriana Treviño MRN: [id number]  SSN: [id number]    YOB: 1945  Age: 74 y.o. Sex: female      Admit Date: 3/12/2019    Subjective:   Patient states that her lower abdomen is stiff. She is voiding freely no nausea or vomiting. Current Facility-Administered Medications   Medication Dose Route Frequency    meropenem (MERREM) 500 mg in 0.9% sodium chloride (MBP/ADV) 50 mL  0.5 g IntraVENous Q6H    vancomycin (VANCOCIN) 1,250 mg in 0.9% sodium chloride 250 mL IVPB  1,250 mg IntraVENous Q12H    dextrose 5% - 0.45% NaCl with KCl 20 mEq/L infusion  100 mL/hr IntraVENous CONTINUOUS    sodium chloride (NS) flush 5-40 mL  5-40 mL IntraVENous Q8H    acetaminophen (TYLENOL) tablet 650 mg  650 mg Oral Q6H PRN    sodium chloride (NS) flush 5-40 mL  5-40 mL IntraVENous Q8H    sodium chloride (NS) flush 5-40 mL  5-40 mL IntraVENous PRN    naloxone (NARCAN) injection 0.4 mg  0.4 mg IntraVENous PRN    HYDROcodone-acetaminophen (NORCO) 5-325 mg per tablet 1 Tab  1 Tab Oral Q4H PRN    HYDROmorphone (PF) (DILAUDID) injection 0.5 mg  0.5 mg IntraVENous Q4H PRN    ondansetron (ZOFRAN) injection 4 mg  4 mg IntraVENous Q4H PRN    enoxaparin (LOVENOX) injection 40 mg  40 mg SubCUTAneous Q24H    pravastatin (PRAVACHOL) tablet 40 mg  40 mg Oral QHS    venlafaxine (EFFEXOR) tablet 50 mg  50 mg Oral DAILY    metroNIDAZOLE (FLAGYL) IVPB premix 500 mg  500 mg IntraVENous Q12H    albuterol (PROVENTIL VENTOLIN) nebulizer solution 2.5 mg  2.5 mg Nebulization Q6H PRN        Objective:   No intake/output data recorded. 03/14 1901 - 03/16 0700  In: 1407 [I.V.:1400]  Out: 352 [Urine:220; Drains:102]  Patient Vitals for the past 8 hrs:   BP Temp Pulse Resp SpO2   03/16/19 0710 144/80 98.4 °F (36.9 °C) 89 18 99 %   03/16/19 0350 142/70 98.8 °F (37.1 °C) 86 20 98 %       Physical Exam:  General: Alert, cooperative, no distress, appears stated age.   Neck:  Supple, symmetrical, trachea midline, no adenopathy, thyroid: no                           enlargement/tenderness/nodules, no carotid bruit and no JVD. Lungs: Clear to auscultation bilaterally. Heart:  Regular rate and rhythm, S1, S2 normal, no murmur, click, rub or gallop. Abdomen: Dressing intact dry Soft, non-tender. Bowel sounds normal. No masses,  No organomegaly. Extremities: Extremities normal, atraumatic, no cyanosis or edema.   Skin:  Skin color, texture, turgor normal. No rashes or lesions    Labs:   Recent Labs     03/16/19  0447   WBC 28.8*   HGB 11.9   HCT 37.0        Recent Labs     03/16/19  0447      K 4.2      CO2 27   *   BUN 5*   CREA 0.54*   CA 7.3*     X-ray   Assessment / Plan:   · Stable postop   · IR to pull drain  ·     Active Problems:    Hypertension (3/12/2019)      High cholesterol (3/12/2019)      GERD (gastroesophageal reflux disease) (3/12/2019)      Depression (3/12/2019)      Cancer ([facility] [address] (2/1/2019)      Overview: left breast      Adverse effect of anesthesia (3/12/2019)      Overview: \"difficulty waking up from anesthethia\"      Diverticulitis (3/12/2019)      Hyponatremia (3/12/2019)      Hypokalemia (3/12/2019)      Abscess (3/12/2019)

## 2019-03-16 NOTE — PROGRESS NOTES
..Bedside shift change report given to Bogdan Kirkpatrick RN (oncoming nurse) by Evelyn Guerrero RN (offgoing nurse). Report included the following information SBAR, Kardex, Intake/Output, MAR, Accordion, Procedure Verification and Quality Measures.

## 2019-03-16 NOTE — OP NOTES
[facility] [address]  OPERATIVE REPORT    Name:  Cindy Rios  MR#:  [id number]  :  1945  ACCOUNT #:  [de-identified]  DATE OF SERVICE:  03/15/2019      PREOPERATIVE DIAGNOSIS:  Perforated sigmoid diverticulitis. POSTOPERATIVE DIAGNOSIS:  Perforated sigmoid diverticulitis. PROCEDURE PERFORMED:  Exploratory laparotomy, sigmoid colectomy with colostomy, Florina's procedure. SURGEON:  Tony La MD    ASSISTANT:  Rito Spurling    ANESTHESIA:  General.    ANESTHESIOLOGIST:  Jacky Carrero DO    COMPLICATIONS:  None. SPECIMENS REMOVED:  Sigmoid colon. IMPLANTS:  None. ESTIMATED BLOOD LOSS:  Approximately 25 mL. ANTIBIOTICS:  The patient had been given vancomycin and meropenem. FINDINGS:  The patient had localized perforation of the sigmoid colon with an abscess deep in the pelvis on the left side where the tip of the drain was located. There was no evidence of free peritonitis and no feculent drainage noted. The sigmoid colon was thickened at the level of the perforation. Proximal and distal colon were normal.    PROCEDURE:  Under general anesthesia and with the patient in lithotomy position and the Gonzales catheter in the bladder, the abdomen and perineum was cleaned, prepped and draped. A time-out was completed. Low vertical midline incision was made and the peritoneal cavity was then entered carefully through the linea alba. Randy wound retractor was placed. The pelvis was examined and the thickened sigmoid colon with a perforation was clearly noted. This appeared to track down to the pelvis where the tip of the drain was present. The sigmoid colon was freed by blunt dissection along the lateral pelvic sidewall. Window was created in the rectosigmoid mesentery and using a Contour stapling device with a green load, the rectum was divided. Two Prolene sutures were placed at the rectal stump for future identification.     The sigmoid mesentery was then divided using the LigaSure device. This dissection was kept close to the colon wall so as to stay well away from the retroperitoneum and the let ureter. This dissection was continued proximally to the level of healthy colon. Once again, a window was created in the mesentery and using a green stapler, the proximal colon at the junction of descending colon and sigmoid was transected. The sigmoid colon was sent to histopathology. The descending colon was mobilized along the avascular line of Toldt until an adequate length of colon was available for the end colostomy. The self-retaining retractor was removed. A thorough irrigation of the rectum and pelvis was performed. The distal drain was transected. An opening was made in the left rectus and the end of the colon was brought through this opening. The serosa was sutured to the fascia with 2-0 Vicryl. Using a separate tray for closure and the surgeon assistant scrubbing back in, the abdomen was then closed. Five sheets of Seprafilm was placed within the peritoneal cavity and the abdomen was closed with #1 looped PDS. Prior to this, Marcaine, Exparel and saline was injected as a block on either side of the incision. This skin was closed with staples with two Penrose drain. The colostomy was matured in the left lower quadrant using 3-0 Monocryl. A colostomy bag was applied and a dressing over the main incision. The patient tolerated the procedure well. Counts were correct. At the end of the procedure, before the abdomen was closed, it was indicated that there was an extra instrument. An x-ray was taken to confirm that there was no foreign body within the peritoneal cavity. This was confirmed.       Lorene Shoemaker MD      [id number]  D:  03/15/2019 17:17  T:  03/16/2019 4:42  JOB #:  [id number]

## 2019-03-16 NOTE — PROGRESS NOTES
Daily Progress Note: 3/16/2019  Lazarus Nicholson MD    Assessment/Plan:   Diverticulitis: failing antibiotics. Now with abscess evident on CT a/p. General surgery to see. ---Keep NPO   ---HOLDING DVT chemoprophylaxis and ASA. --- IV Levaquin, Flagyl, and aztreonam   ---Drain placed 3/13 by IR  ---OR 3/15    Hyponatremia / Hypokalemia:   ---suspect from IVVD and thiazide diuretic use.   ---HOLDING HCTZ.   ---Start IVF with KCl.   ---Follow BMP     Hypertension: controlled.   ---Hold home antihypertensives for now     High cholesterol:   ---continue statin     Depression: continue Cymbalta     GERD (gastroesophageal reflux disease):   ---PPI PRN     Cancer: left breast. Left central IDC, gr 2, ER +, ME +, HER 2 positive:  cT2 cN1a cM0, stage IIB, prognostic stage IB.   ---Followed by Dr. Polina Francisco. Wheeze: mild. Smoker. Advise smoking cessation. Albuterol PRN     Adverse effect of anesthesia: Overview: \"difficulty waking up from anesthethia\".  Will need to monitor closely in PACU as appropriate                     Problem List:  Problem List as of 3/16/2019 Date Reviewed: 3/12/2019          Codes Class Noted - Resolved    Hypertension ICD-10-CM: I10  ICD-9-CM: 401.9  3/12/2019 - Present        High cholesterol ICD-10-CM: E78.00  ICD-9-CM: 272.0  3/12/2019 - Present        GERD (gastroesophageal reflux disease) ICD-10-CM: K21.9  ICD-9-CM: 530.81  3/12/2019 - Present        Depression ICD-10-CM: F32.9  ICD-9-CM: 060  3/12/2019 - Present        Adverse effect of anesthesia ICD-10-CM: T41.45XA  ICD-9-CM: 995.22  3/12/2019 - Present    Overview Signed 3/12/2019  7:39 PM by David Juárez MD     \"difficulty waking up from anesthethia\"             Diverticulitis ICD-10-CM: A81.50  ICD-9-CM: 562.11  3/12/2019 - Present        Hyponatremia ICD-10-CM: E87.1  ICD-9-CM: 276.1  3/12/2019 - Present        Hypokalemia ICD-10-CM: E87.6  ICD-9-CM: 276.8  3/12/2019 - Present        Abscess ICD-10-CM: L02.91  ICD-9-CM: 682.9  3/12/2019 - Present        Breast cancer, left ([facility] [address] ICD-10-CM: C50.912  ICD-9-CM: 174.9  2019 - Present    Overview Addendum 3/12/2019  4:58 PM by Leslee Hale MD     2019 LEFT invasive ductal carcinoma, grade 2, %. HI 20%, Her 2 positive  Positive axillary node, %. HI neg, Her 2 positive  TCH-P               Cancer [facility]) ICD-10-CM: C80.1  ICD-9-CM: 199.1  2019 - Present    Overview Signed 3/12/2019  7:39 PM by Rachel Maldonado MD     left breast                   HPI:   Ms. Doris Villavicencio is a 74 y.o.  female who is admitted to the Gen Surg Service for diverticulitis with abscess. We are asked to evaluate for medical mgmt. Ms. Doris Villavicencio is complaining of abdominal pain. Ongoing for about a week now. Seen in ED a few days ago, sent home on oral abx (levaquin/Flagyl) however did not improve. (Dr Shawanda Ontiveros)    3/13: Feeling a little better. Abd feels less tight. No BM for about 6 days but has had little po. K+ low so will replete. 3/14: Feeling pretty miserable this am. She is c/o increased pressure in the LLQ. K+ improved but still low so will replete further. Afebrile. 3/15:  Less pain today but WBC is up to 23. No fever. Abd soft but tender. Drain patent. K+ normal.     3/16: OR 3/15 colectomy with colostomy for diverticulitis and perforation. Afebrile. WBC high this am. She is feeling better. Pain is under control. No nausea or vomiting. Review of Systems:   A comprehensive review of systems was negative except for that written in the HPI. Objective:   Physical Exam:     Visit Vitals  /80 (BP 1 Location: Right arm, BP Patient Position: At rest)   Pulse 89   Temp 98.4 °F (36.9 °C)   Resp 18   Ht 5' 6\" (1.676 m)   Wt 178 lb (80.7 kg)   SpO2 99%   Breastfeeding?  No   BMI 28.73 kg/m²    O2 Flow Rate (L/min): 3 l/min O2 Device: Nasal cannula    Temp (24hrs), Av.2 °F (36.8 °C), Min:97.4 °F (36.3 °C), Max:98.8 °F (37.1 °C)    No intake/output data recorded. 03/14 1901 - 03/16 0700  In: 1407 [I.V.:1400]  Out: 352 [Urine:220; Drains:102]    General:  Alert, cooperative,  appears stated age. Head:  Normocephalic, without obvious abnormality, atraumatic. Eyes:  Conjunctivae/corneas clear. PERRL, EOMs intact. Nose: Nares normal. Septum midline. Mucosa normal. No drainage or sinus tenderness. Throat: Lips, mucosa, and tongue normal. Teeth and gums normal.   Neck: Supple, symmetrical, trachea midline, no adenopathy, thyroid: no enlargement/tenderness/nodules, no carotid bruit and no JVD. Back:   Symmetric, no curvature. ROM normal. No CVA tenderness. Lungs:   Clear to auscultation bilaterally. Chest wall:  No tenderness or deformity. Heart:  Regular rate and rhythm, S1, S2 normal, no murmur, click, rub or gallop. Abdomen:   Soft, colostomy patent, Bowel sounds present. No masses,  No organomegaly. Extremities: Extremities normal, atraumatic, no cyanosis or edema. No calf tenderness or cords. Pulses: 2+ and symmetric all extremities. Skin: Skin color, texture, turgor normal. No rashes or lesions   Neurologic: CNII-XII intact. Alert and oriented X 3. Fine motor of hands and fingers normal.   equal.  No cogwheeling or rigidity. Gait not tested at this time. Sensation grossly normal to touch. Gross motor of extremities normal.       Data Review:       Recent Days:  Recent Labs     03/16/19  0447 03/15/19  0518 03/14/19  0310   WBC 28.8* 23.5* 10.8   HGB 11.9 11.5 11.8   HCT 37.0 35.4 34.9*    224 222     Recent Labs     03/16/19 0447 03/15/19  0518 03/14/19  0310    137 137   K 4.2 4.0 3.4*    106 100   CO2 27 25 31   * 76 91   BUN 5* 6 11   CREA 0.54* 0.51* 0.59   CA 7.3* 7.4* 7.7*     No results for input(s): PH, PCO2, PO2, HCO3, FIO2 in the last 72 hours.     24 Hour Results:  Recent Results (from the past 24 hour(s))   GLUCOSE, POC    Collection Time: 03/15/19 11:01 AM   Result Value Ref Range Glucose (POC) 89 65 - 100 mg/dL    Performed by Vikas Riggs (PCT)    TYPE & SCREEN    Collection Time: 03/15/19  2:34 PM   Result Value Ref Range    Crossmatch Expiration 03/18/2019     ABO/Rh(D) A POSITIVE     Antibody screen NEG    GLUCOSE, POC    Collection Time: 03/15/19  9:41 PM   Result Value Ref Range    Glucose (POC) 124 (H) 65 - 100 mg/dL    Performed by Melanie Camejo (PCT)    CBC WITH AUTOMATED DIFF    Collection Time: 03/16/19  4:47 AM   Result Value Ref Range    WBC 28.8 (H) 3.6 - 11.0 K/uL    RBC 3.98 3.80 - 5.20 M/uL    HGB 11.9 11.5 - 16.0 g/dL    HCT 37.0 35.0 - 47.0 %    MCV 93.0 80.0 - 99.0 FL    MCH 29.9 26.0 - 34.0 PG    MCHC 32.2 30.0 - 36.5 g/dL    RDW 13.9 11.5 - 14.5 %    PLATELET 243 619 - 576 K/uL    MPV 9.2 8.9 - 12.9 FL    NRBC 0.0 0  WBC    ABSOLUTE NRBC 0.00 0.00 - 0.01 K/uL    NEUTROPHILS 73 32 - 75 %    BAND NEUTROPHILS 9 (H) 0 - 6 %    LYMPHOCYTES 8 (L) 12 - 49 %    MONOCYTES 6 5 - 13 %    EOSINOPHILS 0 0 - 7 %    BASOPHILS 0 0 - 1 %    METAMYELOCYTES 3 (H) 0 %    MYELOCYTES 1 (H) 0 %    IMMATURE GRANULOCYTES 0 %    ABS. NEUTROPHILS 23.6 (H) 1.8 - 8.0 K/UL    ABS. LYMPHOCYTES 2.3 0.8 - 3.5 K/UL    ABS. MONOCYTES 1.7 (H) 0.0 - 1.0 K/UL    ABS. EOSINOPHILS 0.0 0.0 - 0.4 K/UL    ABS. BASOPHILS 0.0 0.0 - 0.1 K/UL    ABS. IMM.  GRANS. 0.0 K/UL    DF MANUAL      RBC COMMENTS NORMOCYTIC, NORMOCHROMIC     METABOLIC PANEL, BASIC    Collection Time: 03/16/19  4:47 AM   Result Value Ref Range    Sodium 138 136 - 145 mmol/L    Potassium 4.2 3.5 - 5.1 mmol/L    Chloride 107 97 - 108 mmol/L    CO2 27 21 - 32 mmol/L    Anion gap 4 (L) 5 - 15 mmol/L    Glucose 104 (H) 65 - 100 mg/dL    BUN 5 (L) 6 - 20 MG/DL    Creatinine 0.54 (L) 0.55 - 1.02 MG/DL    BUN/Creatinine ratio 9 (L) 12 - 20      GFR est AA >60 >60 ml/min/1.73m2    GFR est non-AA >60 >60 ml/min/1.73m2    Calcium 7.3 (L) 8.5 - 10.1 MG/DL       Medications reviewed  Current Facility-Administered Medications   Medication Dose Route Frequency    meropenem (MERREM) 500 mg in 0.9% sodium chloride (MBP/ADV) 50 mL  0.5 g IntraVENous Q6H    vancomycin (VANCOCIN) 1,250 mg in 0.9% sodium chloride 250 mL IVPB  1,250 mg IntraVENous Q12H    dextrose 5% - 0.45% NaCl with KCl 20 mEq/L infusion  100 mL/hr IntraVENous CONTINUOUS    sodium chloride (NS) flush 5-40 mL  5-40 mL IntraVENous Q8H    acetaminophen (TYLENOL) tablet 650 mg  650 mg Oral Q6H PRN    sodium chloride (NS) flush 5-40 mL  5-40 mL IntraVENous Q8H    sodium chloride (NS) flush 5-40 mL  5-40 mL IntraVENous PRN    naloxone (NARCAN) injection 0.4 mg  0.4 mg IntraVENous PRN    HYDROcodone-acetaminophen (NORCO) 5-325 mg per tablet 1 Tab  1 Tab Oral Q4H PRN    HYDROmorphone (PF) (DILAUDID) injection 0.5 mg  0.5 mg IntraVENous Q4H PRN    ondansetron (ZOFRAN) injection 4 mg  4 mg IntraVENous Q4H PRN    enoxaparin (LOVENOX) injection 40 mg  40 mg SubCUTAneous Q24H    pravastatin (PRAVACHOL) tablet 40 mg  40 mg Oral QHS    venlafaxine (EFFEXOR) tablet 50 mg  50 mg Oral DAILY    metroNIDAZOLE (FLAGYL) IVPB premix 500 mg  500 mg IntraVENous Q12H    albuterol (PROVENTIL VENTOLIN) nebulizer solution 2.5 mg  2.5 mg Nebulization Q6H PRN       Care Plan discussed with: Patient/Family    Total time spent with patient and review of records: 30 minutes.     Yoel Burgess MD

## 2019-03-17 LAB
ANION GAP SERPL CALC-SCNC: 4 MMOL/L (ref 5–15)
BASOPHILS # BLD: 0 K/UL (ref 0–0.1)
BASOPHILS NFR BLD: 0 % (ref 0–1)
BUN SERPL-MCNC: 4 MG/DL (ref 6–20)
BUN/CREAT SERPL: 8 (ref 12–20)
CALCIUM SERPL-MCNC: 7.4 MG/DL (ref 8.5–10.1)
CHLORIDE SERPL-SCNC: 104 MMOL/L (ref 97–108)
CO2 SERPL-SCNC: 27 MMOL/L (ref 21–32)
CREAT SERPL-MCNC: 0.49 MG/DL (ref 0.55–1.02)
DIFFERENTIAL METHOD BLD: ABNORMAL
EOSINOPHIL # BLD: 0 K/UL (ref 0–0.4)
EOSINOPHIL NFR BLD: 0 % (ref 0–7)
ERYTHROCYTE [DISTWIDTH] IN BLOOD BY AUTOMATED COUNT: 14.3 % (ref 11.5–14.5)
GLUCOSE SERPL-MCNC: 247 MG/DL (ref 65–100)
HCT VFR BLD AUTO: 35.4 % (ref 35–47)
HGB BLD-MCNC: 11.4 G/DL (ref 11.5–16)
IMM GRANULOCYTES # BLD AUTO: 0 K/UL
IMM GRANULOCYTES NFR BLD AUTO: 0 %
LYMPHOCYTES # BLD: 1.5 K/UL (ref 0.8–3.5)
LYMPHOCYTES NFR BLD: 6 % (ref 12–49)
MCH RBC QN AUTO: 30 PG (ref 26–34)
MCHC RBC AUTO-ENTMCNC: 32.2 G/DL (ref 30–36.5)
MCV RBC AUTO: 93.2 FL (ref 80–99)
METAMYELOCYTES NFR BLD MANUAL: 4 %
MONOCYTES # BLD: 1.5 K/UL (ref 0–1)
MONOCYTES NFR BLD: 6 % (ref 5–13)
MYELOCYTES NFR BLD MANUAL: 2 %
NEUTS BAND NFR BLD MANUAL: 9 % (ref 0–6)
NEUTS SEG # BLD: 21 K/UL (ref 1.8–8)
NEUTS SEG NFR BLD: 73 % (ref 32–75)
NRBC # BLD: 0 K/UL (ref 0–0.01)
NRBC BLD-RTO: 0 PER 100 WBC
PLATELET # BLD AUTO: 237 K/UL (ref 150–400)
PMV BLD AUTO: 9.2 FL (ref 8.9–12.9)
POTASSIUM SERPL-SCNC: 4.6 MMOL/L (ref 3.5–5.1)
RBC # BLD AUTO: 3.8 M/UL (ref 3.8–5.2)
RBC MORPH BLD: ABNORMAL
SODIUM SERPL-SCNC: 135 MMOL/L (ref 136–145)
WBC # BLD AUTO: 25.6 K/UL (ref 3.6–11)

## 2019-03-17 PROCEDURE — 80048 BASIC METABOLIC PNL TOTAL CA: CPT

## 2019-03-17 PROCEDURE — 74011250636 HC RX REV CODE- 250/636: Performed by: SURGERY

## 2019-03-17 PROCEDURE — 36415 COLL VENOUS BLD VENIPUNCTURE: CPT

## 2019-03-17 PROCEDURE — 74011000258 HC RX REV CODE- 258: Performed by: SURGERY

## 2019-03-17 PROCEDURE — 97116 GAIT TRAINING THERAPY: CPT

## 2019-03-17 PROCEDURE — 74011250636 HC RX REV CODE- 250/636: Performed by: INTERNAL MEDICINE

## 2019-03-17 PROCEDURE — 97161 PT EVAL LOW COMPLEX 20 MIN: CPT

## 2019-03-17 PROCEDURE — 74011250637 HC RX REV CODE- 250/637: Performed by: INTERNAL MEDICINE

## 2019-03-17 PROCEDURE — 74011250637 HC RX REV CODE- 250/637: Performed by: SURGERY

## 2019-03-17 PROCEDURE — 65270000029 HC RM PRIVATE

## 2019-03-17 PROCEDURE — 85025 COMPLETE CBC W/AUTO DIFF WBC: CPT

## 2019-03-17 RX ORDER — HYDROCODONE BITARTRATE AND ACETAMINOPHEN 5; 325 MG/1; MG/1
1 TABLET ORAL
Status: DISCONTINUED | OUTPATIENT
Start: 2019-03-17 | End: 2019-03-18

## 2019-03-17 RX ADMIN — Medication 10 ML: at 14:00

## 2019-03-17 RX ADMIN — DEXTROSE MONOHYDRATE, SODIUM CHLORIDE, AND POTASSIUM CHLORIDE 100 ML/HR: 50; 4.5; 1.49 INJECTION, SOLUTION INTRAVENOUS at 22:28

## 2019-03-17 RX ADMIN — Medication 10 ML: at 22:30

## 2019-03-17 RX ADMIN — HYDROCODONE BITARTRATE AND ACETAMINOPHEN 1 TABLET: 5; 325 TABLET ORAL at 17:00

## 2019-03-17 RX ADMIN — HYDROCODONE BITARTRATE AND ACETAMINOPHEN 1 TABLET: 5; 325 TABLET ORAL at 03:47

## 2019-03-17 RX ADMIN — MEROPENEM 500 MG: 500 INJECTION, POWDER, FOR SOLUTION INTRAVENOUS at 03:47

## 2019-03-17 RX ADMIN — DEXTROSE MONOHYDRATE, SODIUM CHLORIDE, AND POTASSIUM CHLORIDE 100 ML/HR: 50; 4.5; 1.49 INJECTION, SOLUTION INTRAVENOUS at 22:23

## 2019-03-17 RX ADMIN — HYDROMORPHONE HYDROCHLORIDE 0.5 MG: 2 INJECTION INTRAMUSCULAR; INTRAVENOUS; SUBCUTANEOUS at 20:28

## 2019-03-17 RX ADMIN — VENLAFAXINE 50 MG: 25 TABLET ORAL at 09:48

## 2019-03-17 RX ADMIN — METRONIDAZOLE 500 MG: 500 INJECTION, SOLUTION INTRAVENOUS at 22:54

## 2019-03-17 RX ADMIN — ENOXAPARIN SODIUM 40 MG: 40 INJECTION SUBCUTANEOUS at 22:29

## 2019-03-17 RX ADMIN — VANCOMYCIN HYDROCHLORIDE 1500 MG: 10 INJECTION, POWDER, LYOPHILIZED, FOR SOLUTION INTRAVENOUS at 04:01

## 2019-03-17 RX ADMIN — MEROPENEM 500 MG: 500 INJECTION, POWDER, FOR SOLUTION INTRAVENOUS at 09:51

## 2019-03-17 RX ADMIN — MEROPENEM 500 MG: 500 INJECTION, POWDER, FOR SOLUTION INTRAVENOUS at 17:02

## 2019-03-17 RX ADMIN — MEROPENEM 500 MG: 500 INJECTION, POWDER, FOR SOLUTION INTRAVENOUS at 22:24

## 2019-03-17 RX ADMIN — HYDROCODONE BITARTRATE AND ACETAMINOPHEN 1 TABLET: 5; 325 TABLET ORAL at 09:48

## 2019-03-17 RX ADMIN — VANCOMYCIN HYDROCHLORIDE 1500 MG: 10 INJECTION, POWDER, LYOPHILIZED, FOR SOLUTION INTRAVENOUS at 18:39

## 2019-03-17 RX ADMIN — METRONIDAZOLE 500 MG: 500 INJECTION, SOLUTION INTRAVENOUS at 10:00

## 2019-03-17 RX ADMIN — HYDROMORPHONE HYDROCHLORIDE 0.5 MG: 2 INJECTION INTRAMUSCULAR; INTRAVENOUS; SUBCUTANEOUS at 11:56

## 2019-03-17 RX ADMIN — PRAVASTATIN SODIUM 40 MG: 20 TABLET ORAL at 22:25

## 2019-03-17 NOTE — PROGRESS NOTES
.Bedside shift change report given to Álvaro Duggan RN (oncoming nurse) by Maritza Bosch RN (offgoing nurse). Report included the following information SBAR, Kardex, MAR, Accordion, Pre Procedure Checklist, Procedure Verification and Quality Measures.

## 2019-03-17 NOTE — PROGRESS NOTES
Daily Progress Note: 3/17/2019  Yissel Nicolas MD    Assessment/Plan:   Diverticulitis: failing antibiotics. Now with abscess evident on CT a/p. General surgery to see. ---Keep NPO   ---HOLDING DVT chemoprophylaxis and ASA. --- IV Levaquin, Flagyl, and aztreonam   ---Drain placed 3/13 by IR and removed 3/16  ---OR 3/15    Hyponatremia / Hypokalemia:   ---suspect from IVVD and thiazide diuretic use.   ---HOLDING HCTZ.   ---IVF with KCl.   ---Follow BMP     Hypertension: controlled.   ---Holding home antihypertensives      High cholesterol:   ---continue statin     Depression: continue Cymbalta     GERD (gastroesophageal reflux disease):   ---PPI PRN     Cancer: left breast. Left central IDC, gr 2, ER +, [place] +, HER 2 positive:  cT2 cN1a cM0, stage IIB, prognostic stage IB.   ---Followed by Dr. Doe Hoang. Wheeze: mild. Smoker. Advise smoking cessation. Albuterol PRN     Adverse effect of anesthesia: Overview: \"difficulty waking up from anesthethia\".  Will need to monitor closely in PACU as appropriate                     Problem List:  Problem List as of 3/17/2019 Date Reviewed: 3/12/2019          Codes Class Noted - Resolved    Hypertension ICD-10-CM: I10  ICD-9-CM: 401.9  3/12/2019 - Present        High cholesterol ICD-10-CM: E78.00  ICD-9-CM: 272.0  3/12/2019 - Present        GERD (gastroesophageal reflux disease) ICD-10-CM: K21.9  ICD-9-CM: 530.81  3/12/2019 - Present        Depression ICD-10-CM: F32.9  ICD-9-CM: 910  3/12/2019 - Present        Adverse effect of anesthesia ICD-10-CM: T41.45XA  ICD-9-CM: 995.22  3/12/2019 - Present    Overview Signed 3/12/2019  7:39 PM by Laly Talbot MD     \"difficulty waking up from anesthethia\"             Diverticulitis ICD-10-CM: Y64.43  ICD-9-CM: 562.11  3/12/2019 - Present        Hyponatremia ICD-10-CM: E87.1  ICD-9-CM: 276.1  3/12/2019 - Present        Hypokalemia ICD-10-CM: E87.6  ICD-9-CM: 276.8  3/12/2019 - Present        Abscess ICD-10-CM: L02.91  ICD-9-CM: 682.9  3/12/2019 - Present        Breast cancer, left ([facility] [address] ICD-10-CM: C50.912  ICD-9-CM: 174.9  2/19/2019 - Present    Overview Addendum 3/12/2019  4:58 PM by Dulce Maria Dela Cruz MD     2/2019 LEFT invasive ductal carcinoma, grade 2, %. IA 20%, Her 2 positive  Positive axillary node, %. IA neg, Her 2 positive  TCH-P               Cancer [facility]) ICD-10-CM: C80.1  ICD-9-CM: 199.1  2/1/2019 - Present    Overview Signed 3/12/2019  7:39 PM by Parker Cobb MD     left breast                   HPI:   Ms. Elizabeth Ramirez is a 74 y.o.  female who is admitted to the Gen Surg Service for diverticulitis with abscess. We are asked to evaluate for medical mgmt. Ms. Elizabeth Ramirez is complaining of abdominal pain. Ongoing for about a week now. Seen in ED a few days ago, sent home on oral abx (levaquin/Flagyl) however did not improve. (Dr Asad Carrero)    3/13: Feeling a little better. Abd feels less tight. No BM for about 6 days but has had little po. K+ low so will replete. 3/14: Feeling pretty miserable this am. She is c/o increased pressure in the LLQ. K+ improved but still low so will replete further. Afebrile. 3/15:  Less pain today but WBC is up to 23. No fever. Abd soft but tender. Drain patent. K+ normal.     3/16: OR 3/15 colectomy with colostomy for diverticulitis and perforation. Afebrile. WBC high this am. She is feeling better. Pain is under control. No nausea or vomiting. 3/17: WBC a little better. \"stomach still feels tight\". Afebrile. Bloody liquid in colostomy bag. No nausea or vomiting. BP stable. Review of Systems:   A comprehensive review of systems was negative except for that written in the HPI. Objective:   Physical Exam:     Visit Vitals  /75 (BP 1 Location: Left arm)   Pulse 83   Temp 97.8 °F (36.6 °C)   Resp 16   Ht 5' 6\" (1.676 m)   Wt 178 lb (80.7 kg)   SpO2 96%   Breastfeeding?  No   BMI 28.73 kg/m²    O2 Flow Rate (L/min): 3 l/min O2 Device: Nasal cannula    Temp (24hrs), Av.3 °F (36.8 °C), Min:97.8 °F (36.6 °C), Max:99 °F (37.2 °C)    No intake/output data recorded. 03/15 1901 -  0700  In: 1500 [I.V.:1500]  Out: 175 [Urine:120; Drains:55]    General:  Alert, cooperative,  appears stated age. Head:  Normocephalic, without obvious abnormality, atraumatic. Eyes:  Conjunctivae/corneas clear. PERRL, EOMs intact. Nose: Nares normal. Septum midline. Mucosa normal. No drainage or sinus tenderness. Throat: Lips, mucosa, and tongue normal. Teeth and gums normal.   Neck: Supple, symmetrical, trachea midline, no adenopathy, thyroid: no enlargement/tenderness/nodules, no carotid bruit and no JVD. Back:   Symmetric, no curvature. ROM normal. No CVA tenderness. Lungs:   Clear to auscultation bilaterally. Chest wall:  No tenderness or deformity. Heart:  Regular rate and rhythm, S1, S2 normal, no murmur, click, rub or gallop. Abdomen:   Soft, colostomy patent, Bowel sounds present. No masses,  No organomegaly. Extremities: Extremities normal, atraumatic, no cyanosis or edema. No calf tenderness or cords. Pulses: 2+ and symmetric all extremities. Skin: Skin color, texture, turgor normal. No rashes or lesions   Neurologic: CNII-XII intact. Alert and oriented X 3. Fine motor of hands and fingers normal.   equal.  No cogwheeling or rigidity. Gait not tested at this time. Sensation grossly normal to touch. Gross motor of extremities normal.       Data Review:       Recent Days:  Recent Labs     19  0354 03/16/19  0447 03/15/19  0518   WBC 25.6* 28.8* 23.5*   HGB 11.4* 11.9 11.5   HCT 35.4 37.0 35.4    259 224     Recent Labs     19  0354 03/16/19  0447 03/15/19  0518   * 138 137   K 4.6 4.2 4.0    107 106   CO2 27 27 25   * 104* 76   BUN 4* 5* 6   CREA 0.49* 0.54* 0.51*   CA 7.4* 7.3* 7.4*     No results for input(s): PH, PCO2, PO2, HCO3, FIO2 in the last 72 hours.     24 Hour Results:  Recent Results (from the past 24 hour(s))   VANCOMYCIN, TROUGH    Collection Time: 03/16/19  4:44 PM   Result Value Ref Range    Vancomycin,trough 10.0 5.0 - 10.0 ug/mL    Reported dose date: NOT PROVIDED      Reported dose time: NOT PROVIDED      Reported dose: NOT PROVIDED UNITS   CBC WITH AUTOMATED DIFF    Collection Time: 03/17/19  3:54 AM   Result Value Ref Range    WBC 25.6 (H) 3.6 - 11.0 K/uL    RBC 3.80 3.80 - 5.20 M/uL    HGB 11.4 (L) 11.5 - 16.0 g/dL    HCT 35.4 35.0 - 47.0 %    MCV 93.2 80.0 - 99.0 FL    MCH 30.0 26.0 - 34.0 PG    MCHC 32.2 30.0 - 36.5 g/dL    RDW 14.3 11.5 - 14.5 %    PLATELET 853 207 - 851 K/uL    MPV 9.2 8.9 - 12.9 FL    NRBC 0.0 0  WBC    ABSOLUTE NRBC 0.00 0.00 - 0.01 K/uL    NEUTROPHILS 73 32 - 75 %    BAND NEUTROPHILS 9 (H) 0 - 6 %    LYMPHOCYTES 6 (L) 12 - 49 %    MONOCYTES 6 5 - 13 %    EOSINOPHILS 0 0 - 7 %    BASOPHILS 0 0 - 1 %    METAMYELOCYTES 4 (H) 0 %    MYELOCYTES 2 (H) 0 %    IMMATURE GRANULOCYTES 0 %    ABS. NEUTROPHILS 21.0 (H) 1.8 - 8.0 K/UL    ABS. LYMPHOCYTES 1.5 0.8 - 3.5 K/UL    ABS. MONOCYTES 1.5 (H) 0.0 - 1.0 K/UL    ABS. EOSINOPHILS 0.0 0.0 - 0.4 K/UL    ABS. BASOPHILS 0.0 0.0 - 0.1 K/UL    ABS. IMM.  GRANS. 0.0 K/UL    DF MANUAL      RBC COMMENTS NORMOCYTIC, NORMOCHROMIC     METABOLIC PANEL, BASIC    Collection Time: 03/17/19  3:54 AM   Result Value Ref Range    Sodium 135 (L) 136 - 145 mmol/L    Potassium 4.6 3.5 - 5.1 mmol/L    Chloride 104 97 - 108 mmol/L    CO2 27 21 - 32 mmol/L    Anion gap 4 (L) 5 - 15 mmol/L    Glucose 247 (H) 65 - 100 mg/dL    BUN 4 (L) 6 - 20 MG/DL    Creatinine 0.49 (L) 0.55 - 1.02 MG/DL    BUN/Creatinine ratio 8 (L) 12 - 20      GFR est AA >60 >60 ml/min/1.73m2    GFR est non-AA >60 >60 ml/min/1.73m2    Calcium 7.4 (L) 8.5 - 10.1 MG/DL       Medications reviewed  Current Facility-Administered Medications   Medication Dose Route Frequency    vancomycin (VANCOCIN) 1500 mg in  ml infusion  1,500 mg IntraVENous Q12H    meropenem (MERREM) 500 mg in 0.9% sodium chloride (MBP/ADV) 50 mL  0.5 g IntraVENous Q6H    dextrose 5% - 0.45% NaCl with KCl 20 mEq/L infusion  100 mL/hr IntraVENous CONTINUOUS    sodium chloride (NS) flush 5-40 mL  5-40 mL IntraVENous Q8H    acetaminophen (TYLENOL) tablet 650 mg  650 mg Oral Q6H PRN    sodium chloride (NS) flush 5-40 mL  5-40 mL IntraVENous Q8H    sodium chloride (NS) flush 5-40 mL  5-40 mL IntraVENous PRN    naloxone (NARCAN) injection 0.4 mg  0.4 mg IntraVENous PRN    HYDROcodone-acetaminophen (NORCO) 5-325 mg per tablet 1 Tab  1 Tab Oral Q4H PRN    HYDROmorphone (PF) (DILAUDID) injection 0.5 mg  0.5 mg IntraVENous Q4H PRN    ondansetron (ZOFRAN) injection 4 mg  4 mg IntraVENous Q4H PRN    enoxaparin (LOVENOX) injection 40 mg  40 mg SubCUTAneous Q24H    pravastatin (PRAVACHOL) tablet 40 mg  40 mg Oral QHS    venlafaxine (EFFEXOR) tablet 50 mg  50 mg Oral DAILY    metroNIDAZOLE (FLAGYL) IVPB premix 500 mg  500 mg IntraVENous Q12H    albuterol (PROVENTIL VENTOLIN) nebulizer solution 2.5 mg  2.5 mg Nebulization Q6H PRN       Care Plan discussed with: Patient/Family    Total time spent with patient and review of records: 30 minutes.     Sanjiv Loja MD

## 2019-03-17 NOTE — PROGRESS NOTES
Physical Therapy orders acknowledged, chart reviewed and discussed with nurse cleared for therapy evaluation however patient requested to come back later, just ambulated with the nurse an hour ago and still in pain. We will come back in an hour for Physical Therapy evaluation and coordinate with nurse for pain meds. Thank you.

## 2019-03-17 NOTE — PROGRESS NOTES
General Surgery Daily Progress Note    Patient: Darren Hooper MRN: [id number]  SSN: [id number]    YOB: 1945  Age: 74 y.o. Sex: female      Admit Date: 3/12/2019    Subjective:   Patient better controlled ambulated in hallway afebrile. Current Facility-Administered Medications   Medication Dose Route Frequency    vancomycin (VANCOCIN) 1500 mg in  ml infusion  1,500 mg IntraVENous Q12H    meropenem (MERREM) 500 mg in 0.9% sodium chloride (MBP/ADV) 50 mL  0.5 g IntraVENous Q6H    dextrose 5% - 0.45% NaCl with KCl 20 mEq/L infusion  100 mL/hr IntraVENous CONTINUOUS    sodium chloride (NS) flush 5-40 mL  5-40 mL IntraVENous Q8H    acetaminophen (TYLENOL) tablet 650 mg  650 mg Oral Q6H PRN    sodium chloride (NS) flush 5-40 mL  5-40 mL IntraVENous Q8H    sodium chloride (NS) flush 5-40 mL  5-40 mL IntraVENous PRN    naloxone (NARCAN) injection 0.4 mg  0.4 mg IntraVENous PRN    HYDROcodone-acetaminophen (NORCO) 5-325 mg per tablet 1 Tab  1 Tab Oral Q4H PRN    HYDROmorphone (PF) (DILAUDID) injection 0.5 mg  0.5 mg IntraVENous Q4H PRN    ondansetron (ZOFRAN) injection 4 mg  4 mg IntraVENous Q4H PRN    enoxaparin (LOVENOX) injection 40 mg  40 mg SubCUTAneous Q24H    pravastatin (PRAVACHOL) tablet 40 mg  40 mg Oral QHS    venlafaxine (EFFEXOR) tablet 50 mg  50 mg Oral DAILY    metroNIDAZOLE (FLAGYL) IVPB premix 500 mg  500 mg IntraVENous Q12H    albuterol (PROVENTIL VENTOLIN) nebulizer solution 2.5 mg  2.5 mg Nebulization Q6H PRN        Objective:   No intake/output data recorded. 03/15 1901 - 03/17 0700  In: 1500 [I.V.:1500]  Out: 175 [Urine:120; Drains:55]  Patient Vitals for the past 8 hrs:   BP Temp Pulse Resp SpO2   03/17/19 0723 138/75 97.8 °F (36.6 °C) 83 16 96 %   03/17/19 0315 129/60 98.4 °F (36.9 °C) 90 16 95 %       Physical Exam:  General: Alert, cooperative, no distress, appears stated age.   Neck:  Supple, symmetrical, trachea midline, no adenopathy, thyroid: no enlargement/tenderness/nodules, no carotid bruit and no JVD. Lungs: Clear to auscultation bilaterally. Heart:  Regular rate and rhythm, S1, S2 normal, no murmur, click, rub or gallop. Abdomen: Stoma healthy dressing intact Soft, non-tender. Bowel sounds normal. No masses,  No organomegaly. Extremities: Extremities normal, atraumatic, no cyanosis or edema.   Skin:  Skin color, texture, turgor normal. No rashes or lesions    Labs:   Recent Labs     03/17/19  0354   WBC 25.6*   HGB 11.4*   HCT 35.4        Recent Labs     03/17/19  0354   *   K 4.6      CO2 27   *   BUN 4*   CREA 0.49*   CA 7.4*     X-ray   Assessment / Plan:   · Recovering well  · White count still high but no fever  · Clear liquids   · Continue antibiotics    Active Problems:    Hypertension (3/12/2019)      High cholesterol (3/12/2019)      GERD (gastroesophageal reflux disease) (3/12/2019)      Depression (3/12/2019)      Cancer ([facility] [address] (2/1/2019)      Overview: left breast      Adverse effect of anesthesia (3/12/2019)      Overview: \"difficulty waking up from anesthethia\"      Diverticulitis (3/12/2019)      Hyponatremia (3/12/2019)      Hypokalemia (3/12/2019)      Abscess (3/12/2019)

## 2019-03-17 NOTE — PROGRESS NOTES
Problem: Mobility Impaired (Adult and Pediatric)  Goal: *Acute Goals and Plan of Care (Insert Text)  Physical Therapy Goals  Initiated 3/17/2019  1. Patient will move from supine to sit and sit to supine , scoot up and down and roll side to side in bed with independence within 7 day(s). 2.  Patient will transfer from bed to chair and chair to bed with independence using the least restrictive device within 7 day(s). 3.  Patient will perform sit to stand with independence within 7 day(s). 4.  Patient will ambulate with independence for 300 feet with the least restrictive device within 7 day(s). 5.  Patient will ascend/descend 2 stairs with  handrail(s) with independence within 7 day(s). physical Therapy EVALUATION  Patient: Jasson Ryan (74 y.o. female)  Date: 3/17/2019  Primary Diagnosis: Diverticulitis [K57.92]  Procedure(s) (LRB):  LAPAROTOMY EXPLORATORY COLECTOMY AND COLOSTOMY (N/A) 2 Days Post-Op   Precautions: cantact       ASSESSMENT :  Based on the objective data described below, the patient presents with generalized weakness and debility. Rolled on the edge of bed CGA, supine to cit CGA, sit to stand SBA, ambulate without assistive device SBA out on the 4th floor hallway. No loss of balance steady sitting and standing balance. Assisted in and out of the bathroom SBA performed some active range of motion exercise on both LE all planes. Educate and  Instructed patient to continue active range of motion exercise on both legs while up on chair or on bed. Assisted back to bed supine notified nurse who agreed to monitor patient. Patient will benefit from skilled intervention to address the above impairments.   Patients rehabilitation potential is considered to be Excellent  Factors which may influence rehabilitation potential include:   []         None noted  []         Mental ability/status  []         Medical condition  []         Home/family situation and support systems  []         Safety awareness  [x]         Pain tolerance/management  []         Other:      PLAN :  Recommendations and Planned Interventions:  [x]           Bed Mobility Training             []    Neuromuscular Re-Education  [x]           Transfer Training                   []    Orthotic/Prosthetic Training  [x]           Gait Training                         []    Modalities  [x]           Therapeutic Exercises           []    Edema Management/Control  [x]           Therapeutic Activities            []    Patient and Family Training/Education  []           Other (comment):    Frequency/Duration: Patient will be followed by physical therapy  5 times a week to address goals. Discharge Recommendations: none  Further Equipment Recommendations for Discharge: none     SUBJECTIVE:   Patient stated Ready for my walk.     OBJECTIVE DATA SUMMARY:   HISTORY:    Past Medical History:   Diagnosis Date    Adverse effect of anesthesia     \"difficulty waking up from anesthethia\"    Cancer ([facility] [address] 02/2019    left breast    Depression     GERD (gastroesophageal reflux disease)     High cholesterol     Hypertension      Past Surgical History:   Procedure Laterality Date    HX BREAST BIOPSY Right years ago    neg; needle bx    HX CATARACT REMOVAL      HX COLONOSCOPY      HX ORTHOPAEDIC      carpal tunnel     Prior Level of Function/Home Situation: Independent community ambulator without assistive device. Personal factors and/or comorbidities impacting plan of care:     Home Situation  Home Environment: Private residence  # Steps to Enter: 3  One/Two Story Residence: One story  Living Alone: Yes  Support Systems: None  Patient Expects to be Discharged to[de-identified] Private residence  Current DME Used/Available at Home: None    EXAMINATION/PRESENTATION/DECISION MAKING:   Critical Behavior:  Neurologic State: Alert  Orientation Level: Oriented X4  Cognition: Appropriate for age attention/concentration, Follows commands     Hearing:   Auditory  Auditory Impairment: None    Range Of Motion:  AROM: Within functional limits           PROM: Within functional limits           Strength:    Strength: Within functional limits                    Tone & Sensation:                                  Coordination:  Coordination: Within functional limits  Vision:      Functional Mobility:  Bed Mobility:  Rolling: Contact guard assistance  Supine to Sit: Contact guard assistance  Sit to Supine: Contact guard assistance  Scooting: Contact guard assistance  Transfers:  Sit to Stand: Stand-by assistance  Stand to Sit: Stand-by assistance  Stand Pivot Transfers: Stand-by assistance     Bed to Chair: Stand-by assistance              Balance:   Sitting: Intact  Standing: Intact; Without support  Ambulation/Gait Training:  Distance (ft): 200 Feet (ft)     Ambulation - Level of Assistance: Stand-by assistance     Gait Description (WDL): Exceptions to WDL  Gait Abnormalities: Antalgic                          Therapeutic Exercises:    Instructed patient to continue active range of motion exercise on both legs while up on chair or on bed. Functional Measure:  Barthel Index:    Bathin  Bladder: 10  Bowels: 10  Groomin  Dressing: 10  Feeding: 10  Mobility: 15  Stairs: 0  Toilet Use: 10  Transfer (Bed to Chair and Back): 15  Total: 90/100       Percentage of impairment   0%   1-19%   20-39%   40-59%   60-79%   80-99%   100%   Barthel Score 0-100 100 99-80 79-60 59-40 20-39 1-19   0     The Barthel ADL Index: Guidelines  1. The index should be used as a record of what a patient does, not as a record of what a patient could do. 2. The main aim is to establish degree of independence from any help, physical or verbal, however minor and for whatever reason. 3. The need for supervision renders the patient not independent. 4. A patient's performance should be established using the best available evidence.  Asking the patient, friends/relatives and nurses are the usual sources, but direct observation and common sense are also important. However direct testing is not needed. 5. Usually the patient's performance over the preceding 24-48 hours is important, but occasionally longer periods will be relevant. 6. Middle categories imply that the patient supplies over 50 per cent of the effort. 7. Use of aids to be independent is allowed. Ailcja Locke., Barthel, D.W. (0665). Functional evaluation: the Barthel Index. [address] [facility] (14)2. ASHWIN Flores, Jamila Saldivar., Joana Osler., [place], [address] (1999). Measuring the change indisability after inpatient rehabilitation; comparison of the responsiveness of the Barthel Index and Functional [place] Measure. Journal of Neurology, Neurosurgery, and Psychiatry, 66(4), 698-367. BUD Proctor, LAKEISHA Martines, & Denny Mackenzie, MDayanaA. (2004.) Assessment of post-stroke quality of life in cost-effectiveness studies: The usefulness of the Barthel Index and the EuroQoL-5D. Quality of Life Research, 15, 712-93          Physical Therapy Evaluation Charge Determination   History Examination Presentation Decision-Making   LOW Complexity : Zero comorbidities / personal factors that will impact the outcome / POC LOW Complexity : 1-2 Standardized tests and measures addressing body structure, function, activity limitation and / or participation in recreation  LOW Complexity : Stable, uncomplicated  Other outcome measures barthel  LOW       Based on the above components, the patient evaluation is determined to be of the following complexity level: LOW     Pain:  Pain Scale 1: Numeric (0 - 10)  Pain Intensity 1: 8  Pain Location 1: Abdomen  Pain Orientation 1: Anterior  Pain Description 1: Aching;Constant;Gnawing  Pain Intervention(s) 1: Medication (see MAR)  Activity Tolerance:   Good. Please refer to the flowsheet for vital signs taken during this treatment.   After treatment:   [x]         Patient left in no apparent distress sitting up in chair  [x]         Patient left in no apparent distress in bed  [x]         Call bell left within reach  [x]         Nursing notified  []         Caregiver present  []         Bed alarm activated    COMMUNICATION/EDUCATION:   The patients plan of care was discussed with: Registered Nurse and patient. [x]         Fall prevention education was provided and the patient/caregiver indicated understanding. [x]         Patient/family have participated as able in goal setting and plan of care. [x]         Patient/family agree to work toward stated goals and plan of care. []         Patient understands intent and goals of therapy, but is neutral about his/her participation. []         Patient is unable to participate in goal setting and plan of care. Thank you for this referral.  Avelino Crawford, PT,WCC.    Time Calculation: 27 mins

## 2019-03-17 NOTE — PROGRESS NOTES
[facility] [place] at [address], [address] [facility]  Damian Esquivelch: [phone]  F: [phone]      Reason for Visit:   Marcia Duggan is a 74 y.o. female who is seen in consultation at the request of Dr. Macario Nguyễn for continuity of care. Hematology / Oncology Treatment History:   · 2/15/19 Left core bx:   IDC, gr 2, 1.1 cm, ER + at 100%, OR + at 20%, HER 2 POSITIVE (IHC 2+; FISH ratio 3.9; sig/cell 9.2)  · 3/1/19 Left ax LN core bx:  + for breast cancer, ER + at 100%, OR negative, HER 2 POSITIVE (IHC 2+, FISH ratio 2; sig/cell 5.8)  · [facility]-P 3/6/19-      History of Present Illness:     Seen in the ED on 3/8/2019 for abd pain and constipation. ED CT  Sowed diverticulitis; was initiated on levaquin and flagyl. Seen  In our clinic yesterday with worsening abd pain; therefore was directed to ED for further eval. ED CT scan shows acute diverticulitis with abscesses. Admitted for further eval and management. Ms Mcdaniel Flavin feeling better this morning. Reports abd \"discomfort/ bloating/pressure\". Rates pain 6/10. Does not want hydrocodone but will try some Tylenol. Had some loose stool this am. Denies any SOB. Denies N/V at present. Sister at bedside. Interval History:   OR 3/15/19 with colostomy for diverticulitis and perforation.   Afebrile, has been OOB, better    Past Medical History:   Diagnosis Date    Adverse effect of anesthesia     \"difficulty waking up from anesthethia\"    Cancer ([facility] [address] 02/2019    left breast    Depression     GERD (gastroesophageal reflux disease)     High cholesterol     Hypertension       Past Surgical History:   Procedure Laterality Date    HX BREAST BIOPSY Right years ago    neg; needle bx    HX CATARACT REMOVAL      HX COLONOSCOPY      HX ORTHOPAEDIC      carpal tunnel      Social History     Tobacco Use    Smoking status: Current Every Day Smoker     Packs/day: 0.50     Years: 55.00     Pack years: 27.50    Smokeless tobacco: Never Used   Substance Use Topics    Alcohol use:  Yes     Alcohol/week: 0.6 oz     Types: 1 Shots of liquor per week     Drinks per session: 5 or 6     Binge frequency: Weekly     Comment: Patient stated that she puts a splash of bourbon in her afternoon coffee around 5-6 days per week      Family History   Problem Relation Age of Onset    Breast Cancer Maternal Aunt     Hypertension Mother     Hypertension Father     Cancer Father         Lung    Cancer Brother         leukemia    Diabetes Brother         2 brothers with diabetes    Hypertension Sister         2 sisters and 4 brothers with HTN     Current Facility-Administered Medications   Medication Dose Route Frequency    HYDROcodone-acetaminophen (NORCO) 5-325 mg per tablet 1 Tab  1 Tab Oral Q4H PRN    vancomycin (VANCOCIN) 1500 mg in  ml infusion  1,500 mg IntraVENous Q12H    meropenem (MERREM) 500 mg in 0.9% sodium chloride (MBP/ADV) 50 mL  0.5 g IntraVENous Q6H    dextrose 5% - 0.45% NaCl with KCl 20 mEq/L infusion  100 mL/hr IntraVENous CONTINUOUS    sodium chloride (NS) flush 5-40 mL  5-40 mL IntraVENous Q8H    acetaminophen (TYLENOL) tablet 650 mg  650 mg Oral Q6H PRN    sodium chloride (NS) flush 5-40 mL  5-40 mL IntraVENous Q8H    sodium chloride (NS) flush 5-40 mL  5-40 mL IntraVENous PRN    naloxone (NARCAN) injection 0.4 mg  0.4 mg IntraVENous PRN    HYDROcodone-acetaminophen (NORCO) 5-325 mg per tablet 1 Tab  1 Tab Oral Q4H PRN    HYDROmorphone (PF) (DILAUDID) injection 0.5 mg  0.5 mg IntraVENous Q4H PRN    ondansetron (ZOFRAN) injection 4 mg  4 mg IntraVENous Q4H PRN    enoxaparin (LOVENOX) injection 40 mg  40 mg SubCUTAneous Q24H    pravastatin (PRAVACHOL) tablet 40 mg  40 mg Oral QHS    venlafaxine (EFFEXOR) tablet 50 mg  50 mg Oral DAILY    metroNIDAZOLE (FLAGYL) IVPB premix 500 mg  500 mg IntraVENous Q12H    albuterol (PROVENTIL VENTOLIN) nebulizer solution 2.5 mg  2.5 mg Nebulization Q6H PRN Allergies   Allergen Reactions    Doxycycline Diarrhea    Keflex [Cephalexin] Hives    Penicillins Rash        Review of Systems: A complete review of systems was obtained, negative except as described above. Physical Exam:     Visit Vitals  /76 (BP 1 Location: Left arm)   Pulse 83   Temp 98.8 °F (37.1 °C)   Resp 16   Ht 5' 6\" (1.676 m)   Wt 178 lb (80.7 kg)   SpO2 98%   Breastfeeding? No   BMI 28.73 kg/m²     ECOG PS: 1  General: No distress  Eyes: PERRLA, anicteric sclerae  HENT: Atraumatic with normal appearance of ears and nose; OP clear  Neck: Supple; no thyromegaly   Lymphatic: No cervical, supraclavicular, or axillary adenopathy  Respiratory: Exp wheezing bilaterally in upper bilateral lobes;  normal respiratory effort  CV: Normal rate, regular rhythm, no murmurs, no peripheral edema  GI: Soft, nondistended, no masses, no hepatomegaly, no splenomegaly  MS: Digits without clubbing or cyanosis. Skin: collection drainage apparatus noted; No rashes, ecchymoses, or petechiae. Normal temperature, turgor, and texture. Neuro/Psych: Alert, oriented, appropriate affect, normal judgment/insight      Results:     Lab Results   Component Value Date/Time    WBC 25.6 (H) 03/17/2019 03:54 AM    HGB 11.4 (L) 03/17/2019 03:54 AM    HCT 35.4 03/17/2019 03:54 AM    PLATELET 473 77/84/8474 03:54 AM    MCV 93.2 03/17/2019 03:54 AM    ABS.  NEUTROPHILS 21.0 (H) 03/17/2019 03:54 AM     Lab Results   Component Value Date/Time    Sodium 135 (L) 03/17/2019 03:54 AM    Potassium 4.6 03/17/2019 03:54 AM    Chloride 104 03/17/2019 03:54 AM    CO2 27 03/17/2019 03:54 AM    Glucose 247 (H) 03/17/2019 03:54 AM    BUN 4 (L) 03/17/2019 03:54 AM    Creatinine 0.49 (L) 03/17/2019 03:54 AM    GFR est AA >60 03/17/2019 03:54 AM    GFR est non-AA >60 03/17/2019 03:54 AM    Calcium 7.4 (L) 03/17/2019 03:54 AM    Glucose (POC) 124 (H) 03/15/2019 09:41 PM     Lab Results   Component Value Date/Time    Bilirubin, total 0.5 03/13/2019 05:06 AM    ALT (SGPT) 20 03/13/2019 05:06 AM    AST (SGOT) 18 03/13/2019 05:06 AM    Alk. phosphatase 69 03/13/2019 05:06 AM    Protein, total 5.9 (L) 03/13/2019 05:06 AM    Albumin 2.3 (L) 03/13/2019 05:06 AM    Globulin 3.6 03/13/2019 05:06 AM     Lab Results   Component Value Date/Time    Lipase 35 (L) 03/12/2019 04:52 PM     No results found for: INR, APTT, DDIMSQ, DDIME, 234019, Marten Sitter, FDPLT, Evertt Casandra, [address] [facility], [address], [address] [facility] [address], [address], [id number], [address]ajrit T[address], [id number], [id number], [id number], 314892    No results found for: CEA, 332306, C199LT, C125, XBSS007, 2729LT, C153LT, PSA, PSALT, LDH, [id number], HCGTLT, AFP, CHRGLT    TTE 3/6/19 EF 66-70%. 3/12/2019 XR ABD ACUTE  IMPRESSION: No acute process in the abdomen. 2/12/2019 CT ABD PELV W CONT    IMPRESSION:    Acute diverticulitis of the sigmoid colon. Development of a focal fluid and gas  collection in the cul-de-sac, consistent with an abscess. Suspected 2 additional  smaller developing abscesses in the pelvis. 3/13/2019 CT guided perc drainage  IMPRESSION: CT guided pelvic/cul-de-sac feculent fluid collection drainage  performed. 30 cc removed; Laboratory results pending. Cath placed for drainage    Assessment and Recommendations:     1. Diverticulitis/Abd abscess/perforation: ecoli, extended spectrum beta lactamase ; Surgery on 3/15; on vanc, meropenem, flagyl    2. Left central IDC, gr 2, ER +, ID +, HER 2 positive:  cT2 cN1a cM0, stage IIB, prognostic stage IB   The goal of systemic adjuvant therapy is to improve the chances for cure and decrease the risk of relapse  3/6 [id number] (Trastuzumab,  Docetaxel,  Carboplatin , Pertuzumab) given every 3 weeks x 6 cycles; Neulasta following each treatment. Next cycle scheduled for 3/27, will need to delay    3. Pain  controlled      Thank you for this consult. All of the patient's questions were answered today.       Signed By: Paras Hernandez MD

## 2019-03-18 ENCOUNTER — APPOINTMENT (OUTPATIENT)
Dept: GENERAL RADIOLOGY | Age: 74
DRG: 330 | End: 2019-03-18
Attending: FAMILY MEDICINE
Payer: MEDICARE

## 2019-03-18 LAB
ALBUMIN SERPL-MCNC: 1.9 G/DL (ref 3.5–5)
ALBUMIN/GLOB SERPL: 0.6 {RATIO} (ref 1.1–2.2)
ALP SERPL-CCNC: 74 U/L (ref 45–117)
ALT SERPL-CCNC: 41 U/L (ref 12–78)
ANION GAP SERPL CALC-SCNC: 3 MMOL/L (ref 5–15)
AST SERPL-CCNC: 36 U/L (ref 15–37)
BASOPHILS # BLD: 0 K/UL (ref 0–0.1)
BASOPHILS NFR BLD: 0 % (ref 0–1)
BILIRUB SERPL-MCNC: 0.3 MG/DL (ref 0.2–1)
BUN SERPL-MCNC: 4 MG/DL (ref 6–20)
BUN/CREAT SERPL: 9 (ref 12–20)
CALCIUM SERPL-MCNC: 7.8 MG/DL (ref 8.5–10.1)
CHLORIDE SERPL-SCNC: 106 MMOL/L (ref 97–108)
CO2 SERPL-SCNC: 29 MMOL/L (ref 21–32)
CREAT SERPL-MCNC: 0.46 MG/DL (ref 0.55–1.02)
DATE LAST DOSE: ABNORMAL
DIFFERENTIAL METHOD BLD: ABNORMAL
EOSINOPHIL # BLD: 0 K/UL (ref 0–0.4)
EOSINOPHIL NFR BLD: 0 % (ref 0–7)
ERYTHROCYTE [DISTWIDTH] IN BLOOD BY AUTOMATED COUNT: 14.5 % (ref 11.5–14.5)
GLOBULIN SER CALC-MCNC: 3 G/DL (ref 2–4)
GLUCOSE BLD STRIP.AUTO-MCNC: 94 MG/DL (ref 65–100)
GLUCOSE BLD STRIP.AUTO-MCNC: 97 MG/DL (ref 65–100)
GLUCOSE SERPL-MCNC: 104 MG/DL (ref 65–100)
HCT VFR BLD AUTO: 36.9 % (ref 35–47)
HGB BLD-MCNC: 12 G/DL (ref 11.5–16)
IMM GRANULOCYTES # BLD AUTO: 0 K/UL
IMM GRANULOCYTES NFR BLD AUTO: 0 %
LYMPHOCYTES # BLD: 1.6 K/UL (ref 0.8–3.5)
LYMPHOCYTES NFR BLD: 8 % (ref 12–49)
MAGNESIUM SERPL-MCNC: 2.1 MG/DL (ref 1.6–2.4)
MCH RBC QN AUTO: 30.7 PG (ref 26–34)
MCHC RBC AUTO-ENTMCNC: 32.5 G/DL (ref 30–36.5)
MCV RBC AUTO: 94.4 FL (ref 80–99)
METAMYELOCYTES NFR BLD MANUAL: 4 %
MONOCYTES # BLD: 1 K/UL (ref 0–1)
MONOCYTES NFR BLD: 5 % (ref 5–13)
MYELOCYTES NFR BLD MANUAL: 2 %
NEUTS BAND NFR BLD MANUAL: 2 % (ref 0–6)
NEUTS SEG # BLD: 16.6 K/UL (ref 1.8–8)
NEUTS SEG NFR BLD: 79 % (ref 32–75)
NRBC # BLD: 0 K/UL (ref 0–0.01)
NRBC BLD-RTO: 0 PER 100 WBC
PLATELET # BLD AUTO: 209 K/UL (ref 150–400)
PMV BLD AUTO: 9.2 FL (ref 8.9–12.9)
POTASSIUM SERPL-SCNC: 4.1 MMOL/L (ref 3.5–5.1)
PROT SERPL-MCNC: 4.9 G/DL (ref 6.4–8.2)
RBC # BLD AUTO: 3.91 M/UL (ref 3.8–5.2)
RBC MORPH BLD: ABNORMAL
REPORTED DOSE,DOSE: ABNORMAL UNITS
REPORTED DOSE/TIME,TMG: ABNORMAL
SERVICE CMNT-IMP: NORMAL
SERVICE CMNT-IMP: NORMAL
SODIUM SERPL-SCNC: 138 MMOL/L (ref 136–145)
VANCOMYCIN TROUGH SERPL-MCNC: 15.5 UG/ML (ref 5–10)
WBC # BLD AUTO: 20.5 K/UL (ref 3.6–11)
WBC MORPH BLD: ABNORMAL

## 2019-03-18 PROCEDURE — 85025 COMPLETE CBC W/AUTO DIFF WBC: CPT

## 2019-03-18 PROCEDURE — 80053 COMPREHEN METABOLIC PANEL: CPT

## 2019-03-18 PROCEDURE — 74011250636 HC RX REV CODE- 250/636: Performed by: SURGERY

## 2019-03-18 PROCEDURE — 74011000258 HC RX REV CODE- 258: Performed by: SURGERY

## 2019-03-18 PROCEDURE — 74011250636 HC RX REV CODE- 250/636: Performed by: INTERNAL MEDICINE

## 2019-03-18 PROCEDURE — 74011250637 HC RX REV CODE- 250/637: Performed by: INTERNAL MEDICINE

## 2019-03-18 PROCEDURE — 74018 RADEX ABDOMEN 1 VIEW: CPT

## 2019-03-18 PROCEDURE — 97116 GAIT TRAINING THERAPY: CPT

## 2019-03-18 PROCEDURE — 82962 GLUCOSE BLOOD TEST: CPT

## 2019-03-18 PROCEDURE — 74011250637 HC RX REV CODE- 250/637: Performed by: PHYSICIAN ASSISTANT

## 2019-03-18 PROCEDURE — 97530 THERAPEUTIC ACTIVITIES: CPT

## 2019-03-18 PROCEDURE — 36415 COLL VENOUS BLD VENIPUNCTURE: CPT

## 2019-03-18 PROCEDURE — 65270000029 HC RM PRIVATE

## 2019-03-18 PROCEDURE — 83735 ASSAY OF MAGNESIUM: CPT

## 2019-03-18 PROCEDURE — 80202 ASSAY OF VANCOMYCIN: CPT

## 2019-03-18 PROCEDURE — 74011250637 HC RX REV CODE- 250/637: Performed by: SURGERY

## 2019-03-18 RX ORDER — HYDROCODONE BITARTRATE AND ACETAMINOPHEN 5; 325 MG/1; MG/1
2 TABLET ORAL
Status: DISCONTINUED | OUTPATIENT
Start: 2019-03-18 | End: 2019-03-21 | Stop reason: HOSPADM

## 2019-03-18 RX ADMIN — VANCOMYCIN HYDROCHLORIDE 1500 MG: 10 INJECTION, POWDER, LYOPHILIZED, FOR SOLUTION INTRAVENOUS at 17:12

## 2019-03-18 RX ADMIN — METRONIDAZOLE 500 MG: 500 INJECTION, SOLUTION INTRAVENOUS at 21:10

## 2019-03-18 RX ADMIN — Medication 10 ML: at 14:28

## 2019-03-18 RX ADMIN — ENOXAPARIN SODIUM 40 MG: 40 INJECTION SUBCUTANEOUS at 22:19

## 2019-03-18 RX ADMIN — VANCOMYCIN HYDROCHLORIDE 1500 MG: 10 INJECTION, POWDER, LYOPHILIZED, FOR SOLUTION INTRAVENOUS at 04:31

## 2019-03-18 RX ADMIN — VENLAFAXINE 50 MG: 25 TABLET ORAL at 10:06

## 2019-03-18 RX ADMIN — HYDROCODONE BITARTRATE AND ACETAMINOPHEN 2 TABLET: 5; 325 TABLET ORAL at 14:27

## 2019-03-18 RX ADMIN — MEROPENEM 500 MG: 500 INJECTION, POWDER, FOR SOLUTION INTRAVENOUS at 04:31

## 2019-03-18 RX ADMIN — HYDROCODONE BITARTRATE AND ACETAMINOPHEN 1 TABLET: 5; 325 TABLET ORAL at 00:04

## 2019-03-18 RX ADMIN — MEROPENEM 500 MG: 500 INJECTION, POWDER, FOR SOLUTION INTRAVENOUS at 16:02

## 2019-03-18 RX ADMIN — Medication 10 ML: at 22:20

## 2019-03-18 RX ADMIN — METRONIDAZOLE 500 MG: 500 INJECTION, SOLUTION INTRAVENOUS at 12:47

## 2019-03-18 RX ADMIN — Medication 10 ML: at 12:48

## 2019-03-18 RX ADMIN — PRAVASTATIN SODIUM 40 MG: 20 TABLET ORAL at 21:07

## 2019-03-18 RX ADMIN — MEROPENEM 500 MG: 500 INJECTION, POWDER, FOR SOLUTION INTRAVENOUS at 21:09

## 2019-03-18 RX ADMIN — HYDROCODONE BITARTRATE AND ACETAMINOPHEN 2 TABLET: 5; 325 TABLET ORAL at 10:06

## 2019-03-18 RX ADMIN — DEXTROSE MONOHYDRATE, SODIUM CHLORIDE, AND POTASSIUM CHLORIDE 100 ML/HR: 50; 4.5; 1.49 INJECTION, SOLUTION INTRAVENOUS at 06:30

## 2019-03-18 RX ADMIN — MEROPENEM 500 MG: 500 INJECTION, POWDER, FOR SOLUTION INTRAVENOUS at 10:08

## 2019-03-18 RX ADMIN — HYDROCODONE BITARTRATE AND ACETAMINOPHEN 2 TABLET: 5; 325 TABLET ORAL at 21:07

## 2019-03-18 RX ADMIN — HYDROCODONE BITARTRATE AND ACETAMINOPHEN 1 TABLET: 5; 325 TABLET ORAL at 04:31

## 2019-03-18 NOTE — PROGRESS NOTES
74 y.o. female diagnosed with breast cancer last month with first neoadjuvent chemo treatment a week ago. Onset of abdominal cramping and \"fullness\" which has persisted since the first treatment. Had an initial CT noting mild diverticulitis and patient was given Abx therapy. Re-eval for continued pain with CT positive for sigmoid diverticulitis and development of pelvic abscesses. Patient is 3 days S/P Perforated sigmoid diverticulitis with Exploratory laparotomy, sigmoid colectomy with colostomy, Florina's procedure. Wound/Ostomy Education:  Patient is resting in bed after an episode of painful gas and difficulty voiding. States she is comfortable now and receptive to ostomy education. Stoma presentation discussed with patient and noted the stoma is beefy red, moist, round/oval, and budded/flush with skin. Discussed importance of stomal presentation and to look for any signs of compromise such as color change, separation from raman-stomal skin, and temperature changes. The pouch was removed and educated patient on measuring around the stoma in order to cut the wafer appropriately. Discussed cleaning and prepping the raman-wound skin for adherence and applied barrier spray to raman-wound. There is a small dimpled area at 9 o'clock and filled with a small amount of stoma paste, allowed to sit while cutting the wafer to fit. Placed the 2.25 2 piece wafer to fit around the stoma. 2.25 ostomy bag was then attached and applied mild pressure/heat to activate wafer and adhere to skin. Provided 2 piece appliance for patient to practice with for ongoing ostomy education. Ostomy enrollment forms for starter kits to be sent to home residence were filled out and signed. Nutrition and Home health services have been set up to follow after discharge.      Russell ZEEN RN CWCN

## 2019-03-18 NOTE — PROGRESS NOTES
Problem: Mobility Impaired (Adult and Pediatric)  Goal: *Acute Goals and Plan of Care (Insert Text)  Physical Therapy Goals  Initiated 3/17/2019  1. Patient will move from supine to sit and sit to supine , scoot up and down and roll side to side in bed with independence within 7 day(s). 2.  Patient will transfer from bed to chair and chair to bed with independence using the least restrictive device within 7 day(s). 3.  Patient will perform sit to stand with independence within 7 day(s). 4.  Patient will ambulate with independence for 300 feet with the least restrictive device within 7 day(s). 5.  Patient will ascend/descend 2 stairs with  handrail(s) with independence within 7 day(s). physical Therapy TREATMENT  Patient: Darnell Mccoy (74 y.o. female)  Date: 3/18/2019  Diagnosis: Diverticulitis [K57.92] <principal problem not specified>  Procedure(s) (LRB):  LAPAROTOMY EXPLORATORY COLECTOMY AND COLOSTOMY (N/A) 3 Days Post-Op  Precautions:   Falls; contact  Chart, physical therapy assessment, plan of care and goals were reviewed. ASSESSMENT:  Pt Malathi Davalos presents with continued severe abdominal pain with activity but good participation and slowly improving mobility. She requires increased assistance (CGA) for flat surface mobility compared with previous PT treatment and demonstrates mild path deviations when ambulating without UE support. Encouraged pt to continue mobility in hallways with nursing staff a few times each day. Progression toward goals:  []    Improving appropriately and progressing toward goals  [x]    Improving slowly and progressing toward goals  []    Not making progress toward goals and plan of care will be adjusted     PLAN:  Patient continues to benefit from skilled intervention to address the above impairments. Continue treatment per established plan of care.   Discharge Recommendations:  Home Health  Further Equipment Recommendations for Discharge:  TBD; none at this time SUBJECTIVE:   Patient stated I've been going back and forth to the bathroom.     Pt received supine, agreeable to PT and cleared by RN. OBJECTIVE DATA SUMMARY:   Critical Behavior:  Neurologic State: Alert  Orientation Level: Oriented X4  Cognition: Appropriate for age attention/concentration, Follows commands     Functional Mobility Training:  Bed Mobility:   Log Roll: supervision following education. Supine to Sit: Additional time;Contact guard assistance  Sit to Supine: Additional time;Contact guard assistance  Scooting: Additional time;Modified independent        Transfers:  Sit to Stand: Contact guard assistance;Stand-by assistance; Additional time  Stand to Sit: Stand-by assistance; Additional time               commode transfer with CGA              Balance:  Sitting: Intact  Standing: Without support  Standing - Static: Good  Standing - Dynamic : Fair  Ambulation/Gait Training:  Distance (ft): 200 Feet (ft)  Assistive Device: Gait belt  Ambulation - Level of Assistance: Contact guard assistance        Gait Abnormalities: Antalgic; Path deviations        Base of Support: Widened     Speed/Eladia: Fluctuations                                    Pain:  Pain Scale 1: Numeric (0 - 10)  Pain Intensity 1: 8 with activity ;decreased at rest  Pain Location 1: Abdomen  Pain Orientation 1: Lower     Pain Intervention(s) 1: Medication (see MAR); rest, repositioned  Activity Tolerance:   Limited by fatigue and pain  Please refer to the flowsheet for vital signs taken during this treatment.   After treatment:   []    Patient left in no apparent distress sitting up in chair  [x]    Patient left in no apparent distress in bed  [x]    Call bell left within reach  [x]    Nursing notified  []    Caregiver present  [x]    Bed alarm activated    COMMUNICATION/COLLABORATION:   The patients plan of care was discussed with: Registered Nurse    Leroy Carrion, PT, DPT   Time Calculation: 23 mins

## 2019-03-18 NOTE — PROGRESS NOTES
3/18/2019 2:03 PM Spoke with Kristine Coughlin at [facility] who confirmed they can accept pt.     3/18/2019 11:57 AM Order for home health PT/OT/RN for ostomy care received. Met with pt to discuss, choice given, letter signed for [facility]. Referral sent to [facility] via [employer]. Also spoke with Kristine Coughlin in intake at [facility], notified of referral.     3/18/2019 10:18 AM Spoke with general surgery PA, following for PT and OT recommendations for potential discharge needs.  Imani Campbell, BSW

## 2019-03-18 NOTE — PROGRESS NOTES
Daily Progress Note: 3/18/2019  Pablo Knott MD    Assessment/Plan:   Diverticulitis with abscess - colectomy with colostomy for diverticulitis and perforation 3/15/19  ---HOLDING DVT chemoprophylaxis and ASA. --- IV Levaquin, Flagyl, and aztreonam     Hyponatremia / Hypokalemia:   ---suspect from IVVD and thiazide diuretic use.   ---HOLDING HCTZ.   ---IVF with KCl.   ---Follow BMP     Hypertension: controlled.   ---Holding home antihypertensives      High cholesterol:   ---continue statin     Depression: continue Cymbalta     GERD (gastroesophageal reflux disease):   ---PPI PRN     Cancer: left breast. Left central IDC, gr 2, ER +, OR +, HER 2 positive:  cT2 cN1a cM0, stage IIB, prognostic stage IB.   ---Followed by Dr. April Chawla. Wheeze: mild. Smoker. Advise smoking cessation. Albuterol PRN     Adverse effect of anesthesia: Overview: \"difficulty waking up from anesthethia\".   No difficulty this surg.         Problem List:  Problem List as of 3/18/2019 Date Reviewed: 3/12/2019          Codes Class Noted - Resolved    Hypertension ICD-10-CM: I10  ICD-9-CM: 401.9  3/12/2019 - Present        High cholesterol ICD-10-CM: E78.00  ICD-9-CM: 272.0  3/12/2019 - Present        GERD (gastroesophageal reflux disease) ICD-10-CM: K21.9  ICD-9-CM: 530.81  3/12/2019 - Present        Depression ICD-10-CM: F32.9  ICD-9-CM: 341  3/12/2019 - Present        Adverse effect of anesthesia ICD-10-CM: T41.45XA  ICD-9-CM: 995.22  3/12/2019 - Present    Overview Signed 3/12/2019  7:39 PM by Chyna Cho MD     \"difficulty waking up from anesthethia\"             Diverticulitis ICD-10-CM: Q65.83  ICD-9-CM: 562.11  3/12/2019 - Present        Hyponatremia ICD-10-CM: E87.1  ICD-9-CM: 276.1  3/12/2019 - Present        Hypokalemia ICD-10-CM: E87.6  ICD-9-CM: 276.8  3/12/2019 - Present        Abscess ICD-10-CM: L02.91  ICD-9-CM: 682.9  3/12/2019 - Present        [facility] ICD-10-CM: C50.912  ICD-9-CM: 174.9 2/19/2019 - Present    Overview Addendum 3/12/2019  4:58 PM by Soco Woodson MD     2/2019 LEFT invasive ductal carcinoma, grade 2, %. SC 20%, Her 2 positive  Positive axillary node, %. SC neg, Her 2 positive  TCH-P               Cancer [facility]) ICD-10-CM: C80.1  ICD-9-CM: 199.1  2/1/2019 - Present    Overview Signed 3/12/2019  7:39 PM by Laney Sanabria MD     left breast                 HPI:   Ms. Romana Pollock is a 74 y.o.  female who is admitted to the Gen Surg Service for diverticulitis with abscess. We are asked to evaluate for medical mgmt. Ms. Romana Pollock is complaining of abdominal pain. Ongoing for about a week now. Seen in ED a few days ago, sent home on oral abx (levaquin/Flagyl) however did not improve. (Dr Mo Ch)    3/13: Feeling a little better. Abd feels less tight. No BM for about 6 days but has had little po. K+ low so will replete. 3/14: Feeling pretty miserable this am. She is c/o increased pressure in the LLQ. K+ improved but still low so will replete further. Afebrile. 3/15:  Less pain today but WBC is up to 23. No fever. Abd soft but tender. Drain patent. K+ normal.     3/16: OR 3/15 colectomy with colostomy for diverticulitis and perforation. Afebrile. WBC high this am. She is feeling better. Pain is under control. No nausea or vomiting. 3/17: WBC a little better. \"stomach still feels tight\". Afebrile. Bloody liquid in colostomy bag. No nausea or vomiting. BP stable. 3/18:  C/O ab \"pressure. \"  No gas as yet. Only small amount of fluid in ostomy bag per nurses. No N/V. AM labs pending. Review of Systems:   A comprehensive review of systems was negative except for that written in the HPI. Objective:   Physical Exam:     Visit Vitals  /74 (BP 1 Location: Left arm, BP Patient Position: At rest;Supine)   Pulse 81   Temp 97.8 °F (36.6 °C)   Resp 16   Ht 5' 6\" (1.676 m)   Wt 80.7 kg (178 lb)   SpO2 98%   Breastfeeding?  No   BMI 28.73 kg/m²    O2 Flow Rate (L/min): 3 l/min O2 Device: Room air    Temp (24hrs), Av.1 °F (36.7 °C), Min:97.4 °F (36.3 °C), Max:98.8 °F (37.1 °C)    No intake/output data recorded.  1901 -  0700  In: 66695 [I.V.:04059]  Out: 0   General:  Alert, cooperative,  appears stated age. Head:  Normocephalic, without obvious abnormality, atraumatic. Eyes:  Conjunctivae/corneas clear. PERRL, EOMs intact. Throat: Lips, mucosa, and tongue normal. Teeth and gums normal.   Neck: Supple, symmetrical, trachea midline, no adenopathy, thyroid: no enlargement/tenderness/nodules, no carotid bruit and no JVD. Lungs:   Clear to auscultation bilaterally. Chest wall:  No tenderness or deformity. Heart:  Regular rate and rhythm, S1, S2 normal, no murmur, click, rub or gallop. Abdomen:   Soft, colostomy site looks good, few bowel sounds. Slightly distended. Mild generalized tenderness present. Dressing dry. Extremities: Extremities normal, atraumatic, no cyanosis or edema. No calf tenderness or cords. Pulses: 2+ and symmetric all extremities. Skin: turgor normal.    Neurologic: Alert and oriented X 3. Fine motor of hands and fingers normal.   equal.  No cogwheeling or rigidity. Gait not tested at this time. Sensation grossly normal to touch. Gross motor of extremities normal.       Data Review:       Recent Days:  Recent Labs     19  0354 197   WBC 25.6* 28.8*   HGB 11.4* 11.9   HCT 35.4 37.0    259     Recent Labs     19  0354 197   * 138   K 4.6 4.2    107   CO2 27 27   * 104*   BUN 4* 5*   CREA 0.49* 0.54*   CA 7.4* 7.3*     No results for input(s): PH, PCO2, PO2, HCO3, FIO2 in the last 72 hours. 24 Hour Results:  No results found for this or any previous visit (from the past 24 hour(s)).     Medications reviewed  Current Facility-Administered Medications   Medication Dose Route Frequency    HYDROcodone-acetaminophen (NORCO) 5-325 mg per tablet 1 Tab  1 Tab Oral Q4H PRN    vancomycin (VANCOCIN) 1500 mg in  ml infusion  1,500 mg IntraVENous Q12H    meropenem (MERREM) 500 mg in 0.9% sodium chloride (MBP/ADV) 50 mL  0.5 g IntraVENous Q6H    dextrose 5% - 0.45% NaCl with KCl 20 mEq/L infusion  100 mL/hr IntraVENous CONTINUOUS    sodium chloride (NS) flush 5-40 mL  5-40 mL IntraVENous Q8H    acetaminophen (TYLENOL) tablet 650 mg  650 mg Oral Q6H PRN    sodium chloride (NS) flush 5-40 mL  5-40 mL IntraVENous Q8H    sodium chloride (NS) flush 5-40 mL  5-40 mL IntraVENous PRN    naloxone (NARCAN) injection 0.4 mg  0.4 mg IntraVENous PRN    HYDROcodone-acetaminophen (NORCO) 5-325 mg per tablet 1 Tab  1 Tab Oral Q4H PRN    HYDROmorphone (PF) (DILAUDID) injection 0.5 mg  0.5 mg IntraVENous Q4H PRN    ondansetron (ZOFRAN) injection 4 mg  4 mg IntraVENous Q4H PRN    enoxaparin (LOVENOX) injection 40 mg  40 mg SubCUTAneous Q24H    pravastatin (PRAVACHOL) tablet 40 mg  40 mg Oral QHS    venlafaxine (EFFEXOR) tablet 50 mg  50 mg Oral DAILY    metroNIDAZOLE (FLAGYL) IVPB premix 500 mg  500 mg IntraVENous Q12H    albuterol (PROVENTIL VENTOLIN) nebulizer solution 2.5 mg  2.5 mg Nebulization Q6H PRN       Care Plan discussed with: Patient/Family    Total time spent with patient and review of records: 30 minutes.     Daxa Taylor MD

## 2019-03-18 NOTE — PROGRESS NOTES
[facility]  [address] [facility], [address] [facility]  Jelani Glance: [phone]  F: [phone]      Reason for Visit:   Corwin Vee is a 74 y.o. female who is seen in consultation at the request of Dr. Lisette Harris for continuity of care. Hematology / Oncology Treatment History:   · 2/15/19 Left core bx:   IDC, gr 2, 1.1 cm, ER + at 100%, ID + at 20%, HER 2 POSITIVE (IHC 2+; FISH ratio 3.9; sig/cell 9.2)  · 3/1/19 Left ax LN core bx:  + for breast cancer, ER + at 100%, ID negative, HER 2 POSITIVE (IHC 2+, FISH ratio 2; sig/cell 5.8)  · TCH-P 3/6/19-      History of Present Illness:     Seen in the ED on 3/8/2019 for abd pain and constipation. ED CT  Sowed diverticulitis; was initiated on levaquin and flagyl. Seen  In our clinic yesterday with worsening abd pain; therefore was directed to ED for further eval. ED CT scan shows acute diverticulitis with abscesses. Admitted for further eval and management. Ms Doris Villavicencio feeling better this morning. Reports abd \"discomfort/ bloating/pressure\". Rates pain 6/10. Does not want hydrocodone but will try some Tylenol. Had some loose stool this am. Denies any SOB. Denies N/V at present. Sister at bedside. Interval History:   OR 3/15/19 with colostomy for diverticulitis and perforation. Had pain this am with \"pressure\"; feeling better now; only tolerating small amts of broth. Denies nausea. Painful to sit up in the chair but has been up walking in the hallway this am.    No family at bedside.        Past Medical History:   Diagnosis Date    Adverse effect of anesthesia     \"difficulty waking up from anesthethia\"    Cancer [facility]) 02/2019    left breast    Depression     GERD (gastroesophageal reflux disease)     High cholesterol     Hypertension       Past Surgical History:   Procedure Laterality Date    HX BREAST BIOPSY Right years ago    neg; needle bx    HX CATARACT REMOVAL      HX COLONOSCOPY      HX ORTHOPAEDIC      carpal tunnel      Social History     Tobacco Use    Smoking status: Current Every Day Smoker     Packs/day: 0.50     Years: 55.00     Pack years: 27.50    Smokeless tobacco: Never Used   Substance Use Topics    Alcohol use:  Yes     Alcohol/week: 0.6 oz     Types: 1 Shots of liquor per week     Drinks per session: 5 or 6     Binge frequency: Weekly     Comment: Patient stated that she puts a splash of bourbon in her afternoon coffee around 5-6 days per week      Family History   Problem Relation Age of Onset    Breast Cancer Maternal Aunt     Hypertension Mother     Hypertension Father     Cancer Father         Lung    Cancer Brother         leukemia    Diabetes Brother         2 brothers with diabetes    Hypertension Sister         2 sisters and 4 brothers with HTN     Current Facility-Administered Medications   Medication Dose Route Frequency    HYDROcodone-acetaminophen (NORCO) 5-325 mg per tablet 2 Tab  2 Tab Oral Q4H PRN    Vancomycin TROUGH @1630 on 3/18/2019   Other ONCE    vancomycin (VANCOCIN) 1500 mg in  ml infusion  1,500 mg IntraVENous Q12H    meropenem (MERREM) 500 mg in 0.9% sodium chloride (MBP/ADV) 50 mL  0.5 g IntraVENous Q6H    dextrose 5% - 0.45% NaCl with KCl 20 mEq/L infusion  100 mL/hr IntraVENous CONTINUOUS    sodium chloride (NS) flush 5-40 mL  5-40 mL IntraVENous Q8H    acetaminophen (TYLENOL) tablet 650 mg  650 mg Oral Q6H PRN    sodium chloride (NS) flush 5-40 mL  5-40 mL IntraVENous Q8H    sodium chloride (NS) flush 5-40 mL  5-40 mL IntraVENous PRN    naloxone (NARCAN) injection 0.4 mg  0.4 mg IntraVENous PRN    HYDROcodone-acetaminophen (NORCO) 5-325 mg per tablet 1 Tab  1 Tab Oral Q4H PRN    HYDROmorphone (PF) (DILAUDID) injection 0.5 mg  0.5 mg IntraVENous Q4H PRN    ondansetron (ZOFRAN) injection 4 mg  4 mg IntraVENous Q4H PRN    enoxaparin (LOVENOX) injection 40 mg  40 mg SubCUTAneous Q24H    pravastatin (PRAVACHOL) tablet 40 mg  40 mg Oral QHS    venlafaxine (EFFEXOR) tablet 50 mg  50 mg Oral DAILY    metroNIDAZOLE (FLAGYL) IVPB premix 500 mg  500 mg IntraVENous Q12H    albuterol (PROVENTIL VENTOLIN) nebulizer solution 2.5 mg  2.5 mg Nebulization Q6H PRN      Allergies   Allergen Reactions    Doxycycline Diarrhea    Keflex [Cephalexin] Hives    Penicillins Rash        Review of Systems: A complete review of systems was obtained, negative except as described above. Physical Exam:     Visit Vitals  /74 (BP 1 Location: Left arm, BP Patient Position: At rest;Supine)   Pulse 81   Temp 97.8 °F (36.6 °C)   Resp 16   Ht 5' 6\" (1.676 m)   Wt 80.7 kg (178 lb)   SpO2 98%   Breastfeeding? No   BMI 28.73 kg/m²     ECOG PS: 1  General: No distress  Eyes: PERRLA, anicteric sclerae  HENT: Atraumatic with normal appearance of ears and nose; OP clear  Neck: Supple; no thyromegaly   Lymphatic: No cervical, supraclavicular, or axillary adenopathy  Respiratory: normal respiratory effort  CV: Normal rate, regular rhythm, no murmurs, no peripheral edema  GI: colostomy noted; no drainage in bag; Soft, nondistended, no masses, no hepatomegaly, no splenomegaly  MS: Digits without clubbing or cyanosis. Skin: midline incision with dsg D&I;  No rashes, ecchymoses, or petechiae. Normal temperature, turgor, and texture. Neuro/Psych: Alert, oriented, appropriate affect, normal judgment/insight      Results:     Lab Results   Component Value Date/Time    WBC 20.5 (H) 03/18/2019 12:07 PM    HGB 12.0 03/18/2019 12:07 PM    HCT 36.9 03/18/2019 12:07 PM    PLATELET 772 70/20/8075 12:07 PM    MCV 94.4 03/18/2019 12:07 PM    ABS.  NEUTROPHILS PENDING 03/18/2019 12:07 PM     Lab Results   Component Value Date/Time    Sodium 138 03/18/2019 12:07 PM    Potassium 4.1 03/18/2019 12:07 PM    Chloride 106 03/18/2019 12:07 PM    CO2 29 03/18/2019 12:07 PM    Glucose 104 (H) 03/18/2019 12:07 PM    BUN 4 (L) 03/18/2019 12:07 PM    Creatinine 0.46 (L) 03/18/2019 12:07 PM    GFR est AA >60 03/18/2019 12:07 PM    GFR est non-AA >60 03/18/2019 12:07 PM    Calcium 7.8 (L) 03/18/2019 12:07 PM    Glucose (POC) 94 03/18/2019 11:40 AM     Lab Results   Component Value Date/Time    Bilirubin, total 0.3 03/18/2019 12:07 PM    ALT (SGPT) 41 03/18/2019 12:07 PM    AST (SGOT) 36 03/18/2019 12:07 PM    Alk. phosphatase 74 03/18/2019 12:07 PM    Protein, total 4.9 (L) 03/18/2019 12:07 PM    Albumin 1.9 (L) 03/18/2019 12:07 PM    Globulin 3.0 03/18/2019 12:07 PM     Lab Results   Component Value Date/Time    Lipase 35 (L) 03/12/2019 04:52 PM     No results found for: INR, APTT, DDIMSQ, DDIME, 242698, Omari Model, FDPLT, Joann Elisa, [address] [facility], [address] [facility], [address] [facility] [address], [address], [id number], [facility] [address], [id number], [id number], [id number], [id number]    No results found for: CEA, 279938, C199LT, C125, VJCY646, 2729LT, C153LT, PSA, PSALT, LDH, [id number], HCGTLT, AFP, CHRGLT    TTE 3/6/19 EF 66-70%. 3/12/2019 XR ABD ACUTE  IMPRESSION: No acute process in the abdomen. 2/12/2019 CT ABD PELV W CONT    IMPRESSION:    Acute diverticulitis of the sigmoid colon. Development of a focal fluid and gas  collection in the cul-de-sac, consistent with an abscess. Suspected 2 additional  smaller developing abscesses in the pelvis. 3/13/2019 CT guided perc drainage  IMPRESSION: CT guided pelvic/cul-de-sac feculent fluid collection drainage  performed. 30 cc removed; Laboratory results pending. Cath placed for drainage    Assessment and Recommendations:     1. Diverticulitis/Abd abscess/perforation: ecoli, extended spectrum beta lactamase ;    3/15 Exp lap; sigmoid colectomy with colostomy; / Cr Medardo abx coverage on vanc, meropenem, flagyl    2.  Left central IDC, gr 2, ER +, MA +, HER 2 positive:  cT2 cN1a cM0, stage IIB, prognostic stage IB   The goal of systemic adjuvant therapy is to improve the chances for cure and decrease the risk of relapse  3/6 [id number] (Trastuzumab,  Docetaxel,  Carboplatin , Pertuzumab) given every 3 weeks x 6 cycles; Neulasta following each treatment. Next cycle scheduled for 3/27, will need to delay    3. Pain  controlled    4. Leukocytosis  Combination of having received neulasta and infection  Continue to monitor     5.  Ostomy teaching  Wound team following        Plan reviewed with Dr Richard Srivastava    Signed By: Codey Tellez NP

## 2019-03-18 NOTE — PROGRESS NOTES
Vancomycin Pharmacy Consult 03/18/19  Vancomycin trough = 15.5 mcg/ml (drawn 12hrs post dose)-true trough  Level is therapeutic   Goal Trough: 15-20 mcg/ml    Plan:   Will continue current regimen    Thank you  Kitty Xavier, PharmD  [phone]

## 2019-03-18 NOTE — PROGRESS NOTES
General Surgery Daily Progress Note    Patient: Marcia Duggan MRN: [id number]  SSN: [id number]    YOB: 1945  Age: 74 y.o. Sex: female      Admit Date: 3/12/2019    Subjective:   Pain controlled, reports some suprapubic pressure. No stool or flatus from ostomy     Current Facility-Administered Medications   Medication Dose Route Frequency    HYDROcodone-acetaminophen (NORCO) 5-325 mg per tablet 1 Tab  1 Tab Oral Q4H PRN    vancomycin (VANCOCIN) 1500 mg in  ml infusion  1,500 mg IntraVENous Q12H    meropenem (MERREM) 500 mg in 0.9% sodium chloride (MBP/ADV) 50 mL  0.5 g IntraVENous Q6H    dextrose 5% - 0.45% NaCl with KCl 20 mEq/L infusion  100 mL/hr IntraVENous CONTINUOUS    sodium chloride (NS) flush 5-40 mL  5-40 mL IntraVENous Q8H    acetaminophen (TYLENOL) tablet 650 mg  650 mg Oral Q6H PRN    sodium chloride (NS) flush 5-40 mL  5-40 mL IntraVENous Q8H    sodium chloride (NS) flush 5-40 mL  5-40 mL IntraVENous PRN    naloxone (NARCAN) injection 0.4 mg  0.4 mg IntraVENous PRN    HYDROcodone-acetaminophen (NORCO) 5-325 mg per tablet 1 Tab  1 Tab Oral Q4H PRN    HYDROmorphone (PF) (DILAUDID) injection 0.5 mg  0.5 mg IntraVENous Q4H PRN    ondansetron (ZOFRAN) injection 4 mg  4 mg IntraVENous Q4H PRN    enoxaparin (LOVENOX) injection 40 mg  40 mg SubCUTAneous Q24H    pravastatin (PRAVACHOL) tablet 40 mg  40 mg Oral QHS    venlafaxine (EFFEXOR) tablet 50 mg  50 mg Oral DAILY    metroNIDAZOLE (FLAGYL) IVPB premix 500 mg  500 mg IntraVENous Q12H    albuterol (PROVENTIL VENTOLIN) nebulizer solution 2.5 mg  2.5 mg Nebulization Q6H PRN        Objective:   No intake/output data recorded.   03/16 1901 - 03/18 0700  In: 44374 [I.V.:95980]  Out: 0   Patient Vitals for the past 8 hrs:   BP Temp Pulse Resp SpO2   03/18/19 0723 143/74 97.8 °F (36.6 °C) 81 16 98 %   03/18/19 0326 147/77 97.4 °F (36.3 °C) 82 16 97 %       Physical Exam:  General: Alert, cooperative, NAD  Lungs: Unlabored  Heart:  Regular rate and  rhythm  Abdomen: Soft, ATTP, non-distended. + bowel sounds. Incisions c/d/i, penrose in place. Ostomy pink, no output  Extremities: Warm, moves all, no edema  Skin:  Warm and dry, no rash    Labs:   Recent Labs     03/17/19  0354   WBC 25.6*   HGB 11.4*   HCT 35.4        Recent Labs     03/17/19  0354   *   K 4.6      CO2 27   *   BUN 4*   CREA 0.49*   CA 7.4*       Assessment / Plan:   · POD#3 ex lap, sigmoidectomy, end colostomy  · Clear liquids  · White count down a bit.  Continue ABX  · Check bladder scan  · Wound care for ostomy teaching  · PT/OT  · CM for discharge planning

## 2019-03-18 NOTE — PROGRESS NOTES
Problem: Mobility Impaired (Adult and Pediatric)  Goal: *Acute Goals and Plan of Care (Insert Text)  Physical Therapy Goals  Initiated 3/17/2019  1. Patient will move from supine to sit and sit to supine , scoot up and down and roll side to side in bed with independence within 7 day(s). 2.  Patient will transfer from bed to chair and chair to bed with independence using the least restrictive device within 7 day(s). 3.  Patient will perform sit to stand with independence within 7 day(s). 4.  Patient will ambulate with independence for 300 feet with the least restrictive device within 7 day(s). 5.  Patient will ascend/descend 2 stairs with  handrail(s) with independence within 7 day(s). physical Therapy TREATMENT  Patient: Kaelyn Peres (74 y.o. female)  Date: 3/18/2019  Diagnosis: Diverticulitis [K57.92] <principal problem not specified>  Procedure(s) (LRB):  LAPAROTOMY EXPLORATORY COLECTOMY AND COLOSTOMY (N/A) 3 Days Post-Op  Precautions:   Falls; contact  Chart, physical therapy assessment, plan of care and goals were reviewed. ASSESSMENT:  Pt Dilma Mayer presents with continued severe abdominal pain with activity but good participation and slowly improving mobility. She requires increased assistance (CGA) for flat surface mobility compared with previous PT treatment and demonstrates mild path deviations when ambulating without UE support. Encouraged pt to continue mobility in hallways with nursing staff a few times each day. Progression toward goals:  []    Improving appropriately and progressing toward goals  [x]    Improving slowly and progressing toward goals  []    Not making progress toward goals and plan of care will be adjusted     PLAN:  Patient continues to benefit from skilled intervention to address the above impairments. Continue treatment per established plan of care.   Discharge Recommendations:  Home Health  Further Equipment Recommendations for Discharge:  TBD; none at this time SUBJECTIVE:   Patient stated I've been going back and forth to the bathroom.     Pt received supine, agreeable to PT and cleared by RN. OBJECTIVE DATA SUMMARY:   Critical Behavior:  Neurologic State: Alert  Orientation Level: Oriented X4  Cognition: Appropriate for age attention/concentration, Follows commands     Functional Mobility Training:  Bed Mobility:   Log Roll: supervision following education. Supine to Sit: Additional time;Contact guard assistance  Sit to Supine: Additional time;Contact guard assistance  Scooting: Additional time;Modified independent        Transfers:  Sit to Stand: Contact guard assistance;Stand-by assistance; Additional time  Stand to Sit: Stand-by assistance; Additional time               commode transfer with CGA              Balance:  Sitting: Intact  Standing: Without support  Standing - Static: Good  Standing - Dynamic : Fair  Ambulation/Gait Training:  Distance (ft): 200 Feet (ft)  Assistive Device: Gait belt  Ambulation - Level of Assistance: Contact guard assistance        Gait Abnormalities: Antalgic; Path deviations        Base of Support: Widened     Speed/Eladia: Fluctuations                                    Pain:  Pain Scale 1: Numeric (0 - 10)  Pain Intensity 1: 8 with activity ;decreased at rest  Pain Location 1: Abdomen  Pain Orientation 1: Lower     Pain Intervention(s) 1: Medication (see MAR); rest, repositioned  Activity Tolerance:   Limited by fatigue and pain  Please refer to the flowsheet for vital signs taken during this treatment.   After treatment:   []    Patient left in no apparent distress sitting up in chair  [x]    Patient left in no apparent distress in bed  [x]    Call bell left within reach  [x]    Nursing notified  []    Caregiver present  [x]    Bed alarm activated    COMMUNICATION/COLLABORATION:   The patients plan of care was discussed with: Registered Nurse    Marlys Bae, PT, DPT   Time Calculation: 23 mins

## 2019-03-19 LAB
ALBUMIN SERPL-MCNC: 1.8 G/DL (ref 3.5–5)
ALBUMIN/GLOB SERPL: 0.5 {RATIO} (ref 1.1–2.2)
ALP SERPL-CCNC: 71 U/L (ref 45–117)
ALT SERPL-CCNC: 35 U/L (ref 12–78)
ANION GAP SERPL CALC-SCNC: 6 MMOL/L (ref 5–15)
AST SERPL-CCNC: 24 U/L (ref 15–37)
BASOPHILS # BLD: 0 K/UL (ref 0–0.1)
BASOPHILS NFR BLD: 0 % (ref 0–1)
BILIRUB SERPL-MCNC: 0.2 MG/DL (ref 0.2–1)
BUN SERPL-MCNC: 5 MG/DL (ref 6–20)
BUN/CREAT SERPL: 11 (ref 12–20)
CALCIUM SERPL-MCNC: 8.1 MG/DL (ref 8.5–10.1)
CHLORIDE SERPL-SCNC: 106 MMOL/L (ref 97–108)
CO2 SERPL-SCNC: 27 MMOL/L (ref 21–32)
CREAT SERPL-MCNC: 0.44 MG/DL (ref 0.55–1.02)
DIFFERENTIAL METHOD BLD: ABNORMAL
EOSINOPHIL # BLD: 0 K/UL (ref 0–0.4)
EOSINOPHIL NFR BLD: 0 % (ref 0–7)
ERYTHROCYTE [DISTWIDTH] IN BLOOD BY AUTOMATED COUNT: 14.6 % (ref 11.5–14.5)
GLOBULIN SER CALC-MCNC: 3.6 G/DL (ref 2–4)
GLUCOSE BLD STRIP.AUTO-MCNC: 93 MG/DL (ref 65–100)
GLUCOSE SERPL-MCNC: 101 MG/DL (ref 65–100)
HCT VFR BLD AUTO: 35.2 % (ref 35–47)
HGB BLD-MCNC: 11.2 G/DL (ref 11.5–16)
IMM GRANULOCYTES # BLD AUTO: 0 K/UL
IMM GRANULOCYTES NFR BLD AUTO: 0 %
LYMPHOCYTES # BLD: 1.9 K/UL (ref 0.8–3.5)
LYMPHOCYTES NFR BLD: 11 % (ref 12–49)
MAGNESIUM SERPL-MCNC: 2.2 MG/DL (ref 1.6–2.4)
MCH RBC QN AUTO: 29.5 PG (ref 26–34)
MCHC RBC AUTO-ENTMCNC: 31.8 G/DL (ref 30–36.5)
MCV RBC AUTO: 92.6 FL (ref 80–99)
METAMYELOCYTES NFR BLD MANUAL: 1 %
MONOCYTES # BLD: 1.9 K/UL (ref 0–1)
MONOCYTES NFR BLD: 11 % (ref 5–13)
NEUTS SEG # BLD: 13.1 K/UL (ref 1.8–8)
NEUTS SEG NFR BLD: 77 % (ref 32–75)
NRBC # BLD: 0 K/UL (ref 0–0.01)
NRBC BLD-RTO: 0 PER 100 WBC
PLATELET # BLD AUTO: 210 K/UL (ref 150–400)
PMV BLD AUTO: 8.9 FL (ref 8.9–12.9)
POTASSIUM SERPL-SCNC: 3.7 MMOL/L (ref 3.5–5.1)
PROT SERPL-MCNC: 5.4 G/DL (ref 6.4–8.2)
RBC # BLD AUTO: 3.8 M/UL (ref 3.8–5.2)
RBC MORPH BLD: ABNORMAL
SERVICE CMNT-IMP: NORMAL
SODIUM SERPL-SCNC: 139 MMOL/L (ref 136–145)
WBC # BLD AUTO: 17 K/UL (ref 3.6–11)

## 2019-03-19 PROCEDURE — 80053 COMPREHEN METABOLIC PANEL: CPT

## 2019-03-19 PROCEDURE — 85025 COMPLETE CBC W/AUTO DIFF WBC: CPT

## 2019-03-19 PROCEDURE — 74011250637 HC RX REV CODE- 250/637: Performed by: SURGERY

## 2019-03-19 PROCEDURE — 74011250636 HC RX REV CODE- 250/636: Performed by: INTERNAL MEDICINE

## 2019-03-19 PROCEDURE — 82962 GLUCOSE BLOOD TEST: CPT

## 2019-03-19 PROCEDURE — 74011250637 HC RX REV CODE- 250/637: Performed by: PHYSICIAN ASSISTANT

## 2019-03-19 PROCEDURE — 65270000029 HC RM PRIVATE

## 2019-03-19 PROCEDURE — 83735 ASSAY OF MAGNESIUM: CPT

## 2019-03-19 PROCEDURE — 74011000258 HC RX REV CODE- 258: Performed by: SURGERY

## 2019-03-19 PROCEDURE — 74011250637 HC RX REV CODE- 250/637: Performed by: INTERNAL MEDICINE

## 2019-03-19 PROCEDURE — 36415 COLL VENOUS BLD VENIPUNCTURE: CPT

## 2019-03-19 PROCEDURE — 74011250636 HC RX REV CODE- 250/636: Performed by: SURGERY

## 2019-03-19 RX ORDER — CALCIUM CARBONATE 200(500)MG
200 TABLET,CHEWABLE ORAL AS NEEDED
Status: DISCONTINUED | OUTPATIENT
Start: 2019-03-19 | End: 2019-03-21 | Stop reason: HOSPADM

## 2019-03-19 RX ADMIN — MEROPENEM 500 MG: 500 INJECTION, POWDER, FOR SOLUTION INTRAVENOUS at 09:39

## 2019-03-19 RX ADMIN — ENOXAPARIN SODIUM 40 MG: 40 INJECTION SUBCUTANEOUS at 22:46

## 2019-03-19 RX ADMIN — Medication 10 ML: at 04:03

## 2019-03-19 RX ADMIN — MEROPENEM 500 MG: 500 INJECTION, POWDER, FOR SOLUTION INTRAVENOUS at 03:27

## 2019-03-19 RX ADMIN — HYDROCODONE BITARTRATE AND ACETAMINOPHEN 2 TABLET: 5; 325 TABLET ORAL at 09:40

## 2019-03-19 RX ADMIN — Medication 10 ML: at 14:00

## 2019-03-19 RX ADMIN — METRONIDAZOLE 500 MG: 500 INJECTION, SOLUTION INTRAVENOUS at 09:39

## 2019-03-19 RX ADMIN — VANCOMYCIN HYDROCHLORIDE 1500 MG: 10 INJECTION, POWDER, LYOPHILIZED, FOR SOLUTION INTRAVENOUS at 04:03

## 2019-03-19 RX ADMIN — Medication 10 ML: at 21:44

## 2019-03-19 RX ADMIN — METRONIDAZOLE 500 MG: 500 INJECTION, SOLUTION INTRAVENOUS at 21:44

## 2019-03-19 RX ADMIN — PRAVASTATIN SODIUM 40 MG: 20 TABLET ORAL at 21:44

## 2019-03-19 RX ADMIN — HYDROCODONE BITARTRATE AND ACETAMINOPHEN 1 TABLET: 5; 325 TABLET ORAL at 03:27

## 2019-03-19 RX ADMIN — HYDROCODONE BITARTRATE AND ACETAMINOPHEN 2 TABLET: 5; 325 TABLET ORAL at 17:21

## 2019-03-19 RX ADMIN — MEROPENEM 500 MG: 500 INJECTION, POWDER, FOR SOLUTION INTRAVENOUS at 22:46

## 2019-03-19 RX ADMIN — VENLAFAXINE 50 MG: 25 TABLET ORAL at 09:40

## 2019-03-19 RX ADMIN — ANTACID TABLETS 200 MG: 500 TABLET, CHEWABLE ORAL at 23:03

## 2019-03-19 RX ADMIN — MEROPENEM 500 MG: 500 INJECTION, POWDER, FOR SOLUTION INTRAVENOUS at 17:12

## 2019-03-19 RX ADMIN — VANCOMYCIN HYDROCHLORIDE 1500 MG: 10 INJECTION, POWDER, LYOPHILIZED, FOR SOLUTION INTRAVENOUS at 17:11

## 2019-03-19 NOTE — PROGRESS NOTES
Bedside and Verbal shift change report given to Elva Carrillo (oncoming nurse) by Mc Calderon (offgoing nurse). Report included the following information SBAR, Kardex, Procedure Summary, Intake/Output and MAR.

## 2019-03-19 NOTE — PROGRESS NOTES
General Surgery Daily Progress Note    Patient: Bryan Fish MRN: [id number]  SSN: [id number]    YOB: 1945  Age: 74 y.o. Sex: female      Admit Date: 3/12/2019    Subjective:   Patient states that she feels a lot better after a good output from stoma. No nausea or vomiting. Current Facility-Administered Medications   Medication Dose Route Frequency    HYDROcodone-acetaminophen (NORCO) 5-325 mg per tablet 2 Tab  2 Tab Oral Q4H PRN    vancomycin (VANCOCIN) 1500 mg in  ml infusion  1,500 mg IntraVENous Q12H    meropenem (MERREM) 500 mg in 0.9% sodium chloride (MBP/ADV) 50 mL  0.5 g IntraVENous Q6H    sodium chloride (NS) flush 5-40 mL  5-40 mL IntraVENous Q8H    acetaminophen (TYLENOL) tablet 650 mg  650 mg Oral Q6H PRN    sodium chloride (NS) flush 5-40 mL  5-40 mL IntraVENous Q8H    sodium chloride (NS) flush 5-40 mL  5-40 mL IntraVENous PRN    naloxone (NARCAN) injection 0.4 mg  0.4 mg IntraVENous PRN    HYDROcodone-acetaminophen (NORCO) 5-325 mg per tablet 1 Tab  1 Tab Oral Q4H PRN    HYDROmorphone (PF) (DILAUDID) injection 0.5 mg  0.5 mg IntraVENous Q4H PRN    ondansetron (ZOFRAN) injection 4 mg  4 mg IntraVENous Q4H PRN    enoxaparin (LOVENOX) injection 40 mg  40 mg SubCUTAneous Q24H    pravastatin (PRAVACHOL) tablet 40 mg  40 mg Oral QHS    venlafaxine (EFFEXOR) tablet 50 mg  50 mg Oral DAILY    metroNIDAZOLE (FLAGYL) IVPB premix 500 mg  500 mg IntraVENous Q12H    albuterol (PROVENTIL VENTOLIN) nebulizer solution 2.5 mg  2.5 mg Nebulization Q6H PRN        Objective:   No intake/output data recorded. 03/17 1901 - 03/19 0700  In: 13760.7 [I.V.:52881.7]  Out: 0   Patient Vitals for the past 8 hrs:   BP Temp Pulse Resp SpO2   03/19/19 0827 148/68 97.4 °F (36.3 °C) 76 18 94 %   03/19/19 0407 142/81 97.9 °F (36.6 °C) 77 18 96 %       Physical Exam:  General: Alert, cooperative, no distress, appears stated age.   Neck:  Supple, symmetrical, trachea midline, no adenopathy, thyroid: no                           enlargement/tenderness/nodules, no carotid bruit and no JVD. Lungs: Clear to auscultation bilaterally. Heart:  Regular rate and rhythm, S1, S2 normal, no murmur, click, rub or gallop. Abdomen: Stoma healthy - good outpu Soft, non-tender. Bowel sounds normal. No masses,  No organomegaly. Extremities: Extremities normal, atraumatic, no cyanosis or edema.   Skin:  Skin color, texture, turgor normal. No rashes or lesions    Labs:   Recent Labs     03/19/19  0355   WBC 17.0*   HGB 11.2*   HCT 35.2        Recent Labs     03/19/19  0355      K 3.7      CO2 27   *   BUN 5*   CREA 0.44*   CA 8.1*   MG 2.2   ALB 1.8*   TBILI 0.2   SGOT 24   ALT 35     X-ray   Assessment / Plan:   · Advance diet  · Remove penrose drain from incision  · Discharge planning with stoma education    Active Problems:    Hypertension (3/12/2019)      High cholesterol (3/12/2019)      GERD (gastroesophageal reflux disease) (3/12/2019)      Depression (3/12/2019)      Cancer ([address] (2/1/2019)      Overview: left breast      Adverse effect of anesthesia (3/12/2019)      Overview: \"difficulty waking up from anesthethia\"      Diverticulitis (3/12/2019)      Hyponatremia (3/12/2019)      Hypokalemia (3/12/2019)      Abscess (3/12/2019)

## 2019-03-19 NOTE — PROGRESS NOTES
Daily Progress Note: 3/19/2019  Fercho Arita MD    Assessment/Plan:   Diverticulitis with abscess - colectomy with colostomy for diverticulitis and perforation 3/15/19  ---HOLDING DVT chemoprophylaxis and ASA. --- IV vanc, Flagyl, and merrem     Hyponatremia / Hypokalemia:   ---suspect from IVVD and thiazide diuretic use.   ---HOLDING HCTZ.   ---IVF with KCl.   ---Follow BMP     Hypertension: controlled.   ---Holding home antihypertensives      High cholesterol:   ---continue statin     Depression: continue Cymbalta     GERD (gastroesophageal reflux disease):   ---PPI PRN     Cancer: left breast. Left central IDC, gr 2, ER +, [place] +, HER 2 positive:  cT2 cN1a cM0, stage IIB, prognostic stage IB.   ---Followed by Dr. Marely Gray. Wheeze: mild. Smoker. Advise smoking cessation. Albuterol PRN     Adverse effect of anesthesia: Overview: \"difficulty waking up from anesthethia\".   No difficulty this surg.         Problem List:  Problem List as of 3/19/2019 Date Reviewed: 3/12/2019          Codes Class Noted - Resolved    Hypertension ICD-10-CM: I10  ICD-9-CM: 401.9  3/12/2019 - Present        High cholesterol ICD-10-CM: E78.00  ICD-9-CM: 272.0  3/12/2019 - Present        GERD (gastroesophageal reflux disease) ICD-10-CM: K21.9  ICD-9-CM: 530.81  3/12/2019 - Present        Depression ICD-10-CM: F32.9  ICD-9-CM: 943  3/12/2019 - Present        Adverse effect of anesthesia ICD-10-CM: T41.45XA  ICD-9-CM: 995.22  3/12/2019 - Present    Overview Signed 3/12/2019  7:39 PM by Victoria Abdullahi MD     \"difficulty waking up from anesthethia\"             Diverticulitis ICD-10-CM: R16.99  ICD-9-CM: 562.11  3/12/2019 - Present        Hyponatremia ICD-10-CM: E87.1  ICD-9-CM: 276.1  3/12/2019 - Present        Hypokalemia ICD-10-CM: E87.6  ICD-9-CM: 276.8  3/12/2019 - Present        Abscess ICD-10-CM: L02.91  ICD-9-CM: 682.9  3/12/2019 - Present        Breast cancer, left ([facility] ICD-10-CM: C50.912  ICD-9-CM: 174.9 2/19/2019 - Present    Overview Addendum 3/12/2019  4:58 PM by Peace Crowder MD     2/2019 LEFT invasive ductal carcinoma, grade 2, %. MI 20%, Her 2 positive  Positive axillary node, %. MI neg, Her 2 positive  T[facility]-P               Cancer [facility]) ICD-10-CM: C80.1  ICD-9-CM: 199.1  2/1/2019 - Present    Overview Signed 3/12/2019  7:39 PM by Alice Abebe MD     left breast                 HPI:   Ms. Bernice Lou is a 74 y.o.  female who is admitted to the Gen Surg Service for diverticulitis with abscess. We are asked to evaluate for medical mgmt. Ms. Bernice Lou is complaining of abdominal pain. Ongoing for about a week now. Seen in ED a few days ago, sent home on oral abx (levaquin/Flagyl) however did not improve. (Dr Todd Valladares)    3/13: Feeling a little better. Abd feels less tight. No BM for about 6 days but has had little po. K+ low so will replete. 3/14: Feeling pretty miserable this am. She is c/o increased pressure in the LLQ. K+ improved but still low so will replete further. Afebrile. 3/15:  Less pain today but WBC is up to 23. No fever. Abd soft but tender. Drain patent. K+ normal.     3/16: OR 3/15 colectomy with colostomy for diverticulitis and perforation. Afebrile. WBC high this am. She is feeling better. Pain is under control. No nausea or vomiting. 3/17: WBC a little better. \"stomach still feels tight\". Afebrile. Bloody liquid in colostomy bag. No nausea or vomiting. BP stable. 3/18:  C/O ab \"pressure. \"  No gas as yet. Only small amount of fluid in ostomy bag per nurses. No N/V. AM labs pending. 3/19:  Less ab distention/\"pressure\" this AM.  Ostomy with output this AM per nurses. WBC coming down. Afeb. Diet advanced to full liquid and tolerating. Review of Systems:   A comprehensive review of systems was negative except for that written in the HPI.     Objective:   Physical Exam:     Visit Vitals  /81 (BP 1 Location: Left arm, BP Patient Position: At rest) Pulse 77   Temp 97.9 °F (36.6 °C)   Resp 18   Ht 5' 6\" (1.676 m)   Wt 80.7 kg (178 lb)   SpO2 96%   Breastfeeding? No   BMI 28.73 kg/m²    O2 Flow Rate (L/min): 3 l/min O2 Device: Room air    Temp (24hrs), Av.8 °F (36.6 °C), Min:97.4 °F (36.3 °C), Max:97.9 °F (36.6 °C)    No intake/output data recorded.  0701 -  1900  In: 50395.7 [I.V.:32735.7]  Out: 0   General:  Alert, cooperative,  appears stated age. Head:  Normocephalic, without obvious abnormality, atraumatic. Eyes:  Conjunctivae/corneas clear. PERRL, EOMs intact. Throat: Lips, mucosa, and tongue normal. Teeth and gums normal.   Neck: Supple, symmetrical, trachea midline, no adenopathy, thyroid: no enlargement/tenderness/nodules, no carotid bruit and no JVD. Lungs:   Clear to auscultation bilaterally. Chest wall:  No tenderness or deformity. Heart:  Regular rate and rhythm, S1, S2 normal, no murmur, click, rub or gallop. Abdomen:   Soft, colostomy site looks good, few bowel sounds. less distended. Mild generalized tenderness present. Dressing dry. Extremities: Extremities normal, atraumatic, no cyanosis or edema. No calf tenderness or cords. Pulses: 2+ and symmetric all extremities. Skin: turgor normal.    Neurologic: Alert and oriented X 3. Fine motor of hands and fingers normal.   equal.  No cogwheeling or rigidity. Gait not tested at this time. Sensation grossly normal to touch.   Gross motor of extremities normal.       Data Review:       Recent Days:  Recent Labs     19  0355 19  1207 19  0354   WBC 17.0* 20.5* 25.6*   HGB 11.2* 12.0 11.4*   HCT 35.2 36.9 35.4    209 237     Recent Labs     19  0355 19  1207 19  0354    138 135*   K 3.7 4.1 4.6    106 104   CO2 27 29 27   * 104* 247*   BUN 5* 4* 4*   CREA 0.44* 0.46* 0.49*   CA 8.1* 7.8* 7.4*   MG 2.2 2.1  --    ALB 1.8* 1.9*  --    TBILI 0.2 0.3  --    SGOT 24 36  --    ALT 35 41  --      No results for input(s): PH, PCO2, PO2, HCO3, FIO2 in the last 72 hours. 24 Hour Results:  Recent Results (from the past 24 hour(s))   GLUCOSE, POC    Collection Time: 03/18/19 11:40 AM   Result Value Ref Range    Glucose (POC) 94 65 - 100 mg/dL    Performed by Mo Aranda (PCT)    METABOLIC PANEL, COMPREHENSIVE    Collection Time: 03/18/19 12:07 PM   Result Value Ref Range    Sodium 138 136 - 145 mmol/L    Potassium 4.1 3.5 - 5.1 mmol/L    Chloride 106 97 - 108 mmol/L    CO2 29 21 - 32 mmol/L    Anion gap 3 (L) 5 - 15 mmol/L    Glucose 104 (H) 65 - 100 mg/dL    BUN 4 (L) 6 - 20 MG/DL    Creatinine 0.46 (L) 0.55 - 1.02 MG/DL    BUN/Creatinine ratio 9 (L) 12 - 20      GFR est AA >60 >60 ml/min/1.73m2    GFR est non-AA >60 >60 ml/min/1.73m2    Calcium 7.8 (L) 8.5 - 10.1 MG/DL    Bilirubin, total 0.3 0.2 - 1.0 MG/DL    ALT (SGPT) 41 12 - 78 U/L    AST (SGOT) 36 15 - 37 U/L    Alk. phosphatase 74 45 - 117 U/L    Protein, total 4.9 (L) 6.4 - 8.2 g/dL    Albumin 1.9 (L) 3.5 - 5.0 g/dL    Globulin 3.0 2.0 - 4.0 g/dL    A-G Ratio 0.6 (L) 1.1 - 2.2     MAGNESIUM    Collection Time: 03/18/19 12:07 PM   Result Value Ref Range    Magnesium 2.1 1.6 - 2.4 mg/dL   CBC WITH AUTOMATED DIFF    Collection Time: 03/18/19 12:07 PM   Result Value Ref Range    WBC 20.5 (H) 3.6 - 11.0 K/uL    RBC 3.91 3.80 - 5.20 M/uL    HGB 12.0 11.5 - 16.0 g/dL    HCT 36.9 35.0 - 47.0 %    MCV 94.4 80.0 - 99.0 FL    MCH 30.7 26.0 - 34.0 PG    MCHC 32.5 30.0 - 36.5 g/dL    RDW 14.5 11.5 - 14.5 %    PLATELET 698 012 - 104 K/uL    MPV 9.2 8.9 - 12.9 FL    NRBC 0.0 0  WBC    ABSOLUTE NRBC 0.00 0.00 - 0.01 K/uL    NEUTROPHILS 79 (H) 32 - 75 %    BAND NEUTROPHILS 2 0 - 6 %    LYMPHOCYTES 8 (L) 12 - 49 %    MONOCYTES 5 5 - 13 %    EOSINOPHILS 0 0 - 7 %    BASOPHILS 0 0 - 1 %    METAMYELOCYTES 4 (H) 0 %    MYELOCYTES 2 (H) 0 %    IMMATURE GRANULOCYTES 0 %    ABS. NEUTROPHILS 16.6 (H) 1.8 - 8.0 K/UL    ABS. LYMPHOCYTES 1.6 0.8 - 3.5 K/UL    ABS.  MONOCYTES 1.0 0.0 - 1.0 K/UL    ABS. EOSINOPHILS 0.0 0.0 - 0.4 K/UL    ABS. BASOPHILS 0.0 0.0 - 0.1 K/UL    ABS. IMM. GRANS. 0.0 K/UL    DF MANUAL      RBC COMMENTS NORMOCYTIC, NORMOCHROMIC      WBC COMMENTS TOXIC GRANULATION     VANCOMYCIN, TROUGH    Collection Time: 03/18/19  4:35 PM   Result Value Ref Range    Vancomycin,trough 15.5 (H) 5.0 - 10.0 ug/mL    Reported dose date: NOT PROVIDED      Reported dose time: NOT PROVIDED      Reported dose: NOT PROVIDED UNITS   GLUCOSE, POC    Collection Time: 03/18/19  9:17 PM   Result Value Ref Range    Glucose (POC) 97 65 - 100 mg/dL    Performed by Danica Hastings (PCT)    METABOLIC PANEL, COMPREHENSIVE    Collection Time: 03/19/19  3:55 AM   Result Value Ref Range    Sodium 139 136 - 145 mmol/L    Potassium 3.7 3.5 - 5.1 mmol/L    Chloride 106 97 - 108 mmol/L    CO2 27 21 - 32 mmol/L    Anion gap 6 5 - 15 mmol/L    Glucose 101 (H) 65 - 100 mg/dL    BUN 5 (L) 6 - 20 MG/DL    Creatinine 0.44 (L) 0.55 - 1.02 MG/DL    BUN/Creatinine ratio 11 (L) 12 - 20      GFR est AA >60 >60 ml/min/1.73m2    GFR est non-AA >60 >60 ml/min/1.73m2    Calcium 8.1 (L) 8.5 - 10.1 MG/DL    Bilirubin, total 0.2 0.2 - 1.0 MG/DL    ALT (SGPT) 35 12 - 78 U/L    AST (SGOT) 24 15 - 37 U/L    Alk.  phosphatase 71 45 - 117 U/L    Protein, total 5.4 (L) 6.4 - 8.2 g/dL    Albumin 1.8 (L) 3.5 - 5.0 g/dL    Globulin 3.6 2.0 - 4.0 g/dL    A-G Ratio 0.5 (L) 1.1 - 2.2     MAGNESIUM    Collection Time: 03/19/19  3:55 AM   Result Value Ref Range    Magnesium 2.2 1.6 - 2.4 mg/dL   CBC WITH AUTOMATED DIFF    Collection Time: 03/19/19  3:55 AM   Result Value Ref Range    WBC 17.0 (H) 3.6 - 11.0 K/uL    RBC 3.80 3.80 - 5.20 M/uL    HGB 11.2 (L) 11.5 - 16.0 g/dL    HCT 35.2 35.0 - 47.0 %    MCV 92.6 80.0 - 99.0 FL    MCH 29.5 26.0 - 34.0 PG    MCHC 31.8 30.0 - 36.5 g/dL    RDW 14.6 (H) 11.5 - 14.5 %    PLATELET 136 592 - 975 K/uL    MPV 8.9 8.9 - 12.9 FL    NRBC 0.0 0  WBC    ABSOLUTE NRBC 0.00 0.00 - 0.01 K/uL NEUTROPHILS 77 (H) 32 - 75 %    LYMPHOCYTES 11 (L) 12 - 49 %    MONOCYTES 11 5 - 13 %    EOSINOPHILS 0 0 - 7 %    BASOPHILS 0 0 - 1 %    METAMYELOCYTES 1 (H) 0 %    IMMATURE GRANULOCYTES 0 %    ABS. NEUTROPHILS 13.1 (H) 1.8 - 8.0 K/UL    ABS. LYMPHOCYTES 1.9 0.8 - 3.5 K/UL    ABS. MONOCYTES 1.9 (H) 0.0 - 1.0 K/UL    ABS. EOSINOPHILS 0.0 0.0 - 0.4 K/UL    ABS. BASOPHILS 0.0 0.0 - 0.1 K/UL    ABS. IMM. GRANS. 0.0 K/UL    DF MANUAL      RBC COMMENTS NORMOCYTIC, NORMOCHROMIC         Medications reviewed  Current Facility-Administered Medications   Medication Dose Route Frequency    HYDROcodone-acetaminophen (NORCO) 5-325 mg per tablet 2 Tab  2 Tab Oral Q4H PRN    vancomycin (VANCOCIN) 1500 mg in  ml infusion  1,500 mg IntraVENous Q12H    meropenem (MERREM) 500 mg in 0.9% sodium chloride (MBP/ADV) 50 mL  0.5 g IntraVENous Q6H    sodium chloride (NS) flush 5-40 mL  5-40 mL IntraVENous Q8H    acetaminophen (TYLENOL) tablet 650 mg  650 mg Oral Q6H PRN    sodium chloride (NS) flush 5-40 mL  5-40 mL IntraVENous Q8H    sodium chloride (NS) flush 5-40 mL  5-40 mL IntraVENous PRN    naloxone (NARCAN) injection 0.4 mg  0.4 mg IntraVENous PRN    HYDROcodone-acetaminophen (NORCO) 5-325 mg per tablet 1 Tab  1 Tab Oral Q4H PRN    HYDROmorphone (PF) (DILAUDID) injection 0.5 mg  0.5 mg IntraVENous Q4H PRN    ondansetron (ZOFRAN) injection 4 mg  4 mg IntraVENous Q4H PRN    enoxaparin (LOVENOX) injection 40 mg  40 mg SubCUTAneous Q24H    pravastatin (PRAVACHOL) tablet 40 mg  40 mg Oral QHS    venlafaxine (EFFEXOR) tablet 50 mg  50 mg Oral DAILY    metroNIDAZOLE (FLAGYL) IVPB premix 500 mg  500 mg IntraVENous Q12H    albuterol (PROVENTIL VENTOLIN) nebulizer solution 2.5 mg  2.5 mg Nebulization Q6H PRN       Care Plan discussed with: Patient/nurse  Total time spent with patient and review of records: 30 minutes.     Dylon Cazares MD

## 2019-03-19 NOTE — PROGRESS NOTES
Cancer [place] at [address], [address] [facility] W: [phone]  F: [phone] Reason for Visit:  
Andrew Locke is a 74 y.o. female who is seen in consultation at the request of Dr. Rama Yuan for continuity of care. Hematology / Oncology Treatment History:  
· 2/15/19 Left core bx:   IDC, gr 2, 1.1 cm, ER + at 100%, [place] + at 20%, HER 2 POSITIVE (IHC 2+; FISH ratio 3.9; sig/cell 9.2) · 3/1/19 Left ax LN core bx:  + for breast cancer, ER + at 100%, AZ negative, HER 2 POSITIVE (IHC 2+, FISH ratio 2; sig/cell 5.8) · TCH-P 3/6/19- History of Present Illness:  
 
Seen in the ED on 3/8/2019 for abd pain and constipation. ED CT  Sowed diverticulitis; was initiated on levaquin and flagyl. Seen  In our clinic yesterday with worsening abd pain; therefore was directed to ED for further eval. ED CT scan shows acute diverticulitis with abscesses. Admitted for further eval and management. Ms Mitch Austin feeling better this morning. Reports abd \"discomfort/ bloating/pressure\". Rates pain 6/10. Does not want hydrocodone but will try some Tylenol. Had some loose stool this am. Denies any SOB. Denies N/V at present. Sister at bedside. Interval History:  
OR 3/15/19 with colostomy for diverticulitis and perforation. Feeling better; up ambulating to BR; had stool in bag this am;  
 
No family at bedside. Past Medical History:  
Diagnosis Date  Adverse effect of anesthesia \"difficulty waking up from anesthethia\"  Cancer ([facility] [address] 02/2019  
 left breast  
 Depression  GERD (gastroesophageal reflux disease)  High cholesterol  Hypertension Past Surgical History:  
Procedure Laterality Date  HX BREAST BIOPSY Right years ago  
 neg; needle bx  HX CATARACT REMOVAL    
 HX COLONOSCOPY    
 HX ORTHOPAEDIC    
 carpal tunnel Social History Tobacco Use  Smoking status: Current Every Day Smoker Packs/day: 0.50 Years: 55.00 Pack years: 27.50  Smokeless tobacco: Never Used Substance Use Topics  Alcohol use: Yes Alcohol/week: 0.6 oz Types: 1 Shots of liquor per week Drinks per session: 5 or 6 Binge frequency: Weekly Comment: Patient stated that she puts a splash of bourbon in her afternoon coffee around 5-6 days per week Family History Problem Relation Age of Onset  Breast Cancer Maternal Aunt  Hypertension Mother  Hypertension Father  Cancer Father Lung  Cancer Brother   
     leukemia  Diabetes Brother   
     2 brothers with diabetes  Hypertension Sister 2 sisters and 4 brothers with HTN Current Facility-Administered Medications Medication Dose Route Frequency  HYDROcodone-acetaminophen (NORCO) 5-325 mg per tablet 2 Tab  2 Tab Oral Q4H PRN  
 vancomycin (VANCOCIN) 1500 mg in  ml infusion  1,500 mg IntraVENous Q12H  
 meropenem (MERREM) 500 mg in 0.9% sodium chloride (MBP/ADV) 50 mL  0.5 g IntraVENous Q6H  
 sodium chloride (NS) flush 5-40 mL  5-40 mL IntraVENous Q8H  
 acetaminophen (TYLENOL) tablet 650 mg  650 mg Oral Q6H PRN  
 sodium chloride (NS) flush 5-40 mL  5-40 mL IntraVENous Q8H  
 sodium chloride (NS) flush 5-40 mL  5-40 mL IntraVENous PRN  
 naloxone (NARCAN) injection 0.4 mg  0.4 mg IntraVENous PRN  
 HYDROcodone-acetaminophen (NORCO) 5-325 mg per tablet 1 Tab  1 Tab Oral Q4H PRN  
 HYDROmorphone (PF) (DILAUDID) injection 0.5 mg  0.5 mg IntraVENous Q4H PRN  
 ondansetron (ZOFRAN) injection 4 mg  4 mg IntraVENous Q4H PRN  
 enoxaparin (LOVENOX) injection 40 mg  40 mg SubCUTAneous Q24H  pravastatin (PRAVACHOL) tablet 40 mg  40 mg Oral QHS  venlafaxine (EFFEXOR) tablet 50 mg  50 mg Oral DAILY  metroNIDAZOLE (FLAGYL) IVPB premix 500 mg  500 mg IntraVENous Q12H  
 albuterol (PROVENTIL VENTOLIN) nebulizer solution 2.5 mg  2.5 mg Nebulization Q6H PRN Allergies Allergen Reactions  Doxycycline Diarrhea  Keflex [Cephalexin] Hives  Penicillins Rash Review of Systems: A complete review of systems was obtained, negative except as described above. Physical Exam:  
 
Visit Vitals /68 (BP 1 Location: Left arm, BP Patient Position: At rest;Supine) Pulse 76 Temp 97.4 °F (36.3 °C) Resp 18 Ht 5' 6\" (1.676 m) Wt 80.7 kg (178 lb) SpO2 94% Breastfeeding? No  
BMI 28.73 kg/m² ECOG PS: 1 General: No distress Eyes: PERRLA, anicteric sclerae HENT: Atraumatic with normal appearance of ears and nose; OP clear Neck: Supple; no thyromegaly Lymphatic: No cervical, supraclavicular, or axillary adenopathy Respiratory:CTAB;  normal respiratory effort CV:Port noted to upper chest area;  Normal rate, regular rhythm, no murmurs, no peripheral edema GI: colostomy noted; sm amt  drainage in bag; Soft, nondistended, no masses, no hepatomegaly, no splenomegaly MS: Digits without clubbing or cyanosis. Skin: midline incision with dsg D&I;  No rashes, ecchymoses, or petechiae. Normal temperature, turgor, and texture. Neuro/Psych: Alert, oriented, appropriate affect, normal judgment/insight Results:  
 
Lab Results Component Value Date/Time WBC 17.0 (H) 03/19/2019 03:55 AM  
 HGB 11.2 (L) 03/19/2019 03:55 AM  
 HCT 35.2 03/19/2019 03:55 AM  
 PLATELET 715 63/09/8980 03:55 AM  
 MCV 92.6 03/19/2019 03:55 AM  
 ABS. NEUTROPHILS 13.1 (H) 03/19/2019 03:55 AM  
 
Lab Results Component Value Date/Time Sodium 139 03/19/2019 03:55 AM  
 Potassium 3.7 03/19/2019 03:55 AM  
 Chloride 106 03/19/2019 03:55 AM  
 CO2 27 03/19/2019 03:55 AM  
 Glucose 101 (H) 03/19/2019 03:55 AM  
 BUN 5 (L) 03/19/2019 03:55 AM  
 Creatinine 0.44 (L) 03/19/2019 03:55 AM  
 GFR est AA >60 03/19/2019 03:55 AM  
 GFR est non-AA >60 03/19/2019 03:55 AM  
 Calcium 8.1 (L) 03/19/2019 03:55 AM  
 Glucose (POC) 93 03/19/2019 06:56 AM  
 
Lab Results Component Value Date/Time Bilirubin, total 0.2 03/19/2019 03:55 AM  
 ALT (SGPT) 35 03/19/2019 03:55 AM  
 AST (SGOT) 24 03/19/2019 03:55 AM  
 Alk. phosphatase 71 03/19/2019 03:55 AM  
 Protein, total 5.4 (L) 03/19/2019 03:55 AM  
 Albumin 1.8 (L) 03/19/2019 03:55 AM  
 Globulin 3.6 03/19/2019 03:55 AM  
 
Lab Results Component Value Date/Time Lipase 35 (L) 03/12/2019 04:52 PM  
 
No results found for: INR, APTT, DDIMSQ, DDIME, 799962, Angela Dory, FDPLT, Darian Hail, PRSFLT, [address], [address], [id number], [id number], [id number], [id number], [id number], [id number] No results found for: CEA, 992934, C199LT, C125, DROK630, 2729LT, C153LT, PSA, PSALT, LDH, [id number], HCGTLT, AFP, CHRGLT 
 
TTE 3/6/19 EF 66-70%. 3/12/2019 XR ABD ACUTE IMPRESSION: No acute process in the abdomen. 2/12/2019 CT ABD PELV W CONT IMPRESSION:   
Acute diverticulitis of the sigmoid colon. Development of a focal fluid and gas 
collection in the cul-de-sac, consistent with an abscess. Suspected 2 additional 
smaller developing abscesses in the pelvis. 3/13/2019 CT guided perc drainage IMPRESSION: CT guided pelvic/cul-de-sac feculent fluid collection drainage 
performed. 30 cc removed; Laboratory results pending. Cath placed for drainage Assessment and Recommendations: 1. Diverticulitis/Abd abscess/perforation: ecoli, extended spectrum beta lactamase ;  
 3/15 Exp lap; sigmoid colectomy with colostomy; / Dr Madeleine Chao consulted abx coverage 2. Left central IDC, gr 2, ER +, NM +, HER 2 positive:  cT2 cN1a cM0, stage IIB, prognostic stage IB The goal of systemic adjuvant therapy is to improve the chances for cure and decrease the risk of relapse 3/6 [id number] (Trastuzumab,  Docetaxel,  Carboplatin , Pertuzumab) given every 3 weeks x 6 cycles; Neulasta following each treatment. Next cycle scheduled for 3/27, will need to delay Discussed with surgery; recommending 4 weeks before chemo resumed Discussed with ID; planning for 2 week abx course 3. Pain 
controlled 4. Leukocytosis 
improving Combination of having received neulasta and infection Continue to monitor 5. Ostomy teaching Wound team following Plan reviewed with Dr Rina Irwin Signed By: Daxa Bonilla NP

## 2019-03-19 NOTE — CONSULTS
[address]     Name:  Raffaele Nava  MR#:  [id number]  :  1945  ACCOUNT #:  [de-identified]  DATE OF SERVICE:  2019      REQUESTING PHYSICIAN:  Dr. Mckeon Seek:  Abdominal pain. HISTORY OF PRESENT ILLNESS:  The patient is a 75-year-old female with past medical history significant for recently diagnosed breast cancer who is admitted to [facility] on 2019 with the aforementioned complaint. The patient was just diagnosed with breast cancer last month and underwent her first chemotherapy treatment a week prior to admission. The day that she received her first cycle of chemotherapy, she developed lower abdominal cramping. She was seen in the emergency department on 2019 where she was found to have diverticulitis on CT scan. She was discharged home on levofloxacin and metronidazole. Despite these antibiotics. There was no improvement and a repeat CT scan revealed diverticulitis with several pelvic abscesses. She was admitted to [facility] for further evaluation and treatment. She was seen by Interventional Radiology and underwent aspiration of the abscess on 2019. She was taken to the OR on 03/15/2019 where she underwent exploratory laparotomy with a sigmoid colectomy and colostomy with Florina's procedure. Cultures from the aspirate are growing ESBL producing Escherichia coli, enterococcus, and anaerobes. She is currently on vancomycin, meropenem and metronidazole. .  The Infectious Disease service had been asked to assist with antibiotic management. PAST MEDICAL HISTORY:  Recently diagnosed breast cancer, depression, gastroesophageal reflux disease, dyslipidemia, hypertension. PAST SURGICAL HISTORY:  Breast biopsy, cataract removal, carpal tunnel release, right chest wall port placement. FAMILY HISTORY:  Hypertension. SOCIAL HISTORY:  The patient is a current tobacco smoker.   She drinks alcohol occasionally. No illicit drug use. OUTPATIENT MEDICATIONS:  Please see body of chart for details. She was on levofloxacin and metronidazole prior to admission. ALLERGIES:  DOXYCYCLINE CAUSES DIARRHEA, CEPHALEXIN CAUSES HIVES, AND PENICILLIN CAUSE A RASH. REVIEW OF SYSTEMS:  As per the HPI. Remainder 10-system review of systems is unremarkable. LABORATORY DATA:  White blood cell count 17,000, platelet count 549,537. Creatinine is 0.4. Cultures from the pelvic abscess are growing ESBL producing Escherichia coli and a possible enterococcus. Mixed anaerobic gram-negative rods are growing as well. PHYSICAL EXAMINATION:  VITAL SIGNS:  Temperature 97.4 degrees Fahrenheit, maximum temperature is less than 100, heart rate is 76 beats per minute, blood pressure 148/68, oxygen saturation 94% on room air. GENERAL:  Alert, no acute distress. HEENT:  Normocephalic, atraumatic. Pupils equal and reactive to light. No oropharyngeal lesions noted. NECK:  Supple. HEART:  Regular rate and rhythm. No murmurs, gallops, rubs. PULMONARY:  Clear to auscultation anteriorly. ABDOMEN:  There is an ostomy which is pink and healthy. Staples remain in the incision site. There is a Penrose drain in place. No erythema noted. Mild abdominal tenderness to palpation. EXTREMITIES:  No clubbing, cyanosis, or edema. SKIN:  No rashes. There is a right chest wall port which is unremarkable. NEUROLOGIC:  Cranial nerves II-XII grossly intact. PSYCHIATRIC:  Normal affect. ASSESSMENT AND PLAN:  1. Perforated diverticulitis with pelvic abscesses. The patient is status post aspiration of an abscess by Interventional Radiology on 03/13/2019. Cultures are growing ESBL producing Escherichia coli, enterococcus, and anaerobes. She was taken to the OR on 03/15/2019 for exploratory laparotomy, sigmoid colectomy, and colostomy with Florina's procedure. Agree with vancomycin and meropenem.   Metronidazole may be stopped as the coverage is redundant and meropenem provides appropriate anaerobic coverage. Anticipate 14 days of intravenous antibiotics to 03/29/2019. This may be administered to the patient's right chest wall port. We will make further recommendations regarding antibiotics once the culture results are final.  2.  Recently diagnosed breast cancer. The patient underwent her first cycle of chemotherapy one week prior to admission. Further chemotherapy is on hold for now until the infection has been treated. 3.  Leukocytosis. This is likely due to a combination of infection and recent Neulasta use. Continue to follow up. 4.  MULTIPLE ANTIBIOTIC ALLERGIES INCLUDING DOXYCYCLINE, CEPHALEXIN, AND PENICILLIN. Thank you for allowing me to participate in the care of this patient.         Nakia Leach, DO [id number]  D:  03/19/2019 11:01  T:  03/19/2019 11:07  JOB #:  [id number]

## 2019-03-19 NOTE — PROGRESS NOTES
Nutrition Assessment: 
 
RECOMMENDATIONS/INTERVENTION(S):  
1. Recommend advancing diet order to GI lite as medically appropriate. 2. Will monitor GI status, tolerance to PO and PO intakes. ASSESSMENT:  
3/19: S/P sigmoid colectomy with colostomy 3/15. Diet has advanced to full liquid which pt is tolerating but only taking in fair PO. Admits that she is not drinking as much fluid as she should secondary to being tired of plain water and not liking some of her liquid options. Is used to drinking coffee most of the day at home. Discussed flavored options of water to try and beverages to limit. Pt verbalized understanding. Educated pt on diet to follow post-colostomy. Written materials and RD contact info left with pt. Ostomy functioning with output starting today. Labs and meds reviewed. 3/15: 75 yo female admitted for diverticulitis. Pt was c/o abd pain and nausea upon admission. Found to have sigmoid diverticulitis with abscess. RD assessment for LOS. PMhx: Breast CA (dx one month ago) s/p one round of chemo, GERD, HTN, hypercholesteremia. Weight WNL per BMI per advanced age. Pt off floor at time of visit in OR for sigmoid colectomy with colostomy. Unable to speak with pt to obtain weight hx or nutrition hx PTA. Prior to today pt was on clear liquid diet with noted poor intakes. Labs and meds reviewed. NaCl with 40mEqKcl ordered at 100ml/hr. Diet Order: Full liquids 
% Eaten:  No data found. Pertinent Medications: [x] Reviewed Labs: [x] Reviewed Anthropometrics: Height: 5' 6\" (167.6 cm) Weight: 80.7 kg (178 lb) IBW (%IBW):   ( ) UBW (%UBW):   (  %) BMI: Body mass index is 28.73 kg/m². This BMI is indicative of: 
 [] Underweight    [x] Normal  - per age  [] Overweight    []  Obesity    []  Extreme Obesity (BMI>40) Estimated Nutrition Needs (Based on): 1701 Kcals/day(BMR 1309 x AF 1.3) , 81 g(81-97gm (1-1.2gm/kg/d)) Protein Carbohydrate: At Least 130 g/day  Fluids: 1701 mL/day (1 ml/kcal) Last BM: 3/19   [x]Active     []Hyperactive  []Hypoactive       [] Absent   BS Skin:    [] Intact   [x] Incision - abd  [] Breakdown   [] DTI   [] Tears/Excoriation/Abrasion  []Edema [] Other: Wt Readings from Last 30 Encounters:  
03/12/19 80.7 kg (178 lb)  
03/12/19 80.7 kg (178 lb) 03/08/19 82.6 kg (182 lb) 03/06/19 82.7 kg (182 lb 6.4 oz) 03/06/19 82.7 kg (182 lb 6.4 oz) 03/05/19 87.1 kg (192 lb) 03/01/19 87.1 kg (192 lb 0.3 oz) 02/28/19 83.3 kg (183 lb 9 oz) 02/25/19 83.5 kg (184 lb)  
02/21/19 82.6 kg (182 lb) NUTRITION DIAGNOSES:  
Problem:  Increased nutrient needs Etiology: related to increased demand for kcals/pro Signs/Symptoms: as evidenced by condition associated with increased needs: CA with current chemo 3/19: Nutrition dx continues. NUTRITION INTERVENTIONS: 
Meals/Snacks: General/healthful diet GOAL:  
Advance PO diet to GI lite with intakes > 50% within next 2-4 days Cultural, Mormonism, or Ethnic Dietary Needs: None EDUCATION & DISCHARGE NEEDS:  
 [x] None Identified 
 [] Identified and Education Provided/Documented 
 [] Identified and Pt declined/was not appropriate [x] Interdisciplinary Care Plan Reviewed/Documented  
 [x] Discharge Needs: Follow low fiber diet at home with adeuqate fluid intake 
 [] No Nutrition Related Discharge Needs NUTRITION RISK:  
Pt Is At Nutrition Risk  [x] No Nutrition Risk Identified  [] PT SEEN FOR:  
 []  MD Consult: []Calorie Count []Diabetic Diet Education []Diet Education []Electrolyte Management []General Nutrition Management and Supplements []Management of Tube Feeding []TPN Recommendations []  RN Referral:  []MST score >=2 
   []Enteral/Parenteral Nutrition PTA []Pregnant: Gestational DM or Multigestation  
              [] Pressure Ulcer []  Low BMI      []  Length of Stay       [] Dysphagia Diet         [] Ventilator [x]  Follow-up Previous Recommendations: 
 [x] Implemented          [] Not Implemented          [] Not Applicable Previous Goal: 
 [] Met              [x] Progressing Towards Goal              [] Not Progressing Towards Goal   [] Not Applicable Kirti Jaems RD Pager [phone] Phone [phone]

## 2019-03-19 NOTE — WOUND CARE
Ostomy nurse follow for ostomy assessment, alert - no distress Dr Keysha Gloria at bedside for wound assessment. Assessment LLQ ostomy appliance intact with brown liquid drainage, passing flatus- stoma red and moist. Mid line incision approximated with penrose drain on the distal and proximal incision- raman wound intact, no redness, moderate amount of tan drainage from the distal drain. Treatment Mid line incison cleansed, dry dressing replaced. Tolerated well. Will follow for continued ostomy care and education Sara Kaiser RN Cleaster Beech

## 2019-03-19 NOTE — PROGRESS NOTES
3/19/2019 6:28 PM EMR reviewed, possibility pt will need iv abx at discharge. Met with pt to discuss. Pt reported she does not feel she will be able to administer her iv abx at home on her own. Pt reported if it is once a day and home health nursing can administer iv abx she will be agreeable. Pt reported if this cannot happen she would prefer to discharge to a SNF. Choices reviewed for iv infusion companies and SNFs. Pt signed choice letter for [facility] for iv infusion. Pt also signed choice letter for [employer] and [facility]. Referrals were sent to pt's preferences. CM will follow for final ID recommendations. TIRSO Sandy

## 2019-03-20 LAB
ALBUMIN SERPL-MCNC: 1.6 G/DL (ref 3.5–5)
ALBUMIN/GLOB SERPL: 0.5 {RATIO} (ref 1.1–2.2)
ALP SERPL-CCNC: 65 U/L (ref 45–117)
ALT SERPL-CCNC: 25 U/L (ref 12–78)
ANION GAP SERPL CALC-SCNC: 5 MMOL/L (ref 5–15)
AST SERPL-CCNC: 19 U/L (ref 15–37)
BASOPHILS # BLD: 0 K/UL (ref 0–0.1)
BASOPHILS NFR BLD: 0 % (ref 0–1)
BILIRUB SERPL-MCNC: 0.2 MG/DL (ref 0.2–1)
BUN SERPL-MCNC: 4 MG/DL (ref 6–20)
BUN/CREAT SERPL: 10 (ref 12–20)
CALCIUM SERPL-MCNC: 8.1 MG/DL (ref 8.5–10.1)
CHLORIDE SERPL-SCNC: 106 MMOL/L (ref 97–108)
CO2 SERPL-SCNC: 29 MMOL/L (ref 21–32)
CREAT SERPL-MCNC: 0.41 MG/DL (ref 0.55–1.02)
DIFFERENTIAL METHOD BLD: ABNORMAL
EOSINOPHIL # BLD: 0 K/UL (ref 0–0.4)
EOSINOPHIL NFR BLD: 0 % (ref 0–7)
ERYTHROCYTE [DISTWIDTH] IN BLOOD BY AUTOMATED COUNT: 14.3 % (ref 11.5–14.5)
GLOBULIN SER CALC-MCNC: 3.4 G/DL (ref 2–4)
GLUCOSE SERPL-MCNC: 92 MG/DL (ref 65–100)
HCT VFR BLD AUTO: 32.7 % (ref 35–47)
HGB BLD-MCNC: 10.4 G/DL (ref 11.5–16)
IMM GRANULOCYTES # BLD AUTO: 0 K/UL (ref 0–0.04)
IMM GRANULOCYTES NFR BLD AUTO: 0 % (ref 0–0.5)
LYMPHOCYTES # BLD: 1.1 K/UL (ref 0.8–3.5)
LYMPHOCYTES NFR BLD: 8 % (ref 12–49)
MAGNESIUM SERPL-MCNC: 2.1 MG/DL (ref 1.6–2.4)
MCH RBC QN AUTO: 29.8 PG (ref 26–34)
MCHC RBC AUTO-ENTMCNC: 31.8 G/DL (ref 30–36.5)
MCV RBC AUTO: 93.7 FL (ref 80–99)
METAMYELOCYTES NFR BLD MANUAL: 1 %
MONOCYTES # BLD: 1.7 K/UL (ref 0–1)
MONOCYTES NFR BLD: 12 % (ref 5–13)
MYELOCYTES NFR BLD MANUAL: 2 %
NEUTS BAND NFR BLD MANUAL: 4 %
NEUTS SEG # BLD: 10.9 K/UL (ref 1.8–8)
NEUTS SEG NFR BLD: 73 % (ref 32–75)
NRBC # BLD: 0 K/UL (ref 0–0.01)
NRBC BLD-RTO: 0 PER 100 WBC
PLATELET # BLD AUTO: 264 K/UL (ref 150–400)
PMV BLD AUTO: 9.4 FL (ref 8.9–12.9)
POTASSIUM SERPL-SCNC: 3.7 MMOL/L (ref 3.5–5.1)
PROT SERPL-MCNC: 5 G/DL (ref 6.4–8.2)
RBC # BLD AUTO: 3.49 M/UL (ref 3.8–5.2)
RBC MORPH BLD: ABNORMAL
SODIUM SERPL-SCNC: 140 MMOL/L (ref 136–145)
WBC # BLD AUTO: 14.2 K/UL (ref 3.6–11)

## 2019-03-20 PROCEDURE — 36415 COLL VENOUS BLD VENIPUNCTURE: CPT

## 2019-03-20 PROCEDURE — 77030010541

## 2019-03-20 PROCEDURE — 77030012856

## 2019-03-20 PROCEDURE — 97535 SELF CARE MNGMENT TRAINING: CPT

## 2019-03-20 PROCEDURE — 74011250637 HC RX REV CODE- 250/637: Performed by: PHYSICIAN ASSISTANT

## 2019-03-20 PROCEDURE — 74011250636 HC RX REV CODE- 250/636: Performed by: SURGERY

## 2019-03-20 PROCEDURE — 83735 ASSAY OF MAGNESIUM: CPT

## 2019-03-20 PROCEDURE — 74011250637 HC RX REV CODE- 250/637: Performed by: SURGERY

## 2019-03-20 PROCEDURE — 80053 COMPREHEN METABOLIC PANEL: CPT

## 2019-03-20 PROCEDURE — 97116 GAIT TRAINING THERAPY: CPT

## 2019-03-20 PROCEDURE — 74011250637 HC RX REV CODE- 250/637: Performed by: FAMILY MEDICINE

## 2019-03-20 PROCEDURE — 74011250636 HC RX REV CODE- 250/636: Performed by: INTERNAL MEDICINE

## 2019-03-20 PROCEDURE — 74011000258 HC RX REV CODE- 258: Performed by: SURGERY

## 2019-03-20 PROCEDURE — 85025 COMPLETE CBC W/AUTO DIFF WBC: CPT

## 2019-03-20 PROCEDURE — 77030011641 HC PASTE OST ADH BMS -A

## 2019-03-20 PROCEDURE — 65270000029 HC RM PRIVATE

## 2019-03-20 PROCEDURE — 74011250637 HC RX REV CODE- 250/637: Performed by: INTERNAL MEDICINE

## 2019-03-20 PROCEDURE — 97165 OT EVAL LOW COMPLEX 30 MIN: CPT

## 2019-03-20 PROCEDURE — 77030010522

## 2019-03-20 RX ORDER — LOSARTAN POTASSIUM 50 MG/1
100 TABLET ORAL DAILY
Status: DISCONTINUED | OUTPATIENT
Start: 2019-03-20 | End: 2019-03-21

## 2019-03-20 RX ADMIN — PRAVASTATIN SODIUM 40 MG: 20 TABLET ORAL at 22:15

## 2019-03-20 RX ADMIN — LOSARTAN POTASSIUM 100 MG: 50 TABLET, FILM COATED ORAL at 08:26

## 2019-03-20 RX ADMIN — Medication 10 ML: at 14:00

## 2019-03-20 RX ADMIN — VANCOMYCIN HYDROCHLORIDE 1500 MG: 10 INJECTION, POWDER, LYOPHILIZED, FOR SOLUTION INTRAVENOUS at 16:25

## 2019-03-20 RX ADMIN — VENLAFAXINE 50 MG: 25 TABLET ORAL at 08:26

## 2019-03-20 RX ADMIN — MEROPENEM 500 MG: 500 INJECTION, POWDER, FOR SOLUTION INTRAVENOUS at 04:00

## 2019-03-20 RX ADMIN — HYDROCODONE BITARTRATE AND ACETAMINOPHEN 2 TABLET: 5; 325 TABLET ORAL at 13:13

## 2019-03-20 RX ADMIN — VANCOMYCIN HYDROCHLORIDE 1500 MG: 10 INJECTION, POWDER, LYOPHILIZED, FOR SOLUTION INTRAVENOUS at 05:45

## 2019-03-20 RX ADMIN — MEROPENEM 500 MG: 500 INJECTION, POWDER, FOR SOLUTION INTRAVENOUS at 08:32

## 2019-03-20 RX ADMIN — MEROPENEM 500 MG: 500 INJECTION, POWDER, FOR SOLUTION INTRAVENOUS at 16:24

## 2019-03-20 RX ADMIN — ENOXAPARIN SODIUM 40 MG: 40 INJECTION SUBCUTANEOUS at 22:15

## 2019-03-20 RX ADMIN — METRONIDAZOLE 500 MG: 500 INJECTION, SOLUTION INTRAVENOUS at 08:31

## 2019-03-20 RX ADMIN — Medication 10 ML: at 22:20

## 2019-03-20 RX ADMIN — HYDROCODONE BITARTRATE AND ACETAMINOPHEN 2 TABLET: 5; 325 TABLET ORAL at 06:30

## 2019-03-20 RX ADMIN — HYDROCODONE BITARTRATE AND ACETAMINOPHEN 2 TABLET: 5; 325 TABLET ORAL at 23:24

## 2019-03-20 RX ADMIN — HYDROCODONE BITARTRATE AND ACETAMINOPHEN 2 TABLET: 5; 325 TABLET ORAL at 01:46

## 2019-03-20 RX ADMIN — HYDROCODONE BITARTRATE AND ACETAMINOPHEN 2 TABLET: 5; 325 TABLET ORAL at 18:21

## 2019-03-20 RX ADMIN — ANTACID TABLETS 200 MG: 500 TABLET, CHEWABLE ORAL at 18:21

## 2019-03-20 RX ADMIN — Medication 10 ML: at 05:45

## 2019-03-20 RX ADMIN — MEROPENEM 500 MG: 500 INJECTION, POWDER, FOR SOLUTION INTRAVENOUS at 22:16

## 2019-03-20 NOTE — PROGRESS NOTES
Occupational Therapy EVALUATION/discharge Patient: Noy Duran (74 y.o. female) Date: 3/20/2019 Primary Diagnosis: Diverticulitis [K57.92] Procedure(s) (LRB): 
LAPAROTOMY EXPLORATORY COLECTOMY AND COLOSTOMY (N/A) 5 Days Post-Op Precautions: Contact,  Fall ASSESSMENT:  
Based on the objective data described below, the patient presents with weakness decreased endurance following admission for diverticulitis. Patient is POD 5 s/p colectomy and colostomy and has history of breast cancer. Patient is pleasant and motivated to participate. She is at modified independence level for bed mobility and supervision for OOB mobility without assistive device. Patient can perform toileting and LB dressing with modified independence and has good safety awareness. Education provided on tailor sitting to decrease discomfort with colostomy during LB dressing. Patient reported feeling \"pressure\", however unable to void, despite increased time spent on commode. Per chart, patient discharging to SNF due to currently requiring IV antibiotics and patient lives alone at baseline. Further skilled acute occupational therapy is not indicated at this time. Discharge Recommendations: None Further Equipment Recommendations for Discharge: None SUBJECTIVE:  
Patient stated I feel like I need to go, but it's just not happening right now.  RN cleared patient for therapy. Patient pleasant and agreeable to participate. OBJECTIVE DATA SUMMARY:  
HISTORY:  
Past Medical History:  
Diagnosis Date  Adverse effect of anesthesia \"difficulty waking up from anesthethia\"  Cancer ([facility] [address] 02/2019  
 left breast  
 Depression  GERD (gastroesophageal reflux disease)  High cholesterol  Hypertension Past Surgical History:  
Procedure Laterality Date  HX BREAST BIOPSY Right years ago  
 neg; needle bx  HX CATARACT REMOVAL    
 HX COLONOSCOPY    
 HX ORTHOPAEDIC    
 carpal tunnel Prior Level of Function/Environment/Context: Patient lives alone and was independent with ADLs and mobility PTA. Expanded or extensive additional review of patient history:  
Home Situation Home Environment: Private residence # Steps to Enter: 3 One/Two Story Residence: One story Living Alone: Yes Support Systems: None Patient Expects to be Discharged to[de-identified] Private residence Current DME Used/Available at Home: None Tub or Shower Type: Tub/Shower combination Hand dominance: Right EXAMINATION OF PERFORMANCE DEFICITS: 
Cognitive/Behavioral Status: 
Neurologic State: Alert; Appropriate for age Orientation Level: Oriented X4 Cognition: Appropriate decision making; Appropriate for age attention/concentration; Appropriate safety awareness; Follows commands Perception: Appears intact Perseveration: No perseveration noted Safety/Judgement: Awareness of environment; Fall prevention;Good awareness of safety precautions; Home safety; Insight into deficits Skin: observed skin intact. Edema: no abnormal swelling noted. Hearing: Auditory Auditory Impairment: None Range of Motion: 
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 
Strength: Within functional limits In bilateral UEs Coordination: 
Coordination: Within functional limits In bilateral UEs Fine Motor Skills-Upper: Left Intact; Right Intact Gross Motor Skills-Upper: Left Intact; Right Intact Tone & Sensation: 
Tone: Normal In bilateral UEs Sensation: Intact In bilateral UEs Balance: 
Sitting: Intact Standing: Intact; Without support Standing - Static: Good Standing - Dynamic : Good Functional Mobility and Transfers for ADLs:Bed Mobility: 
Rolling: Modified independent Supine to Sit: Modified independent Sit to Supine: Modified independent Scooting: Modified independent Transfers: 
Sit to Stand: Modified independent Stand to Sit: Modified independent Bed to Chair: Modified independent Bathroom Mobility: Modified independent Toilet Transfer : Modified independent ADL Assessment: 
Feeding: Independent Oral Facial Hygiene/Grooming: Modified Independent Bathing: Stand-by assistance Upper Body Dressing: Independent Lower Body Dressing: Modified independent Toileting: Modified independent ADL Intervention and task modifications: 
Grooming Grooming Assistance: Modified independent Washing Hands: Modified independent Lower Body Dressing Assistance Dressing Assistance: Modified independent Socks: Modified independent Leg Crossed Method Used: Yes Position Performed: Seated edge of bed Instructed patient to perform dressing in seated position for fall prevention and energy conservation, with patient verbalizing understanding of same. Patient instructed to and demonstrated to perform tailor sitting during LB dressing in order to decrease discomfort on colostomy with modified independence. Toileting Toileting Assistance: Modified independent Bladder Hygiene: Modified independent Clothing Management: Modified independent Adaptive Equipment: Elevated seat Cognitive Retraining Safety/Judgement: Awareness of environment; Fall prevention;Good awareness of safety precautions; Home safety; Insight into deficits Educated patient on energy conservation techniques to increase independence and safety during ADLs, and strategies to maximize her quality of life and decrease her stress/anxiety. 1. Deep breathing 2. Educated on pacing and making sure he/she takes short frequent breaks (e.g. In the shower wash the upper body, rest for 1 minute, then wash the lower body, etc) 3. Educated on re-arranging his/her routine to allow for rest breaks in the morning routine 4.  Educated on looking at the consequences of his/her actions before deciding he/she needs to take on a task (e.g not getting down on one's hands and knees to wash floors because it will take all of one's energy for the day and result in exhaustion). 5. Educated on DME used to help conserve energy, such as a shower seat, a stool or chair in the kitchen, and pushing or pulling items instead of carrying them Functional Measure: 
Barthel Index: 
 
Bathin Bladder: 10 Bowels: 10 
Groomin Dressing: 10 Feeding: 10 Mobility: 15 
Stairs: 0 Toilet Use: 10 Transfer (Bed to Chair and Back): 15 Total: 90/100 Percentage of impairment  
0% 1-19% 20-39% 40-59% 60-79% 80-99% 100% Barthel Score 0-100 100 99-80 79-60 59-40 20-39 1-19 
 0 The Barthel ADL Index: Guidelines 1. The index should be used as a record of what a patient does, not as a record of what a patient could do. 2. The main aim is to establish degree of independence from any help, physical or verbal, however minor and for whatever reason. 3. The need for supervision renders the patient not independent. 4. A patient's performance should be established using the best available evidence. Asking the patient, friends/relatives and nurses are the usual sources, but direct observation and common sense are also important. However direct testing is not needed. 5. Usually the patient's performance over the preceding 24-48 hours is important, but occasionally longer periods will be relevant. 6. Middle categories imply that the patient supplies over 50 per cent of the effort. 7. Use of aids to be independent is allowed. Ridge Nettles., Barthel, D.W. ([id number]). Functional evaluation: the Barthel Index. [address] [facility] (14)2. Lula Libman benji ASHWIN Weir, Alex Kennedy., Danisha Elise., [place], [address] ().  Measuring the change indisability after inpatient rehabilitation; comparison of the responsiveness of the Barthel Index and Functional [place] Measure. Journal of Neurology, Neurosurgery, and Psychiatry, 66(4), 360-308. BUD Palomino, LAKEISHA Martines, & Victor Hugo Gray M.A. (2004.) Assessment of post-stroke quality of life in cost-effectiveness studies: The usefulness of the Barthel Index and the EuroQoL-5D. [facility], 13, 658-17 Occupational Therapy Evaluation Charge Determination History Examination Decision-Making LOW Complexity : Brief history review  LOW Complexity : 1-3 performance deficits relating to physical, cognitive , or psychosocial skils that result in activity limitations and / or participation restrictions  MEDIUM Complexity : Patient may present with comorbidities that affect occupational performnce. Miniml to moderate modification of tasks or assistance (eg, physical or verbal ) with assesment(s) is necessary to enable patient to complete evaluation Based on the above components, the patient evaluation is determined to be of the following complexity level: LOW Pain: 
Patient reporting mild pain in her abdomen and reporting she had received pain medication prior to beginning of session. Patient motivated to participate. At end of session, patient repositioned comfortably in bed. RN notified of patient's pain at end of session. Activity Tolerance:  
Patient limited by pain in abdomen this session, however motivated to participate. Patient denied feeling lightheaded or dizzy throughout session. After treatment:  
[]  Patient left in no apparent distress sitting up in chair 
[x]  Patient left in no apparent distress in bed 
[x]  Call bell left within reach [x]  Nursing notified 
[]  Caregiver present 
[]  Bed alarm activated COMMUNICATION/EDUCATION:  
Communication/Collaboration: 
[x]      Home safety education was provided and the patient/caregiver indicated understanding. [x]      Patient/family have participated as able and agree with findings and recommendations. []      Patient is unable to participate in plan of care at this time. Findings and recommendations were discussed with: Physical Therapist, Registered Nurse and patient Tiffani Vyas OT R/L Time Calculation: 28 mins

## 2019-03-20 NOTE — PROGRESS NOTES
3/20/2019 11:43 AM Jacky Aguirre has denied pt. [facility] has accepted pt for admission on 3/21. Met with pt who was agreeable to [facility]. Pt reported her friend Renu James will transport her to [facility] on 3/21. CM offered to call Renu James or her niece that is listed on her board in her room. Pt declined, pt reported she will relayed discharge plan to Renu Villaradilia and her niece. CM confirmed pt's contact precautions, iv abx, and port will be used for iv abx with Rosalina at [facility]. 2nd IM letter reviewed with pt and signed electronically. Copy of IM letter left with pt. CM will follow. TIRSO Medina

## 2019-03-20 NOTE — WOUND CARE
Wound Ostomy Follow up: 
Patient is resting in bed without complaint. States she will be going to a \"Facility\" after discharge. Per patient Ostomy pouch had been changed on prior shift after becoming \"overly\" full. Reviewed Stoma presentation with patient. Stoma is visible, red, and scant amounts of loose stool noted in pouch. Patient states she has been helping staff empty pouch and will practice cutting the Wafer provided. Ostomy Supplies provided to patient for discharge needs. Will Continue to follow, Consult as needed Deb Schultz BSN RN Paulino Marks

## 2019-03-20 NOTE — PROGRESS NOTES
Daily Progress Note: 3/20/2019 Flo Moise MD 
 
Assessment/Plan:  
Diverticulitis with abscess - colectomy with colostomy for diverticulitis and perforation 3/15/19 
---HOLDING DVT chemoprophylaxis and ASA. --- IV vanc and merrem through 3/29 Hyponatremia / Hypokalemia:  
---suspect from IVVD and thiazide diuretic use.  
---HOLDING HCTZ.  
---IVF with KCl.  
---Follow BMP 
 
 Hypertension: controlled. ---Restart Losartan but continue to hold HCTZ 
  
High cholesterol:  
---continue statin 
  
Depression: continue Cymbalta 
  
GERD (gastroesophageal reflux disease):  
---PPI PRN 
  
Cancer: left breast. Left central IDC, gr 2, ER +, SD +, HER 2 positive:  cT2 cN1a cM0, stage IIB, prognostic stage IB.  
---Followed by Dr. Morgan Blair. Wheeze: mild. Smoker. Advise smoking cessation. Albuterol PRN 
  
Adverse effect of anesthesia: Overview: \"difficulty waking up from anesthethia\". No difficulty this surg.   
  
 
Problem List: 
Problem List as of 3/20/2019 Date Reviewed: 3/12/2019 Codes Class Noted - Resolved Hypertension ICD-10-CM: I10 
ICD-9-CM: 401.9  3/12/2019 - Present High cholesterol ICD-10-CM: E78.00 ICD-9-CM: 272.0  3/12/2019 - Present GERD (gastroesophageal reflux disease) ICD-10-CM: K21.9 ICD-9-CM: 530.81  3/12/2019 - Present Depression ICD-10-CM: F32.9 ICD-9-CM: 641  3/12/2019 - Present Adverse effect of anesthesia ICD-10-CM: T41.45XA ICD-9-CM: 995.22  3/12/2019 - Present Overview Signed 3/12/2019  7:39 PM by Reg Orantes MD  
  \"difficulty waking up from anesthethia\" Diverticulitis ICD-10-CM: N21.44 
ICD-9-CM: 562.11  3/12/2019 - Present Hyponatremia ICD-10-CM: E87.1 ICD-9-CM: 276.1  3/12/2019 - Present Hypokalemia ICD-10-CM: E87.6 ICD-9-CM: 276.8  3/12/2019 - Present Abscess ICD-10-CM: L02.91 
ICD-9-CM: 682.9  3/12/2019 - Present  [facility] ICD-10-CM: C46.560 
 ICD-9-CM: 174.9  2/19/2019 - Present Overview Addendum 3/12/2019  4:58 PM by Soco Woodson MD  
  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 ND 20%, Her 2 positive Positive axillary node, %. ND neg, Her 2 positive [facility]) ICD-10-CM: C80.1 ICD-9-CM: 199.1  2/1/2019 - Present Overview Signed 3/12/2019  7:39 PM by Laney Sanabria MD  
  left breast 
  
  
   
  
 
HPI:  
Ms. Romana Pollock is a 74 y.o.  female who is admitted to the Gen Surg Service for diverticulitis with abscess. We are asked to evaluate for medical mgmt. Ms. Romana Pollock is complaining of abdominal pain. Ongoing for about a week now. Seen in ED a few days ago, sent home on oral abx (levaquin/Flagyl) however did not improve. (Dr Mo Ch) 3/13: Feeling a little better. Abd feels less tight. No BM for about 6 days but has had little po. K+ low so will replete. 3/14: Feeling pretty miserable this am. She is c/o increased pressure in the LLQ. K+ improved but still low so will replete further. Afebrile. 3/15:  Less pain today but WBC is up to 23. No fever. Abd soft but tender. Drain patent. K+ normal.  
 
3/16: OR 3/15 colectomy with colostomy for diverticulitis and perforation. Afebrile. WBC high this am. She is feeling better. Pain is under control. No nausea or vomiting. 3/17: WBC a little better. \"stomach still feels tight\". Afebrile. Bloody liquid in colostomy bag. No nausea or vomiting. BP stable. 3/18:  C/O ab \"pressure. \"  No gas as yet. Only small amount of fluid in ostomy bag per nurses. No N/V. AM labs pending. 3/19:  Less ab distention/\"pressure\" this AM.  Ostomy with output this AM per nurses. WBC coming down. Afeb. Diet advanced to full liquid and tolerating.   
 
3/20:  ID has seen and rec IV antibiotics through 3/29 - to be adjusted and ordered by ID. WBC improving. She reports a poor appetite. Tolerating fluids but not drinking a lot. Has been OOB. Review of Systems: A comprehensive review of systems was negative except for that written in the HPI. Objective:  
Physical Exam:  
 
Visit Vitals /72 (BP 1 Location: Left arm, BP Patient Position: At rest) Pulse 72 Temp 98 °F (36.7 °C) Resp 18 Ht 5' 6\" (1.676 m) Wt 80.7 kg (178 lb) SpO2 95% Breastfeeding? No  
BMI 28.73 kg/m² O2 Flow Rate (L/min): 3 l/min O2 Device: Room air Temp (24hrs), Av.7 °F (36.5 °C), Min:97.4 °F (36.3 °C), Max:98 °F (36.7 °C) 
  1901 -  07 In: -  
Out: 10    03/18 0701 - 1900 In: 1186.7 [P.O.:240; I.V.:946.7] Out: - General:  Alert, cooperative,  appears stated age. Head:  Normocephalic, without obvious abnormality, atraumatic. Eyes:  Conjunctivae/corneas clear. PERRL, EOMs intact. Throat: Lips, mucosa, and tongue normal. Teeth and gums normal.  
Neck: Supple, symmetrical, trachea midline, no adenopathy, thyroid: no enlargement/tenderness/nodules, no carotid bruit and no JVD. Lungs:   Clear to auscultation bilaterally. Chest wall:  No tenderness or deformity. Heart:  Regular rate and rhythm, S1, S2 normal, no murmur, click, rub or gallop. Abdomen:   Soft, colostomy site looks good, few bowel sounds. less distended. Mild generalized tenderness present. Extremities: Extremities normal, atraumatic, no cyanosis or edema. No calf tenderness or cords. Pulses: 2+ and symmetric all extremities. Skin: turgor normal.   
Neurologic: Alert and oriented X 3. Fine motor of hands and fingers normal.   equal.  No cogwheeling or rigidity. Gait not tested at this time. Sensation grossly normal to touch. Gross motor of extremities normal.    
 
Data Review:  
   
Recent Days: 
Recent Labs  
  19 
0355 19 
1207 WBC 17.0* 20.5* HGB 11.2* 12.0 HCT 35.2 36.9  209 Recent Labs  
  19 
0355 19 
1207  138  
K 3.7 4.1  106 CO2 27 29 * 104* BUN 5* 4*  
 CREA 0.44* 0.46* CA 8.1* 7.8*  
MG 2.2 2.1 ALB 1.8* 1.9* TBILI 0.2 0.3 SGOT 24 36 ALT 35 41 No results for input(s): PH, PCO2, PO2, HCO3, FIO2 in the last 72 hours. 24 Hour Results: 
Recent Results (from the past 24 hour(s)) GLUCOSE, POC Collection Time: 03/19/19  6:56 AM  
Result Value Ref Range Glucose (POC) 93 65 - 100 mg/dL Performed by Yessi Eisenberg (PCT) Medications reviewed Current Facility-Administered Medications Medication Dose Route Frequency  calcium carbonate (TUMS) chewable tablet 200 mg [elemental]  200 mg Oral PRN  
 HYDROcodone-acetaminophen (NORCO) 5-325 mg per tablet 2 Tab  2 Tab Oral Q4H PRN  
 vancomycin (VANCOCIN) 1500 mg in  ml infusion  1,500 mg IntraVENous Q12H  
 meropenem (MERREM) 500 mg in 0.9% sodium chloride (MBP/ADV) 50 mL  0.5 g IntraVENous Q6H  
 sodium chloride (NS) flush 5-40 mL  5-40 mL IntraVENous Q8H  
 acetaminophen (TYLENOL) tablet 650 mg  650 mg Oral Q6H PRN  
 sodium chloride (NS) flush 5-40 mL  5-40 mL IntraVENous Q8H  
 sodium chloride (NS) flush 5-40 mL  5-40 mL IntraVENous PRN  
 naloxone (NARCAN) injection 0.4 mg  0.4 mg IntraVENous PRN  
 HYDROcodone-acetaminophen (NORCO) 5-325 mg per tablet 1 Tab  1 Tab Oral Q4H PRN  
 HYDROmorphone (PF) (DILAUDID) injection 0.5 mg  0.5 mg IntraVENous Q4H PRN  
 ondansetron (ZOFRAN) injection 4 mg  4 mg IntraVENous Q4H PRN  
 enoxaparin (LOVENOX) injection 40 mg  40 mg SubCUTAneous Q24H  pravastatin (PRAVACHOL) tablet 40 mg  40 mg Oral QHS  venlafaxine (EFFEXOR) tablet 50 mg  50 mg Oral DAILY  metroNIDAZOLE (FLAGYL) IVPB premix 500 mg  500 mg IntraVENous Q12H  
 albuterol (PROVENTIL VENTOLIN) nebulizer solution 2.5 mg  2.5 mg Nebulization Q6H PRN Care Plan discussed with: Patient/nurse Total time spent with patient and review of records: 30 minutes.  
 
Iqra Moe MD

## 2019-03-20 NOTE — PROGRESS NOTES
Bedside and Verbal shift change report given to [address] José Antonio Rizvi (oncoming nurse) by Joshua Macedo RN (offgoing nurse). Report included the following information SBAR, Kardex, OR Summary, Intake/Output, MAR and Recent Results.

## 2019-03-20 NOTE — PROGRESS NOTES
Bedside and Verbal shift change report given to Jaime (oncoming nurse) by Ela (offgoing nurse). Report included the following information SBAR, Kardex, Procedure Summary, Intake/Output and MAR.

## 2019-03-20 NOTE — PROGRESS NOTES
General Surgery Daily Progress Note Patient: Darnell Mccoy MRN: [id number]  SSN: [id number] YOB: 1945  Age: 74 y.o. Sex: female Admit Date: 3/12/2019 Subjective:  
Pain controlled, no N/V. Ostomy having intermittent output of stool. Tolerating diet. Generally tired today. Current Facility-Administered Medications Medication Dose Route Frequency  losartan (COZAAR) tablet 100 mg  100 mg Oral DAILY  calcium carbonate (TUMS) chewable tablet 200 mg [elemental]  200 mg Oral PRN  
 HYDROcodone-acetaminophen (NORCO) 5-325 mg per tablet 2 Tab  2 Tab Oral Q4H PRN  
 vancomycin (VANCOCIN) 1500 mg in  ml infusion  1,500 mg IntraVENous Q12H  
 meropenem (MERREM) 500 mg in 0.9% sodium chloride (MBP/ADV) 50 mL  0.5 g IntraVENous Q6H  
 sodium chloride (NS) flush 5-40 mL  5-40 mL IntraVENous Q8H  
 acetaminophen (TYLENOL) tablet 650 mg  650 mg Oral Q6H PRN  
 sodium chloride (NS) flush 5-40 mL  5-40 mL IntraVENous Q8H  
 sodium chloride (NS) flush 5-40 mL  5-40 mL IntraVENous PRN  
 naloxone (NARCAN) injection 0.4 mg  0.4 mg IntraVENous PRN  
 HYDROcodone-acetaminophen (NORCO) 5-325 mg per tablet 1 Tab  1 Tab Oral Q4H PRN  
 HYDROmorphone (PF) (DILAUDID) injection 0.5 mg  0.5 mg IntraVENous Q4H PRN  
 ondansetron (ZOFRAN) injection 4 mg  4 mg IntraVENous Q4H PRN  
 enoxaparin (LOVENOX) injection 40 mg  40 mg SubCUTAneous Q24H  pravastatin (PRAVACHOL) tablet 40 mg  40 mg Oral QHS  venlafaxine (EFFEXOR) tablet 50 mg  50 mg Oral DAILY  albuterol (PROVENTIL VENTOLIN) nebulizer solution 2.5 mg  2.5 mg Nebulization Q6H PRN Objective:  
03/20 0701 - 03/20 1900 In: -  
Out: 743 [[id number]] 03/18 1901 - 03/20 0700 In: 240 [P.O.:240] Out: 10 Patient Vitals for the past 8 hrs: 
 BP Temp Pulse Resp SpO2  
03/20/19 0817 168/77 98.2 °F (36.8 °C) 74 14 96 % 03/20/19 0313 150/72 98 °F (36.7 °C) 72 18 95 % Physical Exam: 
General: Alert, cooperative, NAD 
 Lungs: Unlabored Heart:  Regular rate and  rhythm Abdomen: Soft, ATTP, non-distended. + bowel sounds. Incisions c/d/i, ostomy pink Extremities: Warm, moves all, no edema Skin:  Warm and dry, no rash Labs:  
Recent Labs  
  03/20/19 
0854 WBC 14.2* HGB 10.4* HCT 32.7*  
 Recent Labs  
  03/20/19 
2447   
K 3.7  CO2 29  
GLU 92 BUN 4*  
CREA 0.41* CA 8.1*  
MG 2.1 ALB 1.6* TBILI 0.2 SGOT 19 ALT 25 Assessment / Plan:  
· POD#5 ex lap, sigmoidectomy, end colostomy · Diet as tolerated · ABX per ID · Wound care following for ostomy teaching · Plan for discharge to SNF once bed available. Will be on IV ABX x 2 weeks.

## 2019-03-20 NOTE — PROGRESS NOTES
Problem: Falls - Risk of 
Goal: *Absence of Falls Document Yumiko Quinteros Fall Risk and appropriate interventions in the flowsheet. Outcome: Progressing Towards Goal 
Fall Risk Interventions: 
Mobility Interventions: Patient to call before getting OOB, Bed/chair exit alarm Medication Interventions: Bed/chair exit alarm, Patient to call before getting OOB, Teach patient to arise slowly Elimination Interventions: Call light in reach, Patient to call for help with toileting needs History of Falls Interventions: Bed/chair exit alarm, Room close to nurse's station

## 2019-03-20 NOTE — PROGRESS NOTES
Post Discharge PICC and Antibiotic Orders 1. Diagnosis:  Perforated diverticulitis 2. Antibiotic:  Vancomycin 1500mg IV q 12 hours through 3/29/19 Ertapenem 1gm IV daily through 3/29/19 3. Routine PICC/ Rachel/ Portacath Care including PRN catheter flow management 4. Weekly labs: 
 _x__CBC/diff/platelets _x__BUN/Creatinine 
 ___CPK _x__AST/TotalBilirubin/AlkalinePhosphatase 
 ___CRP  
 ___Gentamicin level  ___gentamicin peak/trough Trough Vancomycin level ____goal 10-15 _x__goal 15-20 5. Fax lab to [phone]  Call Critical Lab Values to [phone] 6. May send to IR for line evaluation or replacement -[phone] 7. Home Health to pull PIC line at end of therapy or send to IR for Rachel removal 
8. Allergies: Allergies Allergen Reactions  Doxycycline Diarrhea  Keflex [Cephalexin] Hives  Penicillins Rash 9. Pharmacy Consult for Vancomycin dosing/gentamicin dosing.  
 
 
Allie Perez DO

## 2019-03-20 NOTE — PROGRESS NOTES
[facility] Progress Note Nory Coronado DO 
  [phone] Office [phone]  Fax 3/20/2019 Assessment & Plan: 1. Perforated diverticulitis with pelvic abscesses. The patient is status post aspiration of an abscess by IR on 2019. Cultures are growing ESBL producing Escherichia coli, enterococcus, and anaerobes. She was taken to the OR on 03/15/2019 for exploratory laparotomy, sigmoid colectomy, and colostomy with Florina's procedure. Continue  vancomycin and meropenem. Plan d/c on ertapenem and vancomycin through  2019. This may be administered to the patient's right chest wall port. IV abx orders in chart 2. Recently diagnosed breast cancer. The patient underwent her first cycle of chemotherapy one week prior to admission. Further chemotherapy is on hold for now until the infection has been treated. 3. Leukocytosis. This is likely due to a combination of infection and recent Neulasta use. Continue to follow 4. MULTIPLE ANTIBIOTIC ALLERGIES INCLUDING DOXYCYCLINE, CEPHALEXIN, AND PENICILLIN. Cannot take linezolid due to interaction with effexor Subjective: No complaints Objective:  
 
Vitals:  
Visit Vitals /77 (BP 1 Location: Left arm, BP Patient Position: At rest) Pulse 74 Temp 98.2 °F (36.8 °C) Resp 14 Ht 5' 6\" (1.676 m) Wt 80.7 kg (178 lb) SpO2 96% Breastfeeding? No  
BMI 28.73 kg/m² Tmax:  Temp (24hrs), Av.8 °F (36.6 °C), Min:97.4 °F (36.3 °C), Max:98.2 °F (36.8 °C) Exam:  
Patient is intubated:  no 
 
Physical Examination:  
General:  Alert, cooperative, no distress Head:  Normocephalic, atraumatic. Eyes:  Conjunctivae clear Neck: Supple Lungs:   No distress. Chest wall:  RCW port Heart:    
Abdomen:   Drain removed Extremities: Moves all. Skin:  No rash Neurologic: CNII-XII intact Labs:     
 
No lab exists for component: ITNL No results for input(s): CPK, CKMB, TROIQ in the last 72 hours. Recent Labs  
  03/20/19 
0528 03/19/19 
0355 03/18/19 
1207  139 138  
K 3.7 3.7 4.1  106 106 CO2 29 27 29 BUN 4* 5* 4*  
CREA 0.41* 0.44* 0.46* GLU 92 101* 104* MG 2.1 2.2 2.1 ALB 1.6* 1.8* 1.9* WBC 14.2* 17.0* 20.5* HGB 10.4* 11.2* 12.0 HCT 32.7* 35.2 36.9  210 209 No results for input(s): INR, PTP, APTT in the last 72 hours. No lab exists for component: INREXT Needs: urine analysis, urine sodium, protein and creatinine No results found for: Demetrice Sheehan Cultures: No results found for: MARCK Lab Results Component Value Date/Time Culture result: (A) 03/13/2019 03:01 PM  
  HEAVY MIXED ANAEROBIC GRAM NEGATIVE RODS , BETA LACTAMASE POSITIVE Culture result:  03/13/2019 03:01 PM  
  HEAVY ESCHERICHIA COLI ** (EXTENDED SPECTRUM BETA LACTAMASE ) **  
 Culture result: HEAVY ENTEROCOCCUS AVIUM (A) 03/13/2019 03:01 PM  
 Culture result: Culture performed on Unspun Fluid 03/13/2019 03:01 PM  
 
 
Radiology:  
 
Medications Current Facility-Administered Medications Medication Dose Route Frequency Last Dose  losartan (COZAAR) tablet 100 mg  100 mg Oral DAILY 100 mg at 03/20/19 6810  calcium carbonate (TUMS) chewable tablet 200 mg [elemental]  200 mg Oral  mg at 03/19/19 2303  
 HYDROcodone-acetaminophen (NORCO) 5-325 mg per tablet 2 Tab  2 Tab Oral Q4H PRN 2 Tab at 03/20/19 0630  
 vancomycin (VANCOCIN) 1500 mg in  ml infusion  1,500 mg IntraVENous Q12H 1,500 mg at 03/20/19 0545  meropenem (MERREM) 500 mg in 0.9% sodium chloride (MBP/ADV) 50 mL  0.5 g IntraVENous Q6H 500 mg at 03/20/19 8606  sodium chloride (NS) flush 5-40 mL  5-40 mL IntraVENous Q8H 10 mL at 03/20/19 8546  acetaminophen (TYLENOL) tablet 650 mg  650 mg Oral Q6H  mg at 03/14/19 3551  sodium chloride (NS) flush 5-40 mL  5-40 mL IntraVENous Q8H 10 mL at 03/20/19 9564  sodium chloride (NS) flush 5-40 mL  5-40 mL IntraVENous PRN 10 mL at 03/19/19 0403  naloxone (NARCAN) injection 0.4 mg  0.4 mg IntraVENous PRN    
 HYDROcodone-acetaminophen (NORCO) 5-325 mg per tablet 1 Tab  1 Tab Oral Q4H PRN 1 Tab at 03/19/19 0327  
 HYDROmorphone (PF) (DILAUDID) injection 0.5 mg  0.5 mg IntraVENous Q4H PRN 0.5 mg at 03/17/19 2028  ondansetron (ZOFRAN) injection 4 mg  4 mg IntraVENous Q4H PRN    
 enoxaparin (LOVENOX) injection 40 mg  40 mg SubCUTAneous Q24H 40 mg at 03/19/19 2246  pravastatin (PRAVACHOL) tablet 40 mg  40 mg Oral QHS 40 mg at 03/19/19 2144  venlafaxine (EFFEXOR) tablet 50 mg  50 mg Oral DAILY 50 mg at 03/20/19 0826  
 metroNIDAZOLE (FLAGYL) IVPB premix 500 mg  500 mg IntraVENous Q12H 500 mg at 03/20/19 0831  
 albuterol (PROVENTIL VENTOLIN) nebulizer solution 2.5 mg  2.5 mg Nebulization Q6H PRN Case discussed with: 
 
 
Frandy Hale DO

## 2019-03-20 NOTE — PROGRESS NOTES
Problem: Mobility Impaired (Adult and Pediatric) Goal: *Acute Goals and Plan of Care (Insert Text) Description Physical Therapy Goals Initiated 3/17/2019 1. Patient will move from supine to sit and sit to supine , scoot up and down and roll side to side in bed with independence within 7 day(s). 2.  Patient will transfer from bed to chair and chair to bed with independence using the least restrictive device within 7 day(s). 3.  Patient will perform sit to stand with independence within 7 day(s). 4.  Patient will ambulate with independence for 300 feet with the least restrictive device within 7 day(s). 5.  Patient will ascend/descend 2 stairs with  handrail(s) with independence within 7 day(s). Note: PHYSICAL THERAPY TREATMENT Patient: Ansley Floyd (74 y.o. female) Date: 3/20/2019 Diagnosis: Diverticulitis [K57.92] <principal problem not specified> Procedure(s) (LRB): 
LAPAROTOMY EXPLORATORY COLECTOMY AND COLOSTOMY (N/A) 5 Days Post-Op Precautions: Fall Chart, physical therapy assessment, plan of care and goals were reviewed. ASSESSMENT: 
Patient able to void a small amount which she states relieved abdominal pain and pressure. Patient able to ambulate 230' without an assistive device with MOD I and normal gait pattern. No loss of balance or unsteadiness detected. Patient reports she has not asked RN staff to amb in hallways because she has not felt well enough. Patient encouraged to ask RN staff a minimum twice daily to ambulate in hallways. PT will follow up tomorrow and patient likely ready to attempt stairs. All goals nearly achieved. Do not recommend HHPT as patient mobilizing well. Patient discharging to SNF vs her home as she requires IV antibiotics at d/c and cannot administer them at home alone. Patient has support of niece and a friend named Kathrin Chavarria who will be transporting her to [facility] tomorrow. Progression toward goals: ?    Improving appropriately and progressing toward goals ? Improving slowly and progressing toward goals ? Not making progress toward goals and plan of care will be adjusted PLAN: 
Patient continues to benefit from skilled intervention to address the above impairments. Continue treatment per established plan of care. Discharge Recommendations: Further Equipment Recommendations for Discharge:  none SUBJECTIVE:  
Patient stated that relieved a little pressure.  OBJECTIVE DATA SUMMARY:  
Critical Behavior: 
Neurologic State: Alert, Appropriate for age Orientation Level: Oriented X4 Cognition: Follows commands Functional Mobility Training: 
Bed Mobility: advised on log rolling and splinting abdomin Rolling: Modified independent Supine to Sit: Modified independent Sit to Supine: Modified independent Scooting: Modified independent Transfers: 
Sit to Stand: Modified independent Stand to Sit: Modified independent Stand Pivot Transfers: Modified independent Bed to Chair: Modified independent Balance: 
Standing - Static: Good Standing - Dynamic : GoodAmbulation/Gait Training: 
Distance (ft): 230 Feet (ft) Assistive Device: Gait belt Ambulation - Level of Assistance: Modified independent Gait Abnormalities: Antalgic; Path deviations Stairs - Level of Assistance: (NT) 
 
Pain: 
Pain Scale 1: Numeric (0 - 10) Pain Intensity 1: 8 Pain Location 1: Abdomen Pain Orientation 1: Lower Pain Description 1: Aching Pain Intervention(s) 1: Medication (see MAR) Activity Tolerance:  
Good - NAD and asymptomatic Please refer to the flowsheet for vital signs taken during this treatment. After treatment:  
?    Patient left in no apparent distress sitting up in chair ? Patient left in no apparent distress in bed 
? Call bell left within reach ? Nursing notified ? Caregiver present ? Bed alarm activated COMMUNICATION/COLLABORATION:  
The patients plan of care was discussed with: Registered Nurse Janette Jenkins PT, DPT Time Calculation: 15 mins

## 2019-03-21 VITALS
SYSTOLIC BLOOD PRESSURE: 150 MMHG | WEIGHT: 178 LBS | BODY MASS INDEX: 28.61 KG/M2 | TEMPERATURE: 98.4 F | DIASTOLIC BLOOD PRESSURE: 79 MMHG | HEART RATE: 87 BPM | HEIGHT: 66 IN | OXYGEN SATURATION: 93 % | RESPIRATION RATE: 15 BRPM

## 2019-03-21 LAB
ALBUMIN SERPL-MCNC: 1.8 G/DL (ref 3.5–5)
ALBUMIN/GLOB SERPL: 0.5 {RATIO} (ref 1.1–2.2)
ALP SERPL-CCNC: 65 U/L (ref 45–117)
ALT SERPL-CCNC: 24 U/L (ref 12–78)
ANION GAP SERPL CALC-SCNC: 4 MMOL/L (ref 5–15)
AST SERPL-CCNC: 19 U/L (ref 15–37)
BACTERIA SPEC CULT: ABNORMAL
BASOPHILS # BLD: 0 K/UL (ref 0–0.1)
BASOPHILS NFR BLD: 0 % (ref 0–1)
BILIRUB SERPL-MCNC: 0.4 MG/DL (ref 0.2–1)
BUN SERPL-MCNC: 4 MG/DL (ref 6–20)
BUN/CREAT SERPL: 9 (ref 12–20)
CALCIUM SERPL-MCNC: 8.2 MG/DL (ref 8.5–10.1)
CHLORIDE SERPL-SCNC: 106 MMOL/L (ref 97–108)
CO2 SERPL-SCNC: 31 MMOL/L (ref 21–32)
CREAT SERPL-MCNC: 0.47 MG/DL (ref 0.55–1.02)
DIFFERENTIAL METHOD BLD: ABNORMAL
EOSINOPHIL # BLD: 0 K/UL (ref 0–0.4)
EOSINOPHIL NFR BLD: 0 % (ref 0–7)
ERYTHROCYTE [DISTWIDTH] IN BLOOD BY AUTOMATED COUNT: 14.4 % (ref 11.5–14.5)
GLOBULIN SER CALC-MCNC: 3.5 G/DL (ref 2–4)
GLUCOSE SERPL-MCNC: 88 MG/DL (ref 65–100)
GRAM STN SPEC: ABNORMAL
HCT VFR BLD AUTO: 31.6 % (ref 35–47)
HGB BLD-MCNC: 10.2 G/DL (ref 11.5–16)
IMM GRANULOCYTES # BLD AUTO: 0 K/UL
IMM GRANULOCYTES NFR BLD AUTO: 0 %
LYMPHOCYTES # BLD: 2.4 K/UL (ref 0.8–3.5)
LYMPHOCYTES NFR BLD: 17 % (ref 12–49)
MAGNESIUM SERPL-MCNC: 2 MG/DL (ref 1.6–2.4)
MCH RBC QN AUTO: 30.8 PG (ref 26–34)
MCHC RBC AUTO-ENTMCNC: 32.3 G/DL (ref 30–36.5)
MCV RBC AUTO: 95.5 FL (ref 80–99)
METAMYELOCYTES NFR BLD MANUAL: 1 %
MONOCYTES # BLD: 0.6 K/UL (ref 0–1)
MONOCYTES NFR BLD: 4 % (ref 5–13)
MYELOCYTES NFR BLD MANUAL: 3 %
NEUTS SEG # BLD: 10.7 K/UL (ref 1.8–8)
NEUTS SEG NFR BLD: 75 % (ref 32–75)
NRBC # BLD: 0 K/UL (ref 0–0.01)
NRBC BLD-RTO: 0 PER 100 WBC
PLATELET # BLD AUTO: 311 K/UL (ref 150–400)
PMV BLD AUTO: 9.3 FL (ref 8.9–12.9)
POTASSIUM SERPL-SCNC: 3.6 MMOL/L (ref 3.5–5.1)
PROT SERPL-MCNC: 5.3 G/DL (ref 6.4–8.2)
RBC # BLD AUTO: 3.31 M/UL (ref 3.8–5.2)
RBC MORPH BLD: ABNORMAL
SERVICE CMNT-IMP: ABNORMAL
SODIUM SERPL-SCNC: 141 MMOL/L (ref 136–145)
WBC # BLD AUTO: 14.2 K/UL (ref 3.6–11)

## 2019-03-21 PROCEDURE — 74011250636 HC RX REV CODE- 250/636: Performed by: SURGERY

## 2019-03-21 PROCEDURE — 36415 COLL VENOUS BLD VENIPUNCTURE: CPT

## 2019-03-21 PROCEDURE — 74011250637 HC RX REV CODE- 250/637: Performed by: INTERNAL MEDICINE

## 2019-03-21 PROCEDURE — 80053 COMPREHEN METABOLIC PANEL: CPT

## 2019-03-21 PROCEDURE — 97116 GAIT TRAINING THERAPY: CPT

## 2019-03-21 PROCEDURE — 83735 ASSAY OF MAGNESIUM: CPT

## 2019-03-21 PROCEDURE — 97530 THERAPEUTIC ACTIVITIES: CPT

## 2019-03-21 PROCEDURE — 74011000258 HC RX REV CODE- 258: Performed by: SURGERY

## 2019-03-21 PROCEDURE — 85025 COMPLETE CBC W/AUTO DIFF WBC: CPT

## 2019-03-21 PROCEDURE — 74011250637 HC RX REV CODE- 250/637: Performed by: PHYSICIAN ASSISTANT

## 2019-03-21 PROCEDURE — 74011250637 HC RX REV CODE- 250/637: Performed by: FAMILY MEDICINE

## 2019-03-21 RX ORDER — POLYETHYLENE GLYCOL 3350 17 G/17G
17 POWDER, FOR SOLUTION ORAL
Qty: 30 PACKET | Refills: 0 | Status: SHIPPED
Start: 2019-03-21 | End: 2019-11-20

## 2019-03-21 RX ORDER — HYDROCODONE BITARTRATE AND ACETAMINOPHEN 5; 325 MG/1; MG/1
1-2 TABLET ORAL
Qty: 30 TAB | Refills: 0 | Status: SHIPPED | OUTPATIENT
Start: 2019-03-21 | End: 2019-03-26

## 2019-03-21 RX ADMIN — HYDROCODONE BITARTRATE AND ACETAMINOPHEN 2 TABLET: 5; 325 TABLET ORAL at 09:11

## 2019-03-21 RX ADMIN — HYDROCODONE BITARTRATE AND ACETAMINOPHEN 2 TABLET: 5; 325 TABLET ORAL at 13:23

## 2019-03-21 RX ADMIN — MEROPENEM 500 MG: 500 INJECTION, POWDER, FOR SOLUTION INTRAVENOUS at 04:01

## 2019-03-21 RX ADMIN — VENLAFAXINE 50 MG: 25 TABLET ORAL at 09:11

## 2019-03-21 RX ADMIN — VANCOMYCIN HYDROCHLORIDE 1500 MG: 10 INJECTION, POWDER, LYOPHILIZED, FOR SOLUTION INTRAVENOUS at 05:45

## 2019-03-21 RX ADMIN — HYDROCHLOROTHIAZIDE: 25 TABLET ORAL at 09:00

## 2019-03-21 RX ADMIN — MEROPENEM 500 MG: 500 INJECTION, POWDER, FOR SOLUTION INTRAVENOUS at 09:10

## 2019-03-21 RX ADMIN — HYDROCODONE BITARTRATE AND ACETAMINOPHEN 2 TABLET: 5; 325 TABLET ORAL at 04:00

## 2019-03-21 NOTE — PROGRESS NOTES
Daily Progress Note: 3/21/2019 Corwin Del Valle MD 
 
Assessment/Plan:  
Diverticulitis with abscess - colectomy with colostomy for diverticulitis and perforation 3/15/19 
---HOLDING DVT chemoprophylaxis and ASA. --- IV vanc and merrem through 3/29 Hyponatremia / Hypokalemia:  
---suspect from IVVD and thiazide diuretic use.  
---HOLDING HCTZ.  
---IVF with KCl.  
---Follow BMP 
 
 Hypertension: controlled. ---Restart Losartan  
---Restart HCTZ 
  
High cholesterol:  
---continue statin 
  
Depression: continue Cymbalta 
  
GERD (gastroesophageal reflux disease):  
---PPI PRN 
  
Cancer: left breast. Left central IDC, gr 2, ER +, TN +, HER 2 positive:  cT2 cN1a cM0, stage IIB, prognostic stage IB.  
---Followed by Dr. Viky Gerard. Wheeze: mild. Smoker. Advise smoking cessation. Albuterol PRN 
  
Adverse effect of anesthesia: Overview: \"difficulty waking up from anesthethia\". No difficulty this surg.   
  
 
Problem List: 
Problem List as of 3/21/2019 Date Reviewed: 3/12/2019 Codes Class Noted - Resolved Hypertension ICD-10-CM: I10 
ICD-9-CM: 401.9  3/12/2019 - Present High cholesterol ICD-10-CM: E78.00 ICD-9-CM: 272.0  3/12/2019 - Present GERD (gastroesophageal reflux disease) ICD-10-CM: K21.9 ICD-9-CM: 530.81  3/12/2019 - Present Depression ICD-10-CM: F32.9 ICD-9-CM: 927  3/12/2019 - Present Adverse effect of anesthesia ICD-10-CM: T41.45XA ICD-9-CM: 995.22  3/12/2019 - Present Overview Signed 3/12/2019  7:39 PM by Rachel Maldonado MD  
  \"difficulty waking up from anesthethia\" Diverticulitis ICD-10-CM: Y53.75 
ICD-9-CM: 562.11  3/12/2019 - Present Hyponatremia ICD-10-CM: E87.1 ICD-9-CM: 276.1  3/12/2019 - Present Hypokalemia ICD-10-CM: E87.6 ICD-9-CM: 276.8  3/12/2019 - Present Abscess ICD-10-CM: L02.91 
ICD-9-CM: 682.9  3/12/2019 - Present  [facility] ([facility] ICD-10-CM: S55.982 
 ICD-9-CM: 174.9  2/19/2019 - Present Overview Addendum 3/12/2019  4:58 PM by Parvez Lanier MD  
  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 IL 20%, Her 2 positive Positive axillary node, %. IL neg, Her 2 positive [facility]) ICD-10-CM: C80.1 ICD-9-CM: 199.1  2/1/2019 - Present Overview Signed 3/12/2019  7:39 PM by Jt Brandon MD  
  left breast 
  
  
   
  
 
HPI:  
Ms. Vida Flowers is a 74 y.o.  female who is admitted to the Gen Surg Service for diverticulitis with abscess. We are asked to evaluate for medical mgmt. Ms. Vida Flowers is complaining of abdominal pain. Ongoing for about a week now. Seen in ED a few days ago, sent home on oral abx (levaquin/Flagyl) however did not improve. (Dr Tina Kimble) 3/13: Feeling a little better. Abd feels less tight. No BM for about 6 days but has had little po. K+ low so will replete. 3/14: Feeling pretty miserable this am. She is c/o increased pressure in the LLQ. K+ improved but still low so will replete further. Afebrile. 3/15:  Less pain today but WBC is up to 23. No fever. Abd soft but tender. Drain patent. K+ normal.  
 
3/16: OR 3/15 colectomy with colostomy for diverticulitis and perforation. Afebrile. WBC high this am. She is feeling better. Pain is under control. No nausea or vomiting. 3/17: WBC a little better. \"stomach still feels tight\". Afebrile. Bloody liquid in colostomy bag. No nausea or vomiting. BP stable. 3/18:  C/O ab \"pressure. \"  No gas as yet. Only small amount of fluid in ostomy bag per nurses. No N/V. AM labs pending. 3/19:  Less ab distention/\"pressure\" this AM.  Ostomy with output this AM per nurses. WBC coming down. Afeb. Diet advanced to full liquid and tolerating.   
 
3/20:  ID has seen and rec IV antibiotics through 3/29 - to be adjusted and ordered by ID. WBC improving. She reports a poor appetite. Tolerating fluids but not drinking a lot. Has been OOB. 3/21: Feeling better. Plan is to go to [facility] for rehab and antibiotics. More edema in her legs so will restart her HCTZ. Appetite is poor. She has been able to empty her colostomy independently. Review of Systems: A comprehensive review of systems was negative except for that written in the HPI. Objective:  
Physical Exam:  
 
Visit Vitals /79 (BP 1 Location: Left arm, BP Patient Position: At rest) Pulse 77 Temp 98 °F (36.7 °C) Resp 14 Ht 5' 6\" (1.676 m) Wt 178 lb (80.7 kg) SpO2 96% Breastfeeding? No  
BMI 28.73 kg/m² O2 Flow Rate (L/min): 3 l/min O2 Device: Room air Temp (24hrs), Av.1 °F (36.7 °C), Min:97.9 °F (36.6 °C), Max:98.4 °F (36.9 °C) No intake/output data recorded.  1901 -  0700 In: 360 [P.O.:360] Out: 1035 [Urine:800] General:  Alert, cooperative,  appears stated age. Head:  Normocephalic, without obvious abnormality, atraumatic. Eyes:  Conjunctivae/corneas clear. PERRL, EOMs intact. Throat: Lips, mucosa, and tongue normal. Teeth and gums normal.  
Neck: Supple, symmetrical, trachea midline, no adenopathy, thyroid: no enlargement/tenderness/nodules, no carotid bruit and no JVD. Lungs:   Clear to auscultation bilaterally. Chest wall:  No tenderness or deformity. Heart:  Regular rate and rhythm, S1, S2 normal, no murmur, click, rub or gallop. Abdomen:   Soft, colostomy site looks good, few bowel sounds. less distended. Mild generalized tenderness present. Extremities: Extremities normal, atraumatic, no cyanosis or edema. No calf tenderness or cords. Pulses: 2+ and symmetric all extremities. Skin: turgor normal.   
Neurologic: Alert and oriented X 3. Fine motor of hands and fingers normal.   equal.  No cogwheeling or rigidity. Gait not tested at this time. Sensation grossly normal to touch. Gross motor of extremities normal.    
 
Data Review:  
   
Recent Days: 
Recent Labs  
  19 
0402 19 1213 03/19/19 
0355 WBC 14.2* 14.2* 17.0*  
HGB 10.2* 10.4* 11.2* HCT 31.6* 32.7* 35.2  264 210 Recent Labs  
  03/21/19 
0402 03/20/19 
0528 03/19/19 
0355  140 139  
K 3.6 3.7 3.7  106 106 CO2 31 29 27 GLU 88 92 101* BUN 4* 4* 5*  
CREA 0.47* 0.41* 0.44* CA 8.2* 8.1* 8.1*  
MG 2.0 2.1 2.2 ALB 1.8* 1.6* 1.8* TBILI 0.4 0.2 0.2 SGOT 19 19 24 ALT 24 25 35 No results for input(s): PH, PCO2, PO2, HCO3, FIO2 in the last 72 hours. 24 Hour Results: 
Recent Results (from the past 24 hour(s)) MAGNESIUM Collection Time: 03/21/19  4:02 AM  
Result Value Ref Range Magnesium 2.0 1.6 - 2.4 mg/dL CBC WITH AUTOMATED DIFF Collection Time: 03/21/19  4:02 AM  
Result Value Ref Range WBC 14.2 (H) 3.6 - 11.0 K/uL  
 RBC 3.31 (L) 3.80 - 5.20 M/uL  
 HGB 10.2 (L) 11.5 - 16.0 g/dL HCT 31.6 (L) 35.0 - 47.0 % MCV 95.5 80.0 - 99.0 FL  
 MCH 30.8 26.0 - 34.0 PG  
 MCHC 32.3 30.0 - 36.5 g/dL  
 RDW 14.4 11.5 - 14.5 % PLATELET 862 426 - 583 K/uL MPV 9.3 8.9 - 12.9 FL  
 NRBC 0.0 0  WBC ABSOLUTE NRBC 0.00 0.00 - 0.01 K/uL NEUTROPHILS 75 32 - 75 % LYMPHOCYTES 17 12 - 49 % MONOCYTES 4 (L) 5 - 13 % EOSINOPHILS 0 0 - 7 % BASOPHILS 0 0 - 1 % METAMYELOCYTES 1 (H) 0 % MYELOCYTES 3 (H) 0 % IMMATURE GRANULOCYTES 0 %  
 ABS. NEUTROPHILS 10.7 (H) 1.8 - 8.0 K/UL  
 ABS. LYMPHOCYTES 2.4 0.8 - 3.5 K/UL  
 ABS. MONOCYTES 0.6 0.0 - 1.0 K/UL  
 ABS. EOSINOPHILS 0.0 0.0 - 0.4 K/UL  
 ABS. BASOPHILS 0.0 0.0 - 0.1 K/UL  
 ABS. IMM. GRANS. 0.0 K/UL  
 DF MANUAL    
 RBC COMMENTS NORMOCYTIC, NORMOCHROMIC METABOLIC PANEL, COMPREHENSIVE Collection Time: 03/21/19  4:02 AM  
Result Value Ref Range Sodium 141 136 - 145 mmol/L Potassium 3.6 3.5 - 5.1 mmol/L Chloride 106 97 - 108 mmol/L  
 CO2 31 21 - 32 mmol/L Anion gap 4 (L) 5 - 15 mmol/L Glucose 88 65 - 100 mg/dL BUN 4 (L) 6 - 20 MG/DL  Creatinine 0.47 (L) 0.55 - 1.02 MG/DL  
 BUN/Creatinine ratio 9 (L) 12 - 20 GFR est AA >60 >60 ml/min/1.73m2 GFR est non-AA >60 >60 ml/min/1.73m2 Calcium 8.2 (L) 8.5 - 10.1 MG/DL Bilirubin, total 0.4 0.2 - 1.0 MG/DL  
 ALT (SGPT) 24 12 - 78 U/L  
 AST (SGOT) 19 15 - 37 U/L Alk. phosphatase 65 45 - 117 U/L Protein, total 5.3 (L) 6.4 - 8.2 g/dL Albumin 1.8 (L) 3.5 - 5.0 g/dL Globulin 3.5 2.0 - 4.0 g/dL A-G Ratio 0.5 (L) 1.1 - 2.2 Medications reviewed Current Facility-Administered Medications Medication Dose Route Frequency  . PHARMACY TO SUBSTITUTE PER PROTOCOL (Reordered from: losartan-hydroCHLOROthiazide (HYZAAR) 100-25 mg per tablet)    Per Protocol  losartan (COZAAR) tablet 100 mg  100 mg Oral DAILY  calcium carbonate (TUMS) chewable tablet 200 mg [elemental]  200 mg Oral PRN  
 HYDROcodone-acetaminophen (NORCO) 5-325 mg per tablet 2 Tab  2 Tab Oral Q4H PRN  
 vancomycin (VANCOCIN) 1500 mg in  ml infusion  1,500 mg IntraVENous Q12H  
 meropenem (MERREM) 500 mg in 0.9% sodium chloride (MBP/ADV) 50 mL  0.5 g IntraVENous Q6H  
 sodium chloride (NS) flush 5-40 mL  5-40 mL IntraVENous Q8H  
 acetaminophen (TYLENOL) tablet 650 mg  650 mg Oral Q6H PRN  
 sodium chloride (NS) flush 5-40 mL  5-40 mL IntraVENous Q8H  
 sodium chloride (NS) flush 5-40 mL  5-40 mL IntraVENous PRN  
 naloxone (NARCAN) injection 0.4 mg  0.4 mg IntraVENous PRN  
 HYDROcodone-acetaminophen (NORCO) 5-325 mg per tablet 1 Tab  1 Tab Oral Q4H PRN  
 HYDROmorphone (PF) (DILAUDID) injection 0.5 mg  0.5 mg IntraVENous Q4H PRN  
 ondansetron (ZOFRAN) injection 4 mg  4 mg IntraVENous Q4H PRN  
 enoxaparin (LOVENOX) injection 40 mg  40 mg SubCUTAneous Q24H  pravastatin (PRAVACHOL) tablet 40 mg  40 mg Oral QHS  venlafaxine (EFFEXOR) tablet 50 mg  50 mg Oral DAILY  albuterol (PROVENTIL VENTOLIN) nebulizer solution 2.5 mg  2.5 mg Nebulization Q6H PRN Care Plan discussed with: Patient/nurse Total time spent with patient and review of records: 30 minutes.  
 
Courtney Ludwig MD

## 2019-03-21 NOTE — PROGRESS NOTES
Bedside and Verbal shift change report given to Jaime (oncoming nurse) by Daphney Silverman (offgoing nurse). Report included the following information SBAR, Kardex, Procedure Summary, Intake/Output and MAR. TRANSFER - OUT REPORT: 
 
Verbal report given to MANDO Johnson(name) on Tony Wright  being transferred to [address] [place] [address]([facility]) for routine progression of care Report consisted of patients Situation, Background, Assessment and  
Recommendations(SBAR). Information from the following report(s) SBAR, Kardex, Procedure Summary, Intake/Output and MAR was reviewed with the receiving nurse. Lines:  
Venous Access Device Portacath Upper chest (subclavicular area, right (Active) Central Line Being Utilized Yes 3/21/2019 12:33 AM  
Criteria for Appropriate Use Other (comment) 3/21/2019 12:33 AM  
Site Assessment Clean, dry, & intact 3/21/2019 12:33 AM  
Date of Last Dressing Change 03/20/19 3/20/2019  9:46 AM  
Dressing Status Clean, dry, & intact 3/21/2019 12:33 AM  
Dressing Type Transparent 3/21/2019 12:33 AM  
Access Medial Site Assessment Other (Comment) 3/20/2019  9:46 AM  
Date Accessed (Medial Site) 03/20/19 3/20/2019  9:46 AM  
Access Time (Medial Site) 0945 3/20/2019  9:46 AM  
Access Needle Size (Site #1) 20 G 3/20/2019  9:46 AM  
Access Needle Length (Medial Site) 1 inch 3/20/2019  9:46 AM  
Alcohol Cap Used Yes 3/20/2019  9:46 AM  
  
 
Opportunity for questions and clarification was provided. Patient transported with: 
 [employer] Leave tristan cath in notified nurse

## 2019-03-21 NOTE — DISCHARGE SUMMARY
Surgery Discharge Summary     Patient ID:  Blayne Tineo  [id number]  1 y.o.  1945    Admit date: 3/12/2019    Discharge date and time: 3/21/2019    Admission Diagnoses:    Patient Active Problem List   Diagnosis Code    Breast cancer, left ([facility] C50.912    Hypertension I10    High cholesterol E78.00    GERD (gastroesophageal reflux disease) K21.9    Depression F32.9    Cancer ([facility] C80.1    Adverse effect of anesthesia T41.45XA    Diverticulitis K57.92    Hyponatremia E87.1    Hypokalemia E87.6    Abscess L02.91       Discharge Diagnoses: There are no discharge diagnoses documented for the most recent discharge. Patient Active Problem List   Diagnosis Code    Breast cancer, left ([facility] C50.912    Hypertension I10    High cholesterol E78.00    GERD (gastroesophageal reflux disease) K21.9    Depression F32.9    [facility] C80.1    Adverse effect of anesthesia T41.45XA    Diverticulitis K57.92    Hyponatremia E87.1    Hypokalemia E87.6    Abscess L02.91       Procedures for this admission: Procedure(s):  [address] Course: Ms. Symone Reynaga is a 68yof with hx breast ca, undergoing neoadjuvant chemotherapy, presented to the ED on date of admission with c/o abdominal pain and was found to have evidence of diverticulitis with multiple abscess. She was initially managed conservatively with IV ABX and percutaneous drainage of largest pelvic abscess but she continued to have gumaro and exhibited worsening leukocytosis prompting the decision to proceed with open sigmoidectomy and end colostomy on 3/15. She had an uneventful post-operative course. ID was consulted for ABX recommendations and patient was ultimately discharged to SNF with plan for IV ABX through 3/29. She will follow up in our office in a week for recheck. Expect she will have to remain off chemotherapy at least 4 weeks post-op.      Disposition: SNF    Discharged Condition : stable    Instructions: Follow-up in office  in 1 week.               Signed:  ROSALBA Dunn  3/21/2019  10:36 AM

## 2019-03-21 NOTE — PROGRESS NOTES
General Surgery Daily Progress Note Patient: Tariq Corrales MRN: [id number]  SSN: [id number] YOB: 1945  Age: 74 y.o. Sex: female Admit Date: 3/12/2019 Subjective:  
Pain controlled, no N/V. Ostomy output increased, tolerating diet Current Facility-Administered Medications Medication Dose Route Frequency  losartan-hydroCHLOROthiazide (HYZAAR) 100-25 mg   Oral DAILY  calcium carbonate (TUMS) chewable tablet 200 mg [elemental]  200 mg Oral PRN  
 HYDROcodone-acetaminophen (NORCO) 5-325 mg per tablet 2 Tab  2 Tab Oral Q4H PRN  
 vancomycin (VANCOCIN) 1500 mg in  ml infusion  1,500 mg IntraVENous Q12H  
 meropenem (MERREM) 500 mg in 0.9% sodium chloride (MBP/ADV) 50 mL  0.5 g IntraVENous Q6H  
 sodium chloride (NS) flush 5-40 mL  5-40 mL IntraVENous Q8H  
 acetaminophen (TYLENOL) tablet 650 mg  650 mg Oral Q6H PRN  
 sodium chloride (NS) flush 5-40 mL  5-40 mL IntraVENous Q8H  
 sodium chloride (NS) flush 5-40 mL  5-40 mL IntraVENous PRN  
 naloxone (NARCAN) injection 0.4 mg  0.4 mg IntraVENous PRN  
 HYDROcodone-acetaminophen (NORCO) 5-325 mg per tablet 1 Tab  1 Tab Oral Q4H PRN  
 HYDROmorphone (PF) (DILAUDID) injection 0.5 mg  0.5 mg IntraVENous Q4H PRN  
 ondansetron (ZOFRAN) injection 4 mg  4 mg IntraVENous Q4H PRN  
 enoxaparin (LOVENOX) injection 40 mg  40 mg SubCUTAneous Q24H  pravastatin (PRAVACHOL) tablet 40 mg  40 mg Oral QHS  venlafaxine (EFFEXOR) tablet 50 mg  50 mg Oral DAILY  albuterol (PROVENTIL VENTOLIN) nebulizer solution 2.5 mg  2.5 mg Nebulization Q6H PRN Objective: No intake/output data recorded. 03/19 1901 - 03/21 0700 In: 360 [P.O.:360] Out: 1035 [Urine:800] Patient Vitals for the past 8 hrs: 
 BP Temp Pulse Resp SpO2  
03/21/19 0736 150/79 98 °F (36.7 °C) 77 14 96 % 03/21/19 0316 148/75 98 °F (36.7 °C) 79 18 94 % Physical Exam: 
General: Alert, cooperative, NAD Lungs: Unlabored Heart:  Regular rate and  rhythm Abdomen: Soft, ATTP, non-distended. + bowel sounds. Incisions intact, no erythema or induration, scant purulent drainage from inferior aspect, ostomy pink, stool in bag Extremities: Warm, moves all, no edema Skin:  Warm and dry, no rash Labs:  
Recent Labs  
  03/21/19 
0402 WBC 14.2* HGB 10.2* HCT 31.6*  
 Recent Labs  
  03/21/19 
0402   
K 3.6  CO2 31 GLU 88 BUN 4*  
CREA 0.47* CA 8.2* MG 2.0 ALB 1.8* TBILI 0.4 SGOT 19 ALT 24 Assessment / Plan:  
· POD#6 ex lap, sigmoidectomy, end colostomy · Diet as tolerated · ABX per ID · Wound care following for ostomy teaching · Plan for discharge to SNF today for continued recovery and IV ABX

## 2019-03-21 NOTE — PROGRESS NOTES
Problem: Mobility Impaired (Adult and Pediatric) Goal: *Acute Goals and Plan of Care (Insert Text) Description Physical Therapy Goals Initiated 3/17/2019 1. Patient will move from supine to sit and sit to supine , scoot up and down and roll side to side in bed with independence within 7 day(s). 2.  Patient will transfer from bed to chair and chair to bed with independence using the least restrictive device within 7 day(s). 3.  Patient will perform sit to stand with independence within 7 day(s). 4.  Patient will ambulate with independence for 300 feet with the least restrictive device within 7 day(s). 5.  Patient will ascend/descend 2 stairs with  handrail(s) with independence within 7 day(s). Note: PHYSICAL THERAPY TREATMENT Patient: Adriana Treviño (74 y.o. female) Date: 3/21/2019 Diagnosis: Diverticulitis [K57.92] <principal problem not specified> Procedure(s) (LRB): 
LAPAROTOMY EXPLORATORY COLECTOMY AND COLOSTOMY (N/A) 6 Days Post-Op Precautions: Fall Chart, physical therapy assessment, plan of care and goals were reviewed. ASSESSMENT: 
Pt received in BR with PCT ,agreeable to participate with physical therapy. Gait training x 300' with SBA/Mod I, pt managing IV pole, demonstrates accelerated gait \"get it over with\". Pt fatigued with mobility and requested to return to bed. Difficulty sequencing log rolling technique to reduce abdominal straining for sit>supine. Progression toward goals: 
?    Improving appropriately and progressing toward goals ? Improving slowly and progressing toward goals ? Not making progress toward goals and plan of care will be adjusted PLAN: 
Patient continues to benefit from skilled intervention to address the above impairments. Continue treatment per established plan of care. Discharge Recommendations:  Jason Gupta Further Equipment Recommendations for Discharge:  none SUBJECTIVE:  
Patient stated ?get this over with. ? 
 
 OBJECTIVE DATA SUMMARY:  
Critical Behavior: 
Neurologic State: Alert Orientation Level: Oriented X4 Cognition: Appropriate decision making, Appropriate for age attention/concentration, Appropriate safety awareness Safety/Judgement: Awareness of environment, Fall prevention, Good awareness of safety precautions, Home safety, Insight into deficits Functional Mobility Training: 
Bed Mobility: 
  
  
Sit to Supine: Minimum assistance Transfers: 
  
Stand to Sit: Modified independent Balance: 
Sitting: Intact Standing: Intact Standing - Static: Good Standing - Dynamic : Good Ambulation/Gait Training: 
Distance (ft): 300 Feet (ft) Assistive Device: Gait belt(IV pole) Ambulation - Level of Assistance: Modified independent Gait Abnormalities: Path deviations Base of Support: Widened Stairs: 
  
  
   
 
Neuro Re-Education: 
Therapeutic Exercises:  
 
Pain: 
Pain Scale 1: Numeric (0 - 10) Pain Intensity 1: 2 Pain Location 1: Abdomen Pain Orientation 1: Anterior; Lower Pain Description 1: Aching Pain Intervention(s) 1: Medication (see MAR) Activity Tolerance:  
good Please refer to the flowsheet for vital signs taken during this treatment. After treatment:  
?    Patient left in no apparent distress sitting up in chair ? Patient left in no apparent distress in bed 
? Call bell left within reach ? Nursing notified ? Caregiver present ? Bed alarm activated COMMUNICATION/COLLABORATION:  
The patient?s plan of care was discussed with: Registered Nurse Ether Mix Time Calculation: 23 mins

## 2019-03-21 NOTE — PROGRESS NOTES
[facility] Progress Note Reginaldo Go DO 
  [phone] Office [phone]  Fax 3/21/2019 Assessment & Plan: 1. Perforated diverticulitis with pelvic abscesses. The patient is status post aspiration of an abscess by IR on 2019. Cultures are growing ESBL producing Escherichia coli, enterococcus, and anaerobes. She was taken to the OR on 03/15/2019 for exploratory laparotomy, sigmoid colectomy, and colostomy with Florina's procedure. Continue  vancomycin and meropenem. Plan d/c on ertapenem and vancomycin through  2019. This may be administered to the patient's right chest wall port. IV abx orders in chart 2. Recently diagnosed breast cancer. The patient underwent her first cycle of chemotherapy one week prior to admission. Further chemotherapy is on hold for now until the infection has been treated. 3. Leukocytosis. This is likely due to a combination of infection and recent Neulasta use. Continue to follow 4. MULTIPLE ANTIBIOTIC ALLERGIES INCLUDING DOXYCYCLINE, CEPHALEXIN, AND PENICILLIN. Cannot take linezolid due to interaction with effexor Subjective:  
 
Seen working with PT 
 
Objective:  
 
Vitals:  
Visit Vitals /79 (BP 1 Location: Left arm, BP Patient Position: At rest) Pulse 77 Temp 98 °F (36.7 °C) Resp 14 Ht 5' 6\" (1.676 m) Wt 80.7 kg (178 lb) SpO2 96% Breastfeeding? No  
BMI 28.73 kg/m² Tmax:  Temp (24hrs), Av.1 °F (36.7 °C), Min:97.9 °F (36.6 °C), Max:98.4 °F (36.9 °C) Exam:  
Patient is intubated:  no 
 
Physical Examination:  
General:  Alert, cooperative, no distress Head:    
Eyes:    
Neck:   
   
Lungs:   No distress. Chest wall:    
Heart:    
Abdomen:     
Extremities: Moves all. Skin:   
Neurologic:   
 
Labs:     
 
No lab exists for component: ITNL No results for input(s): CPK, CKMB, TROIQ in the last 72 hours. Recent Labs  
  19 
0402 19 7251 03/19/19 
0355  140 139  
K 3.6 3.7 3.7  106 106 CO2 31 29 27 BUN 4* 4* 5*  
CREA 0.47* 0.41* 0.44* GLU 88 92 101* MG 2.0 2.1 2.2 ALB 1.8* 1.6* 1.8* WBC 14.2* 14.2* 17.0*  
HGB 10.2* 10.4* 11.2* HCT 31.6* 32.7* 35.2  264 210 No results for input(s): INR, PTP, APTT in the last 72 hours. No lab exists for component: INREXT, INREXT Needs: urine analysis, urine sodium, protein and creatinine No results found for: Jenny Andrews Cultures: No results found for: MARCK Lab Results Component Value Date/Time Culture result: (A) 03/13/2019 03:01 PM  
  HEAVY MIXED ANAEROBIC GRAM NEGATIVE RODS , BETA LACTAMASE POSITIVE Culture result:  03/13/2019 03:01 PM  
  HEAVY ESCHERICHIA COLI ** (EXTENDED SPECTRUM BETA LACTAMASE ) **  
 Culture result: HEAVY ENTEROCOCCUS AVIUM (A) 03/13/2019 03:01 PM  
 Culture result: Culture performed on Unspun Fluid 03/13/2019 03:01 PM  
 
 
Radiology:  
 
Medications Current Facility-Administered Medications Medication Dose Route Frequency Last Dose  losartan-hydroCHLOROthiazide (HYZAAR) 100-25 mg   Oral DAILY  calcium carbonate (TUMS) chewable tablet 200 mg [elemental]  200 mg Oral  mg at 03/20/19 1821  
 HYDROcodone-acetaminophen (NORCO) 5-325 mg per tablet 2 Tab  2 Tab Oral Q4H PRN 2 Tab at 03/21/19 0911  
 vancomycin (VANCOCIN) 1500 mg in  ml infusion  1,500 mg IntraVENous Q12H 1,500 mg at 03/21/19 0545  meropenem (MERREM) 500 mg in 0.9% sodium chloride (MBP/ADV) 50 mL  0.5 g IntraVENous Q6H 500 mg at 03/21/19 0910  
 sodium chloride (NS) flush 5-40 mL  5-40 mL IntraVENous Q8H 10 mL at 03/20/19 2220  acetaminophen (TYLENOL) tablet 650 mg  650 mg Oral Q6H  mg at 03/14/19 6580  sodium chloride (NS) flush 5-40 mL  5-40 mL IntraVENous Q8H 10 mL at 03/20/19 1400  
 sodium chloride (NS) flush 5-40 mL  5-40 mL IntraVENous PRN 10 mL at 03/19/19 0409  naloxone (NARCAN) injection 0.4 mg  0.4 mg IntraVENous PRN    
 HYDROcodone-acetaminophen (NORCO) 5-325 mg per tablet 1 Tab  1 Tab Oral Q4H PRN 1 Tab at 03/19/19 0327  
 HYDROmorphone (PF) (DILAUDID) injection 0.5 mg  0.5 mg IntraVENous Q4H PRN 0.5 mg at 03/17/19 2028  ondansetron (ZOFRAN) injection 4 mg  4 mg IntraVENous Q4H PRN    
 enoxaparin (LOVENOX) injection 40 mg  40 mg SubCUTAneous Q24H 40 mg at 03/20/19 2215  pravastatin (PRAVACHOL) tablet 40 mg  40 mg Oral QHS 40 mg at 03/20/19 2215  
 venlafaxine [facility]) tablet 50 mg  50 mg Oral DAILY 50 mg at 03/21/19 0911  
 albuterol (PROVENTIL VENTOLIN) nebulizer solution 2.5 mg  2.5 mg Nebulization Q6H PRN Case discussed with: 
 
 
Sharon Murdock DO

## 2019-03-21 NOTE — PROGRESS NOTES
Received discharge instructions an d a script fo Norco. Script and instructions read and explained to her. Aware of follow up appointments. Verbalized understanding

## 2019-03-21 NOTE — PROGRESS NOTES
3/21/2019 11:06 AM Confirmed discharge with pt, pt reported she will [facility] her [facility] [address]. CM will follow. 3/21/2019 9:41 AM [facility] can accept pt today to a private room after Christopher Moreno. Nursing please call report to [phone].   
 
3/21/2019  8:20 AM Anticipating pt to discharge to [facility] today if medically stable. Pt will have a friend transport her at discharge. Medicare IMM provided to pt on 3/20. CM will follow up. YAYO BenavidesW

## 2019-03-21 NOTE — DISCHARGE INSTRUCTIONS
Patient Discharge Instructions    Deepak Easton / [id number] : 1945    Admitted 3/12/2019 Discharged: 3/21/2019      Medications       · It is important that you take the medication exactly as they are prescribed. · Keep your medication in the bottles provided by the pharmacist and keep a list of the medication names, dosages, and times to be taken in your wallet. · Do not take other medications without consulting your doctor. · Take Miralax as needed if you feel constipated and colostomy output becomes too thick or hard. · Continue IV antibiotics through 3/29 as ordered by Dr. Estelita Queen. What to do at Home    Recommended diet: Regular Diet    Recommended activity: No heavy lifting or strenuous activity for 6 weeks. You may shower. You may drive once pain has improved and you no longer require pain medication. Wound care: Cover abdominal incision with dry dressing. Change daily. We will remove staples in 2 weeks. Follow up with Dr. Stu Aleman in 1 week. Call [facility] to make an appointment. Follow up with Dr. Rosa Bentley as scheduled. Call Dr. Stu Aleman or go to the ER if you develop worsening pain, fever, vomiting, or any other symptoms that concern you.

## 2019-03-27 ENCOUNTER — HOSPITAL ENCOUNTER (OUTPATIENT)
Dept: INFUSION THERAPY | Age: 74
End: 2019-03-27

## 2019-03-28 ENCOUNTER — PATIENT OUTREACH (OUTPATIENT)
Dept: CASE MANAGEMENT | Age: 74
End: 2019-03-28

## 2019-03-28 NOTE — PROGRESS NOTES
Community Care Team documentation for patient in [facility] Initial Follow Up Patient was discharged to [facility], [facility]. Information included in this progress note has been provided to SNF. Hospital Admission and Diagnosis: [facility] 3/12-3/21 Perforated diverticulitis with pelvic abscesses, S/p open sigmoidectomy and end colostomy on 3/15  RRAT Score:  11   Advance Care Planning:  Advance Directive on file PCP : Nichelle Holt MD 
 
SNF Attending: Eulalio Hardy MD 
 
Spoke with SNF team. Ensured patient arrived to SNF safely with admission packet in order. Provided needed hospital discharge follow up appointments: Surgeon Isaac Milligan MD attended today 3/28 per SNF staff; Oncology Cherelle Choi NP in [facility], MD's office on 4/10 at 9:00 AM; [address] [place] [address] on 4/17 at 7:30 AM and on 5/8 at 7:30 AM. PT/OT providing skilled therapy in SNF. Currently ascending/descending 10 steps with supervision and no device. Ambulating 400 ft with supervision with no device. Independent with all ADLs and toileting. Ostomy care teaching provided for patient. IV antibiotics scheduled through 3/29. Prior to hospitalization patient lives alone and has used [facility] previously. LOS TBD. Community Care Team will follow up weekly with [facility] until discharge. Medications were not reconciled and general patient assessment was not completed during this [facility] outreach. John Rizo, MSN, RN, ACNS-BC, [facility] Nurse Navigator, [address] [place] [address] [phone]

## 2019-04-04 ENCOUNTER — PATIENT OUTREACH (OUTPATIENT)
Dept: CASE MANAGEMENT | Age: 74
End: 2019-04-04

## 2019-04-04 NOTE — PROGRESS NOTES
Community Care Team Documentation for Patient in [facility] Discharge Note Patient has been discharged from [id number] [facility] ([facility]). See previous [facility] Team notes. PCP : Silver Collins MD 
 
Spoke with SNF team.  Patient was discharged 4/1 to home alone with [facility] per her request. 
 
[facility] will sign off at this time. Medications were not reconciled and general patient assessment was not completed during this skilled nursing facility outreach. Pat Rizo, MSN, RN, ACNS-BC, [facility] Nurse Navigator, [address] [place] [address] [phone]

## 2019-04-10 ENCOUNTER — OFFICE VISIT (OUTPATIENT)
Dept: ONCOLOGY | Age: 74
End: 2019-04-10

## 2019-04-10 VITALS
WEIGHT: 162.8 LBS | HEIGHT: 66 IN | TEMPERATURE: 96.7 F | HEART RATE: 108 BPM | BODY MASS INDEX: 26.16 KG/M2 | DIASTOLIC BLOOD PRESSURE: 72 MMHG | SYSTOLIC BLOOD PRESSURE: 103 MMHG | RESPIRATION RATE: 16 BRPM | OXYGEN SATURATION: 94 %

## 2019-04-10 DIAGNOSIS — K57.92 DIVERTICULITIS: ICD-10-CM

## 2019-04-10 DIAGNOSIS — Z72.0 TOBACCO USE: ICD-10-CM

## 2019-04-10 DIAGNOSIS — C50.912 MALIGNANT NEOPLASM OF LEFT FEMALE BREAST, UNSPECIFIED ESTROGEN RECEPTOR STATUS, UNSPECIFIED SITE OF BREAST (HCC): Primary | ICD-10-CM

## 2019-04-10 NOTE — PROGRESS NOTES
[facility] [place] [address] [facility]  Heidi Coyer: [phone]  F: [phone]      Reason for Visit:   Olena Barger is a 66 y.o. female who is seen in consultation at the request of Dr. Tate Law for evaluation of therapy for breast cancer. Treatment History:   · 2/15/19 Left core bx:   IDC, gr 2, 1.1 cm, ER + at 100%, SC + at 20%, HER 2 POSITIVE (IHC 2+; FISH ratio 3.9; sig/cell 9.2)  · 3/1/19 Left ax LN core bx:  + for breast cancer, ER + at 100%, SC negative, HER 2 POSITIVE (IHC 2+, FISH ratio 2; sig/cell 5.8)  · TCH-P 3/6/19-  · #2 delayed due to diverticulitis abscess and surgery    History of Present Illness:   Pt noticed the left breast mass herself in Feb 2019, leading to the pathology above    Interval history:  In today for follow up. Complains of gr 2 fatigue, gr 1 hair loss, gr 1 chills, gr 1 insomnia. She was seen in ED on 3/8/19 for abdominal pain and constipation CT abd/pelvis showed diverticulitis and received levaquin and flagyl. KUB also showed nonobstructive bowel gas pattern. Reports last good BM was 3/6 prior to chemo. She states on 3/10 and 3/12 had small watery BM. Sent to ED from office on 3/12/19 for worsening abdominal pain. She was found to have diverticulitis with multiple abscesses. S/p IV abx and perc drain with no relief of her pain, therefore proceeded with open sigmoidectomy and end colostomy on 3/15/19. She was discharged to SNF with IV abx until 3/29/19.      FH:  Paternal aunt breast cancer in her [de-identified]; no ovarian cancer, no prostate or pancreas cancer    Past Medical History:   Diagnosis Date    Adverse effect of anesthesia     \"difficulty waking up from anesthethia\"    Cancer [facility]) 02/2019    left breast    Depression     GERD (gastroesophageal reflux disease)     High cholesterol     Hypertension       Past Surgical History:   Procedure Laterality Date    HX BREAST BIOPSY Right years ago neg; needle bx    HX CATARACT REMOVAL      HX COLONOSCOPY      HX ORTHOPAEDIC      carpal tunnel      Social History     Tobacco Use    Smoking status: Current Every Day Smoker     Packs/day: 0.50     Years: 55.00     Pack years: 27.50    Smokeless tobacco: Never Used   Substance Use Topics    Alcohol use: Yes     Alcohol/week: 0.6 oz     Types: 1 Shots of liquor per week     Drinks per session: 5 or 6     Binge frequency: Weekly     Comment: Patient stated that she puts a splash of bourbon in her afternoon coffee around 5-6 days per week      Family History   Problem Relation Age of Onset    Breast Cancer Maternal Aunt     Hypertension Mother     Hypertension Father     Cancer Father         Lung    Cancer Brother         leukemia    Diabetes Brother         2 brothers with diabetes    Hypertension Sister         2 sisters and 4 brothers with HTN     Current Outpatient Medications   Medication Sig    naproxen sodium (ALEVE PO) Take  by mouth two (2) times daily as needed.  lidocaine-prilocaine (EMLA) topical cream Apply  to affected area as needed for Pain.  losartan-hydroCHLOROthiazide (HYZAAR) 100-25 mg per tablet Take 1 Tab by mouth daily.  pravastatin (PRAVACHOL) 40 mg tablet Take 40 mg by mouth nightly.  STIOLTO RESPIMAT 2.5-2.5 mcg/actuation inhaler Take 2 Puffs by inhalation nightly.  venlafaxine (EFFEXOR) 50 mg tablet Take 50 mg by mouth daily.  Cyanocobalamin-Cobamamide (B12) 5,000-100 mcg lozg by SubLINGual route daily.  cholecalciferol (VITAMIN D3) 1,000 unit cap Take  by mouth daily.  calcium-cholecalciferol, D3, (CALTRATE 600+D) tablet Take 1 Tab by mouth daily.  aspirin delayed-release 81 mg tablet Take  by mouth daily.  polyethylene glycol (MIRALAX) 17 gram packet Take 1 Packet by mouth daily as needed (constipation).  (Patient not taking: Reported on 4/10/2019)    prochlorperazine (COMPAZINE) 10 mg tablet Take 1 Tab by mouth every six (6) hours as needed for Nausea. (Patient not taking: Reported on 4/10/2019)    ondansetron hcl (ZOFRAN) 8 mg tablet Take 1 Tab by mouth every twelve (12) hours as needed for Nausea. (Patient not taking: Reported on 4/10/2019)     No current facility-administered medications for this visit. Allergies   Allergen Reactions    Doxycycline Diarrhea     Patient states can tolerate with yogurt    Keflex [Cephalexin] Hives    Penicillins Rash        Review of Systems: A complete review of systems was obtained, negative except as described above. Physical Exam:     Visit Vitals  /72 (BP 1 Location: Left arm, BP Patient Position: Sitting)   Pulse (!) 108   Temp 96.7 °F (35.9 °C) (Oral)   Resp 16   Ht 5' 6\" (1.676 m)   Wt 162 lb 12.8 oz (73.8 kg)   SpO2 94%   BMI 26.28 kg/m²     ECOG PS: 0  General: No distress  Eyes: PERRLA, anicteric sclerae  HENT: Atraumatic, OP clear  Neck: Supple  Lymphatic: No cervical, supraclavicular adenopathy  Respiratory: exp wheezing  CV: Normal rate, regular rhythm, no murmurs, no peripheral edema  GI: Soft, tender, distended, no masses, +BS  MS:  Digits without clubbing or cyanosis. Skin: No rashes, ecchymoses, or petechiae. Normal temperature, turgor, and texture. Psych: Alert, oriented, appropriate affect, normal judgment/insight  Breasts:  L breast 12:00, 3 cm FN, 3 cm mass, 1.5 cm L ax LN     Results:     Lab Results   Component Value Date/Time    WBC 14.2 (H) 03/21/2019 04:02 AM    HGB 10.2 (L) 03/21/2019 04:02 AM    HCT 31.6 (L) 03/21/2019 04:02 AM    PLATELET 488 92/64/4824 04:02 AM    MCV 95.5 03/21/2019 04:02 AM    ABS.  NEUTROPHILS 10.7 (H) 03/21/2019 04:02 AM     Lab Results   Component Value Date/Time    Sodium 141 03/21/2019 04:02 AM    Potassium 3.6 03/21/2019 04:02 AM    Chloride 106 03/21/2019 04:02 AM    CO2 31 03/21/2019 04:02 AM    Glucose 88 03/21/2019 04:02 AM    BUN 4 (L) 03/21/2019 04:02 AM    Creatinine 0.47 (L) 03/21/2019 04:02 AM    GFR est AA >60 03/21/2019 04:02 AM GFR est non-AA >60 03/21/2019 04:02 AM    Calcium 8.2 (L) 03/21/2019 04:02 AM    Glucose (POC) 93 03/19/2019 06:56 AM     Lab Results   Component Value Date/Time    Bilirubin, total 0.4 03/21/2019 04:02 AM    ALT (SGPT) 24 03/21/2019 04:02 AM    AST (SGOT) 19 03/21/2019 04:02 AM    Alk. phosphatase 65 03/21/2019 04:02 AM    Protein, total 5.3 (L) 03/21/2019 04:02 AM    Albumin 1.8 (L) 03/21/2019 04:02 AM    Globulin 3.5 03/21/2019 04:02 AM       2/12/19 mammogram and US  MAMMOGRAPHY:  The breast demonstrates stable scattered density breast  architecture. Underlying the site of clinical concern in the left breast is  masslike focal asymmetry in the anterior upper left breast. There is no other  dominant mass lesion, architectural distortion, asymmetry, or calcification. There is no skin thickening or nipple retraction.     ULTRASOUND:  Corresponding to the mammographic density there is a 2.4 x 3.1 x  3.7 cm hypoechoic lobulated mass at about the 12:00 position and 3 cm radial  distance from the nipple. No other suspicious cystic or solid lesions  Identified. Dr. Jad Bojorquez has identified 2 large, suspicious ax LN on US:  1.7 cm and 1.2 cm    Records reviewed and summarized above. Assessment/plan:   1. Left central IDC, gr 2, ER +, NV +, HER 2 positive:  cT2 cN1a cM0, stage IIB, prognostic stage IB    We explained to the patient that the goal of systemic adjuvant therapy is to improve the chances for cure and decrease the risk of relapse. We explained why a patient can have microscopic cancer spread now even though physical examination, laboratory studies and imaging studies are negative for cancer. We explained that the same treatments used now as adjuvant or preventive treatments rarely if ever are curative in women who develop metastases. We discussed that there is no difference in overall survival between neoadjuvant and adjuvant chemotherapy.   We discussed the rationale for neoadjuvant chemotherapy, if chemotherapy is warranted, as it is in this case: to avoid any potential delays in giving chemotherapy following surgery, to be able to see the response of the tumor to chemotherapy, and to potentially downstage the tumor prior to surgery. Using eprognosis. org, her 5 year risk of all cause mortality is 20%, her risk in 10 years is 52-58%, average OS is 9-10 years. Therapy discussion is warranted. [facility] suggests equivalency between q.3 week Adriamycin, Cytoxan followed by weekly paclitaxel and trastuzumab compared with the [facility] regimen. However, this study was not powered to show a difference between these two regimens, and in the [facility] publication, the AC-TH arm showed a numerically advantange (though not statistically significant) to the [facility] arm. In this patient, it is completely reasonable to use [facility] approach and avoid the potential cardiotoxicity of the anthracyclines as well as the potential for leukemia. We discussed the toxicities of docetaxel and carboplatin chemotherapy in detail. This chemotherapy frequently causes a low white blood cell count and hospital admissions for treatment of neutropenic fever. We explained that we consider the use of growth factors to minimize this risk. We explained to the patient that some side effects if they occur only last a few days including nausea, vomiting, stomatitis, arthralgia, myalgia,and allergic reactions to Taxotere. We told the patient that severe nausea and vomiting were uncommon and that some side effects,if they occur, will last longer; this includes hair loss, which will be seen in all patients treated with these agents and fatigue,which will be seen in most.  We also informed that for the patient that heart damage is rare with these agents. We explained that carboplatin can rarely cause kidney damage and high frequency hearing loss.   We provided the patient in detail her information concerning the toxicities of this regimen in addition to her overall discussion. Rational for therapy with pertuzumab was also discussed with the patient including a nearly 20% improvement seen in pCR in the neoadjuvant setting with the addition of this medication. Additional AE discussed including an acne rash (and the use of doxycyline 100 mg bid to help prevent) and diarrhea and consideration of additional cardiomyopathy. Rationale for therapy with trastuzumab was also discussed with the patient including a 50% proportional improvement in disease free survival and also an improvement in overall survival in patients receiving trastuzumab and chemotherapy for HER-2 positive breast cancer. The side effects of trastuzumab were discussed including a 4%-5% risk of dropping her ejection fraction while on treatment and about a 1% risk of CHF. We discussed that this drug will be used every 3 weeks for remainder of a year following the chemotherapy cycles. We will check her EF before chemotherapy and every 3 months while she is receiving trastuzumab. · TCH-P (Trastuzumab 8mg/kg load with cycle 1 then 6mg/kg, Docetaxel 75mg/m2, Carboplatin AUC 6, Pertuzumab 840mg load with cycle 1 then 420mg) given every 3 weeks x 6 cycles  · Labs: CBC, CMP prior to each treatment. · Antiemetic Prophylaxis: Palonosetron and dexamethasone prior to chemo  · PRN Antiemetics: Ondansetron, Compazine  · Swelling prophylaxis: Dexmethasone 8mg bid the day before and day after chemotherapy  · TTE prior to chemotherapy and every 12 weeks while on Trastuzumab  · Neulasta 24-72 hours after each treatment    The patient was given presciptions for emla cream, decadron to take 8 mg bid the day before and day after chemo, zofran and compazine. Neulasta the following day. After this discussion, she is agreeable to TCH-P, cycle 1 given on 3/6, she has signed informed consent.     Cryotherapy discussed with docetaxel as well    LN + by core, she may be eligible for the [facility] SLN study if her LN clinically resolves    TTE 3/6/19 EF 66-70%. Port has been placed. Treatment has been delayed due to her diverticulitis and recent hospitalization. Plan to restart next week with reload of trastuzumab and pertuzumab. We will plan to see the patient in follow up at least once per cycle, or sooner if symptoms warrant. 2. Emotional well being:  She has excellent support and is coping well with her disease    3. tob use:  Smokes couple cigs/day; counseled to quit    4. Diverticulitis with abscess/Abdominal pain:  Seen in ED 3/8/19 for abdominal pain and constipation. CT abd/pelvis showed diverticulitis and KUB showed nonobstructive bowel gas pattern and small amount of stool. She was seen in office on 3/12/19 and was sent to ED for worsening abdominal pain. Found to have diverticulitis with multiple abscesses. S/p IV abx and perc drainage followed by colostomy on 3/15/19. She was in SNF until 4/1/19 for IV abx. She has follow up with Dr. Cady Schroeder on 4/25/19. Also has follow up with PCP tomorrow. 5. Constipation with bloating:  Resolved. Due to diverticulitis, see #4. Now with colostomy. 6. Expiratory wheeze: Resolved. PRN inhalers. Will monitor. 7. Fatigue: due to recent hospital stay. Appetite is improving. Energy level has been improving since being home. This patient was seen in conjunction with Bill Gtz NP. I appreciate the opportunity to participate in Ms. Rome Krause's care. Signed By: Odin Teran MD      No orders of the defined types were placed in this encounter.

## 2019-04-10 NOTE — PROGRESS NOTES
Lorena Medellin is a 66 y.o. female    Chief Complaint   Patient presents with   [facility] Follow Up     [facility] 3/12-3/21     Breast Cancer     1. Have you been to the ER, urgent care clinic since your last visit? Hospitalized since your last visit? Patient was [facility] to [facility] 3/12-3/21. Patient underwent an open sigmoidectomy and end colostomy on 3/15. 2. Have you seen or consulted any other health care providers outside of the [address] [place] [address] since your last visit? Include any pap smears or colon screening.  NO    Visit Vitals  /72 (BP 1 Location: Left arm, BP Patient Position: Sitting)   Pulse (!) 108   Temp 96.7 °F (35.9 °C) (Oral)   Resp 16   Ht 5' 6\" (1.676 m)   Wt 162 lb 12.8 oz (73.8 kg)   SpO2 94%   BMI 26.28 kg/m²

## 2019-04-16 RX ORDER — ALBUTEROL SULFATE 0.83 MG/ML
2.5 SOLUTION RESPIRATORY (INHALATION) AS NEEDED
Status: CANCELLED
Start: 2019-04-17

## 2019-04-16 RX ORDER — SODIUM CHLORIDE 9 MG/ML
10 INJECTION INTRAMUSCULAR; INTRAVENOUS; SUBCUTANEOUS AS NEEDED
Status: CANCELLED | OUTPATIENT
Start: 2019-04-17

## 2019-04-16 RX ORDER — HYDROCORTISONE SODIUM SUCCINATE 100 MG/2ML
100 INJECTION, POWDER, FOR SOLUTION INTRAMUSCULAR; INTRAVENOUS AS NEEDED
Status: CANCELLED | OUTPATIENT
Start: 2019-04-17

## 2019-04-16 RX ORDER — DIPHENHYDRAMINE HYDROCHLORIDE 50 MG/ML
50 INJECTION, SOLUTION INTRAMUSCULAR; INTRAVENOUS
Status: CANCELLED | OUTPATIENT
Start: 2019-04-17

## 2019-04-16 RX ORDER — SODIUM CHLORIDE 0.9 % (FLUSH) 0.9 %
10 SYRINGE (ML) INJECTION AS NEEDED
Status: CANCELLED
Start: 2019-04-17

## 2019-04-16 RX ORDER — SODIUM CHLORIDE 9 MG/ML
25 INJECTION, SOLUTION INTRAVENOUS CONTINUOUS
Status: CANCELLED | OUTPATIENT
Start: 2019-04-17

## 2019-04-16 RX ORDER — ACETAMINOPHEN 325 MG/1
650 TABLET ORAL
Status: CANCELLED | OUTPATIENT
Start: 2019-04-17

## 2019-04-16 RX ORDER — DEXAMETHASONE SODIUM PHOSPHATE 100 MG/10ML
10 INJECTION INTRAMUSCULAR; INTRAVENOUS ONCE
Status: CANCELLED | OUTPATIENT
Start: 2019-04-17

## 2019-04-16 RX ORDER — HEPARIN 100 UNIT/ML
300-500 SYRINGE INTRAVENOUS AS NEEDED
Status: CANCELLED
Start: 2019-04-17

## 2019-04-16 RX ORDER — EPINEPHRINE 1 MG/ML
0.3 INJECTION, SOLUTION, CONCENTRATE INTRAVENOUS AS NEEDED
Status: CANCELLED | OUTPATIENT
Start: 2019-04-17

## 2019-04-16 RX ORDER — DIPHENHYDRAMINE HYDROCHLORIDE 50 MG/ML
50 INJECTION, SOLUTION INTRAMUSCULAR; INTRAVENOUS AS NEEDED
Status: CANCELLED
Start: 2019-04-17

## 2019-04-16 RX ORDER — PALONOSETRON 0.05 MG/ML
0.25 INJECTION, SOLUTION INTRAVENOUS ONCE
Status: CANCELLED
Start: 2019-04-17

## 2019-04-16 RX ORDER — ONDANSETRON 2 MG/ML
8 INJECTION INTRAMUSCULAR; INTRAVENOUS AS NEEDED
Status: CANCELLED | OUTPATIENT
Start: 2019-04-17

## 2019-04-16 RX ORDER — ACETAMINOPHEN 325 MG/1
650 TABLET ORAL AS NEEDED
Status: CANCELLED
Start: 2019-04-17

## 2019-04-17 ENCOUNTER — HOSPITAL ENCOUNTER (OUTPATIENT)
Dept: INFUSION THERAPY | Age: 74
Discharge: HOME | End: 2019-04-17
Payer: MEDICARE

## 2019-04-17 ENCOUNTER — OFFICE VISIT (OUTPATIENT)
Dept: ONCOLOGY | Age: 74
End: 2019-04-17

## 2019-04-17 VITALS
TEMPERATURE: 97.8 F | HEART RATE: 81 BPM | OXYGEN SATURATION: 98 % | WEIGHT: 164 LBS | RESPIRATION RATE: 18 BRPM | SYSTOLIC BLOOD PRESSURE: 132 MMHG | HEIGHT: 66 IN | DIASTOLIC BLOOD PRESSURE: 68 MMHG | BODY MASS INDEX: 26.36 KG/M2

## 2019-04-17 VITALS
HEART RATE: 79 BPM | RESPIRATION RATE: 18 BRPM | TEMPERATURE: 98 F | WEIGHT: 164 LBS | DIASTOLIC BLOOD PRESSURE: 67 MMHG | BODY MASS INDEX: 26.36 KG/M2 | SYSTOLIC BLOOD PRESSURE: 153 MMHG | HEIGHT: 66 IN | OXYGEN SATURATION: 98 %

## 2019-04-17 DIAGNOSIS — R53.83 FATIGUE, UNSPECIFIED TYPE: ICD-10-CM

## 2019-04-17 DIAGNOSIS — Z72.0 TOBACCO USE: ICD-10-CM

## 2019-04-17 DIAGNOSIS — Z93.3 COLOSTOMY IN PLACE (HCC): ICD-10-CM

## 2019-04-17 DIAGNOSIS — K57.92 DIVERTICULITIS: ICD-10-CM

## 2019-04-17 DIAGNOSIS — C50.112 MALIGNANT NEOPLASM OF CENTRAL PORTION OF LEFT BREAST IN FEMALE, ESTROGEN RECEPTOR POSITIVE (HCC): Primary | ICD-10-CM

## 2019-04-17 DIAGNOSIS — Z51.11 CHEMOTHERAPY MANAGEMENT, ENCOUNTER FOR: ICD-10-CM

## 2019-04-17 DIAGNOSIS — C50.912 MALIGNANT NEOPLASM OF LEFT FEMALE BREAST, UNSPECIFIED ESTROGEN RECEPTOR STATUS, UNSPECIFIED SITE OF BREAST (HCC): Primary | ICD-10-CM

## 2019-04-17 DIAGNOSIS — Z17.0 MALIGNANT NEOPLASM OF CENTRAL PORTION OF LEFT BREAST IN FEMALE, ESTROGEN RECEPTOR POSITIVE (HCC): Primary | ICD-10-CM

## 2019-04-17 LAB
ALBUMIN SERPL-MCNC: 3.4 G/DL (ref 3.5–5)
ALBUMIN/GLOB SERPL: 0.8 {RATIO} (ref 1.1–2.2)
ALP SERPL-CCNC: 90 U/L (ref 45–117)
ALT SERPL-CCNC: 27 U/L (ref 12–78)
ANION GAP SERPL CALC-SCNC: 6 MMOL/L (ref 5–15)
AST SERPL-CCNC: 11 U/L (ref 15–37)
BASOPHILS # BLD: 0 K/UL (ref 0–0.1)
BASOPHILS NFR BLD: 0 % (ref 0–1)
BILIRUB SERPL-MCNC: 0.2 MG/DL (ref 0.2–1)
BUN SERPL-MCNC: 23 MG/DL (ref 6–20)
BUN/CREAT SERPL: 25 (ref 12–20)
CALCIUM SERPL-MCNC: 9.9 MG/DL (ref 8.5–10.1)
CHLORIDE SERPL-SCNC: 102 MMOL/L (ref 97–108)
CO2 SERPL-SCNC: 29 MMOL/L (ref 21–32)
CREAT SERPL-MCNC: 0.93 MG/DL (ref 0.55–1.02)
DIFFERENTIAL METHOD BLD: ABNORMAL
EOSINOPHIL # BLD: 0 K/UL (ref 0–0.4)
EOSINOPHIL NFR BLD: 0 % (ref 0–7)
ERYTHROCYTE [DISTWIDTH] IN BLOOD BY AUTOMATED COUNT: 12.8 % (ref 11.5–14.5)
GLOBULIN SER CALC-MCNC: 4.2 G/DL (ref 2–4)
GLUCOSE SERPL-MCNC: 121 MG/DL (ref 65–100)
HCT VFR BLD AUTO: 38.8 % (ref 35–47)
HGB BLD-MCNC: 12.8 G/DL (ref 11.5–16)
IMM GRANULOCYTES # BLD AUTO: 0 K/UL (ref 0–0.04)
IMM GRANULOCYTES NFR BLD AUTO: 0 % (ref 0–0.5)
LYMPHOCYTES # BLD: 1.1 K/UL (ref 0.8–3.5)
LYMPHOCYTES NFR BLD: 12 % (ref 12–49)
MCH RBC QN AUTO: 29.8 PG (ref 26–34)
MCHC RBC AUTO-ENTMCNC: 33 G/DL (ref 30–36.5)
MCV RBC AUTO: 90.2 FL (ref 80–99)
MONOCYTES # BLD: 0.3 K/UL (ref 0–1)
MONOCYTES NFR BLD: 3 % (ref 5–13)
NEUTS SEG # BLD: 7.9 K/UL (ref 1.8–8)
NEUTS SEG NFR BLD: 85 % (ref 32–75)
NRBC # BLD: 0 K/UL (ref 0–0.01)
NRBC BLD-RTO: 0 PER 100 WBC
PLATELET # BLD AUTO: 375 K/UL (ref 150–400)
PMV BLD AUTO: 8.9 FL (ref 8.9–12.9)
POTASSIUM SERPL-SCNC: 3.6 MMOL/L (ref 3.5–5.1)
PROT SERPL-MCNC: 7.6 G/DL (ref 6.4–8.2)
RBC # BLD AUTO: 4.3 M/UL (ref 3.8–5.2)
SODIUM SERPL-SCNC: 137 MMOL/L (ref 136–145)
WBC # BLD AUTO: 9.4 K/UL (ref 3.6–11)

## 2019-04-17 PROCEDURE — 96377 APPLICATON ON-BODY INJECTOR: CPT

## 2019-04-17 PROCEDURE — 85025 COMPLETE CBC W/AUTO DIFF WBC: CPT

## 2019-04-17 PROCEDURE — 36415 COLL VENOUS BLD VENIPUNCTURE: CPT

## 2019-04-17 PROCEDURE — 96375 TX/PRO/DX INJ NEW DRUG ADDON: CPT

## 2019-04-17 PROCEDURE — 74011000258 HC RX REV CODE- 258: Performed by: INTERNAL MEDICINE

## 2019-04-17 PROCEDURE — 96415 CHEMO IV INFUSION ADDL HR: CPT

## 2019-04-17 PROCEDURE — 74011250636 HC RX REV CODE- 250/636: Performed by: INTERNAL MEDICINE

## 2019-04-17 PROCEDURE — 96417 CHEMO IV INFUS EACH ADDL SEQ: CPT

## 2019-04-17 PROCEDURE — 80053 COMPREHEN METABOLIC PANEL: CPT

## 2019-04-17 PROCEDURE — 74011250636 HC RX REV CODE- 250/636: Performed by: NURSE PRACTITIONER

## 2019-04-17 PROCEDURE — 77030012965 HC NDL HUBR BBMI -A

## 2019-04-17 PROCEDURE — 96413 CHEMO IV INFUSION 1 HR: CPT

## 2019-04-17 RX ORDER — ALBUTEROL SULFATE 90 UG/1
2 AEROSOL, METERED RESPIRATORY (INHALATION) AS NEEDED
Status: ACTIVE | OUTPATIENT
Start: 2019-04-17 | End: 2019-04-17

## 2019-04-17 RX ORDER — EPINEPHRINE 1 MG/ML
0.3 INJECTION, SOLUTION, CONCENTRATE INTRAVENOUS AS NEEDED
Status: ACTIVE | OUTPATIENT
Start: 2019-04-17 | End: 2019-04-17

## 2019-04-17 RX ORDER — DIPHENHYDRAMINE HYDROCHLORIDE 50 MG/ML
50 INJECTION, SOLUTION INTRAMUSCULAR; INTRAVENOUS AS NEEDED
Status: ACTIVE | OUTPATIENT
Start: 2019-04-17 | End: 2019-04-17

## 2019-04-17 RX ORDER — PALONOSETRON 0.05 MG/ML
0.25 INJECTION, SOLUTION INTRAVENOUS ONCE
Status: COMPLETED | OUTPATIENT
Start: 2019-04-17 | End: 2019-04-17

## 2019-04-17 RX ORDER — DIPHENHYDRAMINE HYDROCHLORIDE 50 MG/ML
50 INJECTION, SOLUTION INTRAMUSCULAR; INTRAVENOUS
Status: DISCONTINUED | OUTPATIENT
Start: 2019-04-17 | End: 2019-04-18 | Stop reason: HOSPADM

## 2019-04-17 RX ORDER — HYDROCORTISONE SODIUM SUCCINATE 100 MG/2ML
100 INJECTION, POWDER, FOR SOLUTION INTRAMUSCULAR; INTRAVENOUS AS NEEDED
Status: ACTIVE | OUTPATIENT
Start: 2019-04-17 | End: 2019-04-17

## 2019-04-17 RX ORDER — HEPARIN 100 UNIT/ML
300-500 SYRINGE INTRAVENOUS AS NEEDED
Status: ACTIVE | OUTPATIENT
Start: 2019-04-17 | End: 2019-04-17

## 2019-04-17 RX ORDER — SODIUM CHLORIDE 0.9 % (FLUSH) 0.9 %
10 SYRINGE (ML) INJECTION AS NEEDED
Status: ACTIVE | OUTPATIENT
Start: 2019-04-17 | End: 2019-04-17

## 2019-04-17 RX ORDER — ALBUTEROL SULFATE 0.83 MG/ML
2.5 SOLUTION RESPIRATORY (INHALATION) AS NEEDED
Status: DISPENSED | OUTPATIENT
Start: 2019-04-17 | End: 2019-04-17

## 2019-04-17 RX ORDER — ACETAMINOPHEN 325 MG/1
650 TABLET ORAL AS NEEDED
Status: ACTIVE | OUTPATIENT
Start: 2019-04-17 | End: 2019-04-17

## 2019-04-17 RX ORDER — SODIUM CHLORIDE 9 MG/ML
10 INJECTION INTRAMUSCULAR; INTRAVENOUS; SUBCUTANEOUS AS NEEDED
Status: ACTIVE | OUTPATIENT
Start: 2019-04-17 | End: 2019-04-17

## 2019-04-17 RX ORDER — ONDANSETRON 2 MG/ML
8 INJECTION INTRAMUSCULAR; INTRAVENOUS AS NEEDED
Status: ACTIVE | OUTPATIENT
Start: 2019-04-17 | End: 2019-04-17

## 2019-04-17 RX ORDER — DEXAMETHASONE SODIUM PHOSPHATE 100 MG/10ML
10 INJECTION INTRAMUSCULAR; INTRAVENOUS ONCE
Status: COMPLETED | OUTPATIENT
Start: 2019-04-17 | End: 2019-04-17

## 2019-04-17 RX ORDER — SODIUM CHLORIDE 9 MG/ML
25 INJECTION, SOLUTION INTRAVENOUS CONTINUOUS
Status: DISPENSED | OUTPATIENT
Start: 2019-04-17 | End: 2019-04-17

## 2019-04-17 RX ORDER — ACETAMINOPHEN 325 MG/1
650 TABLET ORAL
Status: DISCONTINUED | OUTPATIENT
Start: 2019-04-17 | End: 2019-04-18 | Stop reason: HOSPADM

## 2019-04-17 RX ADMIN — DEXAMETHASONE SODIUM PHOSPHATE 10 MG: 10 INJECTION INTRAMUSCULAR; INTRAVENOUS at 10:19

## 2019-04-17 RX ADMIN — PEGFILGRASTIM 6 MG: KIT SUBCUTANEOUS at 16:09

## 2019-04-17 RX ADMIN — PERTUZUMAB 840 MG: 30 INJECTION, SOLUTION, CONCENTRATE INTRAVENOUS at 12:45

## 2019-04-17 RX ADMIN — DOCETAXEL ANHYDROUS 140 MG: 10 INJECTION, SOLUTION INTRAVENOUS at 14:15

## 2019-04-17 RX ADMIN — Medication 500 UNITS: at 16:10

## 2019-04-17 RX ADMIN — PALONOSETRON 0.25 MG: 0.05 INJECTION, SOLUTION INTRAVENOUS at 10:19

## 2019-04-17 RX ADMIN — CARBOPLATIN 512 MG: 10 INJECTION, SOLUTION INTRAVENOUS at 15:35

## 2019-04-17 RX ADMIN — SODIUM CHLORIDE 10 ML: 9 INJECTION INTRAMUSCULAR; INTRAVENOUS; SUBCUTANEOUS at 08:20

## 2019-04-17 RX ADMIN — SODIUM CHLORIDE 150 MG: 900 INJECTION, SOLUTION INTRAVENOUS at 10:19

## 2019-04-17 RX ADMIN — TRASTUZUMAB 595 MG: 150 INJECTION, POWDER, LYOPHILIZED, FOR SOLUTION INTRAVENOUS at 11:09

## 2019-04-17 RX ADMIN — SODIUM CHLORIDE 25 ML/HR: 900 INJECTION, SOLUTION INTRAVENOUS at 10:19

## 2019-04-17 NOTE — PROGRESS NOTES
Cancer [place] at [facility]  [address] [place]: [phone]  F: [phone]      Reason for Visit:   Ruben Granger is a 66 y.o. female who is seen in consultation at the request of Dr. Otis Farnk for evaluation of therapy for breast cancer. Treatment History:   · 2/15/19 Left core bx:   IDC, gr 2, 1.1 cm, ER + at 100%, MI + at 20%, HER 2 POSITIVE (IHC 2+; FISH ratio 3.9; sig/cell 9.2)  · 3/1/19 Left ax LN core bx:  + for breast cancer, ER + at 100%, MI negative, HER 2 POSITIVE (IHC 2+, FISH ratio 2; sig/cell 5.8)  · TCH-P 3/6/19-  · #2 delayed due to diverticulitis abscess and surgery 4/17/19    History of Present Illness:   Pt noticed the left breast mass herself in Feb 2019, leading to the pathology above    She was seen in ED on 3/8/19 for abdominal pain and constipation CT abd/pelvis showed diverticulitis and received levaquin and flagyl. KUB also showed nonobstructive bowel gas pattern. Reports last good BM was 3/6 prior to chemo. She states on 3/10 and 3/12 had small watery BM. Sent to ED from office on 3/12/19 for worsening abdominal pain. She was found to have diverticulitis with multiple abscesses. S/p IV abx and perc drain with no relief of her pain, therefore proceeded with open sigmoidectomy and end colostomy on 3/15/19. She was discharged to SNF with IV abx until 3/29/19. Interval history:  In today for follow up and treatment. Complains of gr 1 fatigue, gr 2 insomnia.      FH:  Paternal aunt breast cancer in her [de-identified]; no ovarian cancer, no prostate or pancreas cancer    Past Medical History:   Diagnosis Date    Adverse effect of anesthesia     \"difficulty waking up from anesthethia\"    Cancer [facility]) 02/2019    left breast    Depression     GERD (gastroesophageal reflux disease)     High cholesterol     Hypertension       Past Surgical History:   Procedure Laterality Date    HX BREAST BIOPSY Right years ago    neg; needle bx    HX CATARACT REMOVAL      HX COLONOSCOPY      HX ORTHOPAEDIC      carpal tunnel      Social History     Tobacco Use    Smoking status: Current Every Day Smoker     Packs/day: 0.10     Years: 55.00     Pack years: 5.50    Smokeless tobacco: Never Used    Tobacco comment: 1-2 cigs/day   Substance Use Topics    Alcohol use: Yes     Alcohol/week: 0.6 oz     Types: 1 Shots of liquor per week     Drinks per session: 5 or 6     Binge frequency: Weekly     Comment: Patient stated that she puts a splash of bourbon in her afternoon coffee around 5-6 days per week      Family History   Problem Relation Age of Onset    Breast Cancer Maternal Aunt     Hypertension Mother     Hypertension Father     Cancer Father         Lung    Cancer Brother         leukemia    Diabetes Brother         2 brothers with diabetes    Hypertension Sister         2 sisters and 4 brothers with HTN     Current Outpatient Medications   Medication Sig    losartan-hydroCHLOROthiazide (HYZAAR) 100-25 mg per tablet Take 0.5 Tabs by mouth daily.  pravastatin (PRAVACHOL) 40 mg tablet Take 40 mg by mouth nightly.  venlafaxine (EFFEXOR) 50 mg tablet Take 50 mg by mouth daily.  Cyanocobalamin-Cobamamide (B12) 5,000-100 mcg lozg by SubLINGual route daily.  cholecalciferol (VITAMIN D3) 1,000 unit cap Take  by mouth daily.  calcium-cholecalciferol, D3, (CALTRATE 600+D) tablet Take 1 Tab by mouth daily.  aspirin delayed-release 81 mg tablet Take  by mouth daily.  polyethylene glycol (MIRALAX) 17 gram packet Take 1 Packet by mouth daily as needed (constipation). (Patient not taking: Reported on 4/10/2019)    naproxen sodium (ALEVE PO) Take  by mouth two (2) times daily as needed.  lidocaine-prilocaine (EMLA) topical cream Apply  to affected area as needed for Pain.  prochlorperazine (COMPAZINE) 10 mg tablet Take 1 Tab by mouth every six (6) hours as needed for Nausea.  (Patient not taking: Reported on 4/10/2019)    ondansetron hcl (ZOFRAN) 8 mg tablet Take 1 Tab by mouth every twelve (12) hours as needed for Nausea. (Patient not taking: Reported on 4/10/2019)    STIOLTO RESPIMAT 2.5-2.5 mcg/actuation inhaler Take 2 Puffs by inhalation nightly. No current facility-administered medications for this visit. Facility-Administered Medications Ordered in Other Visits   Medication Dose Route Frequency    sodium chloride 0.9% injection 10 mL  10 mL IntraVENous PRN    heparin (porcine) pf 300-500 Units  300-500 Units InterCATHeter PRN    pegfilgrastim (NEULASTA) wearable SQ injector 6 mg  6 mg SubCUTAneous ONCE      Allergies   Allergen Reactions    Doxycycline Diarrhea     Patient states can tolerate with yogurt    Keflex [Cephalexin] Hives    Penicillins Rash        Review of Systems: A complete review of systems was obtained, negative except as described above. Physical Exam:     Visit Vitals  /68   Pulse 81   Temp 97.8 °F (36.6 °C) (Temporal)   Resp 18   Ht 5' 6\" (1.676 m)   Wt 164 lb (74.4 kg)   SpO2 98%   BMI 26.47 kg/m²     ECOG PS: 0  General: No distress  Eyes: PERRLA, anicteric sclerae  HENT: Atraumatic, OP clear  Neck: Supple  Lymphatic: No cervical, supraclavicular adenopathy  Respiratory: exp wheezing  CV: Normal rate, regular rhythm, no murmurs, no peripheral edema  GI: Soft, tender, distended, no masses, +BS  MS:  Digits without clubbing or cyanosis. Skin: No rashes, ecchymoses, or petechiae. Normal temperature, turgor, and texture. Psych: Alert, oriented, appropriate affect, normal judgment/insight  Breasts:  L breast 12:00, 3 cm FN, 3 cm mass, 1.5 cm L ax LN (last exam, deferred today)    Results:     Lab Results   Component Value Date/Time    WBC 14.2 (H) 03/21/2019 04:02 AM    HGB 10.2 (L) 03/21/2019 04:02 AM    HCT 31.6 (L) 03/21/2019 04:02 AM    PLATELET 643 07/40/7792 04:02 AM    MCV 95.5 03/21/2019 04:02 AM    ABS.  NEUTROPHILS 10.7 (H) 03/21/2019 04:02 AM     Lab Results   Component Value Date/Time    Sodium 141 03/21/2019 04:02 AM    Potassium 3.6 03/21/2019 04:02 AM    Chloride 106 03/21/2019 04:02 AM    CO2 31 03/21/2019 04:02 AM    Glucose 88 03/21/2019 04:02 AM    BUN 4 (L) 03/21/2019 04:02 AM    Creatinine 0.47 (L) 03/21/2019 04:02 AM    GFR est AA >60 03/21/2019 04:02 AM    GFR est non-AA >60 03/21/2019 04:02 AM    Calcium 8.2 (L) 03/21/2019 04:02 AM    Glucose (POC) 93 03/19/2019 06:56 AM     Lab Results   Component Value Date/Time    Bilirubin, total 0.4 03/21/2019 04:02 AM    ALT (SGPT) 24 03/21/2019 04:02 AM    AST (SGOT) 19 03/21/2019 04:02 AM    Alk. phosphatase 65 03/21/2019 04:02 AM    Protein, total 5.3 (L) 03/21/2019 04:02 AM    Albumin 1.8 (L) 03/21/2019 04:02 AM    Globulin 3.5 03/21/2019 04:02 AM       2/12/19 mammogram and US  MAMMOGRAPHY:  The breast demonstrates stable scattered density breast  architecture. Underlying the site of clinical concern in the left breast is  masslike focal asymmetry in the anterior upper left breast. There is no other  dominant mass lesion, architectural distortion, asymmetry, or calcification. There is no skin thickening or nipple retraction.     ULTRASOUND:  Corresponding to the mammographic density there is a 2.4 x 3.1 x  3.7 cm hypoechoic lobulated mass at about the 12:00 position and 3 cm radial  distance from the nipple. No other suspicious cystic or solid lesions  Identified. Dr. Kristin Orlando has identified 2 large, suspicious ax LN on US:  1.7 cm and 1.2 cm    Records reviewed and summarized above. Assessment/plan:   1. Left central IDC, gr 2, ER +, WA +, HER 2 positive:  cT2 cN1a cM0, stage IIB, prognostic stage IB    We explained to the patient that the goal of systemic adjuvant therapy is to improve the chances for cure and decrease the risk of relapse. We explained why a patient can have microscopic cancer spread now even though physical examination, laboratory studies and imaging studies are negative for cancer.  We explained that the same treatments used now as adjuvant or preventive treatments rarely if ever are curative in women who develop metastases. We discussed that there is no difference in overall survival between neoadjuvant and adjuvant chemotherapy. We discussed the rationale for neoadjuvant chemotherapy, if chemotherapy is warranted, as it is in this case: to avoid any potential delays in giving chemotherapy following surgery, to be able to see the response of the tumor to chemotherapy, and to potentially downstage the tumor prior to surgery. Using eprognosis. org, her 5 year risk of all cause mortality is 20%, her risk in 10 years is 52-58%, average OS is 9-10 years. Therapy discussion is warranted. Alix Driver suggests equivalency between q.3 week Adriamycin, Cytoxan followed by weekly paclitaxel and trastuzumab compared with the [place] 26 regimen. However, this study was not powered to show a difference between these two regimens, and in the [facility] publication, the AC-TH arm showed a numerically advantange (though not statistically significant) to the [place] 26 arm. In this patient, it is completely reasonable to use [place] 26 approach and avoid the potential cardiotoxicity of the anthracyclines as well as the potential for leukemia. We discussed the toxicities of docetaxel and carboplatin chemotherapy in detail. This chemotherapy frequently causes a low white blood cell count and hospital admissions for treatment of neutropenic fever. We explained that we consider the use of growth factors to minimize this risk. We explained to the patient that some side effects if they occur only last a few days including nausea, vomiting, stomatitis, arthralgia, myalgia,and allergic reactions to Taxotere.   We told the patient that severe nausea and vomiting were uncommon and that some side effects,if they occur, will last longer; this includes hair loss, which will be seen in all patients treated with these agents and fatigue,which will be seen in most.  We also informed that for the patient that heart damage is rare with these agents. We explained that carboplatin can rarely cause kidney damage and high frequency hearing loss. We provided the patient in detail her information concerning the toxicities of this regimen in addition to her overall discussion. Rational for therapy with pertuzumab was also discussed with the patient including a nearly 20% improvement seen in pCR in the neoadjuvant setting with the addition of this medication. Additional AE discussed including an acne rash (and the use of doxycyline 100 mg bid to help prevent) and diarrhea and consideration of additional cardiomyopathy. Rationale for therapy with trastuzumab was also discussed with the patient including a 50% proportional improvement in disease free survival and also an improvement in overall survival in patients receiving trastuzumab and chemotherapy for HER-2 positive breast cancer. The side effects of trastuzumab were discussed including a 4%-5% risk of dropping her ejection fraction while on treatment and about a 1% risk of CHF. We discussed that this drug will be used every 3 weeks for remainder of a year following the chemotherapy cycles. We will check her EF before chemotherapy and every 3 months while she is receiving trastuzumab. · TCH-P (Trastuzumab 8mg/kg load with cycle 1 then 6mg/kg, Docetaxel 75mg/m2, Carboplatin AUC 6, Pertuzumab 840mg load with cycle 1 then 420mg) given every 3 weeks x 6 cycles  · Labs: CBC, CMP prior to each treatment.    · Antiemetic Prophylaxis: Palonosetron and dexamethasone prior to chemo  · PRN Antiemetics: Ondansetron, Compazine  · Swelling prophylaxis: Dexmethasone 8mg bid the day before and day after chemotherapy  · TTE prior to chemotherapy and every 12 weeks while on Trastuzumab  · Neulasta 24-72 hours after each treatment    The patient was given presciptions for emla cream, decadron to take 8 mg bid the day before and day after chemo, zofran and compazine. Neulasta the following day. After this discussion, she is agreeable to TC-P, cycle 1 given on 3/6, she has signed informed consent. Cryotherapy discussed with docetaxel as well    LN + by core, she may be eligible for the [facility] SLN study if her LN clinically resolves    TTE 3/6/19 EF 66-70%. Port has been placed. Treatment has been delayed due to her diverticulitis and recent hospitalization. TCHP #2 today with reload of trastuzumab and pertuzumab. We will plan to see the patient in follow up at least once per cycle, or sooner if symptoms warrant. 2. Emotional well being:  She has excellent support and is coping well with her disease    3. tob use:  Smokes couple cigs/day; counseled to quit    4. Diverticulitis with abscess/Abdominal pain:  Seen in ED 3/8/19 for abdominal pain and constipation. CT abd/pelvis showed diverticulitis and KUB showed nonobstructive bowel gas pattern and small amount of stool. She was seen in office on 3/12/19 and was sent to ED for worsening abdominal pain. Found to have diverticulitis with multiple abscesses. S/p IV abx and perc drainage followed by colostomy on 3/15/19. She was in SNF until 4/1/19 for IV abx. She has follow up with Dr. Teresa Blancas on 4/25/19.      5. Constipation with bloating:  Resolved. Due to diverticulitis, see #4. Now with colostomy. 6. Expiratory wheeze: Resolved. PRN inhalers. Will monitor. 7. Fatigue: due to recent hospital stay. Appetite is improving. Energy level has been improving since being home. This patient was seen in conjunction with Eleanor Arnold NP. I appreciate the opportunity to participate in Ms. Gilmar Krause's care. Signed By: Tank Tirado MD      No orders of the defined types were placed in this encounter.

## 2019-04-17 NOTE — PROGRESS NOTES
[facility] VISIT NOTE    0805  Pt arrived at [facility] ambulatory and in no distress for TCHP D1 C2. Assessment completed, pt w/o complaints    Right  chest port accessed with . 75 in concepcion with no difficulty. Positive blood return noted and labs drawn.     Medications Administered     0.9% sodium chloride infusion     Admin Date  04/17/2019 Action  New Bag Dose  25 mL/hr Rate  25 mL/hr Route  IntraVENous Administered By  Alma Villarreal          CARBOplatin (PARAPLATIN) 512 mg in 0.9% sodium chloride 250 mL, overfill volume 25 mL chemo infusion     Admin Date  04/17/2019 Action  New Bag Dose  512 mg Rate  652.4 mL/hr Route  IntraVENous Administered By  Alma Villarreal          dexamethasone (DECADRON) injection 10 mg     Admin Date  04/17/2019 Action  Given Dose  10 mg Route  IntraVENous Administered By  Alma Villarreal          DOCEtaxel (TAXOTERE) 140 mg in 0.9% sodium chloride 250 mL, overfill volume 25 mL chemo infusion     Admin Date  04/17/2019 Action  New Bag Dose  140 mg Route  IntraVENous Administered By  Alma Villarreal          fosaprepitant (EMEND) 150 mg in 0.9% sodium chloride 150 mL IVPB     Admin Date  04/17/2019 Action  Given Dose  150 mg Rate  450 mL/hr Route  IntraVENous Administered By  Alma Villarreal          heparin (porcine) pf 300-500 Units     Admin Date  04/17/2019 Action  Given Dose  500 Units Route  InterCATHeter Administered By  Alma Villarreal          palonosetron HCl (ALOXI) injection 0.25 mg     Admin Date  04/17/2019 Action  Given Dose  0.25 mg Route  IntraVENous Administered By  Alma Villarreal          pegfilgrastim (NEULASTA) wearable SQ injector 6 mg     Admin Date  04/17/2019 Action  Given Dose  6 mg Route  SubCUTAneous Administered By  Alma Villarreal          pertuzumab (PERJETA) 840 mg in 0.9% sodium chloride 250 mL, overfill volume 25 mL IVPB     Admin Date  04/17/2019 Action  New Bag Dose  840 mg Rate  303 mL/hr Route  IntraVENous Administered By  Alma Villarreal          sodium chloride 0.9% injection 10 mL     Admin Date  04/17/2019 Action  Given Dose  10 mL Route  IntraVENous Administered By  Alma Villarreal          trastuzumab (HERCEPTIN) 595 mg in 0.9% sodium chloride 250 mL, overfill volume 25 mL IVPB     Admin Date  04/17/2019 Action  New Bag Dose  595 mg Rate  202.2 mL/hr Route  IntraVENous Administered By  Alma Villarreal              Recent Results (from the past 12 hour(s))   CBC WITH AUTOMATED DIFF    Collection Time: 04/17/19  8:29 AM   Result Value Ref Range    WBC 9.4 3.6 - 11.0 K/uL    RBC 4.30 3.80 - 5.20 M/uL    HGB 12.8 11.5 - 16.0 g/dL    HCT 38.8 35.0 - 47.0 %    MCV 90.2 80.0 - 99.0 FL    MCH 29.8 26.0 - 34.0 PG    MCHC 33.0 30.0 - 36.5 g/dL    RDW 12.8 11.5 - 14.5 %    PLATELET 431 095 - 477 K/uL    MPV 8.9 8.9 - 12.9 FL    NRBC 0.0 0  WBC    ABSOLUTE NRBC 0.00 0.00 - 0.01 K/uL    NEUTROPHILS 85 (H) 32 - 75 %    LYMPHOCYTES 12 12 - 49 %    MONOCYTES 3 (L) 5 - 13 %    EOSINOPHILS 0 0 - 7 %    BASOPHILS 0 0 - 1 %    IMMATURE GRANULOCYTES 0 0.0 - 0.5 %    ABS. NEUTROPHILS 7.9 1.8 - 8.0 K/UL    ABS. LYMPHOCYTES 1.1 0.8 - 3.5 K/UL    ABS. MONOCYTES 0.3 0.0 - 1.0 K/UL    ABS. EOSINOPHILS 0.0 0.0 - 0.4 K/UL    ABS. BASOPHILS 0.0 0.0 - 0.1 K/UL    ABS. IMM. GRANS. 0.0 0.00 - 0.04 K/UL    DF AUTOMATED     METABOLIC PANEL, COMPREHENSIVE    Collection Time: 04/17/19  8:29 AM   Result Value Ref Range    Sodium 137 136 - 145 mmol/L    Potassium 3.6 3.5 - 5.1 mmol/L    Chloride 102 97 - 108 mmol/L    CO2 29 21 - 32 mmol/L    Anion gap 6 5 - 15 mmol/L    Glucose 121 (H) 65 - 100 mg/dL    BUN 23 (H) 6 - 20 MG/DL    Creatinine 0.93 0.55 - 1.02 MG/DL    BUN/Creatinine ratio 25 (H) 12 - 20      GFR est AA >60 >60 ml/min/1.73m2    GFR est non-AA 59 (L) >60 ml/min/1.73m2    Calcium 9.9 8.5 - 10.1 MG/DL    Bilirubin, total 0.2 0.2 - 1.0 MG/DL    ALT (SGPT) 27 12 - 78 U/L    AST (SGOT) 11 (L) 15 - 37 U/L    Alk.  phosphatase 90 45 - 117 U/L    Protein, total 7.6 6.4 - 8.2 g/dL    Albumin 3.4 (L) 3.5 - 5.0 g/dL    Globulin 4.2 (H) 2.0 - 4.0 g/dL    A-G Ratio 0.8 (L) 1.1 - 2.2       Patient Vitals for the past 12 hrs:   Temp Pulse Resp BP SpO2   04/17/19 1608 98 °F (36.7 °C) 79 -- 153/67 --   04/17/19 0813 97.8 °F (36.6 °C) 81 18 132/68 98 %   Education provided to patient about Neulasta On Body Injector including: side effects, how/when to remove the device, as well as what to do in the event of device malfunction. Opportunity for questions was provided and all questions were answered. Patient verbalized understanding. Tolerated treatment well, no adverse reaction noted. Port de-accessed and flushed per protocol. Positive blood return noted. 1615  D/C'd from [facility] ambulatory and in no distress accompanied by sister. Next appointment is 5/8/19 at 0730.

## 2019-04-26 ENCOUNTER — TELEPHONE (OUTPATIENT)
Dept: ONCOLOGY | Age: 74
End: 2019-04-26

## 2019-04-26 NOTE — TELEPHONE ENCOUNTER
4/26/19 10:15 AM: Returned call to patient, who stated that she is currently taking doxycycline twice a day. Stated that it makes her experience burning in her esophagus and that she has had loose stools for the past two days. Stated that she takes Tums and that does help, but she would like other recommendations. Denied vomiting. Advised patient that Dolly Lowe NP, will be consulted and patient will receive a call back. 4/26/19 12:39 PM: Called patient and advised that NP recommends taking Pepcid or Zantac in the morning daily to see if this helps with the burning sensation. Also advised that NP recommends Imodium for the loose stools. Encouraged patient to call this office back if symptoms fail to improve or worsen. Patient verbalized understanding and denied further questions or concerns.

## 2019-04-26 NOTE — TELEPHONE ENCOUNTER
Patient called and left a voicemail stating she would like a call back. The call back is about a medication.  # [phone]

## 2019-05-08 ENCOUNTER — OFFICE VISIT (OUTPATIENT)
Dept: ONCOLOGY | Age: 74
End: 2019-05-08

## 2019-05-08 ENCOUNTER — HOSPITAL ENCOUNTER (OUTPATIENT)
Dept: INFUSION THERAPY | Age: 74
Discharge: HOME | End: 2019-05-08
Payer: MEDICARE

## 2019-05-08 VITALS
SYSTOLIC BLOOD PRESSURE: 123 MMHG | TEMPERATURE: 97 F | DIASTOLIC BLOOD PRESSURE: 65 MMHG | OXYGEN SATURATION: 96 % | WEIGHT: 163.7 LBS | BODY MASS INDEX: 26.31 KG/M2 | HEART RATE: 85 BPM | RESPIRATION RATE: 18 BRPM | HEIGHT: 66 IN

## 2019-05-08 VITALS
RESPIRATION RATE: 18 BRPM | HEIGHT: 66 IN | SYSTOLIC BLOOD PRESSURE: 160 MMHG | TEMPERATURE: 97 F | DIASTOLIC BLOOD PRESSURE: 84 MMHG | WEIGHT: 163.7 LBS | BODY MASS INDEX: 26.31 KG/M2 | HEART RATE: 67 BPM

## 2019-05-08 DIAGNOSIS — Z51.11 CHEMOTHERAPY MANAGEMENT, ENCOUNTER FOR: ICD-10-CM

## 2019-05-08 DIAGNOSIS — Z93.3 COLOSTOMY IN PLACE (HCC): ICD-10-CM

## 2019-05-08 DIAGNOSIS — K21.9 GASTROESOPHAGEAL REFLUX DISEASE WITHOUT ESOPHAGITIS: ICD-10-CM

## 2019-05-08 DIAGNOSIS — C50.912 MALIGNANT NEOPLASM OF LEFT FEMALE BREAST, UNSPECIFIED ESTROGEN RECEPTOR STATUS, UNSPECIFIED SITE OF BREAST (HCC): Primary | ICD-10-CM

## 2019-05-08 DIAGNOSIS — R53.83 FATIGUE, UNSPECIFIED TYPE: ICD-10-CM

## 2019-05-08 DIAGNOSIS — Z17.0 MALIGNANT NEOPLASM OF CENTRAL PORTION OF LEFT BREAST IN FEMALE, ESTROGEN RECEPTOR POSITIVE (HCC): Primary | ICD-10-CM

## 2019-05-08 DIAGNOSIS — K57.92 DIVERTICULITIS: ICD-10-CM

## 2019-05-08 DIAGNOSIS — C50.112 MALIGNANT NEOPLASM OF CENTRAL PORTION OF LEFT BREAST IN FEMALE, ESTROGEN RECEPTOR POSITIVE (HCC): Primary | ICD-10-CM

## 2019-05-08 DIAGNOSIS — Z72.0 TOBACCO USE: ICD-10-CM

## 2019-05-08 LAB
ALBUMIN SERPL-MCNC: 3.4 G/DL (ref 3.5–5)
ALBUMIN/GLOB SERPL: 1 {RATIO} (ref 1.1–2.2)
ALP SERPL-CCNC: 87 U/L (ref 45–117)
ALT SERPL-CCNC: 22 U/L (ref 12–78)
ANION GAP SERPL CALC-SCNC: 10 MMOL/L (ref 5–15)
AST SERPL-CCNC: 10 U/L (ref 15–37)
BASOPHILS # BLD: 0 K/UL (ref 0–0.1)
BASOPHILS NFR BLD: 0 % (ref 0–1)
BILIRUB SERPL-MCNC: 0.3 MG/DL (ref 0.2–1)
BUN SERPL-MCNC: 15 MG/DL (ref 6–20)
BUN/CREAT SERPL: 18 (ref 12–20)
CALCIUM SERPL-MCNC: 9.4 MG/DL (ref 8.5–10.1)
CHLORIDE SERPL-SCNC: 105 MMOL/L (ref 97–108)
CO2 SERPL-SCNC: 25 MMOL/L (ref 21–32)
CREAT SERPL-MCNC: 0.82 MG/DL (ref 0.55–1.02)
DIFFERENTIAL METHOD BLD: ABNORMAL
EOSINOPHIL # BLD: 0 K/UL (ref 0–0.4)
EOSINOPHIL NFR BLD: 0 % (ref 0–7)
ERYTHROCYTE [DISTWIDTH] IN BLOOD BY AUTOMATED COUNT: 14.7 % (ref 11.5–14.5)
GLOBULIN SER CALC-MCNC: 3.3 G/DL (ref 2–4)
GLUCOSE SERPL-MCNC: 168 MG/DL (ref 65–100)
HCT VFR BLD AUTO: 37.5 % (ref 35–47)
HGB BLD-MCNC: 12.6 G/DL (ref 11.5–16)
IMM GRANULOCYTES # BLD AUTO: 0.1 K/UL (ref 0–0.04)
IMM GRANULOCYTES NFR BLD AUTO: 1 % (ref 0–0.5)
LYMPHOCYTES # BLD: 1.2 K/UL (ref 0.8–3.5)
LYMPHOCYTES NFR BLD: 12 % (ref 12–49)
MCH RBC QN AUTO: 30.9 PG (ref 26–34)
MCHC RBC AUTO-ENTMCNC: 33.6 G/DL (ref 30–36.5)
MCV RBC AUTO: 91.9 FL (ref 80–99)
MONOCYTES # BLD: 0.5 K/UL (ref 0–1)
MONOCYTES NFR BLD: 5 % (ref 5–13)
NEUTS SEG # BLD: 8.6 K/UL (ref 1.8–8)
NEUTS SEG NFR BLD: 82 % (ref 32–75)
NRBC # BLD: 0 K/UL (ref 0–0.01)
NRBC BLD-RTO: 0 PER 100 WBC
PLATELET # BLD AUTO: 392 K/UL (ref 150–400)
PMV BLD AUTO: 8.9 FL (ref 8.9–12.9)
POTASSIUM SERPL-SCNC: 3.1 MMOL/L (ref 3.5–5.1)
PROT SERPL-MCNC: 6.7 G/DL (ref 6.4–8.2)
RBC # BLD AUTO: 4.08 M/UL (ref 3.8–5.2)
SODIUM SERPL-SCNC: 140 MMOL/L (ref 136–145)
WBC # BLD AUTO: 10.4 K/UL (ref 3.6–11)

## 2019-05-08 PROCEDURE — 74011250636 HC RX REV CODE- 250/636: Performed by: INTERNAL MEDICINE

## 2019-05-08 PROCEDURE — 74011250637 HC RX REV CODE- 250/637: Performed by: NURSE PRACTITIONER

## 2019-05-08 PROCEDURE — 74011000258 HC RX REV CODE- 258: Performed by: INTERNAL MEDICINE

## 2019-05-08 PROCEDURE — 36415 COLL VENOUS BLD VENIPUNCTURE: CPT

## 2019-05-08 PROCEDURE — 80053 COMPREHEN METABOLIC PANEL: CPT

## 2019-05-08 PROCEDURE — 77030012965 HC NDL HUBR BBMI -A

## 2019-05-08 PROCEDURE — 96375 TX/PRO/DX INJ NEW DRUG ADDON: CPT

## 2019-05-08 PROCEDURE — 96417 CHEMO IV INFUS EACH ADDL SEQ: CPT

## 2019-05-08 PROCEDURE — 96413 CHEMO IV INFUSION 1 HR: CPT

## 2019-05-08 PROCEDURE — 96377 APPLICATON ON-BODY INJECTOR: CPT

## 2019-05-08 PROCEDURE — 74011000250 HC RX REV CODE- 250: Performed by: INTERNAL MEDICINE

## 2019-05-08 PROCEDURE — 96361 HYDRATE IV INFUSION ADD-ON: CPT

## 2019-05-08 PROCEDURE — 96367 TX/PROPH/DG ADDL SEQ IV INF: CPT

## 2019-05-08 PROCEDURE — 85025 COMPLETE CBC W/AUTO DIFF WBC: CPT

## 2019-05-08 RX ORDER — FAMOTIDINE 20 MG/1
20 TABLET, FILM COATED ORAL DAILY
COMMUNITY
End: 2019-07-08

## 2019-05-08 RX ORDER — DOXYCYCLINE 100 MG/1
100 TABLET ORAL 2 TIMES DAILY
COMMUNITY
End: 2019-06-19 | Stop reason: SDUPTHER

## 2019-05-08 RX ORDER — DEXAMETHASONE SODIUM PHOSPHATE 100 MG/10ML
10 INJECTION INTRAMUSCULAR; INTRAVENOUS ONCE
Status: COMPLETED | OUTPATIENT
Start: 2019-05-08 | End: 2019-05-08

## 2019-05-08 RX ORDER — SODIUM CHLORIDE 9 MG/ML
25 INJECTION, SOLUTION INTRAVENOUS CONTINUOUS
Status: DISPENSED | OUTPATIENT
Start: 2019-05-08 | End: 2019-05-08

## 2019-05-08 RX ORDER — PANTOPRAZOLE SODIUM 40 MG/1
40 TABLET, DELAYED RELEASE ORAL DAILY
COMMUNITY
End: 2019-05-08

## 2019-05-08 RX ORDER — SODIUM CHLORIDE 0.9 % (FLUSH) 0.9 %
10 SYRINGE (ML) INJECTION AS NEEDED
Status: ACTIVE | OUTPATIENT
Start: 2019-05-08 | End: 2019-05-08

## 2019-05-08 RX ORDER — SODIUM CHLORIDE 9 MG/ML
10 INJECTION INTRAMUSCULAR; INTRAVENOUS; SUBCUTANEOUS AS NEEDED
Status: ACTIVE | OUTPATIENT
Start: 2019-05-08 | End: 2019-05-08

## 2019-05-08 RX ORDER — PALONOSETRON 0.05 MG/ML
0.25 INJECTION, SOLUTION INTRAVENOUS ONCE
Status: COMPLETED | OUTPATIENT
Start: 2019-05-08 | End: 2019-05-08

## 2019-05-08 RX ORDER — POTASSIUM CHLORIDE 750 MG/1
60 TABLET, FILM COATED, EXTENDED RELEASE ORAL
Status: COMPLETED | OUTPATIENT
Start: 2019-05-08 | End: 2019-05-08

## 2019-05-08 RX ORDER — HEPARIN 100 UNIT/ML
300-500 SYRINGE INTRAVENOUS AS NEEDED
Status: ACTIVE | OUTPATIENT
Start: 2019-05-08 | End: 2019-05-08

## 2019-05-08 RX ADMIN — PALONOSETRON HYDROCHLORIDE 0.25 MG: 0.25 INJECTION, SOLUTION INTRAVENOUS at 09:29

## 2019-05-08 RX ADMIN — SODIUM CHLORIDE 150 MG: 900 INJECTION, SOLUTION INTRAVENOUS at 09:37

## 2019-05-08 RX ADMIN — PERTUZUMAB 420 MG: 30 INJECTION, SOLUTION, CONCENTRATE INTRAVENOUS at 11:47

## 2019-05-08 RX ADMIN — CARBOPLATIN 512 MG: 10 INJECTION, SOLUTION INTRAVENOUS at 13:56

## 2019-05-08 RX ADMIN — PEGFILGRASTIM 6 MG: KIT SUBCUTANEOUS at 14:18

## 2019-05-08 RX ADMIN — TRASTUZUMAB 446 MG: 150 INJECTION, POWDER, LYOPHILIZED, FOR SOLUTION INTRAVENOUS at 11:08

## 2019-05-08 RX ADMIN — ALTEPLASE 2 MG: 2.2 INJECTION, POWDER, LYOPHILIZED, FOR SOLUTION INTRAVENOUS at 08:05

## 2019-05-08 RX ADMIN — POTASSIUM CHLORIDE 60 MEQ: 750 TABLET, FILM COATED, EXTENDED RELEASE ORAL at 10:36

## 2019-05-08 RX ADMIN — DOCETAXEL ANHYDROUS 140 MG: 10 INJECTION, SOLUTION INTRAVENOUS at 12:47

## 2019-05-08 RX ADMIN — Medication 10 ML: at 14:30

## 2019-05-08 RX ADMIN — DEXAMETHASONE SODIUM PHOSPHATE 10 MG: 10 INJECTION INTRAMUSCULAR; INTRAVENOUS at 09:32

## 2019-05-08 RX ADMIN — Medication 500 UNITS: at 14:30

## 2019-05-08 RX ADMIN — SODIUM CHLORIDE 25 ML/HR: 900 INJECTION, SOLUTION INTRAVENOUS at 09:00

## 2019-05-08 NOTE — PROGRESS NOTES
[facility] [place] [facility] 88  [address] [facility], [address] [facility] [address] [facility]  [place]: [phone]  F: [phone]      Reason for Visit:   Sophia Mckenzie is a 66 y.o. female who is seen in consultation at the request of Dr. Ramona Anderson for evaluation of therapy for breast cancer. Treatment History:   · 2/15/19 Left core bx:   IDC, gr 2, 1.1 cm, ER + at 100%, AR + at 20%, HER 2 POSITIVE (IHC 2+; FISH ratio 3.9; sig/cell 9.2)  · 3/1/19 Left ax LN core bx:  + for breast cancer, ER + at 100%, AR negative, HER 2 POSITIVE (IHC 2+, FISH ratio 2; sig/cell 5.8)  · TC-P 3/6/19-  · #2 delayed due to diverticulitis abscess and surgery 4/17/19    History of Present Illness:   Pt noticed the left breast mass herself in Feb 2019, leading to the pathology above    She was seen in ED on 3/8/19 for abdominal pain and constipation CT abd/pelvis showed diverticulitis and received levaquin and flagyl. KUB also showed nonobstructive bowel gas pattern. Reports last good BM was 3/6 prior to chemo. She states on 3/10 and 3/12 had small watery BM. Sent to ED from office on 3/12/19 for worsening abdominal pain. She was found to have diverticulitis with multiple abscesses. S/p IV abx and perc drain with no relief of her pain, therefore proceeded with open sigmoidectomy and end colostomy on 3/15/19. She was discharged to SNF with IV abx until 3/29/19. Interval history:  In today for follow up and treatment. Complains of gr 1 fatigue, gr 1 hair loss, gr 2 insomnia.      FH:  Paternal aunt breast cancer in her [de-identified]; no ovarian cancer, no prostate or pancreas cancer    Past Medical History:   Diagnosis Date    Adverse effect of anesthesia     \"difficulty waking up from anesthethia\"    Cancer [facility]) 02/2019    left breast    Depression     GERD (gastroesophageal reflux disease)     High cholesterol     Hypertension       Past Surgical History:   Procedure Laterality Date    HX BREAST BIOPSY Right years ago    neg; needle bx    HX CATARACT REMOVAL      HX COLONOSCOPY      HX ORTHOPAEDIC      carpal tunnel      Social History     Tobacco Use    Smoking status: Current Every Day Smoker     Packs/day: 0.10     Years: 55.00     Pack years: 5.50    Smokeless tobacco: Never Used    Tobacco comment: 1-2 cigs/day   Substance Use Topics    Alcohol use: Yes     Alcohol/week: 0.6 oz     Types: 1 Shots of liquor per week     Drinks per session: 5 or 6     Binge frequency: Weekly     Comment: Patient stated that she puts a splash of bourbon in her afternoon coffee around 5-6 days per week      Family History   Problem Relation Age of Onset    Breast Cancer Maternal Aunt     Hypertension Mother     Hypertension Father     Cancer Father         Lung    Cancer Brother         leukemia    Diabetes Brother         2 brothers with diabetes    Hypertension Sister         2 sisters and 4 brothers with HTN     Current Outpatient Medications   Medication Sig    famotidine (PEPCID) 20 mg tablet Take 20 mg by mouth two (2) times a day.  doxycycline (ADOXA) 100 mg tablet Take 100 mg by mouth two (2) times a day.  losartan-hydroCHLOROthiazide (HYZAAR) 100-25 mg per tablet Take 0.5 Tabs by mouth daily.  pravastatin (PRAVACHOL) 40 mg tablet Take 40 mg by mouth nightly.  STIOLTO RESPIMAT 2.5-2.5 mcg/actuation inhaler Take 2 Puffs by inhalation nightly.  venlafaxine (EFFEXOR) 50 mg tablet Take 50 mg by mouth daily.  Cyanocobalamin-Cobamamide (B12) 5,000-100 mcg lozg by SubLINGual route daily.  cholecalciferol (VITAMIN D3) 1,000 unit cap Take  by mouth daily.  calcium-cholecalciferol, D3, (CALTRATE 600+D) tablet Take 1 Tab by mouth daily.  aspirin delayed-release 81 mg tablet Take  by mouth daily.  polyethylene glycol (MIRALAX) 17 gram packet Take 1 Packet by mouth daily as needed (constipation).  (Patient not taking: Reported on 4/10/2019)    naproxen sodium (ALEVE PO) Take  by mouth two (2) times daily as needed.  lidocaine-prilocaine (EMLA) topical cream Apply  to affected area as needed for Pain.  prochlorperazine (COMPAZINE) 10 mg tablet Take 1 Tab by mouth every six (6) hours as needed for Nausea. (Patient not taking: Reported on 4/10/2019)    ondansetron hcl (ZOFRAN) 8 mg tablet Take 1 Tab by mouth every twelve (12) hours as needed for Nausea. (Patient not taking: Reported on 4/10/2019)     No current facility-administered medications for this visit. Allergies   Allergen Reactions    Doxycycline Diarrhea     Patient states can tolerate with yogurt    Keflex [Cephalexin] Hives    Penicillins Rash        Review of Systems: A complete review of systems was obtained, negative except as described above. Physical Exam:     Visit Vitals  /65   Pulse 85   Temp 97 °F (36.1 °C) (Temporal)   Resp 18   Ht 5' 6\" (1.676 m)   Wt 163 lb 11.2 oz (74.3 kg)   SpO2 96%   BMI 26.42 kg/m²     ECOG PS: 0  General: No distress  Eyes: PERRLA, anicteric sclerae  HENT: Atraumatic, OP clear  Neck: Supple  Lymphatic: No cervical, supraclavicular adenopathy  Respiratory: exp wheezing  CV: Normal rate, regular rhythm, no murmurs, no peripheral edema  GI: Soft, tender, distended, no masses, +BS  MS:  Digits without clubbing or cyanosis. Skin: No rashes, ecchymoses, or petechiae. Normal temperature, turgor, and texture. Psych: Alert, oriented, appropriate affect, normal judgment/insight  Breasts:  L breast 12:00, 3 cm FN, <1 cm mass, 1.5 cm L ax LN     Results:     Lab Results   Component Value Date/Time    WBC 10.4 05/08/2019 08:01 AM    HGB 12.6 05/08/2019 08:01 AM    HCT 37.5 05/08/2019 08:01 AM    PLATELET 425 87/44/9656 08:01 AM    MCV 91.9 05/08/2019 08:01 AM    ABS.  NEUTROPHILS 8.6 (H) 05/08/2019 08:01 AM     Lab Results   Component Value Date/Time    Sodium 140 05/08/2019 08:01 AM    Potassium 3.1 (L) 05/08/2019 08:01 AM    Chloride 105 05/08/2019 08:01 AM    CO2 25 05/08/2019 08:01 AM    Glucose 168 (H) 05/08/2019 08:01 AM    BUN 15 05/08/2019 08:01 AM    Creatinine 0.82 05/08/2019 08:01 AM    GFR est AA >60 05/08/2019 08:01 AM    GFR est non-AA >60 05/08/2019 08:01 AM    Calcium 9.4 05/08/2019 08:01 AM    Glucose (POC) 93 03/19/2019 06:56 AM     Lab Results   Component Value Date/Time    Bilirubin, total 0.3 05/08/2019 08:01 AM    ALT (SGPT) 22 05/08/2019 08:01 AM    AST (SGOT) 10 (L) 05/08/2019 08:01 AM    Alk. phosphatase 87 05/08/2019 08:01 AM    Protein, total 6.7 05/08/2019 08:01 AM    Albumin 3.4 (L) 05/08/2019 08:01 AM    Globulin 3.3 05/08/2019 08:01 AM       2/12/19 mammogram and US  MAMMOGRAPHY:  The breast demonstrates stable scattered density breast  architecture. Underlying the site of clinical concern in the left breast is  masslike focal asymmetry in the anterior upper left breast. There is no other  dominant mass lesion, architectural distortion, asymmetry, or calcification. There is no skin thickening or nipple retraction.     ULTRASOUND:  Corresponding to the mammographic density there is a 2.4 x 3.1 x  3.7 cm hypoechoic lobulated mass at about the 12:00 position and 3 cm radial  distance from the nipple. No other suspicious cystic or solid lesions  Identified. Dr. Ruth Waddell has identified 2 large, suspicious ax LN on US:  1.7 cm and 1.2 cm    Records reviewed and summarized above. Assessment/plan:   1. Left central IDC, gr 2, ER +, MD +, HER 2 positive:  cT2 cN1a cM0, stage IIB, prognostic stage IB    We explained to the patient that the goal of systemic adjuvant therapy is to improve the chances for cure and decrease the risk of relapse. We explained why a patient can have microscopic cancer spread now even though physical examination, laboratory studies and imaging studies are negative for cancer. We explained that the same treatments used now as adjuvant or preventive treatments rarely if ever are curative in women who develop metastases.      We discussed that there is no difference in overall survival between neoadjuvant and adjuvant chemotherapy. We discussed the rationale for neoadjuvant chemotherapy, if chemotherapy is warranted, as it is in this case: to avoid any potential delays in giving chemotherapy following surgery, to be able to see the response of the tumor to chemotherapy, and to potentially downstage the tumor prior to surgery. Using eprognosis. org, her 5 year risk of all cause mortality is 20%, her risk in 10 years is 52-58%, average OS is 9-10 years. Therapy discussion is warranted. Yuliana Murrell suggests equivalency between q.3 week Adriamycin, Cytoxan followed by weekly paclitaxel and trastuzumab compared with the [facility] regimen. However, this study was not powered to show a difference between these two regimens, and in the [facility] publication, the AC-TH arm showed a numerically advantange (though not statistically significant) to the [facility] arm. In this patient, it is completely reasonable to use [facility] approach and avoid the potential cardiotoxicity of the anthracyclines as well as the potential for leukemia. We discussed the toxicities of docetaxel and carboplatin chemotherapy in detail. This chemotherapy frequently causes a low white blood cell count and hospital admissions for treatment of neutropenic fever. We explained that we consider the use of growth factors to minimize this risk. We explained to the patient that some side effects if they occur only last a few days including nausea, vomiting, stomatitis, arthralgia, myalgia,and allergic reactions to Taxotere. We told the patient that severe nausea and vomiting were uncommon and that some side effects,if they occur, will last longer; this includes hair loss, which will be seen in all patients treated with these agents and fatigue,which will be seen in most.  We also informed that for the patient that heart damage is rare with these agents.   We explained that carboplatin can rarely cause kidney damage and high frequency hearing loss. We provided the patient in detail her information concerning the toxicities of this regimen in addition to her overall discussion. Rational for therapy with pertuzumab was also discussed with the patient including a nearly 20% improvement seen in pCR in the neoadjuvant setting with the addition of this medication. Additional AE discussed including an acne rash (and the use of doxycyline 100 mg bid to help prevent) and diarrhea and consideration of additional cardiomyopathy. Rationale for therapy with trastuzumab was also discussed with the patient including a 50% proportional improvement in disease free survival and also an improvement in overall survival in patients receiving trastuzumab and chemotherapy for HER-2 positive breast cancer. The side effects of trastuzumab were discussed including a 4%-5% risk of dropping her ejection fraction while on treatment and about a 1% risk of CHF. We discussed that this drug will be used every 3 weeks for remainder of a year following the chemotherapy cycles. We will check her EF before chemotherapy and every 3 months while she is receiving trastuzumab. · TCH-P (Trastuzumab 8mg/kg load with cycle 1 then 6mg/kg, Docetaxel 75mg/m2, Carboplatin AUC 6, Pertuzumab 840mg load with cycle 1 then 420mg) given every 3 weeks x 6 cycles  · Labs: CBC, CMP prior to each treatment. · Antiemetic Prophylaxis: Palonosetron and dexamethasone prior to chemo  · PRN Antiemetics: Ondansetron, Compazine  · Swelling prophylaxis: Dexmethasone 8mg bid the day before and day after chemotherapy  · TTE prior to chemotherapy and every 12 weeks while on Trastuzumab  · Neulasta 24-72 hours after each treatment    The patient was given presciptions for emla cream, decadron to take 8 mg bid the day before and day after chemo, zofran and compazine. Neulasta the following day.     After this discussion, she is agreeable to TCH-P, cycle 1 given on 3/6, she has signed informed consent. Cryotherapy discussed with docetaxel as well    LN + by core, she may be eligible for the [facility] SLN study if her LN clinically resolves    TTE 3/6/19 EF 66-70%. Port has been placed. Treatment has been delayed due to her diverticulitis and recent hospitalization. TCHP #3 today, [facility]point imaging ordered. We will plan to see the patient in follow up at least once per cycle, or sooner if symptoms warrant. 2. Emotional well being:  She has excellent support and is coping well with her disease    3. tob use:  Smokes couple cigs/day; counseled to quit    4. Diverticulitis with abscess/Abdominal pain:  Seen in ED 3/8/19 for abdominal pain and constipation. CT abd/pelvis showed diverticulitis and KUB showed nonobstructive bowel gas pattern and small amount of stool. She was seen in office on 3/12/19 and was sent to ED for worsening abdominal pain. Found to have diverticulitis with multiple abscesses. S/p IV abx and perc drainage followed by colostomy on 3/15/19. She was in SNF until 4/1/19 for IV abx. Had follow up with Dr. Serafin Max on 4/25/19, next follow up 7/2019.      5. Constipation with bloating:  Resolved. Due to diverticulitis, see #4. Now with colostomy. 6. Expiratory wheeze: Resolved. PRN inhalers. Will monitor. 7. Fatigue: due to recent hospital stay. Appetite continues to improve. Energy level has been improving since being home. 8. GERD: due to doxycycline. Plan to decrease dose to daily as well as continuing Pepcid daily. This patient was seen in conjunction with Eryn Lentz NP. I appreciate the opportunity to participate in Ms. Sergio Krause's care. Signed By: Nannette Quick MD      No orders of the defined types were placed in this encounter.

## 2019-05-08 NOTE — PROGRESS NOTES
[facility] Progress Note    Date: May 8, 2019    Name: Rachel Escobar    MRN: [id number]         : 1945    0740. Ms. Joanie Stephens Arrived ambulatory and in no distress for C3D1 TCHP. Assessment was completed, no acute issues at this time, no new complaints voiced. R chest wall port accessed without difficulty, NO blood return noted. Flushing without difficulty, + free flow to gravity. Cathflo instilled. Labs drawn peripherally from R arm without difficulty, sent & in process. Chemotherapy Flowsheet 2019   Cycle C3D1   Date 2019   Drug / Regimen TCHP         0815:  Proceeded to appt with Dr. Emely Gunn. 0915.  + blood return to port. Ms. Elizondo Car vitals were reviewed. Patient Vitals for the past 12 hrs:   Temp Pulse Resp BP   19 1417 -- 67 -- 160/84   19 0744 97 °F (36.1 °C) 85 18 123/65       Lab results were obtained and reviewed. Recent Results (from the past 12 hour(s))   CBC WITH AUTOMATED DIFF    Collection Time: 19  8:01 AM   Result Value Ref Range    WBC 10.4 3.6 - 11.0 K/uL    RBC 4.08 3.80 - 5.20 M/uL    HGB 12.6 11.5 - 16.0 g/dL    HCT 37.5 35.0 - 47.0 %    MCV 91.9 80.0 - 99.0 FL    MCH 30.9 26.0 - 34.0 PG    MCHC 33.6 30.0 - 36.5 g/dL    RDW 14.7 (H) 11.5 - 14.5 %    PLATELET 623 144 - 891 K/uL    MPV 8.9 8.9 - 12.9 FL    NRBC 0.0 0  WBC    ABSOLUTE NRBC 0.00 0.00 - 0.01 K/uL    NEUTROPHILS 82 (H) 32 - 75 %    LYMPHOCYTES 12 12 - 49 %    MONOCYTES 5 5 - 13 %    EOSINOPHILS 0 0 - 7 %    BASOPHILS 0 0 - 1 %    IMMATURE GRANULOCYTES 1 (H) 0.0 - 0.5 %    ABS. NEUTROPHILS 8.6 (H) 1.8 - 8.0 K/UL    ABS. LYMPHOCYTES 1.2 0.8 - 3.5 K/UL    ABS. MONOCYTES 0.5 0.0 - 1.0 K/UL    ABS. EOSINOPHILS 0.0 0.0 - 0.4 K/UL    ABS. BASOPHILS 0.0 0.0 - 0.1 K/UL    ABS. IMM.  GRANS. 0.1 (H) 0.00 - 0.04 K/UL    DF AUTOMATED     METABOLIC PANEL, COMPREHENSIVE    Collection Time: 19  8:01 AM   Result Value Ref Range    Sodium 140 136 - 145 mmol/L    Potassium 3.1 (L) 3.5 - 5.1 mmol/L    Chloride 105 97 - 108 mmol/L    CO2 25 21 - 32 mmol/L    Anion gap 10 5 - 15 mmol/L    Glucose 168 (H) 65 - 100 mg/dL    BUN 15 6 - 20 MG/DL    Creatinine 0.82 0.55 - 1.02 MG/DL    BUN/Creatinine ratio 18 12 - 20      GFR est AA >60 >60 ml/min/1.73m2    GFR est non-AA >60 >60 ml/min/1.73m2    Calcium 9.4 8.5 - 10.1 MG/DL    Bilirubin, total 0.3 0.2 - 1.0 MG/DL    ALT (SGPT) 22 12 - 78 U/L    AST (SGOT) 10 (L) 15 - 37 U/L    Alk. phosphatase 87 45 - 117 U/L    Protein, total 6.7 6.4 - 8.2 g/dL    Albumin 3.4 (L) 3.5 - 5.0 g/dL    Globulin 3.3 2.0 - 4.0 g/dL    A-G Ratio 1.0 (L) 1.1 - 2.2         Pre-medications  were administered as ordered and chemotherapy was initiated. Medications:  Dexamethasone IVP  Aloxi IVP  Emend IVPB  Herceptin IV  Perjeta IV  Docetaxel IV  Carbo IV  Neulasta OBI  NS KVO  Potassium PO     Education provided to patient about Neulasta On Body Injector including: side effects, how/when to remove the device, as well as what to do in the event of device malfunction. Opportunity for questions was provided and all questions were answered. Patient verbalized understanding.     1430. Ms. Valarie Dunbar tolerated treatment well and was discharged from [place] [address] in stable condition. Port de-accessed, flushed & heparinized per protocol. She is to return on 05/29/19 for her next appointment.     Naif Fam RN  May 8, 2019

## 2019-05-10 ENCOUNTER — TELEPHONE (OUTPATIENT)
Dept: ONCOLOGY | Age: 74
End: 2019-05-10

## 2019-05-10 NOTE — TELEPHONE ENCOUNTER
5/10/19 9:05 AM: Returned call to patient, who stated that she thought Dr. Adia Bird had told her to wait a week to have her mammogram done so she went ahead and rescheduled it for next week. Patient denied further questions or concerns at this time.

## 2019-05-10 NOTE — TELEPHONE ENCOUNTER
Patient called and left a voicemail requesting a call back before her mammogram appointment, which is today 5/10/19 at 1pm. Patient wanted to know if it was too early to get the mammogram and she should wait until next week,  or if she was good to go for today and have the mammogram done.  # [phone] or [phone]

## 2019-05-16 ENCOUNTER — HOSPITAL ENCOUNTER (OUTPATIENT)
Dept: MAMMOGRAPHY | Age: 74
Discharge: HOME | End: 2019-05-16
Attending: NURSE PRACTITIONER
Payer: MEDICARE

## 2019-05-16 DIAGNOSIS — C50.919 BREAST CANCER (HCC): ICD-10-CM

## 2019-05-16 DIAGNOSIS — C50.912 MALIGNANT NEOPLASM OF LEFT FEMALE BREAST, UNSPECIFIED ESTROGEN RECEPTOR STATUS, UNSPECIFIED SITE OF BREAST (HCC): ICD-10-CM

## 2019-05-16 PROCEDURE — 77065 DX MAMMO INCL CAD UNI: CPT

## 2019-05-16 PROCEDURE — 76642 ULTRASOUND BREAST LIMITED: CPT

## 2019-05-29 ENCOUNTER — OFFICE VISIT (OUTPATIENT)
Dept: ONCOLOGY | Age: 74
End: 2019-05-29

## 2019-05-29 ENCOUNTER — HOSPITAL ENCOUNTER (OUTPATIENT)
Dept: INFUSION THERAPY | Age: 74
Discharge: HOME | End: 2019-05-29
Payer: MEDICARE

## 2019-05-29 VITALS
HEART RATE: 63 BPM | BODY MASS INDEX: 26.07 KG/M2 | WEIGHT: 162.2 LBS | OXYGEN SATURATION: 97 % | SYSTOLIC BLOOD PRESSURE: 165 MMHG | HEIGHT: 66 IN | TEMPERATURE: 97.7 F | RESPIRATION RATE: 16 BRPM | DIASTOLIC BLOOD PRESSURE: 74 MMHG

## 2019-05-29 VITALS
WEIGHT: 162.2 LBS | OXYGEN SATURATION: 97 % | SYSTOLIC BLOOD PRESSURE: 131 MMHG | RESPIRATION RATE: 18 BRPM | DIASTOLIC BLOOD PRESSURE: 80 MMHG | HEIGHT: 66 IN | HEART RATE: 93 BPM | BODY MASS INDEX: 26.07 KG/M2 | TEMPERATURE: 97.3 F

## 2019-05-29 DIAGNOSIS — Z51.11 CHEMOTHERAPY MANAGEMENT, ENCOUNTER FOR: ICD-10-CM

## 2019-05-29 DIAGNOSIS — K57.92 DIVERTICULITIS: ICD-10-CM

## 2019-05-29 DIAGNOSIS — C50.112 MALIGNANT NEOPLASM OF CENTRAL PORTION OF LEFT BREAST IN FEMALE, ESTROGEN RECEPTOR POSITIVE (HCC): Primary | ICD-10-CM

## 2019-05-29 DIAGNOSIS — C50.912 MALIGNANT NEOPLASM OF LEFT FEMALE BREAST, UNSPECIFIED ESTROGEN RECEPTOR STATUS, UNSPECIFIED SITE OF BREAST (HCC): Primary | ICD-10-CM

## 2019-05-29 DIAGNOSIS — Z72.0 TOBACCO USE: ICD-10-CM

## 2019-05-29 DIAGNOSIS — Z51.81 ENCOUNTER FOR MONITORING CARDIOTOXIC DRUG THERAPY: ICD-10-CM

## 2019-05-29 DIAGNOSIS — G62.9 NEUROPATHY: ICD-10-CM

## 2019-05-29 DIAGNOSIS — R53.83 FATIGUE, UNSPECIFIED TYPE: ICD-10-CM

## 2019-05-29 DIAGNOSIS — Z79.899 ENCOUNTER FOR MONITORING CARDIOTOXIC DRUG THERAPY: ICD-10-CM

## 2019-05-29 DIAGNOSIS — Z93.3 COLOSTOMY IN PLACE (HCC): ICD-10-CM

## 2019-05-29 DIAGNOSIS — K21.9 GASTROESOPHAGEAL REFLUX DISEASE WITHOUT ESOPHAGITIS: ICD-10-CM

## 2019-05-29 DIAGNOSIS — Z17.0 MALIGNANT NEOPLASM OF CENTRAL PORTION OF LEFT BREAST IN FEMALE, ESTROGEN RECEPTOR POSITIVE (HCC): Primary | ICD-10-CM

## 2019-05-29 LAB
ALBUMIN SERPL-MCNC: 3.5 G/DL (ref 3.5–5)
ALBUMIN/GLOB SERPL: 1 {RATIO} (ref 1.1–2.2)
ALP SERPL-CCNC: 83 U/L (ref 45–117)
ALT SERPL-CCNC: 25 U/L (ref 12–78)
ANION GAP SERPL CALC-SCNC: 4 MMOL/L (ref 5–15)
AST SERPL-CCNC: 11 U/L (ref 15–37)
BASOPHILS # BLD: 0 K/UL (ref 0–0.1)
BASOPHILS NFR BLD: 0 % (ref 0–1)
BILIRUB SERPL-MCNC: 0.3 MG/DL (ref 0.2–1)
BUN SERPL-MCNC: 13 MG/DL (ref 6–20)
BUN/CREAT SERPL: 16 (ref 12–20)
CALCIUM SERPL-MCNC: 9.5 MG/DL (ref 8.5–10.1)
CHLORIDE SERPL-SCNC: 103 MMOL/L (ref 97–108)
CO2 SERPL-SCNC: 30 MMOL/L (ref 21–32)
CREAT SERPL-MCNC: 0.79 MG/DL (ref 0.55–1.02)
DIFFERENTIAL METHOD BLD: ABNORMAL
EOSINOPHIL # BLD: 0 K/UL (ref 0–0.4)
EOSINOPHIL NFR BLD: 0 % (ref 0–7)
ERYTHROCYTE [DISTWIDTH] IN BLOOD BY AUTOMATED COUNT: 15.9 % (ref 11.5–14.5)
GLOBULIN SER CALC-MCNC: 3.4 G/DL (ref 2–4)
GLUCOSE SERPL-MCNC: 105 MG/DL (ref 65–100)
HCT VFR BLD AUTO: 37.5 % (ref 35–47)
HGB BLD-MCNC: 12.3 G/DL (ref 11.5–16)
IMM GRANULOCYTES # BLD AUTO: 0.1 K/UL (ref 0–0.04)
IMM GRANULOCYTES NFR BLD AUTO: 1 % (ref 0–0.5)
LYMPHOCYTES # BLD: 1.6 K/UL (ref 0.8–3.5)
LYMPHOCYTES NFR BLD: 13 % (ref 12–49)
MCH RBC QN AUTO: 30.4 PG (ref 26–34)
MCHC RBC AUTO-ENTMCNC: 32.8 G/DL (ref 30–36.5)
MCV RBC AUTO: 92.6 FL (ref 80–99)
MONOCYTES # BLD: 1.1 K/UL (ref 0–1)
MONOCYTES NFR BLD: 9 % (ref 5–13)
NEUTS SEG # BLD: 9.5 K/UL (ref 1.8–8)
NEUTS SEG NFR BLD: 77 % (ref 32–75)
NRBC # BLD: 0 K/UL (ref 0–0.01)
NRBC BLD-RTO: 0 PER 100 WBC
PLATELET # BLD AUTO: 275 K/UL (ref 150–400)
PMV BLD AUTO: 8.9 FL (ref 8.9–12.9)
POTASSIUM SERPL-SCNC: 3.3 MMOL/L (ref 3.5–5.1)
PROT SERPL-MCNC: 6.9 G/DL (ref 6.4–8.2)
RBC # BLD AUTO: 4.05 M/UL (ref 3.8–5.2)
SODIUM SERPL-SCNC: 137 MMOL/L (ref 136–145)
WBC # BLD AUTO: 12.2 K/UL (ref 3.6–11)

## 2019-05-29 PROCEDURE — 74011000258 HC RX REV CODE- 258: Performed by: INTERNAL MEDICINE

## 2019-05-29 PROCEDURE — 74011250637 HC RX REV CODE- 250/637: Performed by: NURSE PRACTITIONER

## 2019-05-29 PROCEDURE — 80053 COMPREHEN METABOLIC PANEL: CPT

## 2019-05-29 PROCEDURE — 96417 CHEMO IV INFUS EACH ADDL SEQ: CPT

## 2019-05-29 PROCEDURE — 96367 TX/PROPH/DG ADDL SEQ IV INF: CPT

## 2019-05-29 PROCEDURE — 96375 TX/PRO/DX INJ NEW DRUG ADDON: CPT

## 2019-05-29 PROCEDURE — 96413 CHEMO IV INFUSION 1 HR: CPT

## 2019-05-29 PROCEDURE — 77030012965 HC NDL HUBR BBMI -A

## 2019-05-29 PROCEDURE — 74011250636 HC RX REV CODE- 250/636: Performed by: INTERNAL MEDICINE

## 2019-05-29 PROCEDURE — 36415 COLL VENOUS BLD VENIPUNCTURE: CPT

## 2019-05-29 PROCEDURE — 96377 APPLICATON ON-BODY INJECTOR: CPT

## 2019-05-29 PROCEDURE — 85025 COMPLETE CBC W/AUTO DIFF WBC: CPT

## 2019-05-29 PROCEDURE — 74011250637 HC RX REV CODE- 250/637: Performed by: INTERNAL MEDICINE

## 2019-05-29 RX ORDER — DEXAMETHASONE SODIUM PHOSPHATE 100 MG/10ML
10 INJECTION INTRAMUSCULAR; INTRAVENOUS ONCE
Status: COMPLETED | OUTPATIENT
Start: 2019-05-29 | End: 2019-05-29

## 2019-05-29 RX ORDER — DIPHENHYDRAMINE HYDROCHLORIDE 50 MG/ML
50 INJECTION, SOLUTION INTRAMUSCULAR; INTRAVENOUS
Status: COMPLETED | OUTPATIENT
Start: 2019-05-29 | End: 2019-05-29

## 2019-05-29 RX ORDER — SODIUM CHLORIDE 9 MG/ML
10 INJECTION INTRAMUSCULAR; INTRAVENOUS; SUBCUTANEOUS AS NEEDED
Status: ACTIVE | OUTPATIENT
Start: 2019-05-29 | End: 2019-05-29

## 2019-05-29 RX ORDER — PALONOSETRON 0.05 MG/ML
0.25 INJECTION, SOLUTION INTRAVENOUS ONCE
Status: COMPLETED | OUTPATIENT
Start: 2019-05-29 | End: 2019-05-29

## 2019-05-29 RX ORDER — POTASSIUM CHLORIDE 750 MG/1
40 TABLET, FILM COATED, EXTENDED RELEASE ORAL
Status: DISCONTINUED | OUTPATIENT
Start: 2019-05-29 | End: 2019-05-29

## 2019-05-29 RX ORDER — ACETAMINOPHEN 325 MG/1
650 TABLET ORAL
Status: COMPLETED | OUTPATIENT
Start: 2019-05-29 | End: 2019-05-29

## 2019-05-29 RX ORDER — HEPARIN 100 UNIT/ML
300-500 SYRINGE INTRAVENOUS AS NEEDED
Status: ACTIVE | OUTPATIENT
Start: 2019-05-29 | End: 2019-05-29

## 2019-05-29 RX ORDER — SODIUM CHLORIDE 9 MG/ML
25 INJECTION, SOLUTION INTRAVENOUS CONTINUOUS
Status: DISPENSED | OUTPATIENT
Start: 2019-05-29 | End: 2019-05-29

## 2019-05-29 RX ORDER — SODIUM CHLORIDE 0.9 % (FLUSH) 0.9 %
10 SYRINGE (ML) INJECTION AS NEEDED
Status: ACTIVE | OUTPATIENT
Start: 2019-05-29 | End: 2019-05-29

## 2019-05-29 RX ORDER — POTASSIUM CHLORIDE 1.5 G/1.77G
40 POWDER, FOR SOLUTION ORAL ONCE
Status: COMPLETED | OUTPATIENT
Start: 2019-05-29 | End: 2019-05-29

## 2019-05-29 RX ADMIN — SODIUM CHLORIDE 150 MG: 900 INJECTION, SOLUTION INTRAVENOUS at 10:40

## 2019-05-29 RX ADMIN — SODIUM CHLORIDE 10 ML: 9 INJECTION INTRAMUSCULAR; INTRAVENOUS; SUBCUTANEOUS at 10:01

## 2019-05-29 RX ADMIN — PALONOSETRON HYDROCHLORIDE 0.25 MG: 0.25 INJECTION, SOLUTION INTRAVENOUS at 10:34

## 2019-05-29 RX ADMIN — Medication 10 ML: at 10:00

## 2019-05-29 RX ADMIN — DOCETAXEL ANHYDROUS 140 MG: 10 INJECTION, SOLUTION INTRAVENOUS at 12:30

## 2019-05-29 RX ADMIN — TRASTUZUMAB 446 MG: 150 INJECTION, POWDER, LYOPHILIZED, FOR SOLUTION INTRAVENOUS at 11:15

## 2019-05-29 RX ADMIN — Medication 10 ML: at 14:27

## 2019-05-29 RX ADMIN — Medication 500 UNITS: at 14:27

## 2019-05-29 RX ADMIN — SODIUM CHLORIDE 25 ML/HR: 900 INJECTION, SOLUTION INTRAVENOUS at 10:00

## 2019-05-29 RX ADMIN — DIPHENHYDRAMINE HYDROCHLORIDE 50 MG: 50 INJECTION INTRAMUSCULAR; INTRAVENOUS at 10:00

## 2019-05-29 RX ADMIN — Medication 10 ML: at 10:01

## 2019-05-29 RX ADMIN — POTASSIUM CHLORIDE 40 MEQ: 1.5 POWDER, FOR SOLUTION ORAL at 12:35

## 2019-05-29 RX ADMIN — PEGFILGRASTIM 6 MG: KIT SUBCUTANEOUS at 14:30

## 2019-05-29 RX ADMIN — CARBOPLATIN 572 MG: 10 INJECTION, SOLUTION INTRAVENOUS at 13:50

## 2019-05-29 RX ADMIN — ACETAMINOPHEN 650 MG: 325 TABLET ORAL at 10:00

## 2019-05-29 RX ADMIN — DEXAMETHASONE SODIUM PHOSPHATE 10 MG: 10 INJECTION INTRAMUSCULAR; INTRAVENOUS at 10:39

## 2019-05-29 RX ADMIN — PERTUZUMAB 420 MG: 30 INJECTION, SOLUTION, CONCENTRATE INTRAVENOUS at 11:55

## 2019-05-29 NOTE — PROGRESS NOTES
[facility] - Chemotherapy Progress Note    0745 Pt admit to [facility] for C4D1 [facility] ambulatory in stable condition. Assessment completed. Lauri Delgado PAC with positive blood return. Labs drawn and pt proceeded to scheduled MD appt. MD office notified of pt report of burning and hypersensitivity in feet. MD office notified of K level and that pt reports seeing whole K tabs come through her ostomy after last treatment. Chemotherapy Flowsheet 5/29/2019   Cycle C4D1   Date 5/29/2019   Drug / Regimen TC       Patient Vitals for the past 12 hrs:   Temp Pulse Resp BP SpO2   05/29/19 1423 97.7 °F (36.5 °C) 63 16 165/74 --   05/29/19 0752 97.3 °F (36.3 °C) 93 18 131/80 97 %         Pt declines pregnancy    Pt reports taking decadron at home yesterday, but none today. Medications:  Tylenol PO  Benadryl IV  Aloxi IVP  Emend IV  Dexamethasone IVP  Herceptin IV  Perjeta IV  Docetaxel IV  Neulasta OBI  Potassium PO        1440 Pt tolerated treatment well. PAC maintained positive blood return throughout treatment, flushed with positive blood return at conclusion. D/c home ambulatory in no distress. Pt aware of next appointment scheduled for 6/16 at 0730. Pt declined copy of AVS    Education provided to patient about Neulasta On Body Injector including: side effects, how/when to remove the device, as well as what to do in the event of device malfunction. Opportunity for questions was provided and all questions were answered. Patient verbalized understanding. Lauri Delgado     Recent Results (from the past 12 hour(s))   CBC WITH AUTOMATED DIFF    Collection Time: 05/29/19  8:05 AM   Result Value Ref Range    WBC 12.2 (H) 3.6 - 11.0 K/uL    RBC 4.05 3.80 - 5.20 M/uL    HGB 12.3 11.5 - 16.0 g/dL    HCT 37.5 35.0 - 47.0 %    MCV 92.6 80.0 - 99.0 FL    MCH 30.4 26.0 - 34.0 PG    MCHC 32.8 30.0 - 36.5 g/dL    RDW 15.9 (H) 11.5 - 14.5 %    PLATELET 343 411 - 822 K/uL    MPV 8.9 8.9 - 12.9 FL    NRBC 0.0 0  WBC    ABSOLUTE NRBC 0.00 0.00 - 0.01 K/uL    NEUTROPHILS 77 (H) 32 - 75 %    LYMPHOCYTES 13 12 - 49 %    MONOCYTES 9 5 - 13 %    EOSINOPHILS 0 0 - 7 %    BASOPHILS 0 0 - 1 %    IMMATURE GRANULOCYTES 1 (H) 0.0 - 0.5 %    ABS. NEUTROPHILS 9.5 (H) 1.8 - 8.0 K/UL    ABS. LYMPHOCYTES 1.6 0.8 - 3.5 K/UL    ABS. MONOCYTES 1.1 (H) 0.0 - 1.0 K/UL    ABS. EOSINOPHILS 0.0 0.0 - 0.4 K/UL    ABS. BASOPHILS 0.0 0.0 - 0.1 K/UL    ABS. IMM. GRANS. 0.1 (H) 0.00 - 0.04 K/UL    DF AUTOMATED     METABOLIC PANEL, COMPREHENSIVE    Collection Time: 05/29/19  8:05 AM   Result Value Ref Range    Sodium 137 136 - 145 mmol/L    Potassium 3.3 (L) 3.5 - 5.1 mmol/L    Chloride 103 97 - 108 mmol/L    CO2 30 21 - 32 mmol/L    Anion gap 4 (L) 5 - 15 mmol/L    Glucose 105 (H) 65 - 100 mg/dL    BUN 13 6 - 20 MG/DL    Creatinine 0.79 0.55 - 1.02 MG/DL    BUN/Creatinine ratio 16 12 - 20      GFR est AA >60 >60 ml/min/1.73m2    GFR est non-AA >60 >60 ml/min/1.73m2    Calcium 9.5 8.5 - 10.1 MG/DL    Bilirubin, total 0.3 0.2 - 1.0 MG/DL    ALT (SGPT) 25 12 - 78 U/L    AST (SGOT) 11 (L) 15 - 37 U/L    Alk.  phosphatase 83 45 - 117 U/L    Protein, total 6.9 6.4 - 8.2 g/dL    Albumin 3.5 3.5 - 5.0 g/dL    Globulin 3.4 2.0 - 4.0 g/dL    A-G Ratio 1.0 (L) 1.1 - 2.2

## 2019-05-29 NOTE — PROGRESS NOTES
[facility] [place] [facility] [place] [address] [facility] [address]  Mitch [phone]  F: [phone]      Reason for Visit:   Manda Tapia is a 66 y.o. female who is seen in consultation at the request of Dr. Jesus Mejia for evaluation of therapy for breast cancer. Treatment History:   · 2/15/19 Left core bx:   IDC, gr 2, 1.1 cm, ER + at 100%, CA + at 20%, HER 2 POSITIVE (IHC 2+; FISH ratio 3.9; sig/cell 9.2)  · 3/1/19 Left ax LN core bx:  + for breast cancer, ER + at 100%, CA negative, HER 2 POSITIVE (IHC 2+, FISH ratio 2; sig/cell 5.8)  · TCH-P 3/6/19-  · #2 delayed due to diverticulitis abscess and surgery 19    History of Present Illness:   Pt noticed the left breast mass herself in 2019, leading to the pathology above    She was seen in ED on 3/8/19 for abdominal pain and constipation CT abd/pelvis showed diverticulitis and received levaquin and flagyl. KUB also showed nonobstructive bowel gas pattern. Reports last good BM was 3/6 prior to chemo. She states on 3/10 and 3/12 had small watery BM. Sent to ED from office on 3/12/19 for worsening abdominal pain. She was found to have diverticulitis with multiple abscesses. S/p IV abx and perc drain with no relief of her pain, therefore proceeded with open sigmoidectomy and end colostomy on 3/15/19. She was discharged to SNF with IV abx until 3/29/19. Interval history:  In today for follow up and treatment. Complains of gr 1 fatigue, gr 1 hair loss, gr 1 insomnia, gr 1 mucositis, gr 4 skin rash.     FH:  Paternal aunt breast cancer in her [de-identified]; no ovarian cancer, no prostate or pancreas cancer    Past Medical History:   Diagnosis Date    Adverse effect of anesthesia     \"difficulty waking up from anesthethia\"    Cancer [facility]) 2019    left breast    Depression     GERD (gastroesophageal reflux disease)     High cholesterol     Hypertension       Past Surgical History:   Procedure Laterality Date    HX BREAST BIOPSY Right years ago    neg; needle bx    HX BREAST BIOPSY Left 02/15/2019    IDC    HX CATARACT REMOVAL      HX COLONOSCOPY      HX ORTHOPAEDIC      carpal tunnel      Social History     Tobacco Use    Smoking status: Current Every Day Smoker     Packs/day: 0.10     Years: 55.00     Pack years: 5.50    Smokeless tobacco: Never Used    Tobacco comment: 1-2 cigs/day   Substance Use Topics    Alcohol use: Yes     Alcohol/week: 0.6 oz     Types: 1 Shots of liquor per week     Drinks per session: 5 or 6     Binge frequency: Weekly     Comment: Patient stated that she puts a splash of bourbon in her afternoon coffee around 5-6 days per week      Family History   Problem Relation Age of Onset    Breast Cancer Maternal Aunt         [de-identified]    Hypertension Mother     Hypertension Father     Cancer Father         Lung    Cancer Brother         leukemia    Diabetes Brother         2 brothers with diabetes    Hypertension Sister         2 sisters and 4 brothers with HTN     Current Outpatient Medications   Medication Sig    famotidine (PEPCID) 20 mg tablet Take 20 mg by mouth two (2) times a day.  doxycycline (ADOXA) 100 mg tablet Take 100 mg by mouth two (2) times a day.  losartan-hydroCHLOROthiazide (HYZAAR) 100-25 mg per tablet Take 0.5 Tabs by mouth daily.  pravastatin (PRAVACHOL) 40 mg tablet Take 40 mg by mouth nightly.  STIOLTO RESPIMAT 2.5-2.5 mcg/actuation inhaler Take 2 Puffs by inhalation nightly.  venlafaxine (EFFEXOR) 50 mg tablet Take 50 mg by mouth daily.  Cyanocobalamin-Cobamamide (B12) 5,000-100 mcg lozg by SubLINGual route daily.  cholecalciferol (VITAMIN D3) 1,000 unit cap Take  by mouth daily.  calcium-cholecalciferol, D3, (CALTRATE 600+D) tablet Take 1 Tab by mouth daily.  aspirin delayed-release 81 mg tablet Take  by mouth every other day.  polyethylene glycol (MIRALAX) 17 gram packet Take 1 Packet by mouth daily as needed (constipation).  (Patient not taking: Reported on 4/10/2019)    naproxen sodium (ALEVE PO) Take  by mouth two (2) times daily as needed.  lidocaine-prilocaine (EMLA) topical cream Apply  to affected area as needed for Pain.  prochlorperazine (COMPAZINE) 10 mg tablet Take 1 Tab by mouth every six (6) hours as needed for Nausea. (Patient not taking: Reported on 4/10/2019)    ondansetron hcl (ZOFRAN) 8 mg tablet Take 1 Tab by mouth every twelve (12) hours as needed for Nausea. (Patient not taking: Reported on 4/10/2019)     No current facility-administered medications for this visit. Facility-Administered Medications Ordered in Other Visits   Medication Dose Route Frequency    saline peripheral flush soln 10 mL  10 mL InterCATHeter PRN    sodium chloride 0.9% injection 10 mL  10 mL IntraVENous PRN    heparin (porcine) pf 300-500 Units  300-500 Units InterCATHeter PRN      Allergies   Allergen Reactions    Doxycycline Diarrhea     Patient states can tolerate with yogurt    Keflex [Cephalexin] Hives    Penicillins Rash        Review of Systems: A complete review of systems was obtained, negative except as described above. Physical Exam:     Visit Vitals  /80   Pulse 93   Temp 97.3 °F (36.3 °C) (Temporal)   Resp 18   Ht 5' 6\" (1.676 m)   Wt 162 lb 3.2 oz (73.6 kg)   SpO2 97%   BMI 26.18 kg/m²     ECOG PS: 0  General: No distress  Eyes: PERRLA, anicteric sclerae  HENT: Atraumatic, OP clear  Neck: Supple  Lymphatic: No cervical, supraclavicular adenopathy  Respiratory: exp wheezing  CV: Normal rate, regular rhythm, no murmurs, no peripheral edema  GI: Soft, tender, distended, no masses, +BS  MS:  Digits without clubbing or cyanosis. Skin: No rashes, ecchymoses, or petechiae. Normal temperature, turgor, and texture.   Psych: Alert, oriented, appropriate affect, normal judgment/insight  Breasts:  L breast 12:00, 3 cm FN, <1 cm mass, 1.5 cm L ax LN     Results:     Lab Results   Component Value Date/Time    WBC 12.2 (H) 05/29/2019 08:05 AM    HGB 12.3 05/29/2019 08:05 AM    HCT 37.5 05/29/2019 08:05 AM    PLATELET 278 04/57/9345 08:05 AM    MCV 92.6 05/29/2019 08:05 AM    ABS. NEUTROPHILS 9.5 (H) 05/29/2019 08:05 AM     Lab Results   Component Value Date/Time    Sodium 140 05/08/2019 08:01 AM    Potassium 3.1 (L) 05/08/2019 08:01 AM    Chloride 105 05/08/2019 08:01 AM    CO2 25 05/08/2019 08:01 AM    Glucose 168 (H) 05/08/2019 08:01 AM    BUN 15 05/08/2019 08:01 AM    Creatinine 0.82 05/08/2019 08:01 AM    GFR est AA >60 05/08/2019 08:01 AM    GFR est non-AA >60 05/08/2019 08:01 AM    Calcium 9.4 05/08/2019 08:01 AM    Glucose (POC) 93 03/19/2019 06:56 AM     Lab Results   Component Value Date/Time    Bilirubin, total 0.3 05/08/2019 08:01 AM    ALT (SGPT) 22 05/08/2019 08:01 AM    AST (SGOT) 10 (L) 05/08/2019 08:01 AM    Alk. phosphatase 87 05/08/2019 08:01 AM    Protein, total 6.7 05/08/2019 08:01 AM    Albumin 3.4 (L) 05/08/2019 08:01 AM    Globulin 3.3 05/08/2019 08:01 AM       2/12/19 mammogram and US  MAMMOGRAPHY:  The breast demonstrates stable scattered density breast  architecture. Underlying the site of clinical concern in the left breast is  masslike focal asymmetry in the anterior upper left breast. There is no other  dominant mass lesion, architectural distortion, asymmetry, or calcification. There is no skin thickening or nipple retraction.     ULTRASOUND:  Corresponding to the mammographic density there is a 2.4 x 3.1 x  3.7 cm hypoechoic lobulated mass at about the 12:00 position and 3 cm radial  distance from the nipple. No other suspicious cystic or solid lesions  Identified. Dr. Yandel Figueroa has identified 2 large, suspicious ax LN on US:  1.7 cm and 1.2 cm    5/16/19 Left mammo and US:  The breast parenchyma is heterogeneously dense. Left breast biopsy clip is  unchanged in position.  The central upper left breast mass seen on prior MR exam  dated February 2019 has decreased in size.     Targeted sonographic evaluation of the 12:00 position of the left breast is  performed. Within the 12:00 position of the left breast, there is a 2.1 cm x 1.3  cm x 3.1 cm irregular hypoechoic mass containing calcifications, decreased in  size and measuring 3.8 cm x 2.4 cm x 3.1 cm on prior ultrasound dated February 2018.     IMPRESSION: BI-RADS 6. Pathology proven left breast cancer. Interval decrease in size of 12:00 left breast mass, now measuring 2.1 cm x 1.3 cm x 3.1 cm. Findings were discussed with the patient at the time of interpretation. Records reviewed and summarized above. Assessment/plan:   1. Left central IDC, gr 2, ER +, WI +, HER 2 positive:  cT2 cN1a cM0, stage IIB, prognostic stage IB    We explained to the patient that the goal of systemic adjuvant therapy is to improve the chances for cure and decrease the risk of relapse. We explained why a patient can have microscopic cancer spread now even though physical examination, laboratory studies and imaging studies are negative for cancer. We explained that the same treatments used now as adjuvant or preventive treatments rarely if ever are curative in women who develop metastases. We discussed that there is no difference in overall survival between neoadjuvant and adjuvant chemotherapy. We discussed the rationale for neoadjuvant chemotherapy, if chemotherapy is warranted, as it is in this case: to avoid any potential delays in giving chemotherapy following surgery, to be able to see the response of the tumor to chemotherapy, and to potentially downstage the tumor prior to surgery. Using eprognosis. org, her 5 year risk of all cause mortality is 20%, her risk in 10 years is 52-58%, average OS is 9-10 years. Therapy discussion is warranted. Irais Davila suggests equivalency between q.3 week Adriamycin, Cytoxan followed by weekly paclitaxel and trastuzumab compared with the [facility] regimen.  However, this study was not powered to show a difference between these two regimens, and in the [facility] publication, the AC-TH arm showed a numerically advantange (though not statistically significant) to the [facility] arm. In this patient, it is completely reasonable to use [facility] approach and avoid the potential cardiotoxicity of the anthracyclines as well as the potential for leukemia. We discussed the toxicities of docetaxel and carboplatin chemotherapy in detail. This chemotherapy frequently causes a low white blood cell count and hospital admissions for treatment of neutropenic fever. We explained that we consider the use of growth factors to minimize this risk. We explained to the patient that some side effects if they occur only last a few days including nausea, vomiting, stomatitis, arthralgia, myalgia,and allergic reactions to Taxotere. We told the patient that severe nausea and vomiting were uncommon and that some side effects,if they occur, will last longer; this includes hair loss, which will be seen in all patients treated with these agents and fatigue,which will be seen in most.  We also informed that for the patient that heart damage is rare with these agents. We explained that carboplatin can rarely cause kidney damage and high frequency hearing loss. We provided the patient in detail her information concerning the toxicities of this regimen in addition to her overall discussion. Rational for therapy with pertuzumab was also discussed with the patient including a nearly 20% improvement seen in pCR in the neoadjuvant setting with the addition of this medication. Additional AE discussed including an acne rash (and the use of doxycyline 100 mg bid to help prevent) and diarrhea and consideration of additional cardiomyopathy.     Rationale for therapy with trastuzumab was also discussed with the patient including a 50% proportional improvement in disease free survival and also an improvement in overall survival in patients receiving trastuzumab and chemotherapy for HER-2 positive breast cancer. The side effects of trastuzumab were discussed including a 4%-5% risk of dropping her ejection fraction while on treatment and about a 1% risk of CHF. We discussed that this drug will be used every 3 weeks for remainder of a year following the chemotherapy cycles. We will check her EF before chemotherapy and every 3 months while she is receiving trastuzumab. · TCH-P (Trastuzumab 8mg/kg load with cycle 1 then 6mg/kg, Docetaxel 75mg/m2, Carboplatin AUC 6, Pertuzumab 840mg load with cycle 1 then 420mg) given every 3 weeks x 6 cycles  · Labs: CBC, CMP prior to each treatment. · Antiemetic Prophylaxis: Palonosetron and dexamethasone prior to chemo  · PRN Antiemetics: Ondansetron, Compazine  · Swelling prophylaxis: Dexmethasone 8mg bid the day before and day after chemotherapy  · TTE prior to chemotherapy and every 12 weeks while on Trastuzumab  · Neulasta 24-72 hours after each treatment    The patient was given presciptions for emla cream, decadron to take 8 mg bid the day before and day after chemo, zofran and compazine. Neulasta the following day. After this discussion, she is agreeable to TCH-P, cycle 1 given on 3/6, she has signed informed consent. Cryotherapy discussed with docetaxel as well    LN + by core, she may be eligible for the [facility] SLN study if her LN clinically resolves    TTE 3/6/19 EF 66-70%, next TTE due now, ordered    Port has been placed. Treatment has been delayed due to her diverticulitis and recent hospitalization. TCHP #4 today, midpoint imaging shows response to treatment. We will plan to see the patient in follow up at least once per cycle, or sooner if symptoms warrant. 2. Emotional well being:  She has excellent support and is coping well with her disease    3. tob use:  Smokes couple cigs/day; counseled to quit    4. Diverticulitis with abscess/Abdominal pain:  Seen in ED 3/8/19 for abdominal pain and constipation.   CT abd/pelvis showed diverticulitis and KUB showed nonobstructive bowel gas pattern and small amount of stool. She was seen in office on 3/12/19 and was sent to ED for worsening abdominal pain. Found to have diverticulitis with multiple abscesses. S/p IV abx and perc drainage followed by colostomy on 3/15/19. She was in SNF until 4/1/19 for IV abx. Had follow up with Dr. Teresa Blancas on 4/25/19, next follow up 7/2019.      5. Constipation with bloating:  Resolved. Due to diverticulitis, see #4. Now with colostomy. 6. Expiratory wheeze: Resolved. PRN inhalers. Will monitor. 7. Fatigue: due to recent hospital stay. Appetite continues to improve. Energy level has been improving since being home. 8. GERD: due to doxycycline. This has resolved with decreased dose to daily as well as continuing Pepcid daily. 9. 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 Started after Cycle 3. Reports her feet feel \"hot on the inside\" but states this has improved slightly. Does not wish to start any medications at this time. Patient will let us know if this worsens. This patient was seen in conjunction with Eleanor Arnold NP. I appreciate the opportunity to participate in Ms. Gilmar Krause's care. Signed By: Tank Tirado MD      No orders of the defined types were placed in this encounter.

## 2019-05-30 ENCOUNTER — TELEPHONE (OUTPATIENT)
Dept: ONCOLOGY | Age: 74
End: 2019-05-30

## 2019-05-30 NOTE — TELEPHONE ENCOUNTER
5/30/19 9:50 AM: Called patient and advised of echocardiogram appointment on Tuesday, 6/4, at 1 PM with Dr. Governor Bosch office. Patient verbalized understanding and denied questions or concerns.

## 2019-06-03 DIAGNOSIS — C50.112 MALIGNANT NEOPLASM OF CENTRAL PORTION OF LEFT BREAST IN FEMALE, ESTROGEN RECEPTOR POSITIVE (HCC): ICD-10-CM

## 2019-06-03 DIAGNOSIS — Z17.0 MALIGNANT NEOPLASM OF CENTRAL PORTION OF LEFT BREAST IN FEMALE, ESTROGEN RECEPTOR POSITIVE (HCC): ICD-10-CM

## 2019-06-03 RX ORDER — DOXYCYCLINE 100 MG/1
CAPSULE ORAL
Qty: 84 CAP | Refills: 0 | Status: SHIPPED | OUTPATIENT
Start: 2019-06-03 | End: 2019-07-08

## 2019-06-18 ENCOUNTER — TELEPHONE (OUTPATIENT)
Dept: SURGERY | Age: 74
End: 2019-06-18

## 2019-06-18 ENCOUNTER — OFFICE VISIT (OUTPATIENT)
Dept: SURGERY | Age: 74
End: 2019-06-18

## 2019-06-18 VITALS
HEART RATE: 94 BPM | DIASTOLIC BLOOD PRESSURE: 79 MMHG | HEIGHT: 66 IN | BODY MASS INDEX: 26.03 KG/M2 | SYSTOLIC BLOOD PRESSURE: 130 MMHG | WEIGHT: 162 LBS

## 2019-06-18 DIAGNOSIS — Z17.0 MALIGNANT NEOPLASM OF UPPER-OUTER QUADRANT OF LEFT BREAST IN FEMALE, ESTROGEN RECEPTOR POSITIVE (HCC): Primary | ICD-10-CM

## 2019-06-18 DIAGNOSIS — C50.412 MALIGNANT NEOPLASM OF UPPER-OUTER QUADRANT OF LEFT BREAST IN FEMALE, ESTROGEN RECEPTOR POSITIVE (HCC): Primary | ICD-10-CM

## 2019-06-18 NOTE — PROGRESS NOTES
HISTORY OF PRESENT ILLNESS  Manda Tapia is a 66 y.o. female. HPI ESTABLISHED patient here today with complaints of redness at her port site, The area is warm to touch. She was recently seen by Dr. Serafin Max who felt she needed to be seen. The patient has 3 more chemotherapy treatments then will plan surgery. She had perforated diverticulitis after her first chemo treatment resulting in a colostomy. 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 ND 20%, Her 2 positive  Positive axillary node, %. ND neg, Her 2 positive  TCH-P    ROS    Physical Exam   Constitutional: She is oriented to person, place, and time. She appears well-developed and well-nourished. Cardiovascular: Normal rate, regular rhythm and normal heart sounds. Pulmonary/Chest: Effort normal and breath sounds normal. Right breast exhibits skin change (two patches of red skin at the catheter site). Right breast exhibits no mass, no nipple discharge and no tenderness. Left breast exhibits mass (2 cm mass 12:00). Left breast exhibits no nipple discharge, no skin change and no tenderness. Breasts are symmetrical.       No fluctuance or pain at the portacath site. Erythema is isolated to two patches at the inferior and superior aspect of the catheter which corresponds to tented, dense scar tissue. Abdominal:       colostomy   Lymphadenopathy:     She has no cervical adenopathy. She has no axillary adenopathy. Right: No supraclavicular adenopathy present. Left: No supraclavicular adenopathy present. Neurological: She is alert and oriented to person, place, and time. Skin: Skin is warm and dry. ASSESSMENT and PLAN    ICD-10-CM ICD-9-CM    1. Malignant neoplasm of upper-outer quadrant of left breast in female, estrogen receptor positive (HCC) C50.412 174.4     Z17.0 V86.0       1. Erythema at catheter tract is in 2 separate sites, not along the entire tract, and does not extend to the port and is not painful. This erythema appears to be secondary to dense scar tissue. Pt said this area has been red on and off since her port went in. She is getting Herceptin so the port will remain in to complete a year of treatment. 2. She will complete chemo July 10.  3. LEFT mastectomy and sentinel node biopsy (she may be on the sentinel node trial) in August.  Pt would like to have bilateral mastectomies without reconstruction. She has supplemental insurance and we will see if this is covered. PLAN: bilateral mastectomy and LEFT axillary sentinel node biopsy. Will schedule for August.    Discussed surgery and post operative expectaions:  1. Pt will remain in the hospital overnight after surgery. 2. The wounds will be closed with dissolving sutures and skin glue. It is okay to shower after surgery. 3. She will have SAVANNAH drains for about a week after surgery. I have given her drain pouches. 4. Pain is usually well-controlled with ibuprofen or acetaminophen. Hydrocodone may be prescribed if needed  5. Infection and bleeding are possible, but infrequent complications. 6. She can expect 1-2 weeks of fatigue after surgery.

## 2019-06-19 ENCOUNTER — HOSPITAL ENCOUNTER (OUTPATIENT)
Dept: INFUSION THERAPY | Age: 74
Discharge: HOME | End: 2019-06-19
Payer: MEDICARE

## 2019-06-19 ENCOUNTER — OFFICE VISIT (OUTPATIENT)
Dept: ONCOLOGY | Age: 74
End: 2019-06-19

## 2019-06-19 VITALS
OXYGEN SATURATION: 96 % | DIASTOLIC BLOOD PRESSURE: 61 MMHG | HEART RATE: 82 BPM | BODY MASS INDEX: 25.86 KG/M2 | HEIGHT: 66 IN | RESPIRATION RATE: 18 BRPM | TEMPERATURE: 96.4 F | WEIGHT: 160.9 LBS | SYSTOLIC BLOOD PRESSURE: 127 MMHG

## 2019-06-19 VITALS
BODY MASS INDEX: 25.86 KG/M2 | SYSTOLIC BLOOD PRESSURE: 152 MMHG | HEART RATE: 68 BPM | WEIGHT: 160.9 LBS | HEIGHT: 66 IN | DIASTOLIC BLOOD PRESSURE: 72 MMHG | OXYGEN SATURATION: 96 % | RESPIRATION RATE: 18 BRPM | TEMPERATURE: 96.4 F

## 2019-06-19 DIAGNOSIS — Z51.81 ENCOUNTER FOR MONITORING CARDIOTOXIC DRUG THERAPY: ICD-10-CM

## 2019-06-19 DIAGNOSIS — K21.9 GASTROESOPHAGEAL REFLUX DISEASE WITHOUT ESOPHAGITIS: ICD-10-CM

## 2019-06-19 DIAGNOSIS — Z17.0 MALIGNANT NEOPLASM OF CENTRAL PORTION OF LEFT BREAST IN FEMALE, ESTROGEN RECEPTOR POSITIVE (HCC): Primary | ICD-10-CM

## 2019-06-19 DIAGNOSIS — G62.9 NEUROPATHY: ICD-10-CM

## 2019-06-19 DIAGNOSIS — R53.83 FATIGUE, UNSPECIFIED TYPE: ICD-10-CM

## 2019-06-19 DIAGNOSIS — C50.112 MALIGNANT NEOPLASM OF CENTRAL PORTION OF LEFT BREAST IN FEMALE, ESTROGEN RECEPTOR POSITIVE (HCC): Primary | ICD-10-CM

## 2019-06-19 DIAGNOSIS — Z79.899 ENCOUNTER FOR MONITORING CARDIOTOXIC DRUG THERAPY: ICD-10-CM

## 2019-06-19 DIAGNOSIS — Z72.0 TOBACCO USE: ICD-10-CM

## 2019-06-19 DIAGNOSIS — Z93.3 COLOSTOMY IN PLACE (HCC): ICD-10-CM

## 2019-06-19 DIAGNOSIS — Z51.11 CHEMOTHERAPY MANAGEMENT, ENCOUNTER FOR: ICD-10-CM

## 2019-06-19 DIAGNOSIS — K57.92 DIVERTICULITIS: ICD-10-CM

## 2019-06-19 LAB
ALBUMIN SERPL-MCNC: 3.5 G/DL (ref 3.5–5)
ALBUMIN/GLOB SERPL: 1.1 {RATIO} (ref 1.1–2.2)
ALP SERPL-CCNC: 79 U/L (ref 45–117)
ALT SERPL-CCNC: 20 U/L (ref 12–78)
ANION GAP SERPL CALC-SCNC: 6 MMOL/L (ref 5–15)
AST SERPL-CCNC: 10 U/L (ref 15–37)
BASOPHILS # BLD: 0 K/UL (ref 0–0.1)
BASOPHILS NFR BLD: 0 % (ref 0–1)
BILIRUB SERPL-MCNC: 0.4 MG/DL (ref 0.2–1)
BUN SERPL-MCNC: 10 MG/DL (ref 6–20)
BUN/CREAT SERPL: 12 (ref 12–20)
CALCIUM SERPL-MCNC: 9.8 MG/DL (ref 8.5–10.1)
CHLORIDE SERPL-SCNC: 105 MMOL/L (ref 97–108)
CO2 SERPL-SCNC: 31 MMOL/L (ref 21–32)
CREAT SERPL-MCNC: 0.83 MG/DL (ref 0.55–1.02)
DIFFERENTIAL METHOD BLD: ABNORMAL
EOSINOPHIL # BLD: 0 K/UL (ref 0–0.4)
EOSINOPHIL NFR BLD: 0 % (ref 0–7)
ERYTHROCYTE [DISTWIDTH] IN BLOOD BY AUTOMATED COUNT: 16.7 % (ref 11.5–14.5)
GLOBULIN SER CALC-MCNC: 3.1 G/DL (ref 2–4)
GLUCOSE SERPL-MCNC: 111 MG/DL (ref 65–100)
HCT VFR BLD AUTO: 36.3 % (ref 35–47)
HGB BLD-MCNC: 12 G/DL (ref 11.5–16)
IMM GRANULOCYTES # BLD AUTO: 0 K/UL (ref 0–0.04)
IMM GRANULOCYTES NFR BLD AUTO: 0 % (ref 0–0.5)
LYMPHOCYTES # BLD: 1.7 K/UL (ref 0.8–3.5)
LYMPHOCYTES NFR BLD: 19 % (ref 12–49)
MCH RBC QN AUTO: 31.4 PG (ref 26–34)
MCHC RBC AUTO-ENTMCNC: 33.1 G/DL (ref 30–36.5)
MCV RBC AUTO: 95 FL (ref 80–99)
MONOCYTES # BLD: 0.9 K/UL (ref 0–1)
MONOCYTES NFR BLD: 10 % (ref 5–13)
NEUTS SEG # BLD: 6.3 K/UL (ref 1.8–8)
NEUTS SEG NFR BLD: 71 % (ref 32–75)
NRBC # BLD: 0 K/UL (ref 0–0.01)
NRBC BLD-RTO: 0 PER 100 WBC
PLATELET # BLD AUTO: 293 K/UL (ref 150–400)
PMV BLD AUTO: 8.9 FL (ref 8.9–12.9)
POTASSIUM SERPL-SCNC: 3.2 MMOL/L (ref 3.5–5.1)
PROT SERPL-MCNC: 6.6 G/DL (ref 6.4–8.2)
RBC # BLD AUTO: 3.82 M/UL (ref 3.8–5.2)
SODIUM SERPL-SCNC: 142 MMOL/L (ref 136–145)
TROPONIN I SERPL-MCNC: <0.05 NG/ML
WBC # BLD AUTO: 8.9 K/UL (ref 3.6–11)

## 2019-06-19 PROCEDURE — 80053 COMPREHEN METABOLIC PANEL: CPT

## 2019-06-19 PROCEDURE — 96375 TX/PRO/DX INJ NEW DRUG ADDON: CPT

## 2019-06-19 PROCEDURE — 77030012965 HC NDL HUBR BBMI -A

## 2019-06-19 PROCEDURE — 96417 CHEMO IV INFUS EACH ADDL SEQ: CPT

## 2019-06-19 PROCEDURE — 96374 THER/PROPH/DIAG INJ IV PUSH: CPT

## 2019-06-19 PROCEDURE — 74011250637 HC RX REV CODE- 250/637: Performed by: NURSE PRACTITIONER

## 2019-06-19 PROCEDURE — 85025 COMPLETE CBC W/AUTO DIFF WBC: CPT

## 2019-06-19 PROCEDURE — 74011000258 HC RX REV CODE- 258: Performed by: INTERNAL MEDICINE

## 2019-06-19 PROCEDURE — 96413 CHEMO IV INFUSION 1 HR: CPT

## 2019-06-19 PROCEDURE — 74011250636 HC RX REV CODE- 250/636: Performed by: INTERNAL MEDICINE

## 2019-06-19 PROCEDURE — 36415 COLL VENOUS BLD VENIPUNCTURE: CPT

## 2019-06-19 PROCEDURE — 96365 THER/PROPH/DIAG IV INF INIT: CPT

## 2019-06-19 PROCEDURE — 84484 ASSAY OF TROPONIN QUANT: CPT

## 2019-06-19 RX ORDER — SODIUM CHLORIDE 0.9 % (FLUSH) 0.9 %
10 SYRINGE (ML) INJECTION AS NEEDED
Status: ACTIVE | OUTPATIENT
Start: 2019-06-19 | End: 2019-06-19

## 2019-06-19 RX ORDER — POTASSIUM CHLORIDE 1.5 G/1.77G
40 POWDER, FOR SOLUTION ORAL ONCE
Status: COMPLETED | OUTPATIENT
Start: 2019-06-19 | End: 2019-06-19

## 2019-06-19 RX ORDER — SODIUM CHLORIDE 9 MG/ML
10 INJECTION INTRAMUSCULAR; INTRAVENOUS; SUBCUTANEOUS AS NEEDED
Status: DISPENSED | OUTPATIENT
Start: 2019-06-19 | End: 2019-06-19

## 2019-06-19 RX ORDER — HEPARIN 100 UNIT/ML
500 SYRINGE INTRAVENOUS AS NEEDED
Status: DISCONTINUED | OUTPATIENT
Start: 2019-06-19 | End: 2019-06-20 | Stop reason: HOSPADM

## 2019-06-19 RX ORDER — SODIUM CHLORIDE 0.9 % (FLUSH) 0.9 %
10 SYRINGE (ML) INJECTION AS NEEDED
Status: DISCONTINUED | OUTPATIENT
Start: 2019-06-19 | End: 2019-06-20 | Stop reason: HOSPADM

## 2019-06-19 RX ORDER — POTASSIUM CHLORIDE 750 MG/1
40 TABLET, FILM COATED, EXTENDED RELEASE ORAL
Status: DISCONTINUED | OUTPATIENT
Start: 2019-06-19 | End: 2019-06-19

## 2019-06-19 RX ORDER — HEPARIN 100 UNIT/ML
300-500 SYRINGE INTRAVENOUS AS NEEDED
Status: ACTIVE | OUTPATIENT
Start: 2019-06-19 | End: 2019-06-19

## 2019-06-19 RX ORDER — DEXAMETHASONE SODIUM PHOSPHATE 100 MG/10ML
10 INJECTION INTRAMUSCULAR; INTRAVENOUS ONCE
Status: COMPLETED | OUTPATIENT
Start: 2019-06-19 | End: 2019-06-19

## 2019-06-19 RX ORDER — PALONOSETRON 0.05 MG/ML
0.25 INJECTION, SOLUTION INTRAVENOUS ONCE
Status: COMPLETED | OUTPATIENT
Start: 2019-06-19 | End: 2019-06-19

## 2019-06-19 RX ORDER — SODIUM CHLORIDE 9 MG/ML
25 INJECTION, SOLUTION INTRAVENOUS CONTINUOUS
Status: DISPENSED | OUTPATIENT
Start: 2019-06-19 | End: 2019-06-19

## 2019-06-19 RX ADMIN — PALONOSETRON HYDROCHLORIDE 0.25 MG: 0.25 INJECTION, SOLUTION INTRAVENOUS at 09:54

## 2019-06-19 RX ADMIN — TRASTUZUMAB 446 MG: 150 INJECTION, POWDER, LYOPHILIZED, FOR SOLUTION INTRAVENOUS at 10:14

## 2019-06-19 RX ADMIN — Medication 10 ML: at 13:53

## 2019-06-19 RX ADMIN — PERTUZUMAB 420 MG: 30 INJECTION, SOLUTION, CONCENTRATE INTRAVENOUS at 10:56

## 2019-06-19 RX ADMIN — Medication 500 UNITS: at 13:54

## 2019-06-19 RX ADMIN — CARBOPLATIN 549 MG: 10 INJECTION, SOLUTION INTRAVENOUS at 13:04

## 2019-06-19 RX ADMIN — SODIUM CHLORIDE 150 MG: 900 INJECTION, SOLUTION INTRAVENOUS at 09:25

## 2019-06-19 RX ADMIN — SODIUM CHLORIDE 25 ML/HR: 900 INJECTION, SOLUTION INTRAVENOUS at 09:24

## 2019-06-19 RX ADMIN — DEXAMETHASONE SODIUM PHOSPHATE 10 MG: 10 INJECTION INTRAMUSCULAR; INTRAVENOUS at 09:58

## 2019-06-19 RX ADMIN — PEGFILGRASTIM 6 MG: KIT SUBCUTANEOUS at 13:53

## 2019-06-19 RX ADMIN — POTASSIUM CHLORIDE 40 MEQ: 1.5 POWDER, FOR SOLUTION ORAL at 13:52

## 2019-06-19 RX ADMIN — DOCETAXEL ANHYDROUS 140 MG: 10 INJECTION, SOLUTION INTRAVENOUS at 11:44

## 2019-06-19 NOTE — PROGRESS NOTES
[facility] [place] [address] [facility]  Valentino Greenspan: [phone]  F: [phone]      Reason for Visit:   Jose M Mcgregor is a 66 y.o. female who is seen in consultation at the request of Dr. Alannah Restrepo for evaluation of therapy for breast cancer. Treatment History:   · 2/15/19 Left core bx:   IDC, gr 2, 1.1 cm, ER + at 100%, AK + at 20%, HER 2 POSITIVE (IHC 2+; FISH ratio 3.9; sig/cell 9.2)  · 3/1/19 Left ax LN core bx:  + for breast cancer, ER + at 100%, AK negative, HER 2 POSITIVE (IHC 2+, FISH ratio 2; sig/cell 5.8)  · [facility] 3/6/19-  · #2 delayed due to diverticulitis abscess and surgery 4/17/19    History of Present Illness:   Pt noticed the left breast mass herself in Feb 2019, leading to the pathology above    She was seen in ED on 3/8/19 for abdominal pain and constipation CT abd/pelvis showed diverticulitis and received levaquin and flagyl. KUB also showed nonobstructive bowel gas pattern. Reports last good BM was 3/6 prior to chemo. She states on 3/10 and 3/12 had small watery BM. Sent to ED from office on 3/12/19 for worsening abdominal pain. She was found to have diverticulitis with multiple abscesses. S/p IV abx and perc drain with no relief of her pain, therefore proceeded with open sigmoidectomy and end colostomy on 3/15/19. She was discharged to SNF with IV abx until 3/29/19. Interval history:  In today for follow up and treatment. Complains of gr 1 fatigue, gr 1 insomnia, and also states the bottom of her feet are peeling.      FH:  Paternal aunt breast cancer in her [de-identified]; no ovarian cancer, no prostate or pancreas cancer    Past Medical History:   Diagnosis Date    Adverse effect of anesthesia     \"difficulty waking up from anesthethia\"    Cancer [facility]) 02/2019    left breast    Depression     GERD (gastroesophageal reflux disease)     High cholesterol     Hypertension       Past Surgical History:   Procedure Laterality Date  HX BREAST BIOPSY Right years ago    neg; needle bx    HX BREAST BIOPSY Left 02/15/2019    IDC    HX CATARACT REMOVAL      HX COLONOSCOPY      HX ORTHOPAEDIC      carpal tunnel      Social History     Tobacco Use    Smoking status: Current Every Day Smoker     Packs/day: 0.10     Years: 55.00     Pack years: 5.50    Smokeless tobacco: Never Used    Tobacco comment: 1-2 cigs/day   Substance Use Topics    Alcohol use: Yes     Alcohol/week: 0.6 oz     Types: 1 Shots of liquor per week     Drinks per session: 5 or 6     Binge frequency: Weekly     Comment: Patient stated that she puts a splash of bourbon in her afternoon coffee around 5-6 days per week      Family History   Problem Relation Age of Onset    Breast Cancer Maternal Aunt         [de-identified]    Hypertension Mother     Hypertension Father     Cancer Father         Lung    Cancer Brother         leukemia    Diabetes Brother         2 brothers with diabetes    Hypertension Sister         2 sisters and 4 brothers with HTN     Current Outpatient Medications   Medication Sig    doxycycline (VIBRAMYCIN) 100 mg capsule TAKE 1 CAPSULE BY MOUTH TWICE DAILY FOR  42  DAYS    famotidine (PEPCID) 20 mg tablet Take 20 mg by mouth daily.  losartan-hydroCHLOROthiazide (HYZAAR) 100-25 mg per tablet Take 0.5 Tabs by mouth daily.  pravastatin (PRAVACHOL) 40 mg tablet Take 40 mg by mouth nightly.  venlafaxine (EFFEXOR) 50 mg tablet Take 50 mg by mouth daily.  Cyanocobalamin-Cobamamide (B12) 5,000-100 mcg lozg by SubLINGual route daily.  cholecalciferol (VITAMIN D3) 1,000 unit cap Take  by mouth daily.  calcium-cholecalciferol, D3, (CALTRATE 600+D) tablet Take 1 Tab by mouth daily.  aspirin delayed-release 81 mg tablet Take  by mouth every other day.  polyethylene glycol (MIRALAX) 17 gram packet Take 1 Packet by mouth daily as needed (constipation).  naproxen sodium (ALEVE PO) Take  by mouth two (2) times daily as needed.     lidocaine-prilocaine (EMLA) topical cream Apply  to affected area as needed for Pain.  prochlorperazine (COMPAZINE) 10 mg tablet Take 1 Tab by mouth every six (6) hours as needed for Nausea.  ondansetron hcl (ZOFRAN) 8 mg tablet Take 1 Tab by mouth every twelve (12) hours as needed for Nausea.  STIOLTO RESPIMAT 2.5-2.5 mcg/actuation inhaler Take 2 Puffs by inhalation nightly. No current facility-administered medications for this visit. Facility-Administered Medications Ordered in Other Visits   Medication Dose Route Frequency    heparin (porcine) pf 500 Units  500 Units IntraVENous PRN    sodium chloride (NS) flush 10 mL  10 mL IntraVENous PRN      Allergies   Allergen Reactions    Doxycycline Diarrhea     Patient states can tolerate with yogurt    Keflex [Cephalexin] Hives    Penicillins Rash        Review of Systems: A complete review of systems was obtained, negative except as described above. Physical Exam:     Visit Vitals  /61   Pulse 82   Temp 96.4 °F (35.8 °C) (Temporal)   Resp 18   Ht 5' 6\" (1.676 m)   Wt 160 lb 14.4 oz (73 kg)   SpO2 96%   BMI 25.97 kg/m²     ECOG PS: 0  General: No distress  Eyes: PERRLA, anicteric sclerae  HENT: Atraumatic, OP clear  Neck: Supple  Lymphatic: No cervical, supraclavicular adenopathy  Respiratory: exp wheezing  CV: Normal rate, regular rhythm, no murmurs, no peripheral edema  GI: Soft, tender, distended, no masses, +BS  MS:  Digits without clubbing or cyanosis. Skin: No rashes, ecchymoses, or petechiae. Normal temperature, turgor, and texture. Psych: Alert, oriented, appropriate affect, normal judgment/insight  Breasts:  L breast 12:00, 3 cm FN, <1 cm mass, 1.5 cm L ax LN     Results:     Lab Results   Component Value Date/Time    WBC 8.9 06/19/2019 07:54 AM    HGB 12.0 06/19/2019 07:54 AM    HCT 36.3 06/19/2019 07:54 AM    PLATELET 102 77/74/7470 07:54 AM    MCV 95.0 06/19/2019 07:54 AM    ABS.  NEUTROPHILS 6.3 06/19/2019 07:54 AM     Lab Results   Component Value Date/Time    Sodium 142 06/19/2019 07:54 AM    Potassium 3.2 (L) 06/19/2019 07:54 AM    Chloride 105 06/19/2019 07:54 AM    CO2 31 06/19/2019 07:54 AM    Glucose 111 (H) 06/19/2019 07:54 AM    BUN 10 06/19/2019 07:54 AM    Creatinine 0.83 06/19/2019 07:54 AM    GFR est AA >60 06/19/2019 07:54 AM    GFR est non-AA >60 06/19/2019 07:54 AM    Calcium 9.8 06/19/2019 07:54 AM    Glucose (POC) 93 03/19/2019 06:56 AM     Lab Results   Component Value Date/Time    Bilirubin, total 0.4 06/19/2019 07:54 AM    ALT (SGPT) 20 06/19/2019 07:54 AM    AST (SGOT) 10 (L) 06/19/2019 07:54 AM    Alk. phosphatase 79 06/19/2019 07:54 AM    Protein, total 6.6 06/19/2019 07:54 AM    Albumin 3.5 06/19/2019 07:54 AM    Globulin 3.1 06/19/2019 07:54 AM       2/12/19 mammogram and US  MAMMOGRAPHY:  The breast demonstrates stable scattered density breast  architecture. Underlying the site of clinical concern in the left breast is  masslike focal asymmetry in the anterior upper left breast. There is no other  dominant mass lesion, architectural distortion, asymmetry, or calcification. There is no skin thickening or nipple retraction.     ULTRASOUND:  Corresponding to the mammographic density there is a 2.4 x 3.1 x  3.7 cm hypoechoic lobulated mass at about the 12:00 position and 3 cm radial  distance from the nipple. No other suspicious cystic or solid lesions  Identified. Dr. Lloyd Benton has identified 2 large, suspicious ax LN on US:  1.7 cm and 1.2 cm    5/16/19 Left mammo and US:  The breast parenchyma is heterogeneously dense. Left breast biopsy clip is  unchanged in position. The central upper left breast mass seen on prior MR exam  dated February 2019 has decreased in size.     Targeted sonographic evaluation of the 12:00 position of the left breast is  performed.  Within the 12:00 position of the left breast, there is a 2.1 cm x 1.3  cm x 3.1 cm irregular hypoechoic mass containing calcifications, decreased in  size and measuring 3.8 cm x 2.4 cm x 3.1 cm on prior ultrasound dated February 2018.     IMPRESSION: BI-RADS 6. Pathology proven left breast cancer. Interval decrease in size of 12:00 left breast mass, now measuring 2.1 cm x 1.3 cm x 3.1 cm. Findings were discussed with the patient at the time of interpretation. Records reviewed and summarized above. Assessment/plan:   1. Left central IDC, gr 2, ER +, CT +, HER 2 positive:  cT2 cN1a cM0, stage IIB, prognostic stage IB    We explained to the patient that the goal of systemic adjuvant therapy is to improve the chances for cure and decrease the risk of relapse. We explained why a patient can have microscopic cancer spread now even though physical examination, laboratory studies and imaging studies are negative for cancer. We explained that the same treatments used now as adjuvant or preventive treatments rarely if ever are curative in women who develop metastases. We discussed that there is no difference in overall survival between neoadjuvant and adjuvant chemotherapy. We discussed the rationale for neoadjuvant chemotherapy, if chemotherapy is warranted, as it is in this case: to avoid any potential delays in giving chemotherapy following surgery, to be able to see the response of the tumor to chemotherapy, and to potentially downstage the tumor prior to surgery. Using eprognosis. org, her 5 year risk of all cause mortality is 20%, her risk in 10 years is 52-58%, average OS is 9-10 years. Therapy discussion is warranted. Patricai Reeder suggests equivalency between q.3 week Adriamycin, Cytoxan followed by weekly paclitaxel and trastuzumab compared with the [facility] regimen. However, this study was not powered to show a difference between these two regimens, and in the NEJ publication, the AC-TH arm showed a numerically advantange (though not statistically significant) to the [facility] arm.   In this patient, it is completely reasonable to use [facility] approach and avoid the potential cardiotoxicity of the anthracyclines as well as the potential for leukemia. We discussed the toxicities of docetaxel and carboplatin chemotherapy in detail. This chemotherapy frequently causes a low white blood cell count and hospital admissions for treatment of neutropenic fever. We explained that we consider the use of growth factors to minimize this risk. We explained to the patient that some side effects if they occur only last a few days including nausea, vomiting, stomatitis, arthralgia, myalgia,and allergic reactions to Taxotere. We told the patient that severe nausea and vomiting were uncommon and that some side effects,if they occur, will last longer; this includes hair loss, which will be seen in all patients treated with these agents and fatigue,which will be seen in most.  We also informed that for the patient that heart damage is rare with these agents. We explained that carboplatin can rarely cause kidney damage and high frequency hearing loss. We provided the patient in detail her information concerning the toxicities of this regimen in addition to her overall discussion. Rational for therapy with pertuzumab was also discussed with the patient including a nearly 20% improvement seen in pCR in the neoadjuvant setting with the addition of this medication. Additional AE discussed including an acne rash (and the use of doxycyline 100 mg bid to help prevent) and diarrhea and consideration of additional cardiomyopathy. Rationale for therapy with trastuzumab was also discussed with the patient including a 50% proportional improvement in disease free survival and also an improvement in overall survival in patients receiving trastuzumab and chemotherapy for HER-2 positive breast cancer. The side effects of trastuzumab were discussed including a 4%-5% risk of dropping her ejection fraction while on treatment and about a 1% risk of CHF.   We discussed that this drug will be used every 3 weeks for remainder of a year following the chemotherapy cycles. We will check her EF before chemotherapy and every 3 months while she is receiving trastuzumab. · TCH-P (Trastuzumab 8mg/kg load with cycle 1 then 6mg/kg, Docetaxel 75mg/m2, Carboplatin AUC 6, Pertuzumab 840mg load with cycle 1 then 420mg) given every 3 weeks x 6 cycles  · Labs: CBC, CMP prior to each treatment. · Antiemetic Prophylaxis: Palonosetron and dexamethasone prior to chemo  · PRN Antiemetics: Ondansetron, Compazine  · Swelling prophylaxis: Dexmethasone 8mg bid the day before and day after chemotherapy  · TTE prior to chemotherapy and every 12 weeks while on Trastuzumab  · Neulasta 24-72 hours after each treatment    The patient was given presciptions for emla cream, decadron to take 8 mg bid the day before and day after chemo, zofran and compazine. Neulasta the following day. After this discussion, she is agreeable to TCH-P, cycle 1 given on 3/6, she has signed informed consent. Cryotherapy discussed with docetaxel as well    LN + by core, she may be eligible for the [place] SLN study if her LN clinically resolves    TTE 3/6/19 EF 72%, TTE 6/4/19 EF 66%    Port has been placed. Treatment has been delayed due to her diverticulitis and recent hospitalization. TCHP #5 today, midpoint imaging shows response to treatment. She plans on bilateral mastecomies. Met with Dr. Yandel Fgiueroa yesterday, plan for 1st week of August.    We will plan to see the patient in follow up at least once per cycle, or sooner if symptoms warrant. 2. Emotional well being:  She has excellent support and is coping well with her disease    3. tob use:  Smokes couple cigs/day; counseled to quit    4. Diverticulitis with abscess/Abdominal pain:  Seen in ED 3/8/19 for abdominal pain and constipation. CT abd/pelvis showed diverticulitis and KUB showed nonobstructive bowel gas pattern and small amount of stool.   She was seen in office on 3/12/19 and was sent to ED for worsening abdominal pain. Found to have diverticulitis with multiple abscesses. S/p IV abx and perc drainage followed by colostomy on 3/15/19. She was in SNF until 4/1/19 for IV abx. She had follow up with Dr. Lauren Millard on 6/17/19. Next follow up on 7/25/19.    5. Constipation with bloating:  Previously due to diverticulitis, see #4. Now with colostomy. Reports she had some constipation a few days ago and met with Dr. Lauren Millard on 6/17/19 who recommended Miralax bid which helped. 6. Expiratory wheeze: Resolved. PRN inhalers. Will monitor. 7. Fatigue: due to recent hospital stay. Appetite continues to improve. Energy level has been improving since being home. 8. GERD: due to doxycycline. This has resolved with decreased dose to daily as well as continuing Pepcid daily. 9. 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 Started after Cycle 3. Reports her feet feel \"hot on the inside\" but states this has improved slightly. Does not wish to start any medications at this time. Patient will let us know if this worsens. 10. Decreased EF:  EF dropped from 72% to 66% and global strain dropped from 18% to 15.7% between 3/2019 to 6/2019, however per Dr. Mary Banegas, visually there is no significant change. Recommend close follow up and Troponin I level, ordered. 11. Port discomfort:  Port is functioning, she met with Dr. Rossana Varner on 6/18/19. No infection    This patient was seen in conjunction with Kasey Lizarraga NP. I appreciate the opportunity to participate in Ms. Kris Krause's care. Signed By: Maylin Foster MD      No orders of the defined types were placed in this encounter.

## 2019-06-19 NOTE — PROGRESS NOTES
[facility]patient [facility]fusion [facility] - Chemotherapy Progress Note  8602 Pt admit to [facility] for C5D1 [facility] ambulatory in stable condition. Assessment completed. No new concerns voiced. Right chest port accessed without difficulty. Positive blood return noted. Labs drawn. 1300 Call to Lidia at Dr. Ilan Barrett office to advise K 3.2. Order for KCL 40meq PO now received. Chemotherapy Flowsheet 6/19/2019   Cycle C5D1   Date 6/19/2019   Drug / Regimen [facility]       Visit Vitals  /72   Pulse 68   Temp 96.4 °F (35.8 °C)   Resp 18   Ht 5' 6\" (1.676 m)   Wt 73 kg (160 lb 14.4 oz)   SpO2 96%   Breastfeeding? No   BMI 25.97 kg/m²     Medications:  IV NS flush  IV Heparin flush  IV emend  IV aloxi  IV decadron  IV herceptin  IV perjeta  IV taxotere  IV carboplatin  SQ Neulasta OBI  PO KCL    1405 Pt tolerated treatment well. Right chest port maintained positive blood return throughout treatment, flushed with positive blood return at conclusion and concepcion removed. D/c home ambulatory in no distress. Pt aware of next appointment scheduled for 7/10 @ 0730. Recent Results (from the past 12 hour(s))   CBC WITH AUTOMATED DIFF    Collection Time: 06/19/19  7:54 AM   Result Value Ref Range    WBC 8.9 3.6 - 11.0 K/uL    RBC 3.82 3.80 - 5.20 M/uL    HGB 12.0 11.5 - 16.0 g/dL    HCT 36.3 35.0 - 47.0 %    MCV 95.0 80.0 - 99.0 FL    MCH 31.4 26.0 - 34.0 PG    MCHC 33.1 30.0 - 36.5 g/dL    RDW 16.7 (H) 11.5 - 14.5 %    PLATELET 289 888 - 675 K/uL    MPV 8.9 8.9 - 12.9 FL    NRBC 0.0 0  WBC    ABSOLUTE NRBC 0.00 0.00 - 0.01 K/uL    NEUTROPHILS 71 32 - 75 %    LYMPHOCYTES 19 12 - 49 %    MONOCYTES 10 5 - 13 %    EOSINOPHILS 0 0 - 7 %    BASOPHILS 0 0 - 1 %    IMMATURE GRANULOCYTES 0 0.0 - 0.5 %    ABS. NEUTROPHILS 6.3 1.8 - 8.0 K/UL    ABS. LYMPHOCYTES 1.7 0.8 - 3.5 K/UL    ABS. MONOCYTES 0.9 0.0 - 1.0 K/UL    ABS. EOSINOPHILS 0.0 0.0 - 0.4 K/UL    ABS. BASOPHILS 0.0 0.0 - 0.1 K/UL    ABS. IMM.  GRANS. 0.0 0.00 - 0.04 K/UL    DF AUTOMATED METABOLIC PANEL, COMPREHENSIVE    Collection Time: 06/19/19  7:54 AM   Result Value Ref Range    Sodium 142 136 - 145 mmol/L    Potassium 3.2 (L) 3.5 - 5.1 mmol/L    Chloride 105 97 - 108 mmol/L    CO2 31 21 - 32 mmol/L    Anion gap 6 5 - 15 mmol/L    Glucose 111 (H) 65 - 100 mg/dL    BUN 10 6 - 20 MG/DL    Creatinine 0.83 0.55 - 1.02 MG/DL    BUN/Creatinine ratio 12 12 - 20      GFR est AA >60 >60 ml/min/1.73m2    GFR est non-AA >60 >60 ml/min/1.73m2    Calcium 9.8 8.5 - 10.1 MG/DL    Bilirubin, total 0.4 0.2 - 1.0 MG/DL    ALT (SGPT) 20 12 - 78 U/L    AST (SGOT) 10 (L) 15 - 37 U/L    Alk.  phosphatase 79 45 - 117 U/L    Protein, total 6.6 6.4 - 8.2 g/dL    Albumin 3.5 3.5 - 5.0 g/dL    Globulin 3.1 2.0 - 4.0 g/dL    A-G Ratio 1.1 1.1 - 2.2     TROPONIN I    Collection Time: 06/19/19  9:19 AM   Result Value Ref Range    Troponin-I, Qt. <0.05 <0.05 ng/mL

## 2019-06-20 NOTE — PROGRESS NOTES
Visit charted 6/19/19  Spiritual Care Partner Volunteer visited patient in [facility] on 6/19/19.   Documented by: Esequiel Villareal, MS., [address] ([id number])

## 2019-06-20 NOTE — TELEPHONE ENCOUNTER
SCHEDULED SURGERY AT [facility] ON 8-5-19 AT 7:30 AM; PATIENT WILL ARRIVE AT 6:00 AM AND CHECK IN ON THE 2ND FL AT PATIENT REGISTRATION    PREADMISSION TESTING-RN WILL CALL FROM HOSPITAL    POST OP  [address] ON 8-13-19 AT 1:30 PM    PATIENT WAS CALLED AND GIVEN INSTRUCTIONS AND ALSO MAILED INSTRUCTIONS TO HOME ADDRESS    PATIENT WAS MADE AWARE THAT MEDICARE DOES NOT COVER PROPHYLACTIC RIGHT BREAST. ABN MAILED TO HER HOME ADDRESS.

## 2019-07-02 DIAGNOSIS — Z17.0 MALIGNANT NEOPLASM OF CENTRAL PORTION OF LEFT BREAST IN FEMALE, ESTROGEN RECEPTOR POSITIVE (HCC): Primary | ICD-10-CM

## 2019-07-02 DIAGNOSIS — C50.112 MALIGNANT NEOPLASM OF CENTRAL PORTION OF LEFT BREAST IN FEMALE, ESTROGEN RECEPTOR POSITIVE (HCC): Primary | ICD-10-CM

## 2019-07-08 ENCOUNTER — HOSPITAL ENCOUNTER (OUTPATIENT)
Dept: PREADMISSION TESTING | Age: 74
Discharge: HOME | End: 2019-07-08
Payer: MEDICARE

## 2019-07-08 VITALS
DIASTOLIC BLOOD PRESSURE: 60 MMHG | TEMPERATURE: 97.9 F | BODY MASS INDEX: 25.25 KG/M2 | HEART RATE: 98 BPM | RESPIRATION RATE: 20 BRPM | WEIGHT: 157.13 LBS | OXYGEN SATURATION: 99 % | SYSTOLIC BLOOD PRESSURE: 118 MMHG | HEIGHT: 66 IN

## 2019-07-08 DIAGNOSIS — C50.112 MALIGNANT NEOPLASM OF CENTRAL PORTION OF LEFT BREAST IN FEMALE, ESTROGEN RECEPTOR POSITIVE (HCC): ICD-10-CM

## 2019-07-08 DIAGNOSIS — Z17.0 MALIGNANT NEOPLASM OF CENTRAL PORTION OF LEFT BREAST IN FEMALE, ESTROGEN RECEPTOR POSITIVE (HCC): ICD-10-CM

## 2019-07-08 LAB
ABO + RH BLD: NORMAL
BLOOD GROUP ANTIBODIES SERPL: NORMAL
SPECIMEN EXP DATE BLD: NORMAL

## 2019-07-08 PROCEDURE — 86900 BLOOD TYPING SEROLOGIC ABO: CPT

## 2019-07-08 PROCEDURE — 36415 COLL VENOUS BLD VENIPUNCTURE: CPT

## 2019-07-08 RX ORDER — FAMOTIDINE 20 MG/1
20 TABLET, FILM COATED ORAL DAILY
COMMUNITY

## 2019-07-08 RX ORDER — DOXYCYCLINE 100 MG/1
100 TABLET ORAL DAILY
COMMUNITY
End: 2019-11-20 | Stop reason: ALTCHOICE

## 2019-07-08 NOTE — PERIOP NOTES
[address], [address] [facility]                            MAIN OR                                  [phone]   MAIN PRE OP                          [phone]                                                                                AMBULATORY PRE OP          [phone]  PRE-ADMISSION TESTING    [phone]     Surgery Date:   Monday 7/5/19        Is surgery arrival time given by surgeon? NO  If [facility] staff will call you Friday 8/2/19  between 3 and 7pm the day before your surgery with your arrival time. (If your surgery is on a Monday, we will call you the Friday before.)    Call [phone] after 7pm Monday-Friday if you did not receive your arrival time. INSTRUCTIONS BEFORE YOUR SURGERY   When You  Arrive   Arrive at the 2nd [address] [facility] on the day of your surgery  Have your insurance card, photo ID, and any copayment (if needed)     Food   and   Drink   NO food or drink after midnight the night before surgery    This means NO water, gum, mints, coffee, juice, etc.  No alcohol (beer, wine, liquor) 24 hours before and after surgery     Medications to   TAKE   Morning of Surgery   MEDICATIONS TO TAKE THE MORNING OF SURGERY WITH A SIP OF WATER:    pepcid,venlafaxine     Medications  To  STOP      7 days before surgery  Non-Steroidal anti-inflammatory Drugs (NSAID's): for example, Ibuprofen (Advil, Motrin), Naproxen (Aleve)   Aspirin, if taking for pain    Herbal supplements, vitamins, and fish oil     Blood  Thinners  If you take  Aspirin, Plavix, Coumadin, or any blood-thinning or anti-blood clot medicine, talk to the doctor who prescribed the medications for pre-operative instructions.      Bathing Clothing  Jewelry  Valuables       If you shower the morning of surgery, please do not apply anything to your skin (lotions, powders, deodorant, or makeup, especially mascara)   Do not shave or trim anywhere 24 hours before surgery   Wear your hair loose or down; no pony-tails, buns, or metal hair clips   Wear loose, comfortable, clean clothes   Wear glasses instead of contacts   Leave money, valuables, and jewelry, including body piercings, at home     Going Home       or Spending the Night    SAME-DAY SURGERY: You must have a responsible adult drive you home and stay with you 24 hours after surgery   ADMITS: If your doctor is keeping you into the hospital after surgery, leave personal belongings/luggage in your car until you have a hospital room number. Hospital discharge time is 12 noon  Drivers must be here before 12 noon unless you are told differently   Special Instructions Free  parking 7a-5p. Bring completed medication list on day of surgery       Follow all instructions so your surgery wont be cancelled. Please, be on time. If a situation occurs and you are delayed the day of surgery, call [phone] or          [phone]. If your physical condition changes (like a fever, cold, flu, etc.) call your surgeon. The patient was contacted  in person. Home medication reviewed and verified during PAT appointment. The patient verbalizes understanding of all instructions and does not  need reinforcement.

## 2019-07-08 NOTE — PROGRESS NOTES
[facility] [place] [facility] [place] [address] [address] Smith: [phone]  F: [phone]      Reason for Visit:   Nellie Grewal is a 66 y.o. female who is seen in consultation at the request of Dr. Kennedy Hdez for evaluation of therapy for breast cancer. Treatment History:   · 2/15/19 Left core bx:   IDC, gr 2, 1.1 cm, ER + at 100%, IA + at 20%, HER 2 POSITIVE (IHC 2+; FISH ratio 3.9; sig/cell 9.2)  · 3/1/19 Left ax LN core bx:  + for breast cancer, ER + at 100%, IA negative, HER 2 POSITIVE (IHC 2+, FISH ratio 2; sig/cell 5.8)  · TCH-P 3/6/19-7/10/19  · #2 delayed due to diverticulitis abscess and surgery 4/17/19    History of Present Illness:   Pt noticed the left breast mass herself in Feb 2019, leading to the pathology above    She was seen in ED on 3/8/19 for abdominal pain and constipation CT abd/pelvis showed diverticulitis and received levaquin and flagyl. KUB also showed nonobstructive bowel gas pattern. Reports last good BM was 3/6 prior to chemo. She states on 3/10 and 3/12 had small watery BM. Sent to ED from office on 3/12/19 for worsening abdominal pain. She was found to have diverticulitis with multiple abscesses. S/p IV abx and perc drain with no relief of her pain, therefore proceeded with open sigmoidectomy and end colostomy on 3/15/19. She was discharged to SNF with IV abx until 3/29/19. Interval history:  In today for follow up and treatment. Fatigue grade 1, hair loss grade 1, insomnia grade 1. Reports itching and burning bilateral eyes that started last week. She has been doing warm compresses and using eye lubricant. Reports the drainage as clear to thick. Vaginal dryness grade 1.      FH:  Paternal aunt breast cancer in her [de-identified]; no ovarian cancer, no prostate or pancreas cancer    Past Medical History:   Diagnosis Date    Adverse effect of anesthesia     \"difficulty waking up from anesthethia\"    Cancer [facility]) 02/2019    left breast. current chemo patient    Depression     in past    GERD (gastroesophageal reflux disease)     High cholesterol     Hypertension       Past Surgical History:   Procedure Laterality Date    HX BREAST BIOPSY Right years ago    neg; needle bx    HX BREAST BIOPSY Left 02/15/2019    IDC    HX CATARACT REMOVAL      2013 (R), 2016(L)    HX COLONOSCOPY      HX COLOSTOMY Left 03/2019    HX ORTHOPAEDIC      carpal tunnel    HX VASCULAR ACCESS Right 02/2019    portacath    IR CHANGE DRAIN  ABCESS PERC  03/2019      Social History     Tobacco Use    Smoking status: Current Every Day Smoker     Packs/day: 0.25     Years: 55.00     Pack years: 13.75    Smokeless tobacco: Never Used    Tobacco comment: 1-2 cigs/day   Substance Use Topics    Alcohol use: Yes     Alcohol/week: 0.6 oz     Types: 1 Shots of liquor per week     Drinks per session: 5 or 6     Binge frequency: Weekly     Comment: Patient stated that she puts a splash of bourbon in her afternoon coffee around 5-6 days per week      Family History   Problem Relation Age of Onset    Breast Cancer Maternal Aunt         [de-identified]    Hypertension Mother     Hypertension Father     Cancer Father         Lung    Cancer Brother         leukemia    Diabetes Brother         2 brothers with diabetes    Hypertension Sister         2 sisters and 4 brothers with HTN     Current Outpatient Medications   Medication Sig    doxycycline (ADOXA) 100 mg tablet Take 100 mg by mouth daily.  famotidine (PEPCID AC) 20 mg tablet Take 20 mg by mouth daily.  cyanocobalamin/mecobalamin (CYANOCOBALAMIN-METHYLCOBALAMIN SL) 5,000 mcg by SubLINGual route daily.  polyethylene glycol (MIRALAX) 17 gram packet Take 1 Packet by mouth daily as needed (constipation).  naproxen sodium (ALEVE PO) Take  by mouth two (2) times daily as needed.  lidocaine-prilocaine (EMLA) topical cream Apply  to affected area as needed for Pain.     prochlorperazine (COMPAZINE) 10 mg tablet Take 1 Tab by mouth every six (6) hours as needed for Nausea.  losartan-hydroCHLOROthiazide (HYZAAR) 100-25 mg per tablet Take 0.5 Tabs by mouth daily.  pravastatin (PRAVACHOL) 40 mg tablet Take 40 mg by mouth nightly.  STIOLTO RESPIMAT 2.5-2.5 mcg/actuation inhaler Take 2 Puffs by inhalation nightly.  venlafaxine (EFFEXOR) 50 mg tablet Take 50 mg by mouth daily.  cholecalciferol (VITAMIN D3) 1,000 unit cap Take 1,000 Units by mouth daily.  calcium-cholecalciferol, D3, (CALTRATE 600+D) tablet Take 1 Tab by mouth nightly.  aspirin delayed-release 81 mg tablet Take  by mouth every other day. No current facility-administered medications for this visit. Allergies   Allergen Reactions    Keflex [Cephalexin] Hives    Penicillins Rash    Doxycycline Other (comments)     STOMACH CRAMPS        Review of Systems: A complete review of systems was obtained, negative except as described above. Physical Exam:     Visit Vitals  /71   Pulse 83   Temp 97 °F (36.1 °C) (Oral)   Resp 16   Ht 5' 6\" (1.676 m)   Wt 158 lb (71.7 kg)   SpO2 99%   BMI 25.50 kg/m²     ECOG PS: 0  General: No distress  Eyes: PERRLA, anicteric sclerae, bilateral sclerae injected. HENT: Atraumatic, OP clear  Neck: Supple  Lymphatic: No cervical, supraclavicular adenopathy  Respiratory: clear to auscultation bilaterally  CV: Normal rate, regular rhythm, no murmurs, no peripheral edema  GI: Soft, tender, distended, no masses, +BS, ostomy in place  MS:  Digits without clubbing or cyanosis. Skin: No rashes, ecchymoses, or petechiae. Normal temperature, turgor, and texture.   Psych: Alert, oriented, appropriate affect, normal judgment/insight  Breasts:  L breast 12:00, 3 cm FN, <1 cm mass, 1.5 cm L ax LN (deferred today)     Results:     Lab Results   Component Value Date/Time    WBC 11.3 (H) 07/10/2019 07:57 AM    HGB 11.8 07/10/2019 07:57 AM    HCT 34.5 (L) 07/10/2019 07:57 AM    PLATELET 906 57/35/8424 07:57 AM    MCV 96.6 07/10/2019 07:57 AM    ABS. NEUTROPHILS 8.0 07/10/2019 07:57 AM     Lab Results   Component Value Date/Time    Sodium 138 07/10/2019 07:57 AM    Potassium 3.0 (L) 07/10/2019 07:57 AM    Chloride 100 07/10/2019 07:57 AM    CO2 32 07/10/2019 07:57 AM    Glucose 107 (H) 07/10/2019 07:57 AM    BUN 13 07/10/2019 07:57 AM    Creatinine 0.76 07/10/2019 07:57 AM    GFR est AA >60 07/10/2019 07:57 AM    GFR est non-AA >60 07/10/2019 07:57 AM    Calcium 9.9 07/10/2019 07:57 AM    Glucose (POC) 93 03/19/2019 06:56 AM     Lab Results   Component Value Date/Time    Bilirubin, total 0.4 07/10/2019 07:57 AM    ALT (SGPT) 17 07/10/2019 07:57 AM    AST (SGOT) 13 (L) 07/10/2019 07:57 AM    Alk. phosphatase 83 07/10/2019 07:57 AM    Protein, total 6.6 07/10/2019 07:57 AM    Albumin 3.5 07/10/2019 07:57 AM    Globulin 3.1 07/10/2019 07:57 AM       2/12/19 mammogram and US  MAMMOGRAPHY:  The breast demonstrates stable scattered density breast  architecture. Underlying the site of clinical concern in the left breast is  masslike focal asymmetry in the anterior upper left breast. There is no other  dominant mass lesion, architectural distortion, asymmetry, or calcification. There is no skin thickening or nipple retraction.     ULTRASOUND:  Corresponding to the mammographic density there is a 2.4 x 3.1 x  3.7 cm hypoechoic lobulated mass at about the 12:00 position and 3 cm radial  distance from the nipple. No other suspicious cystic or solid lesions  Identified. Dr. Jared Johnson has identified 2 large, suspicious ax LN on US:  1.7 cm and 1.2 cm    5/16/19 Left mammo and US:  The breast parenchyma is heterogeneously dense. Left breast biopsy clip is  unchanged in position. The central upper left breast mass seen on prior MR exam  dated February 2019 has decreased in size.     Targeted sonographic evaluation of the 12:00 position of the left breast is  performed.  Within the 12:00 position of the left breast, there is a 2.1 cm x 1.3  cm x 3.1 cm irregular hypoechoic mass containing calcifications, decreased in  size and measuring 3.8 cm x 2.4 cm x 3.1 cm on prior ultrasound dated February 2018.     IMPRESSION: BI-RADS 6. Pathology proven left breast cancer. Interval decrease in size of 12:00 left breast mass, now measuring 2.1 cm x 1.3 cm x 3.1 cm. Findings were discussed with the patient at the time of interpretation. Records reviewed and summarized above. Assessment/plan:   1. Left central IDC, gr 2, ER +, MO +, HER 2 positive:  cT2 cN1a cM0, stage IIB, prognostic stage IB    We explained to the patient that the goal of systemic adjuvant therapy is to improve the chances for cure and decrease the risk of relapse. We explained why a patient can have microscopic cancer spread now even though physical examination, laboratory studies and imaging studies are negative for cancer. We explained that the same treatments used now as adjuvant or preventive treatments rarely if ever are curative in women who develop metastases. We discussed that there is no difference in overall survival between neoadjuvant and adjuvant chemotherapy. We discussed the rationale for neoadjuvant chemotherapy, if chemotherapy is warranted, as it is in this case: to avoid any potential delays in giving chemotherapy following surgery, to be able to see the response of the tumor to chemotherapy, and to potentially downstage the tumor prior to surgery. Using eprognosis. org, her 5 year risk of all cause mortality is 20%, her risk in 10 years is 52-58%, average OS is 9-10 years. Therapy discussion is warranted. Chepe Kohler suggests equivalency between q.3 week Adriamycin, Cytoxan followed by weekly paclitaxel and trastuzumab compared with the [facility] regimen.  However, this study was not powered to show a difference between these two regimens, and in the [facility] publication, the AC-TH arm showed a numerically advantange (though not statistically significant) to the [facility] arm. In this patient, it is completely reasonable to use [facility] approach and avoid the potential cardiotoxicity of the anthracyclines as well as the potential for leukemia. We discussed the toxicities of docetaxel and carboplatin chemotherapy in detail. This chemotherapy frequently causes a low white blood cell count and hospital admissions for treatment of neutropenic fever. We explained that we consider the use of growth factors to minimize this risk. We explained to the patient that some side effects if they occur only last a few days including nausea, vomiting, stomatitis, arthralgia, myalgia,and allergic reactions to Taxotere. We told the patient that severe nausea and vomiting were uncommon and that some side effects,if they occur, will last longer; this includes hair loss, which will be seen in all patients treated with these agents and fatigue,which will be seen in most.  We also informed that for the patient that heart damage is rare with these agents. We explained that carboplatin can rarely cause kidney damage and high frequency hearing loss. We provided the patient in detail her information concerning the toxicities of this regimen in addition to her overall discussion. Rational for therapy with pertuzumab was also discussed with the patient including a nearly 20% improvement seen in pCR in the neoadjuvant setting with the addition of this medication. Additional AE discussed including an acne rash (and the use of doxycyline 100 mg bid to help prevent) and diarrhea and consideration of additional cardiomyopathy. Rationale for therapy with trastuzumab was also discussed with the patient including a 50% proportional improvement in disease free survival and also an improvement in overall survival in patients receiving trastuzumab and chemotherapy for HER-2 positive breast cancer.   The side effects of trastuzumab were discussed including a 4%-5% risk of dropping her ejection fraction while on treatment and about a 1% risk of CHF. We discussed that this drug will be used every 3 weeks for remainder of a year following the chemotherapy cycles. We will check her EF before chemotherapy and every 3 months while she is receiving trastuzumab. · TCH-P (Trastuzumab 8mg/kg load with cycle 1 then 6mg/kg, Docetaxel 75mg/m2, Carboplatin AUC 6, Pertuzumab 840mg load with cycle 1 then 420mg) given every 3 weeks x 6 cycles  · Labs: CBC, CMP prior to each treatment. · Antiemetic Prophylaxis: Palonosetron and dexamethasone prior to chemo  · PRN Antiemetics: Ondansetron, Compazine  · Swelling prophylaxis: Dexmethasone 8mg bid the day before and day after chemotherapy  · TTE prior to chemotherapy and every 12 weeks while on Trastuzumab  · Neulasta 24-72 hours after each treatment    The patient was given presciptions for emla cream, decadron to take 8 mg bid the day before and day after chemo, zofran and compazine. Neulasta the following day. After this discussion, she is agreeable to TCH-P, cycle 1 given on 3/6, she has signed informed consent. Cryotherapy discussed with docetaxel as well    LN + by core, she may be eligible for the [place] SLN study if her LN clinically resolves    TTE 3/6/19 EF 72%, TTE 6/4/19 EF 66%    Port has been placed. Midpoint imaging shows response to treatment. TC #6 today, endpoint imaging ordered. She plans on bilateral mastecomies. Met with Dr. Cyndi Burgess 6/18/19, plan for surgery on 8/5/19. We will plan to see the patient in follow up at least once per cycle, or sooner if symptoms warrant. 2. Emotional well being:  She has excellent support and is coping well with her disease    3. tob use:  Smokes couple cigs/day; counseled to quit    4. Diverticulitis with abscess/Abdominal pain:  Seen in ED 3/8/19 for abdominal pain and constipation. CT abd/pelvis showed diverticulitis and KUB showed nonobstructive bowel gas pattern and small amount of stool.   She was seen in office on 3/12/19 and was sent to ED for worsening abdominal pain. Found to have diverticulitis with multiple abscesses. S/p IV abx and perc drainage followed by colostomy on 3/15/19. She was in SNF until 4/1/19 for IV abx. She had follow up with Dr. Delilah Flannery on 6/17/19. Next follow up on 7/25/19.    5. Constipation with bloating:  Previously due to diverticulitis, see #4. Now with colostomy. PRN miralax BID with improvement    6. Expiratory wheeze: Resolved. PRN inhalers. Will monitor. 7. Fatigue: due to hospital stay. Appetite continues to improve. Energy level has been improving since being home. 8. GERD: due to doxycycline. This has resolved with decreased dose to daily as well as continuing Pepcid daily. 9. 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 Started after Cycle 3. Reports her feet feel \"hot on the inside\" but states this has improved slightly. Does not wish to start any medications at this time. Patient will let us know if this worsens. 10. Decreased EF:  EF dropped from 72% to 66% and global strain dropped from 18% to 15.7% between 3/2019 to 6/2019, however per Dr. Armand Roque, visually there is no significant change. Recommend close follow up and Troponin I level. Trop on 6/19/19 was normal.     11. Port discomfort:  Port is functioning, she met with Dr. Abi Sheth on 6/18/19. No infection    12. Viral conjunctivitis: Reports itching and burning bilateral eyes that started last week. Afebrile. -- Lubricating eye drops and warm compresses    13. Hypokalemia: K+ 3.0. Replete with 40 meq NOW. -- Take 20 meq K daily. This patient was seen in conjunction with Juanis Fitzpatrick NP. I appreciate the opportunity to participate in Ms. Charlette Krause's care. Signed By: Cathryn Garrison MD      No orders of the defined types were placed in this encounter.

## 2019-07-10 ENCOUNTER — OFFICE VISIT (OUTPATIENT)
Dept: ONCOLOGY | Age: 74
End: 2019-07-10

## 2019-07-10 ENCOUNTER — HOSPITAL ENCOUNTER (OUTPATIENT)
Dept: INFUSION THERAPY | Age: 74
Discharge: HOME | End: 2019-07-10
Payer: MEDICARE

## 2019-07-10 ENCOUNTER — TELEPHONE (OUTPATIENT)
Dept: ONCOLOGY | Age: 74
End: 2019-07-10

## 2019-07-10 VITALS
HEIGHT: 66 IN | RESPIRATION RATE: 16 BRPM | HEART RATE: 83 BPM | TEMPERATURE: 97 F | OXYGEN SATURATION: 99 % | BODY MASS INDEX: 25.39 KG/M2 | SYSTOLIC BLOOD PRESSURE: 121 MMHG | DIASTOLIC BLOOD PRESSURE: 71 MMHG | WEIGHT: 158 LBS

## 2019-07-10 VITALS
HEIGHT: 66 IN | OXYGEN SATURATION: 98 % | RESPIRATION RATE: 16 BRPM | SYSTOLIC BLOOD PRESSURE: 150 MMHG | BODY MASS INDEX: 25.54 KG/M2 | HEART RATE: 65 BPM | WEIGHT: 158.9 LBS | DIASTOLIC BLOOD PRESSURE: 78 MMHG | TEMPERATURE: 97.4 F

## 2019-07-10 DIAGNOSIS — C50.112 MALIGNANT NEOPLASM OF CENTRAL PORTION OF LEFT BREAST IN FEMALE, ESTROGEN RECEPTOR POSITIVE (HCC): Primary | ICD-10-CM

## 2019-07-10 DIAGNOSIS — Z51.11 CHEMOTHERAPY MANAGEMENT, ENCOUNTER FOR: ICD-10-CM

## 2019-07-10 DIAGNOSIS — G62.9 NEUROPATHY: ICD-10-CM

## 2019-07-10 DIAGNOSIS — K57.92 DIVERTICULITIS: ICD-10-CM

## 2019-07-10 DIAGNOSIS — B35.1 NAIL FUNGUS: Primary | ICD-10-CM

## 2019-07-10 DIAGNOSIS — Z72.0 TOBACCO USE: ICD-10-CM

## 2019-07-10 DIAGNOSIS — K21.9 GASTROESOPHAGEAL REFLUX DISEASE WITHOUT ESOPHAGITIS: ICD-10-CM

## 2019-07-10 DIAGNOSIS — Z93.3 COLOSTOMY IN PLACE (HCC): ICD-10-CM

## 2019-07-10 DIAGNOSIS — Z17.0 MALIGNANT NEOPLASM OF CENTRAL PORTION OF LEFT BREAST IN FEMALE, ESTROGEN RECEPTOR POSITIVE (HCC): ICD-10-CM

## 2019-07-10 DIAGNOSIS — Z51.81 ENCOUNTER FOR MONITORING CARDIOTOXIC DRUG THERAPY: ICD-10-CM

## 2019-07-10 DIAGNOSIS — Z17.0 MALIGNANT NEOPLASM OF CENTRAL PORTION OF LEFT BREAST IN FEMALE, ESTROGEN RECEPTOR POSITIVE (HCC): Primary | ICD-10-CM

## 2019-07-10 DIAGNOSIS — E87.6 HYPOKALEMIA: ICD-10-CM

## 2019-07-10 DIAGNOSIS — K59.00 CONSTIPATION, UNSPECIFIED CONSTIPATION TYPE: ICD-10-CM

## 2019-07-10 DIAGNOSIS — Z79.899 ENCOUNTER FOR MONITORING CARDIOTOXIC DRUG THERAPY: ICD-10-CM

## 2019-07-10 DIAGNOSIS — C50.112 MALIGNANT NEOPLASM OF CENTRAL PORTION OF LEFT BREAST IN FEMALE, ESTROGEN RECEPTOR POSITIVE (HCC): ICD-10-CM

## 2019-07-10 DIAGNOSIS — R53.83 FATIGUE, UNSPECIFIED TYPE: ICD-10-CM

## 2019-07-10 LAB
ALBUMIN SERPL-MCNC: 3.5 G/DL (ref 3.5–5)
ALBUMIN/GLOB SERPL: 1.1 {RATIO} (ref 1.1–2.2)
ALP SERPL-CCNC: 83 U/L (ref 45–117)
ALT SERPL-CCNC: 17 U/L (ref 12–78)
ANION GAP SERPL CALC-SCNC: 6 MMOL/L (ref 5–15)
AST SERPL-CCNC: 13 U/L (ref 15–37)
BASOPHILS # BLD: 0 K/UL (ref 0–0.1)
BASOPHILS NFR BLD: 0 % (ref 0–1)
BILIRUB SERPL-MCNC: 0.4 MG/DL (ref 0.2–1)
BUN SERPL-MCNC: 13 MG/DL (ref 6–20)
BUN/CREAT SERPL: 17 (ref 12–20)
CALCIUM SERPL-MCNC: 9.9 MG/DL (ref 8.5–10.1)
CHLORIDE SERPL-SCNC: 100 MMOL/L (ref 97–108)
CO2 SERPL-SCNC: 32 MMOL/L (ref 21–32)
CREAT SERPL-MCNC: 0.76 MG/DL (ref 0.55–1.02)
DIFFERENTIAL METHOD BLD: ABNORMAL
EOSINOPHIL # BLD: 0 K/UL (ref 0–0.4)
EOSINOPHIL NFR BLD: 0 % (ref 0–7)
ERYTHROCYTE [DISTWIDTH] IN BLOOD BY AUTOMATED COUNT: 17.2 % (ref 11.5–14.5)
GLOBULIN SER CALC-MCNC: 3.1 G/DL (ref 2–4)
GLUCOSE SERPL-MCNC: 107 MG/DL (ref 65–100)
HCT VFR BLD AUTO: 34.5 % (ref 35–47)
HGB BLD-MCNC: 11.8 G/DL (ref 11.5–16)
IMM GRANULOCYTES # BLD AUTO: 0.1 K/UL (ref 0–0.04)
IMM GRANULOCYTES NFR BLD AUTO: 0 % (ref 0–0.5)
LYMPHOCYTES # BLD: 1.9 K/UL (ref 0.8–3.5)
LYMPHOCYTES NFR BLD: 17 % (ref 12–49)
MCH RBC QN AUTO: 33.1 PG (ref 26–34)
MCHC RBC AUTO-ENTMCNC: 34.2 G/DL (ref 30–36.5)
MCV RBC AUTO: 96.6 FL (ref 80–99)
MONOCYTES # BLD: 1.4 K/UL (ref 0–1)
MONOCYTES NFR BLD: 12 % (ref 5–13)
NEUTS SEG # BLD: 8 K/UL (ref 1.8–8)
NEUTS SEG NFR BLD: 71 % (ref 32–75)
NRBC # BLD: 0 K/UL (ref 0–0.01)
NRBC BLD-RTO: 0 PER 100 WBC
PLATELET # BLD AUTO: 246 K/UL (ref 150–400)
PMV BLD AUTO: 9.2 FL (ref 8.9–12.9)
POTASSIUM SERPL-SCNC: 3 MMOL/L (ref 3.5–5.1)
PROT SERPL-MCNC: 6.6 G/DL (ref 6.4–8.2)
RBC # BLD AUTO: 3.57 M/UL (ref 3.8–5.2)
SODIUM SERPL-SCNC: 138 MMOL/L (ref 136–145)
WBC # BLD AUTO: 11.3 K/UL (ref 3.6–11)

## 2019-07-10 PROCEDURE — 96367 TX/PROPH/DG ADDL SEQ IV INF: CPT

## 2019-07-10 PROCEDURE — 96377 APPLICATON ON-BODY INJECTOR: CPT

## 2019-07-10 PROCEDURE — 74011250636 HC RX REV CODE- 250/636: Performed by: INTERNAL MEDICINE

## 2019-07-10 PROCEDURE — 96417 CHEMO IV INFUS EACH ADDL SEQ: CPT

## 2019-07-10 PROCEDURE — 96375 TX/PRO/DX INJ NEW DRUG ADDON: CPT

## 2019-07-10 PROCEDURE — 96413 CHEMO IV INFUSION 1 HR: CPT

## 2019-07-10 PROCEDURE — 77030012965 HC NDL HUBR BBMI -A

## 2019-07-10 PROCEDURE — 74011000250 HC RX REV CODE- 250: Performed by: INTERNAL MEDICINE

## 2019-07-10 PROCEDURE — 74011000258 HC RX REV CODE- 258: Performed by: INTERNAL MEDICINE

## 2019-07-10 PROCEDURE — 80053 COMPREHEN METABOLIC PANEL: CPT

## 2019-07-10 PROCEDURE — 36415 COLL VENOUS BLD VENIPUNCTURE: CPT

## 2019-07-10 PROCEDURE — 74011250637 HC RX REV CODE- 250/637: Performed by: NURSE PRACTITIONER

## 2019-07-10 PROCEDURE — 85025 COMPLETE CBC W/AUTO DIFF WBC: CPT

## 2019-07-10 RX ORDER — SODIUM CHLORIDE 0.9 % (FLUSH) 0.9 %
10 SYRINGE (ML) INJECTION AS NEEDED
Status: ACTIVE | OUTPATIENT
Start: 2019-07-10 | End: 2019-07-10

## 2019-07-10 RX ORDER — POTASSIUM CHLORIDE 1.5 G/1.77G
40 POWDER, FOR SOLUTION ORAL
Status: COMPLETED | OUTPATIENT
Start: 2019-07-10 | End: 2019-07-10

## 2019-07-10 RX ORDER — DEXAMETHASONE SODIUM PHOSPHATE 100 MG/10ML
10 INJECTION INTRAMUSCULAR; INTRAVENOUS ONCE
Status: COMPLETED | OUTPATIENT
Start: 2019-07-10 | End: 2019-07-10

## 2019-07-10 RX ORDER — POTASSIUM CHLORIDE 750 MG/1
40 TABLET, FILM COATED, EXTENDED RELEASE ORAL
Status: DISCONTINUED | OUTPATIENT
Start: 2019-07-10 | End: 2019-07-10

## 2019-07-10 RX ORDER — HEPARIN 100 UNIT/ML
300-500 SYRINGE INTRAVENOUS AS NEEDED
Status: ACTIVE | OUTPATIENT
Start: 2019-07-10 | End: 2019-07-10

## 2019-07-10 RX ORDER — NEOMYCIN SULFATE, POLYMYXIN B SULFATE AND HYDROCORTISONE 10; 3.5; 1 MG/ML; MG/ML; [USP'U]/ML
SUSPENSION/ DROPS AURICULAR (OTIC)
Qty: 10 ML | Refills: 1 | Status: SHIPPED | OUTPATIENT
Start: 2019-07-10 | End: 2019-10-31 | Stop reason: SDUPTHER

## 2019-07-10 RX ORDER — SODIUM CHLORIDE 9 MG/ML
25 INJECTION, SOLUTION INTRAVENOUS CONTINUOUS
Status: DISPENSED | OUTPATIENT
Start: 2019-07-10 | End: 2019-07-10

## 2019-07-10 RX ORDER — PALONOSETRON 0.05 MG/ML
0.25 INJECTION, SOLUTION INTRAVENOUS ONCE
Status: COMPLETED | OUTPATIENT
Start: 2019-07-10 | End: 2019-07-10

## 2019-07-10 RX ORDER — SODIUM CHLORIDE 9 MG/ML
10 INJECTION INTRAMUSCULAR; INTRAVENOUS; SUBCUTANEOUS AS NEEDED
Status: ACTIVE | OUTPATIENT
Start: 2019-07-10 | End: 2019-07-10

## 2019-07-10 RX ORDER — POTASSIUM CHLORIDE 750 MG/1
20 CAPSULE, EXTENDED RELEASE ORAL DAILY
Qty: 60 CAP | Refills: 0 | Status: SHIPPED | OUTPATIENT
Start: 2019-07-10 | End: 2019-08-12 | Stop reason: SDUPTHER

## 2019-07-10 RX ADMIN — Medication 500 UNITS: at 14:07

## 2019-07-10 RX ADMIN — CARBOPLATIN 580 MG: 10 INJECTION, SOLUTION INTRAVENOUS at 13:24

## 2019-07-10 RX ADMIN — PERTUZUMAB 420 MG: 30 INJECTION, SOLUTION, CONCENTRATE INTRAVENOUS at 11:05

## 2019-07-10 RX ADMIN — SODIUM CHLORIDE 10 ML: 9 INJECTION, SOLUTION INTRAMUSCULAR; INTRAVENOUS; SUBCUTANEOUS at 09:38

## 2019-07-10 RX ADMIN — DOCETAXEL ANHYDROUS 140 MG: 10 INJECTION, SOLUTION INTRAVENOUS at 12:06

## 2019-07-10 RX ADMIN — PALONOSETRON HYDROCHLORIDE 0.25 MG: 0.25 INJECTION, SOLUTION INTRAVENOUS at 09:32

## 2019-07-10 RX ADMIN — DEXAMETHASONE SODIUM PHOSPHATE 10 MG: 10 INJECTION INTRAMUSCULAR; INTRAVENOUS at 09:38

## 2019-07-10 RX ADMIN — SODIUM CHLORIDE 150 MG: 900 INJECTION, SOLUTION INTRAVENOUS at 09:48

## 2019-07-10 RX ADMIN — TRASTUZUMAB 446 MG: 150 INJECTION, POWDER, LYOPHILIZED, FOR SOLUTION INTRAVENOUS at 10:15

## 2019-07-10 RX ADMIN — POTASSIUM CHLORIDE 40 MEQ: 1.5 POWDER, FOR SOLUTION ORAL at 09:38

## 2019-07-10 RX ADMIN — SODIUM CHLORIDE 25 ML/HR: 900 INJECTION, SOLUTION INTRAVENOUS at 09:32

## 2019-07-10 RX ADMIN — PEGFILGRASTIM 6 MG: KIT SUBCUTANEOUS at 14:07

## 2019-07-10 NOTE — PATIENT INSTRUCTIONS
1. You have viral conjunctivitis apply warm compresses throughout the day  2. Apply lubricating eye drops- such as systane preservative free   3. Call Jerrell Woodruff NP if you develop thick, mucous like discharge. 4. I am sending in a potassium prescription to your pharmacy, please take daily.

## 2019-07-10 NOTE — PROGRESS NOTES
[facility] Progress Note    Date: July 10, 2019    Name: Kiki Noel    MRN: [id number]         : 1945    0740:  Ms. Michael Garrido Arrived ambulatory and in no distress for C6D1 of TCHP Regimen. Assessment was completed, no acute issues at this time, no new complaints voiced. PAC chest wall port accessed without difficulty, labs drawn & sent for processing. Chemotherapy Flowsheet 7/10/2019   Cycle C6D1   Date 7/10/2019   Drug / Regimen TCHP   Pre Meds given       9744 Patient proceed to appointment with Dr. Ken Mcintosh. Ms. Ruben Yi vitals were reviewed. Patient Vitals for the past 24 hrs:   Temp Pulse Resp BP SpO2   07/10/19 1414 97.4 °F (36.3 °C) 65 16 150/78 98 %   07/10/19 0745 97 °F (36.1 °C) 83 18 121/71 99 %     Lab results were obtained and reviewed. Recent Results (from the past 12 hour(s))   CBC WITH AUTOMATED DIFF    Collection Time: 07/10/19  7:57 AM   Result Value Ref Range    WBC 11.3 (H) 3.6 - 11.0 K/uL    RBC 3.57 (L) 3.80 - 5.20 M/uL    HGB 11.8 11.5 - 16.0 g/dL    HCT 34.5 (L) 35.0 - 47.0 %    MCV 96.6 80.0 - 99.0 FL    MCH 33.1 26.0 - 34.0 PG    MCHC 34.2 30.0 - 36.5 g/dL    RDW 17.2 (H) 11.5 - 14.5 %    PLATELET 914 407 - 412 K/uL    MPV 9.2 8.9 - 12.9 FL    NRBC 0.0 0  WBC    ABSOLUTE NRBC 0.00 0.00 - 0.01 K/uL    NEUTROPHILS 71 32 - 75 %    LYMPHOCYTES 17 12 - 49 %    MONOCYTES 12 5 - 13 %    EOSINOPHILS 0 0 - 7 %    BASOPHILS 0 0 - 1 %    IMMATURE GRANULOCYTES 0 0.0 - 0.5 %    ABS. NEUTROPHILS 8.0 1.8 - 8.0 K/UL    ABS. LYMPHOCYTES 1.9 0.8 - 3.5 K/UL    ABS. MONOCYTES 1.4 (H) 0.0 - 1.0 K/UL    ABS. EOSINOPHILS 0.0 0.0 - 0.4 K/UL    ABS. BASOPHILS 0.0 0.0 - 0.1 K/UL    ABS. IMM.  GRANS. 0.1 (H) 0.00 - 0.04 K/UL    DF AUTOMATED     METABOLIC PANEL, COMPREHENSIVE    Collection Time: 07/10/19  7:57 AM   Result Value Ref Range    Sodium 138 136 - 145 mmol/L    Potassium 3.0 (L) 3.5 - 5.1 mmol/L    Chloride 100 97 - 108 mmol/L    CO2 32 21 - 32 mmol/L    Anion gap 6 5 - 15 mmol/L    Glucose 107 (H) 65 - 100 mg/dL    BUN 13 6 - 20 MG/DL    Creatinine 0.76 0.55 - 1.02 MG/DL    BUN/Creatinine ratio 17 12 - 20      GFR est AA >60 >60 ml/min/1.73m2    GFR est non-AA >60 >60 ml/min/1.73m2    Calcium 9.9 8.5 - 10.1 MG/DL    Bilirubin, total 0.4 0.2 - 1.0 MG/DL    ALT (SGPT) 17 12 - 78 U/L    AST (SGOT) 13 (L) 15 - 37 U/L    Alk.  phosphatase 83 45 - 117 U/L    Protein, total 6.6 6.4 - 8.2 g/dL    Albumin 3.5 3.5 - 5.0 g/dL    Globulin 3.1 2.0 - 4.0 g/dL    A-G Ratio 1.1 1.1 - 2.2         Medications:  Medications Administered     0.9% sodium chloride infusion     Admin Date  07/10/2019 Action  New Bag Dose  25 mL/hr Rate  25 mL/hr Route  IntraVENous Administered By  Haley Fontanez RN          CARBOplatin (PARAPLATIN) 580 mg in 0.9% sodium chloride 250 mL, overfill volume 25 mL chemo infusion     Admin Date  07/10/2019 Action  New Bag Dose  580 mg Rate  666 mL/hr Route  IntraVENous Administered By  Haley Fontanez RN          dexamethasone (DECADRON) injection 10 mg     Admin Date  07/10/2019 Action  Given Dose  10 mg Route  IntraVENous Administered By  Haley Fontanez RN          DOCEtaxel (TAXOTERE) 140 mg in 0.9% sodium chloride 250 mL, overfill volume 25 mL chemo infusion     Admin Date  07/10/2019 Action  New Bag Dose  140 mg Route  IntraVENous Administered By  Grace Ross RN          fosaprepitant (EMEND) 150 mg in 0.9% sodium chloride 150 mL IVPB     Admin Date  07/10/2019 Action  Given Dose  150 mg Rate  450 mL/hr Route  IntraVENous Administered By  Haley Fontanez RN          heparin (porcine) pf 300-500 Units     Admin Date  07/10/2019 Action  Given Dose  500 Units Route  InterCATHeter Administered By  Haley Fontanez RN          palonosetron HCl (ALOXI) injection 0.25 mg     Admin Date  07/10/2019 Action  Given Dose  0.25 mg Route  IntraVENous Administered By  Haley Fontanez RN          pegfilgrastim (NEULASTA) wearable SQ injector 6 mg     Admin Date  07/10/2019 Action  Given Dose  6 mg Route  SubCUTAneous Administered By  Dilshad Mercado, MANDO          pertuzumab (PERJETA) 420 mg in 0.9% sodium chloride 250 mL, overfill volume 25 mL IVPB     Admin Date  07/10/2019 Action  New Bag Dose  420 mg Rate  578 mL/hr Route  IntraVENous Administered By  Dilshad Mercado RN          potassium chloride (KLOR-CON) packet for solution 40 mEq     Admin Date  07/10/2019 Action  Given Dose  40 mEq Route  Oral Administered By  Dilshad Mercado RN          sodium chloride 0.9% injection 10 mL     Admin Date  07/10/2019 Action  Given Dose  10 mL Route  IntraVENous Administered By  Dilshad Mercado RN          trastuzumab (HERCEPTIN) 446 mg in 0.9% sodium chloride 250 mL, overfill volume 25 mL IVPB     Admin Date  07/10/2019 Action  New Bag Dose  446 mg Rate  592.4 mL/hr Route  IntraVENous Administered By  Dilshad Mercado, MANDO                Ms. Michael Garrido tolerated treatment well and was discharged from [place] [address] in stable condition at 46. Port de-accessed, flushed & heparinized per protocol. Neulasta On-Q placed on left arm and working order verified. She is to return on July 31 at 0730 for her next appointment.     Matheus Roca RN  July 10, 2019

## 2019-07-10 NOTE — PROGRESS NOTES
Alejandra Camacho is a 66 y.o. female here today for follow up of breast cancer. States bilat. eyes are oozing, itching, burning. This has been since last week. She has tried OTC eye drops with some relief.

## 2019-07-10 NOTE — TELEPHONE ENCOUNTER
07/10/19 3:13 PM Notified patient I called in ointment for fungus present behind nail beds. Asked patient to call if she has questions.

## 2019-07-12 DIAGNOSIS — Z17.0 MALIGNANT NEOPLASM OF UPPER-OUTER QUADRANT OF LEFT BREAST IN FEMALE, ESTROGEN RECEPTOR POSITIVE (HCC): Primary | ICD-10-CM

## 2019-07-12 DIAGNOSIS — C50.412 MALIGNANT NEOPLASM OF UPPER-OUTER QUADRANT OF LEFT BREAST IN FEMALE, ESTROGEN RECEPTOR POSITIVE (HCC): Primary | ICD-10-CM

## 2019-07-19 ENCOUNTER — TELEPHONE (OUTPATIENT)
Dept: ONCOLOGY | Age: 74
End: 2019-07-19

## 2019-07-19 NOTE — TELEPHONE ENCOUNTER
Patient called and stated she is having trouble with the eye drops that were recommended to her.  #[phone]

## 2019-07-19 NOTE — TELEPHONE ENCOUNTER
Called patient to follow up with complaint of eye discharge. States she has bilateral thick discharge and itching that is consistent throughout the day. Recommend continuing to use Soothe eyedrops throughout day and taking daily Claritin or other antihistamine. Pt denies questions at this time. Discussed patient to notify us if discharge worsens or changes color.

## 2019-07-19 NOTE — TELEPHONE ENCOUNTER
Call returned to patient. Verified ID x 2.  States she tried OTC Systane eye drops as recommended at last office visit but this caused increased irritation to eyes. States she began using Soothe XP eye drops again but feels prescription for antibiotic eye drops is needed. Reports bilateral eyes continue to \"ooze sticky drainage\" causing eyelids to stick together at times. Complains of itching & burning to eyes. Notes redness around eyes. Reports no fevers noted. Advised update will be sent to Lindsey Bell NP and we will return call with recommendations.

## 2019-07-26 RX ORDER — DIPHENHYDRAMINE HYDROCHLORIDE 50 MG/ML
50 INJECTION, SOLUTION INTRAMUSCULAR; INTRAVENOUS AS NEEDED
Status: CANCELLED
Start: 2019-07-31

## 2019-07-26 RX ORDER — SODIUM CHLORIDE 0.9 % (FLUSH) 0.9 %
10 SYRINGE (ML) INJECTION AS NEEDED
Status: CANCELLED
Start: 2019-07-31

## 2019-07-26 RX ORDER — ALBUTEROL SULFATE 0.83 MG/ML
2.5 SOLUTION RESPIRATORY (INHALATION) AS NEEDED
Status: CANCELLED
Start: 2019-07-31

## 2019-07-26 RX ORDER — HEPARIN 100 UNIT/ML
300-500 SYRINGE INTRAVENOUS AS NEEDED
Status: CANCELLED
Start: 2019-07-31

## 2019-07-26 RX ORDER — HYDROCORTISONE SODIUM SUCCINATE 100 MG/2ML
100 INJECTION, POWDER, FOR SOLUTION INTRAMUSCULAR; INTRAVENOUS AS NEEDED
Status: CANCELLED | OUTPATIENT
Start: 2019-07-31

## 2019-07-26 RX ORDER — DIPHENHYDRAMINE HYDROCHLORIDE 50 MG/ML
50 INJECTION, SOLUTION INTRAMUSCULAR; INTRAVENOUS
Status: CANCELLED | OUTPATIENT
Start: 2019-07-31

## 2019-07-26 RX ORDER — EPINEPHRINE 1 MG/ML
0.3 INJECTION, SOLUTION, CONCENTRATE INTRAVENOUS AS NEEDED
Status: CANCELLED | OUTPATIENT
Start: 2019-07-31

## 2019-07-26 RX ORDER — SODIUM CHLORIDE 9 MG/ML
25 INJECTION, SOLUTION INTRAVENOUS CONTINUOUS
Status: CANCELLED | OUTPATIENT
Start: 2019-07-31

## 2019-07-26 RX ORDER — ONDANSETRON 2 MG/ML
8 INJECTION INTRAMUSCULAR; INTRAVENOUS AS NEEDED
Status: CANCELLED | OUTPATIENT
Start: 2019-07-31

## 2019-07-26 RX ORDER — ACETAMINOPHEN 325 MG/1
650 TABLET ORAL AS NEEDED
Status: CANCELLED
Start: 2019-07-31

## 2019-07-26 RX ORDER — SODIUM CHLORIDE 9 MG/ML
10 INJECTION INTRAMUSCULAR; INTRAVENOUS; SUBCUTANEOUS AS NEEDED
Status: CANCELLED | OUTPATIENT
Start: 2019-07-31

## 2019-07-26 RX ORDER — ACETAMINOPHEN 325 MG/1
650 TABLET ORAL
Status: CANCELLED | OUTPATIENT
Start: 2019-07-31

## 2019-07-31 ENCOUNTER — HOSPITAL ENCOUNTER (OUTPATIENT)
Dept: INFUSION THERAPY | Age: 74
End: 2019-07-31
Payer: MEDICARE

## 2019-08-01 ENCOUNTER — HOSPITAL ENCOUNTER (OUTPATIENT)
Dept: MAMMOGRAPHY | Age: 74
Discharge: HOME | End: 2019-08-01
Attending: NURSE PRACTITIONER
Payer: MEDICARE

## 2019-08-01 DIAGNOSIS — C50.112 MALIGNANT NEOPLASM OF CENTRAL PORTION OF LEFT BREAST IN FEMALE, ESTROGEN RECEPTOR POSITIVE (HCC): ICD-10-CM

## 2019-08-01 DIAGNOSIS — Z17.0 MALIGNANT NEOPLASM OF CENTRAL PORTION OF LEFT BREAST IN FEMALE, ESTROGEN RECEPTOR POSITIVE (HCC): ICD-10-CM

## 2019-08-01 DIAGNOSIS — C50.919 BREAST CANCER (HCC): ICD-10-CM

## 2019-08-01 PROCEDURE — 77065 DX MAMMO INCL CAD UNI: CPT

## 2019-08-01 PROCEDURE — 76642 ULTRASOUND BREAST LIMITED: CPT

## 2019-08-02 ENCOUNTER — ANESTHESIA EVENT (OUTPATIENT)
Dept: SURGERY | Age: 74
End: 2019-08-02
Payer: MEDICARE

## 2019-08-02 NOTE — PERIOP NOTES
Patient with a history of ESBL. Order for contact isolation placed in [address] [facility] under sign and held, multi-phase orders for the day of surgery.   DOS: 8/5/2019

## 2019-08-05 ENCOUNTER — HOSPITAL ENCOUNTER (OUTPATIENT)
Age: 74
Setting detail: OBSERVATION
Discharge: HOME | End: 2019-08-06
Attending: SURGERY | Admitting: SURGERY
Payer: MEDICARE

## 2019-08-05 ENCOUNTER — ANESTHESIA (OUTPATIENT)
Dept: SURGERY | Age: 74
End: 2019-08-05
Payer: MEDICARE

## 2019-08-05 DIAGNOSIS — C50.412 MALIGNANT NEOPLASM OF UPPER-OUTER QUADRANT OF LEFT BREAST IN FEMALE, ESTROGEN RECEPTOR POSITIVE (HCC): ICD-10-CM

## 2019-08-05 DIAGNOSIS — Z17.0 MALIGNANT NEOPLASM OF UPPER-OUTER QUADRANT OF LEFT BREAST IN FEMALE, ESTROGEN RECEPTOR POSITIVE (HCC): ICD-10-CM

## 2019-08-05 LAB
ABO + RH BLD: NORMAL
BLOOD GROUP ANTIBODIES SERPL: NORMAL
COMMENT, HOLDF: NORMAL
POTASSIUM SERPL-SCNC: 3.6 MMOL/L (ref 3.5–5.1)
SAMPLES BEING HELD,HOLD: NORMAL
SPECIMEN EXP DATE BLD: NORMAL

## 2019-08-05 PROCEDURE — 74011250636 HC RX REV CODE- 250/636: Performed by: SURGERY

## 2019-08-05 PROCEDURE — 74011250636 HC RX REV CODE- 250/636

## 2019-08-05 PROCEDURE — 77030002996 HC SUT SLK J&J -A: Performed by: SURGERY

## 2019-08-05 PROCEDURE — 74011250636 HC RX REV CODE- 250/636: Performed by: NURSE ANESTHETIST, CERTIFIED REGISTERED

## 2019-08-05 PROCEDURE — 88342 IMHCHEM/IMCYTCHM 1ST ANTB: CPT

## 2019-08-05 PROCEDURE — 74011000250 HC RX REV CODE- 250: Performed by: SURGERY

## 2019-08-05 PROCEDURE — 76030000004 HC AMB SURG OR TIME 2 TO 2.5: Performed by: SURGERY

## 2019-08-05 PROCEDURE — 77030012406 HC DRN WND PENRS BARD -A: Performed by: SURGERY

## 2019-08-05 PROCEDURE — 77030018836 HC SOL IRR NACL ICUM -A: Performed by: SURGERY

## 2019-08-05 PROCEDURE — 88307 TISSUE EXAM BY PATHOLOGIST: CPT

## 2019-08-05 PROCEDURE — 77030040361 HC SLV COMPR DVT MDII -B

## 2019-08-05 PROCEDURE — 74011000250 HC RX REV CODE- 250: Performed by: NURSE ANESTHETIST, CERTIFIED REGISTERED

## 2019-08-05 PROCEDURE — 86900 BLOOD TYPING SEROLOGIC ABO: CPT

## 2019-08-05 PROCEDURE — 74011000258 HC RX REV CODE- 258: Performed by: SURGERY

## 2019-08-05 PROCEDURE — 76210000037 HC AMBSU PH I REC 2 TO 2.5 HR: Performed by: SURGERY

## 2019-08-05 PROCEDURE — 96361 HYDRATE IV INFUSION ADD-ON: CPT

## 2019-08-05 PROCEDURE — 96360 HYDRATION IV INFUSION INIT: CPT

## 2019-08-05 PROCEDURE — 74011250637 HC RX REV CODE- 250/637: Performed by: SURGERY

## 2019-08-05 PROCEDURE — 77030002933 HC SUT MCRYL J&J -A: Performed by: SURGERY

## 2019-08-05 PROCEDURE — 76060000064 HC AMB SURG ANES 2 TO 2.5 HR: Performed by: SURGERY

## 2019-08-05 PROCEDURE — 99218 HC RM OBSERVATION: CPT

## 2019-08-05 PROCEDURE — 77030013079 HC BLNKT BAIR HGGR 3M -A: Performed by: ANESTHESIOLOGY

## 2019-08-05 PROCEDURE — 36415 COLL VENOUS BLD VENIPUNCTURE: CPT

## 2019-08-05 PROCEDURE — 77030026438 HC STYL ET INTUB CARD -A: Performed by: ANESTHESIOLOGY

## 2019-08-05 PROCEDURE — 77030008684 HC TU ET CUF COVD -B: Performed by: ANESTHESIOLOGY

## 2019-08-05 PROCEDURE — 74011250636 HC RX REV CODE- 250/636: Performed by: ANESTHESIOLOGY

## 2019-08-05 PROCEDURE — 77030020782 HC GWN BAIR PAWS FLX 3M -B

## 2019-08-05 PROCEDURE — 84132 ASSAY OF SERUM POTASSIUM: CPT

## 2019-08-05 PROCEDURE — 77030013567 HC DRN WND RESERV BARD -A: Performed by: SURGERY

## 2019-08-05 PROCEDURE — 77030039266 HC ADH SKN EXOFIN S2SG -A: Performed by: SURGERY

## 2019-08-05 PROCEDURE — 77030031139 HC SUT VCRL2 J&J -A: Performed by: SURGERY

## 2019-08-05 RX ORDER — DEXAMETHASONE SODIUM PHOSPHATE 4 MG/ML
INJECTION, SOLUTION INTRA-ARTICULAR; INTRALESIONAL; INTRAMUSCULAR; INTRAVENOUS; SOFT TISSUE AS NEEDED
Status: DISCONTINUED | OUTPATIENT
Start: 2019-08-05 | End: 2019-08-05 | Stop reason: HOSPADM

## 2019-08-05 RX ORDER — PROPOFOL 10 MG/ML
INJECTION, EMULSION INTRAVENOUS AS NEEDED
Status: DISCONTINUED | OUTPATIENT
Start: 2019-08-05 | End: 2019-08-05 | Stop reason: HOSPADM

## 2019-08-05 RX ORDER — MIDAZOLAM HYDROCHLORIDE 1 MG/ML
2 INJECTION, SOLUTION INTRAMUSCULAR; INTRAVENOUS
Status: DISCONTINUED | OUTPATIENT
Start: 2019-08-05 | End: 2019-08-05 | Stop reason: HOSPADM

## 2019-08-05 RX ORDER — ONDANSETRON 2 MG/ML
INJECTION INTRAMUSCULAR; INTRAVENOUS AS NEEDED
Status: DISCONTINUED | OUTPATIENT
Start: 2019-08-05 | End: 2019-08-05 | Stop reason: HOSPADM

## 2019-08-05 RX ORDER — SODIUM CHLORIDE 0.9 % (FLUSH) 0.9 %
5-40 SYRINGE (ML) INJECTION EVERY 8 HOURS
Status: DISCONTINUED | OUTPATIENT
Start: 2019-08-05 | End: 2019-08-06 | Stop reason: HOSPADM

## 2019-08-05 RX ORDER — NALOXONE HYDROCHLORIDE 0.4 MG/ML
0.2 INJECTION, SOLUTION INTRAMUSCULAR; INTRAVENOUS; SUBCUTANEOUS
Status: DISCONTINUED | OUTPATIENT
Start: 2019-08-05 | End: 2019-08-05 | Stop reason: HOSPADM

## 2019-08-05 RX ORDER — FENTANYL CITRATE 50 UG/ML
INJECTION, SOLUTION INTRAMUSCULAR; INTRAVENOUS AS NEEDED
Status: DISCONTINUED | OUTPATIENT
Start: 2019-08-05 | End: 2019-08-05 | Stop reason: HOSPADM

## 2019-08-05 RX ORDER — ROCURONIUM BROMIDE 10 MG/ML
INJECTION, SOLUTION INTRAVENOUS AS NEEDED
Status: DISCONTINUED | OUTPATIENT
Start: 2019-08-05 | End: 2019-08-05 | Stop reason: HOSPADM

## 2019-08-05 RX ORDER — MIDAZOLAM HYDROCHLORIDE 1 MG/ML
INJECTION, SOLUTION INTRAMUSCULAR; INTRAVENOUS AS NEEDED
Status: DISCONTINUED | OUTPATIENT
Start: 2019-08-05 | End: 2019-08-05 | Stop reason: HOSPADM

## 2019-08-05 RX ORDER — LIDOCAINE HYDROCHLORIDE 20 MG/ML
INJECTION, SOLUTION EPIDURAL; INFILTRATION; INTRACAUDAL; PERINEURAL AS NEEDED
Status: DISCONTINUED | OUTPATIENT
Start: 2019-08-05 | End: 2019-08-05 | Stop reason: HOSPADM

## 2019-08-05 RX ORDER — HYDROMORPHONE HYDROCHLORIDE 2 MG/ML
.25-1 INJECTION, SOLUTION INTRAMUSCULAR; INTRAVENOUS; SUBCUTANEOUS
Status: DISCONTINUED | OUTPATIENT
Start: 2019-08-05 | End: 2019-08-05 | Stop reason: HOSPADM

## 2019-08-05 RX ORDER — FAMOTIDINE 20 MG/1
20 TABLET, FILM COATED ORAL DAILY
Status: DISCONTINUED | OUTPATIENT
Start: 2019-08-06 | End: 2019-08-06 | Stop reason: HOSPADM

## 2019-08-05 RX ORDER — BUPIVACAINE HYDROCHLORIDE AND EPINEPHRINE 5; 5 MG/ML; UG/ML
30 INJECTION, SOLUTION EPIDURAL; INTRACAUDAL; PERINEURAL
Status: DISCONTINUED | OUTPATIENT
Start: 2019-08-05 | End: 2019-08-05 | Stop reason: HOSPADM

## 2019-08-05 RX ORDER — HYDROMORPHONE HYDROCHLORIDE 1 MG/ML
0.5 INJECTION, SOLUTION INTRAMUSCULAR; INTRAVENOUS; SUBCUTANEOUS
Status: DISCONTINUED | OUTPATIENT
Start: 2019-08-05 | End: 2019-08-06 | Stop reason: HOSPADM

## 2019-08-05 RX ORDER — LIDOCAINE HYDROCHLORIDE 10 MG/ML
0.1 INJECTION, SOLUTION EPIDURAL; INFILTRATION; INTRACAUDAL; PERINEURAL AS NEEDED
Status: DISCONTINUED | OUTPATIENT
Start: 2019-08-05 | End: 2019-08-05 | Stop reason: HOSPADM

## 2019-08-05 RX ORDER — SODIUM CHLORIDE, SODIUM LACTATE, POTASSIUM CHLORIDE, CALCIUM CHLORIDE 600; 310; 30; 20 MG/100ML; MG/100ML; MG/100ML; MG/100ML
125 INJECTION, SOLUTION INTRAVENOUS CONTINUOUS
Status: DISCONTINUED | OUTPATIENT
Start: 2019-08-05 | End: 2019-08-05 | Stop reason: HOSPADM

## 2019-08-05 RX ORDER — PROCHLORPERAZINE EDISYLATE 5 MG/ML
5 INJECTION INTRAMUSCULAR; INTRAVENOUS
Status: DISCONTINUED | OUTPATIENT
Start: 2019-08-05 | End: 2019-08-06 | Stop reason: HOSPADM

## 2019-08-05 RX ORDER — BUPIVACAINE HYDROCHLORIDE AND EPINEPHRINE 5; 5 MG/ML; UG/ML
INJECTION, SOLUTION EPIDURAL; INTRACAUDAL; PERINEURAL AS NEEDED
Status: DISCONTINUED | OUTPATIENT
Start: 2019-08-05 | End: 2019-08-05 | Stop reason: HOSPADM

## 2019-08-05 RX ORDER — ACETAMINOPHEN 325 MG/1
650 TABLET ORAL
Status: DISCONTINUED | OUTPATIENT
Start: 2019-08-05 | End: 2019-08-06 | Stop reason: HOSPADM

## 2019-08-05 RX ORDER — HYDROCODONE BITARTRATE AND ACETAMINOPHEN 5; 325 MG/1; MG/1
1 TABLET ORAL
Status: DISCONTINUED | OUTPATIENT
Start: 2019-08-05 | End: 2019-08-06 | Stop reason: HOSPADM

## 2019-08-05 RX ORDER — SUCCINYLCHOLINE CHLORIDE 20 MG/ML
INJECTION INTRAMUSCULAR; INTRAVENOUS AS NEEDED
Status: DISCONTINUED | OUTPATIENT
Start: 2019-08-05 | End: 2019-08-05 | Stop reason: HOSPADM

## 2019-08-05 RX ORDER — SODIUM CHLORIDE 9 MG/ML
75 INJECTION, SOLUTION INTRAVENOUS CONTINUOUS
Status: DISCONTINUED | OUTPATIENT
Start: 2019-08-05 | End: 2019-08-06 | Stop reason: HOSPADM

## 2019-08-05 RX ORDER — DOXYCYCLINE HYCLATE 100 MG
100 TABLET ORAL DAILY
Status: DISCONTINUED | OUTPATIENT
Start: 2019-08-06 | End: 2019-08-06 | Stop reason: HOSPADM

## 2019-08-05 RX ORDER — FLUMAZENIL 0.1 MG/ML
0.2 INJECTION INTRAVENOUS
Status: DISCONTINUED | OUTPATIENT
Start: 2019-08-05 | End: 2019-08-05 | Stop reason: HOSPADM

## 2019-08-05 RX ORDER — POTASSIUM CHLORIDE 750 MG/1
10 TABLET, FILM COATED, EXTENDED RELEASE ORAL DAILY
Status: DISCONTINUED | OUTPATIENT
Start: 2019-08-06 | End: 2019-08-06 | Stop reason: HOSPADM

## 2019-08-05 RX ORDER — PROPOFOL 10 MG/ML
INJECTION, EMULSION INTRAVENOUS
Status: DISCONTINUED | OUTPATIENT
Start: 2019-08-05 | End: 2019-08-05 | Stop reason: HOSPADM

## 2019-08-05 RX ORDER — DIPHENHYDRAMINE HYDROCHLORIDE 50 MG/ML
12.5 INJECTION, SOLUTION INTRAMUSCULAR; INTRAVENOUS AS NEEDED
Status: DISCONTINUED | OUTPATIENT
Start: 2019-08-05 | End: 2019-08-05 | Stop reason: HOSPADM

## 2019-08-05 RX ORDER — SODIUM CHLORIDE 0.9 % (FLUSH) 0.9 %
5-40 SYRINGE (ML) INJECTION AS NEEDED
Status: DISCONTINUED | OUTPATIENT
Start: 2019-08-05 | End: 2019-08-06 | Stop reason: HOSPADM

## 2019-08-05 RX ADMIN — SUCCINYLCHOLINE CHLORIDE 100 MG: 20 INJECTION, SOLUTION INTRAMUSCULAR; INTRAVENOUS; PARENTERAL at 07:40

## 2019-08-05 RX ADMIN — FENTANYL CITRATE 25 MCG: 50 INJECTION, SOLUTION INTRAMUSCULAR; INTRAVENOUS at 09:19

## 2019-08-05 RX ADMIN — MIDAZOLAM HYDROCHLORIDE 0.5 MG: 1 INJECTION, SOLUTION INTRAMUSCULAR; INTRAVENOUS at 07:36

## 2019-08-05 RX ADMIN — PROPOFOL 50 MCG/KG/MIN: 10 INJECTION, EMULSION INTRAVENOUS at 07:52

## 2019-08-05 RX ADMIN — FENTANYL CITRATE 50 MCG: 50 INJECTION, SOLUTION INTRAMUSCULAR; INTRAVENOUS at 08:35

## 2019-08-05 RX ADMIN — PROPOFOL 30 MG: 10 INJECTION, EMULSION INTRAVENOUS at 08:18

## 2019-08-05 RX ADMIN — ONDANSETRON 4 MG: 2 INJECTION INTRAMUSCULAR; INTRAVENOUS at 09:18

## 2019-08-05 RX ADMIN — ROCURONIUM BROMIDE 5 MG: 50 INJECTION, SOLUTION INTRAVENOUS at 07:39

## 2019-08-05 RX ADMIN — Medication 10 ML: at 22:10

## 2019-08-05 RX ADMIN — DEXAMETHASONE SODIUM PHOSPHATE 4 MG: 4 INJECTION, SOLUTION INTRAMUSCULAR; INTRAVENOUS at 08:52

## 2019-08-05 RX ADMIN — MIDAZOLAM HYDROCHLORIDE 0.5 MG: 1 INJECTION, SOLUTION INTRAMUSCULAR; INTRAVENOUS at 07:33

## 2019-08-05 RX ADMIN — PROPOFOL 120 MG: 10 INJECTION, EMULSION INTRAVENOUS at 07:40

## 2019-08-05 RX ADMIN — PROPOFOL 30 MG: 10 INJECTION, EMULSION INTRAVENOUS at 09:36

## 2019-08-05 RX ADMIN — FENTANYL CITRATE 25 MCG: 50 INJECTION, SOLUTION INTRAMUSCULAR; INTRAVENOUS at 09:36

## 2019-08-05 RX ADMIN — SODIUM CHLORIDE, SODIUM LACTATE, POTASSIUM CHLORIDE, AND CALCIUM CHLORIDE 125 ML/HR: 600; 310; 30; 20 INJECTION, SOLUTION INTRAVENOUS at 06:51

## 2019-08-05 RX ADMIN — FENTANYL CITRATE 50 MCG: 50 INJECTION, SOLUTION INTRAMUSCULAR; INTRAVENOUS at 07:38

## 2019-08-05 RX ADMIN — FENTANYL CITRATE 25 MCG: 50 INJECTION, SOLUTION INTRAMUSCULAR; INTRAVENOUS at 09:27

## 2019-08-05 RX ADMIN — PROPOFOL 30 MG: 10 INJECTION, EMULSION INTRAVENOUS at 07:46

## 2019-08-05 RX ADMIN — SODIUM CHLORIDE 75 ML/HR: 900 INJECTION, SOLUTION INTRAVENOUS at 22:10

## 2019-08-05 RX ADMIN — HYDROCODONE BITARTRATE AND ACETAMINOPHEN 1 TABLET: 5; 325 TABLET ORAL at 18:39

## 2019-08-05 RX ADMIN — FENTANYL CITRATE 25 MCG: 50 INJECTION, SOLUTION INTRAMUSCULAR; INTRAVENOUS at 09:35

## 2019-08-05 RX ADMIN — SODIUM CHLORIDE 75 ML/HR: 900 INJECTION, SOLUTION INTRAVENOUS at 10:18

## 2019-08-05 RX ADMIN — LIDOCAINE HYDROCHLORIDE 40 MG: 20 INJECTION, SOLUTION EPIDURAL; INFILTRATION; INTRACAUDAL; PERINEURAL at 07:38

## 2019-08-05 RX ADMIN — PROPOFOL 30 MG: 10 INJECTION, EMULSION INTRAVENOUS at 09:31

## 2019-08-05 RX ADMIN — FENTANYL CITRATE 50 MCG: 50 INJECTION, SOLUTION INTRAMUSCULAR; INTRAVENOUS at 07:46

## 2019-08-05 RX ADMIN — ROCURONIUM BROMIDE 15 MG: 50 INJECTION, SOLUTION INTRAVENOUS at 07:53

## 2019-08-05 RX ADMIN — HYDROMORPHONE HYDROCHLORIDE 0.5 MG: 2 INJECTION INTRAMUSCULAR; INTRAVENOUS; SUBCUTANEOUS at 10:40

## 2019-08-05 NOTE — ANESTHESIA POSTPROCEDURE EVALUATION
Procedure(s):  BILATERAL BREAST MASTECTOMIES AND LEFT BREAST SENTINEL NODE BIOPSY WITH BLUE DYE. general    Anesthesia Post Evaluation      Multimodal analgesia: multimodal analgesia not used between 6 hours prior to anesthesia start to PACU discharge  Patient location during evaluation: PACU  Patient participation: complete - patient participated  Level of consciousness: awake  Pain management: adequate  Airway patency: patent  Anesthetic complications: no  Cardiovascular status: acceptable, blood pressure returned to baseline and hemodynamically stable  Respiratory status: acceptable  Hydration status: acceptable  Post anesthesia nausea and vomiting:  controlled      Vitals Value Taken Time   /64 8/5/2019 11:25 AM   Temp 36.7 °C (98.1 °F) 8/5/2019 11:00 AM   Pulse 85 8/5/2019 11:27 AM   Resp 22 8/5/2019 11:27 AM   SpO2 97 % 8/5/2019 11:30 AM   Vitals shown include unvalidated device data.

## 2019-08-05 NOTE — OP NOTES
Mik Garcia [facility] [place] [place] [facility]  [address] [facility] [address]    Name: Panfilo Thayer  : 1945    Bilateral mastectomy  Operative Report    Date of Surgery: 2019  Preoperative Diagnosis:  LEFT Breast cancer, UOQ/12:00  Postoperative Diagnosis: same  Surgeon: Dr Sandip Carpio   Anesthesia: General  Procedure: Procedure(s):  BILATERAL BREAST MASTECTOMIES AND LEFT BREAST SENTINEL NODE BIOPSY WITH BLUE DYE  Indication:  Completed edin-adjuvant chemotherapy       The patient was brought to the operating room and placed on the table in the supine position. She was induced with general anesthesia. 5cc of 0.5% methylene blue was injected in the subareolar left  Breast.  The breast was massaged for 5 minutes. Both breasts and axillae were prepped and draped in the usual sterile manner. The prophylactic right breast mastectomy was done first.  An elliptical incision was made which extended 6 cm above and below the nipple, and 18 cm in length. Skin flaps were elevated using cats claws and electrocautery. They were elevated medialy to the sternum, inferiorly to the inframammary fold, superiorly to the infraclavicular region and laterally to the mid axillary line. The breast was removed from the Pectoralis Major fascia using electrocautery. This was done in a medial to lateral fashion. The breast was removed and a suture was placed at 12 o'clock for orientation purposes. The specimen was sent to Pathology. The left breast mastectomy was done in a similar fashion with attention given to the periareolar/12:00 region where the tumor was identified. The left axillary cavity was accessed through the clavicopectoral fascia. A cluster of blue lymph nodes were identified and sent to pathology. A deeper hard, non-blue lymph node was labeled 'sentinel node #2' and sent to pathology. Hemostasis was controlled throughout the procedure using electrocautery.     10mm flat SAVANNAH drains were placed in the mastectomy beds and exited at the lateral aspect of each incision. The dermis was re approximated with 3-0 Vicryl suture. A ridge of skin and subcutaneous tissue between the mastectomies was removed joining the two mastectomy incisions The skin was closed with running 4-0 Monocryl suture. The drains were sutured to the skin with 4-0 Monocryl, and then hooked to bulb suction. The wounds were dressed with Dermabond. The patient was awakened and extubated and taken to the recovery room. Sponge, needle and instrument counts were reported to be correct.     Findings:  Small blue sentinel nodes  Estimated Blood Loss: 100 cc   Drains: Dequan-Valenzuela drains hooked to bulb suction       Specimens:   ID Type Source Tests Collected by Time Destination   1 : right breast prophylactic mastectomy Preservative Breast  Azalia Godinez MD 8/5/2019 9452 Pathology   2 : left breast mastectomy Preservative Breast  Azalia Godinez MD 8/5/2019 4123 Pathology   3 : left axillary [place] nodes Preservative Node  Azalia Godinez MD 8/5/2019 3626 Pathology   4 : Left axillary sentinel node #2 Preservative Node  Azalia Godinez MD 8/5/2019 3274 Pathology      Complications: none  Implants: none  Assistant: Surg Asst-1: Romayne Gauze    Signed:  Major Hilton MD

## 2019-08-05 NOTE — PERIOP NOTES
1200  Pt awake, VSS. Pt off monitors for transport to room 416. Sister informed. All belongings sent. TRANSFER - OUT REPORT:    Verbal report given to Beth Bullock on Benigno Crocker  being transferred to  for routine post - op       Report consisted of patients Situation, Background, Assessment and   Recommendations(SBAR). Information from the following report(s) SBAR, OR Summary, Intake/Output, MAR and Cardiac Rhythm NSR was reviewed with the receiving nurse. Lines:   Venous Access Device Portacath Upper chest (subclavicular area, right (Active)       Peripheral IV 08/05/19 Left Forearm (Active)   Site Assessment Clean, dry, & intact 8/5/2019 10:00 AM   Phlebitis Assessment 0 8/5/2019 10:00 AM   Infiltration Assessment 0 8/5/2019 10:00 AM   Dressing Status Clean, dry, & intact 8/5/2019 10:00 AM   Dressing Type Transparent 8/5/2019 10:00 AM   Hub Color/Line Status Green; Infusing 8/5/2019 10:00 AM   Alcohol Cap Used Yes 8/5/2019 10:00 AM        Opportunity for questions and clarification was provided.       Patient transported with:   Registered Nurse

## 2019-08-05 NOTE — ANESTHESIA PREPROCEDURE EVALUATION
Relevant Problems   No relevant active problems       Anesthetic History   No history of anesthetic complications            Review of Systems / Medical History  Patient summary reviewed, nursing notes reviewed and pertinent labs reviewed    Pulmonary  Within defined limits                 Neuro/Psych   Within defined limits           Cardiovascular  Within defined limits  Hypertension              Exercise tolerance: >4 METS     GI/Hepatic/Renal  Within defined limits   GERD           Endo/Other  Within defined limits      Cancer     Other Findings   Comments: Breast ca  Current colostomy           Physical Exam    Airway  Mallampati: II    Neck ROM: normal range of motion   Mouth opening: Normal     Cardiovascular  Regular rate and rhythm,  S1 and S2 normal,  no murmur, click, rub, or gallop  Rhythm: regular  Rate: normal         Dental  No notable dental hx       Pulmonary  Breath sounds clear to auscultation               Abdominal  GI exam deferred       Other Findings            Anesthetic Plan    ASA: 3  Anesthesia type: general          Induction: Intravenous  Anesthetic plan and risks discussed with: Patient

## 2019-08-06 VITALS
HEART RATE: 71 BPM | BODY MASS INDEX: 25.37 KG/M2 | HEIGHT: 66 IN | DIASTOLIC BLOOD PRESSURE: 67 MMHG | SYSTOLIC BLOOD PRESSURE: 127 MMHG | OXYGEN SATURATION: 97 % | RESPIRATION RATE: 16 BRPM | WEIGHT: 157.85 LBS | TEMPERATURE: 97.7 F

## 2019-08-06 PROCEDURE — 99218 HC RM OBSERVATION: CPT

## 2019-08-06 PROCEDURE — 74011250637 HC RX REV CODE- 250/637: Performed by: SURGERY

## 2019-08-06 RX ADMIN — POTASSIUM CHLORIDE 10 MEQ: 750 TABLET, EXTENDED RELEASE ORAL at 10:45

## 2019-08-06 RX ADMIN — HYDROCODONE BITARTRATE AND ACETAMINOPHEN 1 TABLET: 5; 325 TABLET ORAL at 11:04

## 2019-08-06 RX ADMIN — LOSARTAN POTASSIUM: 50 TABLET, FILM COATED ORAL at 10:46

## 2019-08-06 RX ADMIN — DOXYCYCLINE HYCLATE 100 MG: 100 TABLET, COATED ORAL at 11:04

## 2019-08-06 RX ADMIN — HYDROCODONE BITARTRATE AND ACETAMINOPHEN 1 TABLET: 5; 325 TABLET ORAL at 01:29

## 2019-08-06 RX ADMIN — Medication 10 ML: at 06:32

## 2019-08-06 RX ADMIN — FAMOTIDINE 20 MG: 20 TABLET ORAL at 10:46

## 2019-08-06 NOTE — PROGRESS NOTES
Reason for Admission:   Left breast cancer                   RRAT Score:      12               Plan for utilizing home health:    Not indicated                      Current Advanced Directive/Advance Care Plan: On file                         Transition of Care Plan:    CM met with pt and her sister. Pt lives alone in a one story house with three steps to enter. Her sister will be staying with her post d/c. Pt has had HH in the past with [facility] and rehab at the [address] [place] [address]. She does not own any DME. Pt uses [address] PCP is Dr. Erika Vasquez. Pt was provided information on [facility]. Pt was informed of her observation status and provided copies of the notification letters. 1. Home with family assistance  2. Follow-up with PCP and specialists  3.  Pt's sister will provide transportation home    Care Management Interventions  PCP Verified by CM: Yes(Dr. Erika Vasquez; no nurse navigator)  Palliative Care Criteria Met (RRAT>21 & CHF Dx)?: No  Discharge Durable Medical Equipment: No  Physical Therapy Consult: No  Occupational Therapy Consult: No  Speech Therapy Consult: No  Current Support Network: Lives Alone  Confirm Follow Up Transport: Family  Plan discussed with Pt/Family/Caregiver: Yes  Discharge Location  Discharge Placement: Home with family assistance     Medicine KAYCEE zhou

## 2019-08-06 NOTE — DISCHARGE INSTRUCTIONS
Discharge Instructions from Dr. Boy Brian    Patient Discharge Instructions    Shirin Catherine / [id number] : 1945    Admitted 2019 Discharged: 2019   What to do at Home  Diet:Regular  Activity: As tolerated. No driving if taking pain medication. Okay to shower or take a bath. You may chose to wear a bra to sleep in for extra support for the next few days. Pain control: Ice pack 20 minutes of every hour until you go to bed tonight. You may use over-the-counter medication as needed (acetominophen, aspirin, ibuprofen). .  Tomorrow you may put a heat pack on the breast.  Dressing: The skin glue is waterproof. It is okay to wash normally at this site. If the glue is still present after 10 days you should peel it off. Follow up:2019 1:30 PM   [place] [address], [address]  Problems/Questions: Call Christen Kirkland MD on my cell phone. [phone]                                            [facility] [address] [place] [address] Abiola Ryder of the Drain    1. Strip the tubing 2 times a day (more often if there are a lot of blood clots). a. Wash hands. b. Grasp the tubing close to the exit point/dressing with one hand and stabilize it.  c. With your other hand grasp the tubing next to your stabilizing hand. d. Using an alcohol pad (or lotion), pinching the tubing tightly, slide your fingers down the tubing away from the body to expel contents of the tubing into the bulb; repeat this 2 or 3 times. It is okay if the tubing becomes flat from the suction. 2. Empty the bulb into a small measuring container 2 times a day or whenever the bulb is full or the bulb feels heavy. a. Wash Hands. b. Open the small plug on the top of the bulb and pour the drainage into the measuring container. c. Squeeze the bulb and hold while replacing the plug.   The bulb must be collapsed for the suction and drain to work.  d. Trina Carlos can pin the tag of the bulb to your clothing so the weight of the bulb does not pull on the insertion site. 3. Measure the drainage and record the amount each time that you empty it.  a. Hold the measuring container at eye level to read the numbers on the side. b. Read the numbers in the ml column and record the time and amount. c. Wash hands                   To empty and measure                         To prime and resume suction    Showering/Dressing Change      1. You MAY shower. 2. You MAY change the dressing. 3. Apply a clean gauze or bandaid around the insertion site after the shower. You may need to change it more often if it becomes heavily soiled. 4. The drain may be removed when the output is less than 20 cc over 24 hours. Call the office to make an appointment to have the drain removed. [phone].       DATE TIME DRAINAGE AMOUNT

## 2019-08-07 ENCOUNTER — TELEPHONE (OUTPATIENT)
Dept: SURGERY | Age: 74
End: 2019-08-07

## 2019-08-07 NOTE — TELEPHONE ENCOUNTER
Patient left Hello message stating she couldn't keep her SAVANNAH drains charged and with correct suction, as well as having to frequently change her dressings, which have been \"soaked\". Sent message to Dr. Beth Blum and told patient I would have Dr. Beth Blum call her back. She was very appreciative of the call.

## 2019-08-08 ENCOUNTER — OFFICE VISIT (OUTPATIENT)
Dept: SURGERY | Age: 74
End: 2019-08-08

## 2019-08-08 VITALS
HEART RATE: 99 BPM | WEIGHT: 157 LBS | SYSTOLIC BLOOD PRESSURE: 120 MMHG | HEIGHT: 66 IN | BODY MASS INDEX: 25.23 KG/M2 | DIASTOLIC BLOOD PRESSURE: 69 MMHG

## 2019-08-08 DIAGNOSIS — Z90.13 S/P MASTECTOMY, BILATERAL: Primary | ICD-10-CM

## 2019-08-08 NOTE — PROGRESS NOTES
HISTORY OF PRESENT ILLNESS  Mulu Gandhi is a 66 y.o. female. HPI ESTABLISHED patient here today for complaints of maggy drain leakage around both sites of insertion. The patient has less than 30 cc in both drains, and saturated guaze around the sites. She is POD # 3 BILATERAL mastectomies and SLNB.   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 NJ 20%, Her 2 positive  Positive axillary node, %. NJ neg, Her 2 positive  TCH-P      ROS    Physical Exam   Pulmonary/Chest: Right breast exhibits no skin change and no tenderness. Left breast exhibits no skin change and no tenderness. ASSESSMENT and PLAN    ICD-10-CM ICD-9-CM    1. S/P mastectomy, bilateral Z90.13 V45.71      Bilateral mastectomy for LEFT breast cancer. The drains have been leaking around the drain since Monday. Drains removed today  Follow up Tuesday - may need seroma aspiration. Ms Ad Sidhu is one of the most upbeat, pleasant patients I have ever met.

## 2019-08-08 NOTE — PATIENT INSTRUCTIONS

## 2019-08-12 DIAGNOSIS — E87.6 HYPOKALEMIA: ICD-10-CM

## 2019-08-12 RX ORDER — POTASSIUM CHLORIDE 750 MG/1
CAPSULE, EXTENDED RELEASE ORAL
Qty: 60 CAP | Refills: 0 | Status: SHIPPED | OUTPATIENT
Start: 2019-08-12 | End: 2019-11-20

## 2019-08-13 ENCOUNTER — OFFICE VISIT (OUTPATIENT)
Dept: SURGERY | Age: 74
End: 2019-08-13

## 2019-08-13 VITALS
HEART RATE: 95 BPM | BODY MASS INDEX: 24.91 KG/M2 | WEIGHT: 155 LBS | HEIGHT: 66 IN | SYSTOLIC BLOOD PRESSURE: 143 MMHG | DIASTOLIC BLOOD PRESSURE: 78 MMHG

## 2019-08-13 DIAGNOSIS — Z90.13 STATUS POST BILATERAL MASTECTOMY: Primary | ICD-10-CM

## 2019-08-13 RX ORDER — ACETAMINOPHEN 500 MG
1000 TABLET ORAL
COMMUNITY
End: 2020-09-30

## 2019-08-13 NOTE — PROGRESS NOTES
HISTORY OF PRESENT ILLNESS  Nel Francisco is a 66 y.o. female. HPI ESTABLISHED patient, POD #8  BILATERAL mastectomies and LEFT SLNB. Her drains did not function well (leaking around the drains) and were pulled 5 days ago. She had some drainage from the LEFT incision until 3 days ago. No drainage since. 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 MI 20%, Her 2 positive  Positive axillary node, %. MI neg, Her 2 positive  TCH-P  8/2019 bilateral mastectomy. LEFT COMPLETE RESPONDS, 0/7 nodes. RIGHT  Lobular carcinoma in situ. ROS    Physical Exam   Pulmonary/Chest: Right breast exhibits no skin change (erythema at the incision) and no tenderness. Left breast exhibits no skin change and no tenderness. Breasts are symmetrical (bilateral mastectomy). No fluctuance. No seroma. ASSESSMENT and PLAN    ICD-10-CM ICD-9-CM    1. Status post bilateral mastectomy Z90.13 V45.71      Wounds healing well.   No further drainage  Some erythema along the incision  Pt will resume chemo 8/28/2019  Remove portacath when she completes infusions, anticipate 12/2019

## 2019-08-13 NOTE — PATIENT INSTRUCTIONS
A Healthy Lifestyle: Care Instructions  Your Care Instructions    A healthy lifestyle can help you feel good, stay at a healthy weight, and have plenty of energy for both work and play. A healthy lifestyle is something you can share with your whole family. A healthy lifestyle also can lower your risk for serious health problems, such as high blood pressure, heart disease, and diabetes. You can follow a few steps listed below to improve your health and the health of your family. Follow-up care is a key part of your treatment and safety. Be sure to make and go to all appointments, and call your doctor if you are having problems. It's also a good idea to know your test results and keep a list of the medicines you take. How can you care for yourself at home? · Do not eat too much sugar, fat, or fast foods. You can still have dessert and treats now and then. The goal is moderation. · Start small to improve your eating habits. Pay attention to portion sizes, drink less juice and soda pop, and eat more fruits and vegetables. ? Eat a healthy amount of food. A 3-ounce serving of meat, for example, is about the size of a deck of cards. Fill the rest of your plate with vegetables and whole grains. ? Limit the amount of soda and sports drinks you have every day. Drink more water when you are thirsty. ? Eat at least 5 servings of fruits and vegetables every day. It may seem like a lot, but it is not hard to reach this goal. A serving or helping is 1 piece of fruit, 1 cup of vegetables, or 2 cups of leafy, raw vegetables. Have an apple or some carrot sticks as an afternoon snack instead of a candy bar. Try to have fruits and/or vegetables at every meal.  · Make exercise part of your daily routine. You may want to start with simple activities, such as walking, bicycling, or slow swimming. Try to be active 30 to 60 minutes every day. You do not need to do all 30 to 60 minutes all at once.  For example, you can exercise 3 times a day for 10 or 20 minutes. Moderate exercise is safe for most people, but it is always a good idea to talk to your doctor before starting an exercise program.  · Keep moving. [place] the lawn, work in the garden, or [employer]. Take the stairs instead of the elevator at work. · If you smoke, quit. People who smoke have an increased risk for heart attack, stroke, cancer, and other lung illnesses. Quitting is hard, but there are ways to boost your chance of quitting tobacco for good. ? Use nicotine gum, patches, or lozenges. ? Ask your doctor about stop-smoking programs and medicines. ? Keep trying. In addition to reducing your risk of diseases in the future, you will notice some benefits soon after you stop using tobacco. If you have shortness of breath or asthma symptoms, they will likely get better within a few weeks after you quit. · Limit how much alcohol you drink. Moderate amounts of alcohol (up to 2 drinks a day for men, 1 drink a day for women) are okay. But drinking too much can lead to liver problems, high blood pressure, and other health problems. Family health  If you have a family, there are many things you can do together to improve your health. · Eat meals together as a family as often as possible. · Eat healthy foods. This includes fruits, vegetables, lean meats and dairy, and whole grains. · Include your family in your fitness plan. Most people think of activities such as jogging or tennis as the way to fitness, but there are many ways you and your family can be more active. Anything that makes you breathe hard and gets your heart pumping is exercise. Here are some tips:  ? Walk to do errands or to take your child to school or the bus.  ? Go for a family bike ride after dinner instead of watching TV. Where can you learn more? Go to http://manuel-pricila.info/. Enter I327 in the search box to learn more about \"A Healthy Lifestyle: Care Instructions. \"  Current as of: September 11, 2018  Content Version: 12.1  © 8750-6983 [employer]. Care instructions adapted under license by [employer] (which disclaims liability or warranty for this information). If you have questions about a medical condition or this instruction, always ask your healthcare professional. [address] any warranty or liability for your use of this information.

## 2019-08-21 ENCOUNTER — APPOINTMENT (OUTPATIENT)
Dept: INFUSION THERAPY | Age: 74
End: 2019-08-21
Payer: MEDICARE

## 2019-08-26 ENCOUNTER — TELEPHONE (OUTPATIENT)
Dept: SURGERY | Age: 74
End: 2019-08-26

## 2019-08-26 NOTE — TELEPHONE ENCOUNTER
Patient is about three weeks S/P BILATERAL mastectomy. Has some redness and the surgical glue is still in place. Is concerned about an area on the incision. I recommended that she call Dr. Alen Saint. If she needs to be seen tomorrow, I asked her to call tomorrow morning to get an appointment time. She wants to be seen after 12:00 tomorrow because she has a dental appointment in the morning. She was very appreciative of the return phone call.

## 2019-08-27 ENCOUNTER — OFFICE VISIT (OUTPATIENT)
Dept: SURGERY | Age: 74
End: 2019-08-27

## 2019-08-27 ENCOUNTER — DOCUMENTATION ONLY (OUTPATIENT)
Dept: SURGERY | Age: 74
End: 2019-08-27

## 2019-08-27 VITALS
HEIGHT: 66 IN | WEIGHT: 156 LBS | HEART RATE: 87 BPM | SYSTOLIC BLOOD PRESSURE: 132 MMHG | BODY MASS INDEX: 25.07 KG/M2 | DIASTOLIC BLOOD PRESSURE: 73 MMHG

## 2019-08-27 DIAGNOSIS — Z17.0 MALIGNANT NEOPLASM OF CENTRAL PORTION OF LEFT BREAST IN FEMALE, ESTROGEN RECEPTOR POSITIVE (HCC): ICD-10-CM

## 2019-08-27 DIAGNOSIS — C50.112 MALIGNANT NEOPLASM OF CENTRAL PORTION OF LEFT BREAST IN FEMALE, ESTROGEN RECEPTOR POSITIVE (HCC): ICD-10-CM

## 2019-08-27 DIAGNOSIS — Z90.13 HISTORY OF BILATERAL MASTECTOMY: Primary | ICD-10-CM

## 2019-08-27 NOTE — PROGRESS NOTES
The patients pathology specimen was called to [address] [place] [address]. Spoke to Luciano Weaver who is aware it is a stat specimen.

## 2019-08-27 NOTE — LETTER
8/27/19 Patient: Panfilo Thayer YOB: 1945 Date of Visit: 8/27/2019 Lesli Phelan MD 
[address] [place] [address] VIA Facsimile: [phone] Dear Lesli Phelan MD, Thank you for referring Ms. Katie Ludwig to [address] for evaluation. My notes for this consultation are attached. If you have questions, please do not hesitate to call me. I look forward to following your patient along with you.  
 
 
Sincerely, 
 
Darling So MD

## 2019-08-27 NOTE — PROGRESS NOTES
HISTORY OF PRESENT ILLNESS  Nel Francisco is a 66 y.o. female. HPI ESTABLISHED patient here today for complaints of redness at the RIGHT breast incisional site. The patient states she cannot get all the glue off and the incisional area is red an irritated. She also has an itchy rash on her arms and legs and back that has small open sores. She is on doxycyline (prophylactic for Perjeta)  2/2019 LEFT invasive ductal carcinoma, grade 2, %. ND 20%, Her 2 positive  Positive axillary node, %. ND neg, Her 2 positive  TCH-P  8/2019 bilateral mastectomy. LEFT COMPLETE RESPONDS, 0/7 nodes. RIGHT  Lobular carcinoma in situ. ROS    Physical Exam   Pulmonary/Chest: Right breast exhibits skin change (incisions mostly healed aside from superficial wound opening). Left breast exhibits skin change (7mm eschar medial incision). ASSESSMENT and PLAN    ICD-10-CM ICD-9-CM    1. History of bilateral mastectomy Z90.13 V45.71      1. Pt reassured that the wound will heal by itself. All the glue is off. The yellow material in the wound is granulation tissue. 2. The itchy skin may be due to Perjeta. She will meet with Dr Tara Etienne tomorrow.

## 2019-08-28 ENCOUNTER — HOSPITAL ENCOUNTER (OUTPATIENT)
Dept: INFUSION THERAPY | Age: 74
Discharge: HOME | End: 2019-08-28
Payer: MEDICARE

## 2019-08-28 ENCOUNTER — OFFICE VISIT (OUTPATIENT)
Dept: ONCOLOGY | Age: 74
End: 2019-08-28

## 2019-08-28 VITALS
DIASTOLIC BLOOD PRESSURE: 79 MMHG | HEART RATE: 82 BPM | BODY MASS INDEX: 25.47 KG/M2 | TEMPERATURE: 96.8 F | HEIGHT: 66 IN | WEIGHT: 158.5 LBS | OXYGEN SATURATION: 97 % | SYSTOLIC BLOOD PRESSURE: 132 MMHG | RESPIRATION RATE: 18 BRPM

## 2019-08-28 VITALS
BODY MASS INDEX: 25.47 KG/M2 | OXYGEN SATURATION: 97 % | SYSTOLIC BLOOD PRESSURE: 132 MMHG | DIASTOLIC BLOOD PRESSURE: 72 MMHG | TEMPERATURE: 96.8 F | HEART RATE: 81 BPM | WEIGHT: 158.5 LBS | HEIGHT: 66 IN | RESPIRATION RATE: 18 BRPM

## 2019-08-28 DIAGNOSIS — C50.112 MALIGNANT NEOPLASM OF CENTRAL PORTION OF LEFT BREAST IN FEMALE, ESTROGEN RECEPTOR POSITIVE (HCC): Primary | ICD-10-CM

## 2019-08-28 DIAGNOSIS — G62.9 NEUROPATHY: ICD-10-CM

## 2019-08-28 DIAGNOSIS — Z51.81 ENCOUNTER FOR MONITORING CARDIOTOXIC DRUG THERAPY: ICD-10-CM

## 2019-08-28 DIAGNOSIS — K21.9 GASTROESOPHAGEAL REFLUX DISEASE WITHOUT ESOPHAGITIS: ICD-10-CM

## 2019-08-28 DIAGNOSIS — R93.1 DECREASED CARDIAC EJECTION FRACTION: ICD-10-CM

## 2019-08-28 DIAGNOSIS — Z79.899 ENCOUNTER FOR MONITORING CARDIOTOXIC DRUG THERAPY: ICD-10-CM

## 2019-08-28 DIAGNOSIS — Z72.0 TOBACCO USE: ICD-10-CM

## 2019-08-28 DIAGNOSIS — L53.9 ERYTHEMA: ICD-10-CM

## 2019-08-28 DIAGNOSIS — R53.83 FATIGUE, UNSPECIFIED TYPE: ICD-10-CM

## 2019-08-28 DIAGNOSIS — E87.6 HYPOKALEMIA: ICD-10-CM

## 2019-08-28 DIAGNOSIS — L29.9 ITCHY SKIN: ICD-10-CM

## 2019-08-28 DIAGNOSIS — Z17.0 MALIGNANT NEOPLASM OF CENTRAL PORTION OF LEFT BREAST IN FEMALE, ESTROGEN RECEPTOR POSITIVE (HCC): Primary | ICD-10-CM

## 2019-08-28 DIAGNOSIS — Z93.3 COLOSTOMY IN PLACE (HCC): ICD-10-CM

## 2019-08-28 PROCEDURE — 96417 CHEMO IV INFUS EACH ADDL SEQ: CPT

## 2019-08-28 PROCEDURE — 74011250636 HC RX REV CODE- 250/636: Performed by: NURSE PRACTITIONER

## 2019-08-28 PROCEDURE — 77030012965 HC NDL HUBR BBMI -A

## 2019-08-28 PROCEDURE — 96415 CHEMO IV INFUSION ADDL HR: CPT

## 2019-08-28 PROCEDURE — 96413 CHEMO IV INFUSION 1 HR: CPT

## 2019-08-28 RX ORDER — DIPHENHYDRAMINE HYDROCHLORIDE 50 MG/ML
50 INJECTION, SOLUTION INTRAMUSCULAR; INTRAVENOUS AS NEEDED
Status: CANCELLED
Start: 2019-08-28

## 2019-08-28 RX ORDER — SODIUM CHLORIDE 0.9 % (FLUSH) 0.9 %
10 SYRINGE (ML) INJECTION AS NEEDED
Status: ACTIVE | OUTPATIENT
Start: 2019-08-28 | End: 2019-08-28

## 2019-08-28 RX ORDER — HYDROCORTISONE SODIUM SUCCINATE 100 MG/2ML
100 INJECTION, POWDER, FOR SOLUTION INTRAMUSCULAR; INTRAVENOUS AS NEEDED
Status: CANCELLED | OUTPATIENT
Start: 2019-09-18

## 2019-08-28 RX ORDER — DIPHENHYDRAMINE HYDROCHLORIDE 50 MG/ML
50 INJECTION, SOLUTION INTRAMUSCULAR; INTRAVENOUS
Status: CANCELLED | OUTPATIENT
Start: 2019-08-28

## 2019-08-28 RX ORDER — EPINEPHRINE 1 MG/ML
0.3 INJECTION, SOLUTION, CONCENTRATE INTRAVENOUS AS NEEDED
Status: CANCELLED | OUTPATIENT
Start: 2019-08-28

## 2019-08-28 RX ORDER — ALBUTEROL SULFATE 0.83 MG/ML
2.5 SOLUTION RESPIRATORY (INHALATION) AS NEEDED
Status: CANCELLED
Start: 2019-09-18

## 2019-08-28 RX ORDER — SODIUM CHLORIDE 0.9 % (FLUSH) 0.9 %
10 SYRINGE (ML) INJECTION AS NEEDED
Status: CANCELLED
Start: 2019-09-18

## 2019-08-28 RX ORDER — ALBUTEROL SULFATE 0.83 MG/ML
2.5 SOLUTION RESPIRATORY (INHALATION) AS NEEDED
Status: CANCELLED
Start: 2019-08-28

## 2019-08-28 RX ORDER — SODIUM CHLORIDE 9 MG/ML
25 INJECTION, SOLUTION INTRAVENOUS CONTINUOUS
Status: CANCELLED | OUTPATIENT
Start: 2019-08-28

## 2019-08-28 RX ORDER — ACETAMINOPHEN 325 MG/1
650 TABLET ORAL
Status: CANCELLED | OUTPATIENT
Start: 2019-08-28

## 2019-08-28 RX ORDER — ONDANSETRON 2 MG/ML
8 INJECTION INTRAMUSCULAR; INTRAVENOUS AS NEEDED
Status: CANCELLED | OUTPATIENT
Start: 2019-08-28

## 2019-08-28 RX ORDER — EPINEPHRINE 1 MG/ML
0.3 INJECTION, SOLUTION, CONCENTRATE INTRAVENOUS AS NEEDED
Status: CANCELLED | OUTPATIENT
Start: 2019-09-18

## 2019-08-28 RX ORDER — HEPARIN 100 UNIT/ML
300-500 SYRINGE INTRAVENOUS AS NEEDED
Status: ACTIVE | OUTPATIENT
Start: 2019-08-28 | End: 2019-08-28

## 2019-08-28 RX ORDER — HEPARIN 100 UNIT/ML
300-500 SYRINGE INTRAVENOUS AS NEEDED
Status: CANCELLED
Start: 2019-08-28

## 2019-08-28 RX ORDER — ACETAMINOPHEN 325 MG/1
650 TABLET ORAL
Status: CANCELLED | OUTPATIENT
Start: 2019-09-18

## 2019-08-28 RX ORDER — DIPHENHYDRAMINE HYDROCHLORIDE 50 MG/ML
50 INJECTION, SOLUTION INTRAMUSCULAR; INTRAVENOUS
Status: CANCELLED | OUTPATIENT
Start: 2019-09-18

## 2019-08-28 RX ORDER — SODIUM CHLORIDE 9 MG/ML
10 INJECTION INTRAMUSCULAR; INTRAVENOUS; SUBCUTANEOUS AS NEEDED
Status: CANCELLED | OUTPATIENT
Start: 2019-09-18

## 2019-08-28 RX ORDER — SODIUM CHLORIDE 9 MG/ML
25 INJECTION, SOLUTION INTRAVENOUS CONTINUOUS
Status: CANCELLED | OUTPATIENT
Start: 2019-09-18

## 2019-08-28 RX ORDER — ONDANSETRON 2 MG/ML
8 INJECTION INTRAMUSCULAR; INTRAVENOUS AS NEEDED
Status: CANCELLED | OUTPATIENT
Start: 2019-09-18

## 2019-08-28 RX ORDER — DIPHENHYDRAMINE HYDROCHLORIDE 50 MG/ML
50 INJECTION, SOLUTION INTRAMUSCULAR; INTRAVENOUS AS NEEDED
Status: CANCELLED
Start: 2019-09-18

## 2019-08-28 RX ORDER — SODIUM CHLORIDE 9 MG/ML
25 INJECTION, SOLUTION INTRAVENOUS CONTINUOUS
Status: DISPENSED | OUTPATIENT
Start: 2019-08-28 | End: 2019-08-28

## 2019-08-28 RX ORDER — SODIUM CHLORIDE 9 MG/ML
10 INJECTION INTRAMUSCULAR; INTRAVENOUS; SUBCUTANEOUS AS NEEDED
Status: CANCELLED | OUTPATIENT
Start: 2019-08-28

## 2019-08-28 RX ORDER — SODIUM CHLORIDE 9 MG/ML
10 INJECTION INTRAMUSCULAR; INTRAVENOUS; SUBCUTANEOUS AS NEEDED
Status: ACTIVE | OUTPATIENT
Start: 2019-08-28 | End: 2019-08-28

## 2019-08-28 RX ORDER — SODIUM CHLORIDE 0.9 % (FLUSH) 0.9 %
10 SYRINGE (ML) INJECTION AS NEEDED
Status: CANCELLED
Start: 2019-08-28

## 2019-08-28 RX ORDER — HEPARIN 100 UNIT/ML
300-500 SYRINGE INTRAVENOUS AS NEEDED
Status: CANCELLED
Start: 2019-09-18

## 2019-08-28 RX ORDER — ACETAMINOPHEN 325 MG/1
650 TABLET ORAL AS NEEDED
Status: CANCELLED
Start: 2019-09-18

## 2019-08-28 RX ORDER — HYDROCORTISONE SODIUM SUCCINATE 100 MG/2ML
100 INJECTION, POWDER, FOR SOLUTION INTRAMUSCULAR; INTRAVENOUS AS NEEDED
Status: CANCELLED | OUTPATIENT
Start: 2019-08-28

## 2019-08-28 RX ORDER — ACETAMINOPHEN 325 MG/1
650 TABLET ORAL AS NEEDED
Status: CANCELLED
Start: 2019-08-28

## 2019-08-28 RX ADMIN — Medication 10 ML: at 13:23

## 2019-08-28 RX ADMIN — Medication 500 UNITS: at 13:23

## 2019-08-28 RX ADMIN — TRASTUZUMAB 595 MG: 150 INJECTION, POWDER, LYOPHILIZED, FOR SOLUTION INTRAVENOUS at 10:23

## 2019-08-28 RX ADMIN — PERTUZUMAB 840 MG: 30 INJECTION, SOLUTION, CONCENTRATE INTRAVENOUS at 12:12

## 2019-08-28 RX ADMIN — SODIUM CHLORIDE 25 ML/HR: 900 INJECTION, SOLUTION INTRAVENOUS at 09:39

## 2019-08-28 NOTE — PROGRESS NOTES
[facility] [place] [facility] [place] [address] [address] [facility]  Deshawn [place]: [phone]  F: [phone]      Reason for Visit:   Hubert Kumar is a 66 y.o. female who is seen in consultation at the request of Dr. Cyndi Burgess for evaluation of therapy for breast cancer. Treatment History:   · 2/15/19 Left core bx:   IDC, gr 2, 1.1 cm, ER + at 100%, DC + at 20%, HER 2 POSITIVE (IHC 2+; FISH ratio 3.9; sig/cell 9.2)  · 3/1/19 Left ax LN core bx:  + for breast cancer, ER + at 100%, DC negative, HER 2 POSITIVE (IHC 2+, FISH ratio 2; sig/cell 5.8)  · TC-P 3/6/19-7/10/19  · #2 delayed due to diverticulitis abscess and surgery 4/17/19  · 8/5/19 Bilateral Mastecotomy: Right breast: lobular carcinomna in situ, fibrocystic changes including atypical and usual ductal hyperplasia, sclerosing adenosis apocrine metaplasia, microcalcification. Left breast: No evidence of residual invasive carcinoma or carcinoma in situ, no DCIS, 0/9 LNs; ypT0 yp N0 cM0, pCR  · [facility] HP 8/28/19-    History of Present Illness:   Pt noticed the left breast mass herself in Feb 2019, leading to the pathology above    She was seen in ED on 3/8/19 for abdominal pain and constipation CT abd/pelvis showed diverticulitis and received levaquin and flagyl. KUB also showed nonobstructive bowel gas pattern. Reports last good BM was 3/6 prior to chemo. She states on 3/10 and 3/12 had small watery BM. Sent to ED from office on 3/12/19 for worsening abdominal pain. She was found to have diverticulitis with multiple abscesses. S/p IV abx and perc drain with no relief of her pain, therefore proceeded with open sigmoidectomy and end colostomy on 3/15/19. She was discharged to SNF with IV abx until 3/29/19. Interval history:  In today for follow up. Complains of gr 1 fatigue, gr 1 hair loss, gr 2 insomnia, gr 1 itching, gr 1 neuropathy, gr 1 vaginal dryness.     FH:  Paternal aunt breast cancer in her [de-identified]; no ovarian cancer, no prostate or pancreas cancer    Past Medical History:   Diagnosis Date    Adverse effect of anesthesia     \"difficulty waking up from anesthethia\"    Cancer ([address] 02/2019    left breast. current chemo patient    Depression     in past    GERD (gastroesophageal reflux disease)     High cholesterol     Hypertension       Past Surgical History:   Procedure Laterality Date    HX BREAST BIOPSY Right years ago    neg; needle bx    HX BREAST BIOPSY Left 02/15/2019    IDC    HX CATARACT REMOVAL      2013 (R), 2016(L)    HX COLONOSCOPY      HX COLOSTOMY Left 03/2019    HX MASTECTOMY Bilateral 8/5/2019    BILATERAL BREAST MASTECTOMIES AND LEFT BREAST SENTINEL NODE BIOPSY WITH BLUE DYE performed by Lio Hogan MD at [address] HX ORTHOPAEDIC      carpal tunnel    HX VASCULAR ACCESS Right 02/2019    portacath    IR CHANGE DRAIN  ABCESS PERC  03/2019      Social History     Tobacco Use    Smoking status: Current Every Day Smoker     Packs/day: 0.25     Years: 55.00     Pack years: 13.75    Smokeless tobacco: Never Used    Tobacco comment: 1-2 cigs/day   Substance Use Topics    Alcohol use:  Yes     Alcohol/week: 1.0 standard drinks     Types: 1 Shots of liquor per week     Drinks per session: 5 or 6     Binge frequency: Weekly     Comment: Patient stated that she puts a splash of bourbon in her afternoon coffee around 5-6 days per week      Family History   Problem Relation Age of Onset    Breast Cancer Maternal Aunt         [de-identified]    Hypertension Mother     Hypertension Father     Cancer Father         Lung    Cancer Brother         leukemia    Diabetes Brother         2 brothers with diabetes    Hypertension Sister         2 sisters and 4 brothers with HTN     Current Outpatient Medications   Medication Sig    potassium chloride SA (MICRO-K) 10 mEq capsule TAKE 2 CAPSULES BY MOUTH ONCE DAILY -  YOU  CAN  BREAK  OPEN  CAPSULES  AND  MIX  WITH  JUICE    neomycin-polymyxin-hydrocortisone, buffered, (PEDIOTIC) 3.5-10,000-1 mg/mL-unit/mL-% otic suspension Place 3 drops at end of toenails and fingernails (3) times daily.  doxycycline (ADOXA) 100 mg tablet Take 100 mg by mouth daily.  famotidine (PEPCID AC) 20 mg tablet Take 20 mg by mouth daily.  cyanocobalamin/mecobalamin (CYANOCOBALAMIN-METHYLCOBALAMIN SL) 5,000 mcg by SubLINGual route daily.  losartan-hydroCHLOROthiazide (HYZAAR) 100-25 mg per tablet Take 0.5 Tabs by mouth daily.  pravastatin (PRAVACHOL) 40 mg tablet Take 40 mg by mouth nightly.  STIOLTO RESPIMAT 2.5-2.5 mcg/actuation inhaler Take 2 Puffs by inhalation nightly.  venlafaxine (EFFEXOR) 50 mg tablet Take 50 mg by mouth daily.  cholecalciferol (VITAMIN D3) 1,000 unit cap Take 1,000 Units by mouth daily.  calcium-cholecalciferol, D3, (CALTRATE 600+D) tablet Take 1 Tab by mouth nightly.  aspirin delayed-release 81 mg tablet Take  by mouth every other day.  acetaminophen (TYLENOL EXTRA STRENGTH) 500 mg tablet Take 1,000 mg by mouth every six (6) hours as needed for Pain.  polyethylene glycol (MIRALAX) 17 gram packet Take 1 Packet by mouth daily as needed (constipation).  naproxen sodium (ALEVE PO) Take  by mouth two (2) times daily as needed.  lidocaine-prilocaine (EMLA) topical cream Apply  to affected area as needed for Pain.  prochlorperazine (COMPAZINE) 10 mg tablet Take 1 Tab by mouth every six (6) hours as needed for Nausea. No current facility-administered medications for this visit. Allergies   Allergen Reactions    Keflex [Cephalexin] Hives    Penicillins Rash    Other Medication Itching     CHLORHEXADINE ( wipes caused moderate to severe itching in preop 8/5/19)        Review of Systems: A complete review of systems was obtained, negative except as described above.     Physical Exam:     Visit Vitals  /79   Pulse 82   Temp 96.8 °F (36 °C) (Temporal)   Resp 18   Ht 5' 6\" (1.676 m)   Wt 158 lb 8 oz (71.9 kg)   SpO2 97%   BMI 25.58 kg/m²     ECOG PS: 0  General: No distress  Eyes: PERRLA, anicteric sclerae. HENT: Atraumatic, OP clear  Neck: Supple  Lymphatic: No cervical, supraclavicular adenopathy  Respiratory: clear to auscultation bilaterally  CV: Normal rate, regular rhythm, no murmurs, no peripheral edema  GI: Soft, tender, distended, no masses, +BS, ostomy in place  MS:  Digits without clubbing or cyanosis. Skin: No rashes, ecchymoses, or petechiae. Normal temperature, turgor, and texture. Psych: Alert, oriented, appropriate affect, normal judgment/insight  Breasts:  Bilateral mastectomies. Left breast healing well, no erythema. Right breast: granulation tissue present, erythema present. Results:     Lab Results   Component Value Date/Time    WBC 11.3 (H) 07/10/2019 07:57 AM    HGB 11.8 07/10/2019 07:57 AM    HCT 34.5 (L) 07/10/2019 07:57 AM    PLATELET 220 24/74/9782 07:57 AM    MCV 96.6 07/10/2019 07:57 AM    ABS. NEUTROPHILS 8.0 07/10/2019 07:57 AM     Lab Results   Component Value Date/Time    Sodium 138 07/10/2019 07:57 AM    Potassium 3.6 08/05/2019 06:45 AM    Chloride 100 07/10/2019 07:57 AM    CO2 32 07/10/2019 07:57 AM    Glucose 107 (H) 07/10/2019 07:57 AM    BUN 13 07/10/2019 07:57 AM    Creatinine 0.76 07/10/2019 07:57 AM    GFR est AA >60 07/10/2019 07:57 AM    GFR est non-AA >60 07/10/2019 07:57 AM    Calcium 9.9 07/10/2019 07:57 AM    Glucose (POC) 93 03/19/2019 06:56 AM     Lab Results   Component Value Date/Time    Bilirubin, total 0.4 07/10/2019 07:57 AM    ALT (SGPT) 17 07/10/2019 07:57 AM    AST (SGOT) 13 (L) 07/10/2019 07:57 AM    Alk. phosphatase 83 07/10/2019 07:57 AM    Protein, total 6.6 07/10/2019 07:57 AM    Albumin 3.5 07/10/2019 07:57 AM    Globulin 3.1 07/10/2019 07:57 AM       2/12/19 mammogram and US  MAMMOGRAPHY:  The breast demonstrates stable scattered density breast  architecture.   Underlying the site of clinical concern in the left breast is  masslike focal asymmetry in the anterior upper left breast. There is no other  dominant mass lesion, architectural distortion, asymmetry, or calcification. There is no skin thickening or nipple retraction.     ULTRASOUND:  Corresponding to the mammographic density there is a 2.4 x 3.1 x  3.7 cm hypoechoic lobulated mass at about the 12:00 position and 3 cm radial  distance from the nipple. No other suspicious cystic or solid lesions  Identified. Dr. Talia Luis has identified 2 large, suspicious ax LN on US:  1.7 cm and 1.2 cm    5/16/19 Left mammo and US:  The breast parenchyma is heterogeneously dense. Left breast biopsy clip is  unchanged in position. The central upper left breast mass seen on prior MR exam  dated February 2019 has decreased in size.     Targeted sonographic evaluation of the 12:00 position of the left breast is  performed. Within the 12:00 position of the left breast, there is a 2.1 cm x 1.3  cm x 3.1 cm irregular hypoechoic mass containing calcifications, decreased in  size and measuring 3.8 cm x 2.4 cm x 3.1 cm on prior ultrasound dated February 2018.     IMPRESSION: BI-RADS 6. Pathology proven left breast cancer. Interval decrease in size of 12:00 left breast mass, now measuring 2.1 cm x 1.3 cm x 3.1 cm. Findings were discussed with the patient at the time of interpretation. Records reviewed and summarized above. Assessment/plan:   1. Left central IDC, gr 2, ER +, OK +, HER 2 positive:  cT2 cN1a cM0, stage IIB, prognostic stage IB; ypT0 ypN0 cM0    We explained to the patient that the goal of systemic adjuvant therapy is to improve the chances for cure and decrease the risk of relapse. We explained why a patient can have microscopic cancer spread now even though physical examination, laboratory studies and imaging studies are negative for cancer.  We explained that the same treatments used now as adjuvant or preventive treatments rarely if ever are curative in women who develop metastases. TTE 3/6/19 EF 72%, TTE 6/4/19 EF 66%    [facility] HP #1 today. Will see her back in 6 weeks for #3. We will plan to see the patient in follow up at least once per cycle, or sooner if symptoms warrant. TTE due prior to next dose    2. Emotional well being:  She has excellent support and is coping well with her disease    3. tob use:  Smokes 3-4 cigs/day; counseled to quit    4. Fatigue: Appetite continues to improve. Energy level has been improving. 5. GERD: due to doxycycline. This has resolved with decreased dose to daily as well as continuing Pepcid daily. 6. 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 Started after Cycle 3. Does not wish to start any medications at this time. Patient will let us know if this worsens. 7. Decreased EF:  EF dropped from 72% to 66% and global strain dropped from 18% to 15.7% between 3/2019 to 6/2019, however per Dr. Ailyn Seth, visually there is no significant change. Recommend close follow up and Troponin I level. Trop on 6/19/19 was normal. Next TTE due now, ordered. 8. Hypokalemia: 20 meq K daily. 9. Redness to right breast:  Met with Dr. Karrin Mcardle on 8/27/19 who reassured patient that the yellow material was granulation tissue. 10.  Itchy skin:  Ongoing since chemo but intermittent to arms, legs, back. May be due to pertuzumab. Recommend PRN hydrocortisone cream or benadryl gel. Also recommended PO benadryl PRN. This patient was seen in conjunction with Manisha Pimentel NP      I appreciate the opportunity to participate in Ms. Corwin Krause's care. Signed By: Keerthi Izquierdo MD      No orders of the defined types were placed in this encounter.

## 2019-08-28 NOTE — PROGRESS NOTES
[facility] - Chemotherapy Progress Note    0730 Pt admit to [facility] for Herceptin/Perjeta C7 ambulatory in stable condition. Assessment completed. No new concerns voiced. Port accessed with positive blood return. Patient proceeded to scheduled MD appointment. Patient returned with no change in treatment. Echo results were within parameters to treat. Chemotherapy Flowsheet 8/28/2019   Cycle C7D1   Date 8/28/2019   Drug / Regimen HP   Pre Meds -       Visit Vitals  /79 (BP 1 Location: Left arm, BP Patient Position: Sitting)   Pulse 82   Temp 96.8 °F (36 °C)   Resp 16   Ht 5' 6\" (1.676 m)   Wt 71.9 kg (158 lb 8 oz)   SpO2 97%   Breastfeeding? No   BMI 25.58 kg/m²     RN gave report to Rickey Rosales RN. Patient in no signs of distress during hand off. Patient tolerated herceptin well. Perjeta hung during hand off.

## 2019-08-29 RX ORDER — HYDROCORTISONE SODIUM SUCCINATE 100 MG/2ML
100 INJECTION, POWDER, FOR SOLUTION INTRAMUSCULAR; INTRAVENOUS AS NEEDED
Status: CANCELLED | OUTPATIENT
Start: 2019-10-09

## 2019-08-29 RX ORDER — DIPHENHYDRAMINE HYDROCHLORIDE 50 MG/ML
50 INJECTION, SOLUTION INTRAMUSCULAR; INTRAVENOUS
Status: CANCELLED | OUTPATIENT
Start: 2019-10-09

## 2019-08-29 RX ORDER — ONDANSETRON 2 MG/ML
8 INJECTION INTRAMUSCULAR; INTRAVENOUS AS NEEDED
Status: CANCELLED | OUTPATIENT
Start: 2019-10-09

## 2019-08-29 RX ORDER — ACETAMINOPHEN 325 MG/1
650 TABLET ORAL
Status: CANCELLED | OUTPATIENT
Start: 2019-10-09

## 2019-08-29 RX ORDER — SODIUM CHLORIDE 9 MG/ML
25 INJECTION, SOLUTION INTRAVENOUS CONTINUOUS
Status: CANCELLED | OUTPATIENT
Start: 2019-10-09

## 2019-08-29 RX ORDER — ACETAMINOPHEN 325 MG/1
650 TABLET ORAL AS NEEDED
Status: CANCELLED
Start: 2019-10-09

## 2019-08-29 RX ORDER — EPINEPHRINE 1 MG/ML
0.3 INJECTION, SOLUTION, CONCENTRATE INTRAVENOUS AS NEEDED
Status: CANCELLED | OUTPATIENT
Start: 2019-10-09

## 2019-08-29 RX ORDER — DIPHENHYDRAMINE HYDROCHLORIDE 50 MG/ML
50 INJECTION, SOLUTION INTRAMUSCULAR; INTRAVENOUS AS NEEDED
Status: CANCELLED
Start: 2019-10-09

## 2019-08-29 RX ORDER — ALBUTEROL SULFATE 0.83 MG/ML
2.5 SOLUTION RESPIRATORY (INHALATION) AS NEEDED
Status: CANCELLED
Start: 2019-10-09

## 2019-08-29 RX ORDER — SODIUM CHLORIDE 9 MG/ML
10 INJECTION INTRAMUSCULAR; INTRAVENOUS; SUBCUTANEOUS AS NEEDED
Status: CANCELLED | OUTPATIENT
Start: 2019-10-09

## 2019-08-29 RX ORDER — SODIUM CHLORIDE 0.9 % (FLUSH) 0.9 %
10 SYRINGE (ML) INJECTION AS NEEDED
Status: CANCELLED
Start: 2019-10-09

## 2019-08-29 RX ORDER — HEPARIN 100 UNIT/ML
300-500 SYRINGE INTRAVENOUS AS NEEDED
Status: CANCELLED
Start: 2019-10-09

## 2019-09-12 DIAGNOSIS — C50.112 MALIGNANT NEOPLASM OF CENTRAL PORTION OF LEFT BREAST IN FEMALE, ESTROGEN RECEPTOR POSITIVE (HCC): ICD-10-CM

## 2019-09-12 DIAGNOSIS — Z17.0 MALIGNANT NEOPLASM OF CENTRAL PORTION OF LEFT BREAST IN FEMALE, ESTROGEN RECEPTOR POSITIVE (HCC): ICD-10-CM

## 2019-09-18 ENCOUNTER — HOSPITAL ENCOUNTER (OUTPATIENT)
Dept: INFUSION THERAPY | Age: 74
Discharge: HOME | End: 2019-09-18
Payer: MEDICARE

## 2019-09-18 VITALS
TEMPERATURE: 97 F | HEART RATE: 59 BPM | SYSTOLIC BLOOD PRESSURE: 142 MMHG | RESPIRATION RATE: 17 BRPM | BODY MASS INDEX: 25.05 KG/M2 | DIASTOLIC BLOOD PRESSURE: 70 MMHG | WEIGHT: 155.2 LBS

## 2019-09-18 DIAGNOSIS — C50.112 MALIGNANT NEOPLASM OF CENTRAL PORTION OF LEFT BREAST IN FEMALE, ESTROGEN RECEPTOR POSITIVE (HCC): Primary | ICD-10-CM

## 2019-09-18 DIAGNOSIS — Z17.0 MALIGNANT NEOPLASM OF CENTRAL PORTION OF LEFT BREAST IN FEMALE, ESTROGEN RECEPTOR POSITIVE (HCC): Primary | ICD-10-CM

## 2019-09-18 PROCEDURE — 74011250636 HC RX REV CODE- 250/636: Performed by: NURSE PRACTITIONER

## 2019-09-18 PROCEDURE — 77030012965 HC NDL HUBR BBMI -A

## 2019-09-18 PROCEDURE — 96417 CHEMO IV INFUS EACH ADDL SEQ: CPT

## 2019-09-18 PROCEDURE — 96413 CHEMO IV INFUSION 1 HR: CPT

## 2019-09-18 PROCEDURE — 74011000250 HC RX REV CODE- 250: Performed by: NURSE PRACTITIONER

## 2019-09-18 RX ORDER — SODIUM CHLORIDE 9 MG/ML
25 INJECTION, SOLUTION INTRAVENOUS CONTINUOUS
Status: DISPENSED | OUTPATIENT
Start: 2019-09-18 | End: 2019-09-18

## 2019-09-18 RX ORDER — SODIUM CHLORIDE 0.9 % (FLUSH) 0.9 %
10 SYRINGE (ML) INJECTION AS NEEDED
Status: ACTIVE | OUTPATIENT
Start: 2019-09-18 | End: 2019-09-18

## 2019-09-18 RX ORDER — HEPARIN 100 UNIT/ML
300-500 SYRINGE INTRAVENOUS AS NEEDED
Status: ACTIVE | OUTPATIENT
Start: 2019-09-18 | End: 2019-09-18

## 2019-09-18 RX ORDER — SODIUM CHLORIDE 9 MG/ML
10 INJECTION INTRAMUSCULAR; INTRAVENOUS; SUBCUTANEOUS AS NEEDED
Status: ACTIVE | OUTPATIENT
Start: 2019-09-18 | End: 2019-09-18

## 2019-09-18 RX ADMIN — TRASTUZUMAB 446 MG: 150 INJECTION, POWDER, LYOPHILIZED, FOR SOLUTION INTRAVENOUS at 09:35

## 2019-09-18 RX ADMIN — Medication 10 ML: at 10:55

## 2019-09-18 RX ADMIN — SODIUM CHLORIDE 10 ML: 9 INJECTION, SOLUTION INTRAMUSCULAR; INTRAVENOUS; SUBCUTANEOUS at 09:08

## 2019-09-18 RX ADMIN — Medication 10 ML: at 09:09

## 2019-09-18 RX ADMIN — PERTUZUMAB 420 MG: 30 INJECTION, SOLUTION, CONCENTRATE INTRAVENOUS at 10:25

## 2019-09-18 RX ADMIN — SODIUM CHLORIDE 25 ML/HR: 900 INJECTION, SOLUTION INTRAVENOUS at 09:29

## 2019-09-18 RX ADMIN — Medication 500 UNITS: at 10:55

## 2019-09-18 NOTE — PROGRESS NOTES
[facility] Progress Note    Date: 2019    Name: Good Berry    MRN: [id number]         : 1945    Ms. Krause Arrived ambulatory and in no distress for C8 of Herceptin/Perjeta Regimen. Assessment was completed, no acute issues at this time, no new complaints voiced. Right chest wall port accessed without difficulty, + blood return. Pt has healing incision from double masectomy. Incision has no drainage, no pain, no swelling but is slightly open. Pt stated surgeon and onc is following the healing of this incision. Ms. Gabriel Preston vitals were reviewed. Patient Vitals for the past 12 hrs:   Temp Pulse Resp BP   19 1056 97 °F (36.1 °C) (!) 59 17 142/70   19 0904 96.6 °F (35.9 °C) 78 18 139/73       Echo reviewed from 9/3/19. EF 66% and within treatment parameters. Medications:  Herceptin IV  Perjeta IV    Ms. Krause tolerated treatment well and was discharged from [place] [address] in stable condition. Port de-accessed, flushed & heparinized per protocol. She is to return on 10/9/19 at 9:00 for her next appointment.     Anand Atkinson RN  2019

## 2019-10-02 RX ORDER — DOXYCYCLINE 100 MG/1
CAPSULE ORAL
Qty: 84 CAP | Refills: 0 | Status: SHIPPED | OUTPATIENT
Start: 2019-10-02 | End: 2019-11-20 | Stop reason: ALTCHOICE

## 2019-10-09 ENCOUNTER — OFFICE VISIT (OUTPATIENT)
Dept: ONCOLOGY | Age: 74
End: 2019-10-09

## 2019-10-09 ENCOUNTER — HOSPITAL ENCOUNTER (OUTPATIENT)
Dept: INFUSION THERAPY | Age: 74
Discharge: HOME | End: 2019-10-09
Payer: MEDICARE

## 2019-10-09 VITALS
RESPIRATION RATE: 16 BRPM | TEMPERATURE: 97.8 F | BODY MASS INDEX: 25.26 KG/M2 | HEART RATE: 78 BPM | SYSTOLIC BLOOD PRESSURE: 122 MMHG | WEIGHT: 157.2 LBS | OXYGEN SATURATION: 97 % | HEIGHT: 66 IN | DIASTOLIC BLOOD PRESSURE: 65 MMHG

## 2019-10-09 VITALS
SYSTOLIC BLOOD PRESSURE: 126 MMHG | HEART RATE: 80 BPM | TEMPERATURE: 97.9 F | OXYGEN SATURATION: 97 % | RESPIRATION RATE: 16 BRPM | HEIGHT: 66 IN | WEIGHT: 157 LBS | DIASTOLIC BLOOD PRESSURE: 67 MMHG | BODY MASS INDEX: 25.23 KG/M2

## 2019-10-09 DIAGNOSIS — Z72.0 TOBACCO USE: ICD-10-CM

## 2019-10-09 DIAGNOSIS — Z17.0 MALIGNANT NEOPLASM OF CENTRAL PORTION OF LEFT BREAST IN FEMALE, ESTROGEN RECEPTOR POSITIVE (HCC): Primary | ICD-10-CM

## 2019-10-09 DIAGNOSIS — Z51.81 ENCOUNTER FOR MONITORING CARDIOTOXIC DRUG THERAPY: ICD-10-CM

## 2019-10-09 DIAGNOSIS — R53.83 FATIGUE, UNSPECIFIED TYPE: ICD-10-CM

## 2019-10-09 DIAGNOSIS — K21.9 GASTROESOPHAGEAL REFLUX DISEASE WITHOUT ESOPHAGITIS: ICD-10-CM

## 2019-10-09 DIAGNOSIS — C50.112 MALIGNANT NEOPLASM OF CENTRAL PORTION OF LEFT BREAST IN FEMALE, ESTROGEN RECEPTOR POSITIVE (HCC): Primary | ICD-10-CM

## 2019-10-09 DIAGNOSIS — G62.9 NEUROPATHY: ICD-10-CM

## 2019-10-09 DIAGNOSIS — Z93.3 COLOSTOMY IN PLACE (HCC): ICD-10-CM

## 2019-10-09 DIAGNOSIS — E87.6 HYPOKALEMIA: ICD-10-CM

## 2019-10-09 DIAGNOSIS — Z78.0 POSTMENOPAUSAL: ICD-10-CM

## 2019-10-09 DIAGNOSIS — L53.9 ERYTHEMA: ICD-10-CM

## 2019-10-09 DIAGNOSIS — L29.9 ITCHY SKIN: ICD-10-CM

## 2019-10-09 DIAGNOSIS — Z51.11 CHEMOTHERAPY MANAGEMENT, ENCOUNTER FOR: ICD-10-CM

## 2019-10-09 DIAGNOSIS — Z79.899 ENCOUNTER FOR MONITORING CARDIOTOXIC DRUG THERAPY: ICD-10-CM

## 2019-10-09 PROCEDURE — 96417 CHEMO IV INFUS EACH ADDL SEQ: CPT

## 2019-10-09 PROCEDURE — 96413 CHEMO IV INFUSION 1 HR: CPT

## 2019-10-09 PROCEDURE — 77030012965 HC NDL HUBR BBMI -A

## 2019-10-09 PROCEDURE — 74011250636 HC RX REV CODE- 250/636: Performed by: NURSE PRACTITIONER

## 2019-10-09 RX ORDER — SODIUM CHLORIDE 0.9 % (FLUSH) 0.9 %
10 SYRINGE (ML) INJECTION AS NEEDED
Status: ACTIVE | OUTPATIENT
Start: 2019-10-09 | End: 2019-10-09

## 2019-10-09 RX ORDER — ANASTROZOLE 1 MG/1
1 TABLET ORAL DAILY
Qty: 90 TAB | Refills: 2 | Status: SHIPPED | OUTPATIENT
Start: 2019-10-09 | End: 2020-07-13

## 2019-10-09 RX ORDER — HEPARIN 100 UNIT/ML
300-500 SYRINGE INTRAVENOUS AS NEEDED
Status: ACTIVE | OUTPATIENT
Start: 2019-10-09 | End: 2019-10-09

## 2019-10-09 RX ORDER — SODIUM CHLORIDE 9 MG/ML
10 INJECTION INTRAMUSCULAR; INTRAVENOUS; SUBCUTANEOUS AS NEEDED
Status: ACTIVE | OUTPATIENT
Start: 2019-10-09 | End: 2019-10-09

## 2019-10-09 RX ORDER — SODIUM CHLORIDE 9 MG/ML
25 INJECTION, SOLUTION INTRAVENOUS CONTINUOUS
Status: DISPENSED | OUTPATIENT
Start: 2019-10-09 | End: 2019-10-09

## 2019-10-09 RX ADMIN — Medication 10 ML: at 12:42

## 2019-10-09 RX ADMIN — PERTUZUMAB 420 MG: 30 INJECTION, SOLUTION, CONCENTRATE INTRAVENOUS at 12:02

## 2019-10-09 RX ADMIN — SODIUM CHLORIDE 25 ML/HR: 900 INJECTION, SOLUTION INTRAVENOUS at 11:01

## 2019-10-09 RX ADMIN — Medication 500 UNITS: at 12:42

## 2019-10-09 RX ADMIN — TRASTUZUMAB 446 MG: 150 INJECTION, POWDER, LYOPHILIZED, FOR SOLUTION INTRAVENOUS at 11:04

## 2019-10-09 RX ADMIN — Medication 10 ML: at 11:02

## 2019-10-09 NOTE — PROGRESS NOTES
Bob Dietrich is a 66 y.o. female here today for follow up of breast cancer. She is asking for refill for pediotic soln for nails.

## 2019-10-09 NOTE — PROGRESS NOTES
Spiritual Care Partner Volunteer visited patient in [facility] on 10/9/19. Documented by:  [employer] Florecita Carson.      Paging Service: [phone]

## 2019-10-09 NOTE — PROGRESS NOTES
[facility] [place] [facility] [place] [address]  [address] [facility], [address] [facility] [address] [facility]  Paula Drop: [phone]  F: [phone]      Reason for Visit:   Xenia Crandall is a 66 y.o. female who is seen in consultation at the request of Dr. Tatum Sweet for evaluation of therapy for breast cancer. Treatment History:   · 2/15/19 Left core bx:   IDC, gr 2, 1.1 cm, ER + at 100%, MD + at 20%, HER 2 POSITIVE (IHC 2+; FISH ratio 3.9; sig/cell 9.2)  · 3/1/19 Left ax LN core bx:  + for breast cancer, ER + at 100%, MD negative, HER 2 POSITIVE (IHC 2+, FISH ratio 2; sig/cell 5.8)  · [facility] 3/6/19-7/10/19  · #2 delayed due to diverticulitis abscess and surgery 4/17/19  · 8/5/19 Bilateral Mastecotomy: Right breast: lobular carcinomna in situ, fibrocystic changes including atypical and usual ductal hyperplasia, sclerosing adenosis apocrine metaplasia, microcalcification. Left breast: No evidence of residual invasive carcinoma or carcinoma in situ, no DCIS, 0/9 LNs; ypT0 yp N0 cM0, pCR  · [facility] 8/28/19-    History of Present Illness:   Pt noticed the left breast mass herself in Feb 2019, leading to the pathology above    She was seen in ED on 3/8/19 for abdominal pain and constipation CT abd/pelvis showed diverticulitis and received levaquin and flagyl. KUB also showed nonobstructive bowel gas pattern. Reports last good BM was 3/6 prior to chemo. She states on 3/10 and 3/12 had small watery BM. Sent to ED from office on 3/12/19 for worsening abdominal pain. She was found to have diverticulitis with multiple abscesses. S/p IV abx and perc drain with no relief of her pain, therefore proceeded with open sigmoidectomy and end colostomy on 3/15/19. She was discharged to SNF with IV abx until 3/29/19. Interval history:  In today for follow up. Complains of gr 1 hair loss, gr 2 insomnia, gr 1 itching, gr 1 neuropathy, gr 1 vaginal dryness.     FH:  Paternal aunt breast cancer in her [de-identified]; no ovarian cancer, no prostate or pancreas cancer    Past Medical History:   Diagnosis Date    Adverse effect of anesthesia     \"difficulty waking up from anesthethia\"    Cancer ([address] 02/2019    left breast. current chemo patient    Depression     in past    GERD (gastroesophageal reflux disease)     High cholesterol     Hypertension       Past Surgical History:   Procedure Laterality Date    HX BREAST BIOPSY Right years ago    neg; needle bx    HX BREAST BIOPSY Left 02/15/2019    IDC    HX CATARACT REMOVAL      2013 (R), 2016(L)    HX COLONOSCOPY      HX COLOSTOMY Left 03/2019    HX MASTECTOMY Bilateral 8/5/2019    BILATERAL BREAST MASTECTOMIES AND LEFT BREAST SENTINEL NODE BIOPSY WITH BLUE DYE performed by Joey Gonzalez MD at [address] HX ORTHOPAEDIC      carpal tunnel    HX VASCULAR ACCESS Right 02/2019    portacath    IR CHANGE DRAIN  [facility] PERC  03/2019      Social History     Tobacco Use    Smoking status: Current Every Day Smoker     Packs/day: 0.25     Years: 55.00     Pack years: 13.75    Smokeless tobacco: Never Used    Tobacco comment: 1-2 cigs/day   Substance Use Topics    Alcohol use: Yes     Alcohol/week: 1.0 standard drinks     Types: 1 Shots of liquor per week     Drinks per session: 5 or 6     Binge frequency: Weekly     Comment: Patient stated that she puts a splash of bourbon in her afternoon coffee around 5-6 days per week      Family History   Problem Relation Age of Onset    Breast Cancer Maternal Aunt         [de-identified]    Hypertension Mother     Hypertension Father     Cancer Father         Lung    Cancer Brother         leukemia    Diabetes Brother         2 brothers with diabetes    Hypertension Sister         2 sisters and 4 brothers with HTN     Current Outpatient Medications   Medication Sig    anastrozole (ARIMIDEX) 1 mg tablet Take 1 mg by mouth daily.     neomycin-polymyxin-hydrocortisone, buffered, (PEDIOTIC) 3.5-10,000-1 mg/mL-unit/mL-% otic suspension Place 3 drops at end of toenails and fingernails (3) times daily.  cyanocobalamin/mecobalamin (CYANOCOBALAMIN-METHYLCOBALAMIN SL) 5,000 mcg by SubLINGual route daily.  lidocaine-prilocaine (EMLA) topical cream Apply  to affected area as needed for Pain.  losartan-hydroCHLOROthiazide (HYZAAR) 100-25 mg per tablet Take 0.5 Tabs by mouth daily.  pravastatin (PRAVACHOL) 40 mg tablet Take 40 mg by mouth nightly.  STIOLTO RESPIMAT 2.5-2.5 mcg/actuation inhaler Take 2 Puffs by inhalation nightly.  venlafaxine (EFFEXOR) 50 mg tablet Take 50 mg by mouth daily.  cholecalciferol (VITAMIN D3) 1,000 unit cap Take 1,000 Units by mouth daily.  calcium-cholecalciferol, D3, (CALTRATE 600+D) tablet Take 1 Tab by mouth nightly.  aspirin delayed-release 81 mg tablet Take  by mouth every other day.  doxycycline (VIBRAMYCIN) 100 mg capsule TAKE 1 CAPSULE BY MOUTH TWICE DAILY FOR  42  DAYS    acetaminophen (TYLENOL EXTRA STRENGTH) 500 mg tablet Take 1,000 mg by mouth every six (6) hours as needed for Pain.  potassium chloride SA (MICRO-K) 10 mEq capsule TAKE 2 CAPSULES BY MOUTH ONCE DAILY -  YOU  CAN  BREAK  OPEN  CAPSULES  AND  MIX  WITH  JUICE    doxycycline (ADOXA) 100 mg tablet Take 100 mg by mouth daily.  famotidine (PEPCID AC) 20 mg tablet Take 20 mg by mouth daily.  polyethylene glycol (MIRALAX) 17 gram packet Take 1 Packet by mouth daily as needed (constipation).  naproxen sodium (ALEVE PO) Take  by mouth two (2) times daily as needed.  prochlorperazine (COMPAZINE) 10 mg tablet Take 1 Tab by mouth every six (6) hours as needed for Nausea. No current facility-administered medications for this visit.        Allergies   Allergen Reactions    Keflex [Cephalexin] Hives    Penicillins Rash    Other Medication Itching     CHLORHEXADINE ( wipes caused moderate to severe itching in preop 8/5/19)        Review of Systems: A complete review of systems was obtained, negative except as described above.    Physical Exam:     Visit Vitals  /67   Pulse 80   Temp 97.9 °F (36.6 °C) (Oral)   Resp 16   Ht 5' 6\" (1.676 m)   Wt 157 lb (71.2 kg)   SpO2 97%   BMI 25.34 kg/m²     ECOG PS: 0  General: No distress  Eyes: PERRLA, anicteric sclerae. HENT: Atraumatic, OP clear  Neck: Supple  Lymphatic: No cervical, supraclavicular adenopathy  Respiratory: clear to auscultation bilaterally  CV: Normal rate, regular rhythm, no murmurs, no peripheral edema  GI: Soft, tender, distended, no masses, +BS, ostomy in place  MS:  Digits without clubbing or cyanosis. Skin: No rashes, ecchymoses, or petechiae. Normal temperature, turgor, and texture. Psych: Alert, oriented, appropriate affect, normal judgment/insight  Breasts:  Bilateral mastectomies. Left breast healing well, no erythema. Right breast: healing well,  Mild erythema present at incision site. Results:     Lab Results   Component Value Date/Time    WBC 11.3 (H) 07/10/2019 07:57 AM    HGB 11.8 07/10/2019 07:57 AM    HCT 34.5 (L) 07/10/2019 07:57 AM    PLATELET 593 96/91/9105 07:57 AM    MCV 96.6 07/10/2019 07:57 AM    ABS. NEUTROPHILS 8.0 07/10/2019 07:57 AM     Lab Results   Component Value Date/Time    Sodium 138 07/10/2019 07:57 AM    Potassium 3.6 08/05/2019 06:45 AM    Chloride 100 07/10/2019 07:57 AM    CO2 32 07/10/2019 07:57 AM    Glucose 107 (H) 07/10/2019 07:57 AM    BUN 13 07/10/2019 07:57 AM    Creatinine 0.76 07/10/2019 07:57 AM    GFR est AA >60 07/10/2019 07:57 AM    GFR est non-AA >60 07/10/2019 07:57 AM    Calcium 9.9 07/10/2019 07:57 AM    Glucose (POC) 93 03/19/2019 06:56 AM     Lab Results   Component Value Date/Time    Bilirubin, total 0.4 07/10/2019 07:57 AM    ALT (SGPT) 17 07/10/2019 07:57 AM    AST (SGOT) 13 (L) 07/10/2019 07:57 AM    Alk.  phosphatase 83 07/10/2019 07:57 AM    Protein, total 6.6 07/10/2019 07:57 AM    Albumin 3.5 07/10/2019 07:57 AM    Globulin 3.1 07/10/2019 07:57 AM       2/12/19 mammogram and US  MAMMOGRAPHY:  The breast demonstrates stable scattered density breast  architecture. Underlying the site of clinical concern in the left breast is  masslike focal asymmetry in the anterior upper left breast. There is no other  dominant mass lesion, architectural distortion, asymmetry, or calcification. There is no skin thickening or nipple retraction.     ULTRASOUND:  Corresponding to the mammographic density there is a 2.4 x 3.1 x  3.7 cm hypoechoic lobulated mass at about the 12:00 position and 3 cm radial  distance from the nipple. No other suspicious cystic or solid lesions  Identified. Dr. Brad Godfrey has identified 2 large, suspicious ax LN on US:  1.7 cm and 1.2 cm    5/16/19 Left mammo and US:  The breast parenchyma is heterogeneously dense. Left breast biopsy clip is  unchanged in position. The central upper left breast mass seen on prior MR exam  dated February 2019 has decreased in size.     Targeted sonographic evaluation of the 12:00 position of the left breast is  performed. Within the 12:00 position of the left breast, there is a 2.1 cm x 1.3  cm x 3.1 cm irregular hypoechoic mass containing calcifications, decreased in  size and measuring 3.8 cm x 2.4 cm x 3.1 cm on prior ultrasound dated February 2018.     IMPRESSION: BI-RADS 6. Pathology proven left breast cancer. Interval decrease in size of 12:00 left breast mass, now measuring 2.1 cm x 1.3 cm x 3.1 cm. Findings were discussed with the patient at the time of interpretation. Records reviewed and summarized above. Assessment/plan:   1. Left central IDC, gr 2, ER +, [place] +, HER 2 positive:  cT2 cN1a cM0, stage IIB, prognostic stage IB; ypT0 ypN0 cM0    We explained to the patient that the goal of systemic adjuvant therapy is to improve the chances for cure and decrease the risk of relapse. We explained why a patient can have microscopic cancer spread now even though physical examination, laboratory studies and imaging studies are negative for cancer.  We explained that the same treatments used now as adjuvant or preventive treatments rarely if ever are curative in women who develop metastases. TTE 3/6/19 EF 72%, TTE 6/4/19 EF 66%, TTE 9/3/19, EF 66%, next due 11/26/19. [facility] #3 today. Will see her back in 6 weeks for #5. Referral placed  to Rad-onc. The risks and benefits of aromatase inhibitors (anastrozole, letrozole, and exemestane) were discussed in detail and the patient was informed of the following: Risks include the development of painful muscles and joints (arthralgia/myalgia) and bone loss. Muscle and joint pain can be severe but rarely result in any tissue damage; symptoms usually resolve in several weeks when the medication is stopped. Bone loss is common and a bone density test is recommended as a baseline and then yearly to every several years depending on initial results. The risk of fractures is increased by a few percent in patients taking these drugs, but careful monitoring of bone density and using bone protecting agents when indicated can minimize these risks. Unlike tamoxifen there is no increased risk of blood clots or endometrial cancer. AIs can cause or worsen vaginal dryness but women using these drugs should not use vaginal estrogen preparations for these symptoms. AIs can also cause or increase hot flashes. Any other symptoms should be reported. After this discussion, she is agreeable to Anastrozole 1 mg daily, rx in. Plan to start this 1 week after completing XRT. DEXA ordered. Will refer to rad onc for + LN prior to chemo    We will plan to see the patient in follow up at least once per cycle, or sooner if symptoms warrant. 2. Emotional well being:  She has excellent support and is coping well with her disease    3. tob use:  Smokes 4 cigs/day; counseled to quit    4. Fatigue: Appetite continues to improve. Energy level has been improving. 5. GERD: due to doxycycline.   This has resolved with decreased dose to daily as well as continuing Pepcid daily. 6. 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 Started after Cycle 3. Does not wish to start any medications at this time. Patient will let us know if this worsens. 7. Decreased EF:  EF dropped from 72% to 66% and global strain dropped from 18% to 15.7% between 3/2019 to 6/2019, however per Dr. Sudhakar Tyler, visually there is no significant change. Recommend close follow up and Troponin I level. Trop on 6/19/19 was normal. TTE on 9/3/19 showed no significant change in overall LV systolic function. 8. Hypokalemia: 20 meq K daily. 9. Redness to right breast:  Met with Dr. Luciano Jensen on 8/27/19 who reassured patient that the yellow material was granulation tissue. This has improved. 10.  Itchy skin:  Ongoing since chemo but intermittent to arms, legs, back. This has improved. May be due to pertuzumab. Recommend PRN hydrocortisone cream or benadryl gel. Also recommended PO benadryl PRN. This patient was seen in conjunction with Dixie Vines NP      I appreciate the opportunity to participate in Ms. Celeste Krause's care. Signed By: Sotero Singh MD      No orders of the defined types were placed in this encounter.

## 2019-10-09 NOTE — PROGRESS NOTES
[facility] - Chemotherapy Progress Note    0900 Pt admit to [facility] for C 6 Herceptin/Perjeta ambulatory in stable condition. Assessment completed. No new concerns voiced. PAC with positive blood return. Chemotherapy Flowsheet 10/9/2019   Cycle C 9   Date 10/9/2019   Drug / Regimen HP   Pre Meds -   Notes echo 9/3/19 @ 66 %       Visit Vitals  /67   Pulse 80   Temp 97.9 °F (36.6 °C)   Resp 18   Ht 5' 6\" (1.676 m)   Wt 71.3 kg (157 lb 3.2 oz)   SpO2 97%   Breastfeeding? No   BMI 25.37 kg/m²     Pt declines pregnancy. Echo 9/3/19 @ 66%  Pt proceeded to scheduled MD appointment=-returned- no change to tx. Medications:  herceptin  Perjeta    1245 Pt tolerated treatment well. PAC maintained positive blood return throughout treatment, flushed with positive blood return at conclusion and throughout treatment. D/c home ambulatory in no distress. Pt aware of next appointment scheduled for 10/30/19 @ 0900.     No labs required

## 2019-10-11 ENCOUNTER — HOSPITAL ENCOUNTER (OUTPATIENT)
Dept: RADIATION THERAPY | Age: 74
Discharge: HOME | End: 2019-10-11

## 2019-10-28 RX ORDER — ALBUTEROL SULFATE 0.83 MG/ML
2.5 SOLUTION RESPIRATORY (INHALATION) AS NEEDED
Status: CANCELLED
Start: 2019-10-30

## 2019-10-28 RX ORDER — SODIUM CHLORIDE 9 MG/ML
10 INJECTION INTRAMUSCULAR; INTRAVENOUS; SUBCUTANEOUS AS NEEDED
Status: CANCELLED | OUTPATIENT
Start: 2019-10-30

## 2019-10-28 RX ORDER — DIPHENHYDRAMINE HYDROCHLORIDE 50 MG/ML
50 INJECTION, SOLUTION INTRAMUSCULAR; INTRAVENOUS AS NEEDED
Status: CANCELLED
Start: 2019-10-30

## 2019-10-28 RX ORDER — ACETAMINOPHEN 325 MG/1
650 TABLET ORAL AS NEEDED
Status: CANCELLED
Start: 2019-10-30

## 2019-10-28 RX ORDER — HYDROCORTISONE SODIUM SUCCINATE 100 MG/2ML
100 INJECTION, POWDER, FOR SOLUTION INTRAMUSCULAR; INTRAVENOUS AS NEEDED
Status: CANCELLED | OUTPATIENT
Start: 2019-10-30

## 2019-10-28 RX ORDER — SODIUM CHLORIDE 9 MG/ML
25 INJECTION, SOLUTION INTRAVENOUS CONTINUOUS
Status: CANCELLED | OUTPATIENT
Start: 2019-10-30

## 2019-10-28 RX ORDER — EPINEPHRINE 1 MG/ML
0.3 INJECTION, SOLUTION, CONCENTRATE INTRAVENOUS AS NEEDED
Status: CANCELLED | OUTPATIENT
Start: 2019-10-30

## 2019-10-28 RX ORDER — HEPARIN 100 UNIT/ML
300-500 SYRINGE INTRAVENOUS AS NEEDED
Status: CANCELLED
Start: 2019-10-30

## 2019-10-28 RX ORDER — ONDANSETRON 2 MG/ML
8 INJECTION INTRAMUSCULAR; INTRAVENOUS AS NEEDED
Status: CANCELLED | OUTPATIENT
Start: 2019-10-30

## 2019-10-28 RX ORDER — SODIUM CHLORIDE 0.9 % (FLUSH) 0.9 %
10 SYRINGE (ML) INJECTION AS NEEDED
Status: CANCELLED
Start: 2019-10-30

## 2019-10-28 RX ORDER — ACETAMINOPHEN 325 MG/1
650 TABLET ORAL
Status: CANCELLED | OUTPATIENT
Start: 2019-10-30

## 2019-10-28 RX ORDER — DIPHENHYDRAMINE HYDROCHLORIDE 50 MG/ML
50 INJECTION, SOLUTION INTRAMUSCULAR; INTRAVENOUS
Status: CANCELLED | OUTPATIENT
Start: 2019-10-30

## 2019-10-29 ENCOUNTER — TELEPHONE (OUTPATIENT)
Dept: SURGERY | Age: 74
End: 2019-10-29

## 2019-10-29 NOTE — TELEPHONE ENCOUNTER
----- Message from Clifton Donahue sent at 10/28/2019  4:49 PM EDT -----  Patients last chemo treatment is 12-11-19. Would like to schedule port removal. May I have an order.   Thanks

## 2019-10-29 NOTE — TELEPHONE ENCOUNTER
SCHEDULED SURGERY AT [facility] ON 12-18-19 AT 7:30 AM; PATIENT WILL ARRIVE AT 6:30 AM AND CHECK IN ON 2ND FL AT PATIENT REGISTRATION    PATIENT HAS BEEN CALLED AND MAILED INSTRUCTIONS TO HER HOME ADDRESS TODAY.

## 2019-10-30 ENCOUNTER — HOSPITAL ENCOUNTER (OUTPATIENT)
Dept: INFUSION THERAPY | Age: 74
Discharge: HOME | End: 2019-10-30
Payer: MEDICARE

## 2019-10-30 VITALS
BODY MASS INDEX: 24.72 KG/M2 | OXYGEN SATURATION: 95 % | RESPIRATION RATE: 16 BRPM | HEIGHT: 66 IN | TEMPERATURE: 97.5 F | HEART RATE: 85 BPM | DIASTOLIC BLOOD PRESSURE: 63 MMHG | SYSTOLIC BLOOD PRESSURE: 126 MMHG | WEIGHT: 153.8 LBS

## 2019-10-30 DIAGNOSIS — C50.112 MALIGNANT NEOPLASM OF CENTRAL PORTION OF LEFT BREAST IN FEMALE, ESTROGEN RECEPTOR POSITIVE (HCC): Primary | ICD-10-CM

## 2019-10-30 DIAGNOSIS — Z17.0 MALIGNANT NEOPLASM OF CENTRAL PORTION OF LEFT BREAST IN FEMALE, ESTROGEN RECEPTOR POSITIVE (HCC): Primary | ICD-10-CM

## 2019-10-30 PROCEDURE — 74011000250 HC RX REV CODE- 250: Performed by: NURSE PRACTITIONER

## 2019-10-30 PROCEDURE — 77030012965 HC NDL HUBR BBMI -A

## 2019-10-30 PROCEDURE — 96417 CHEMO IV INFUS EACH ADDL SEQ: CPT

## 2019-10-30 PROCEDURE — 74011250636 HC RX REV CODE- 250/636: Performed by: NURSE PRACTITIONER

## 2019-10-30 PROCEDURE — 96413 CHEMO IV INFUSION 1 HR: CPT

## 2019-10-30 RX ORDER — HEPARIN 100 UNIT/ML
300-500 SYRINGE INTRAVENOUS AS NEEDED
Status: ACTIVE | OUTPATIENT
Start: 2019-10-30 | End: 2019-10-30

## 2019-10-30 RX ORDER — SODIUM CHLORIDE 9 MG/ML
25 INJECTION, SOLUTION INTRAVENOUS CONTINUOUS
Status: DISPENSED | OUTPATIENT
Start: 2019-10-30 | End: 2019-10-30

## 2019-10-30 RX ORDER — SODIUM CHLORIDE 0.9 % (FLUSH) 0.9 %
10 SYRINGE (ML) INJECTION AS NEEDED
Status: ACTIVE | OUTPATIENT
Start: 2019-10-30 | End: 2019-10-30

## 2019-10-30 RX ORDER — SODIUM CHLORIDE 9 MG/ML
10 INJECTION INTRAMUSCULAR; INTRAVENOUS; SUBCUTANEOUS AS NEEDED
Status: ACTIVE | OUTPATIENT
Start: 2019-10-30 | End: 2019-10-30

## 2019-10-30 RX ADMIN — SODIUM CHLORIDE 25 ML/HR: 900 INJECTION, SOLUTION INTRAVENOUS at 09:42

## 2019-10-30 RX ADMIN — SODIUM CHLORIDE 10 ML: 9 INJECTION, SOLUTION INTRAMUSCULAR; INTRAVENOUS; SUBCUTANEOUS at 09:20

## 2019-10-30 RX ADMIN — Medication 10 ML: at 11:25

## 2019-10-30 RX ADMIN — TRASTUZUMAB 446 MG: 150 INJECTION, POWDER, LYOPHILIZED, FOR SOLUTION INTRAVENOUS at 09:50

## 2019-10-30 RX ADMIN — Medication 500 UNITS: at 11:25

## 2019-10-30 RX ADMIN — PERTUZUMAB 420 MG: 30 INJECTION, SOLUTION, CONCENTRATE INTRAVENOUS at 10:49

## 2019-10-30 NOTE — PROGRESS NOTES
[facility] Progress Note    Date: 2019    Name: Giovanni Rojo    MRN: [id number]         : 1945    Ms. Krause Arrived ambulatory and in no distress for C10D1 of Herceptin/Perjeta Regimen. Assessment was completed, no acute issues at this time, no new complaints voiced. R chest wall port accessed with 0.75 concepcion without difficulty. No lab parameters ordered for this treatment, Echo determined EF 66%    Chemotherapy Flowsheet 10/30/2019   Cycle C10D1   Date 10/30/2019   Drug / Regimen HP   Pre Meds -   Notes -         Ms. Krause's vitals were reviewed.   Visit Vitals  /63   Pulse 85   Temp 97.5 °F (36.4 °C)   Resp 16   Ht 5' 6\" (1.676 m)   Wt 69.8 kg (153 lb 12.8 oz)   SpO2 95%   BMI 24.82 kg/m²         Medications:  Medications Administered     0.9% sodium chloride infusion     Admin Date  10/30/2019 Action  New Bag Dose  25 mL/hr Rate  25 mL/hr Route  IntraVENous Administered By  Melissa [facility] RN          heparin (porcine) pf 300-500 Units     Admin Date  10/30/2019 Action  Given Dose  500 Units Route  InterCATHeter Administered By  Melissa Kent RN          pertuzumab (PERJETA) 420 mg in 0.9% sodium chloride 250 mL, overfill volume 25 mL IVPB     Admin Date  10/30/2019 Action  New Bag Dose  420 mg Rate  578 mL/hr Route  IntraVENous Administered By  Melissa Kent RN          saline peripheral flush soln 10 mL     Admin Date  10/30/2019 Action  Given Dose  10 mL Route  InterCATHeter Administered By  Melissa Kent RN          sodium chloride 0.9% injection 10 mL     Admin Date  10/30/2019 Action  Given Dose  10 mL Route  IntraVENous Administered By  Melissa Kent RN          trastuzumab (HERCEPTIN) 446 mg in 0.9% sodium chloride 250 mL, overfill volume 25 mL IVPB     Admin Date  10/30/2019 Action  New Bag Dose  446 mg Rate  592.4 mL/hr Route  IntraVENous Administered By  Melissa Kent RN                  Ms. Ken Manuel tolerated treatment well and was [facility]. Port de-accessed, flushed & heparinized per protocol. She is to return on November 20  for her next appointment.     Rosalina Covington RN  October 30, 2019

## 2019-10-31 DIAGNOSIS — B35.1 NAIL FUNGUS: ICD-10-CM

## 2019-10-31 DIAGNOSIS — C50.112 MALIGNANT NEOPLASM OF CENTRAL PORTION OF LEFT BREAST IN FEMALE, ESTROGEN RECEPTOR POSITIVE (HCC): ICD-10-CM

## 2019-10-31 DIAGNOSIS — Z17.0 MALIGNANT NEOPLASM OF CENTRAL PORTION OF LEFT BREAST IN FEMALE, ESTROGEN RECEPTOR POSITIVE (HCC): ICD-10-CM

## 2019-10-31 RX ORDER — NEOMYCIN SULFATE, POLYMYXIN B SULFATE AND HYDROCORTISONE 10; 3.5; 1 MG/ML; MG/ML; [USP'U]/ML
SUSPENSION/ DROPS AURICULAR (OTIC)
Qty: 10 ML | Refills: 0 | Status: SHIPPED | OUTPATIENT
Start: 2019-10-31 | End: 2020-09-30

## 2019-11-04 ENCOUNTER — HOSPITAL ENCOUNTER (OUTPATIENT)
Dept: MAMMOGRAPHY | Age: 74
Discharge: HOME | End: 2019-11-04
Attending: INTERNAL MEDICINE
Payer: MEDICARE

## 2019-11-04 DIAGNOSIS — Z78.0 POSTMENOPAUSAL: ICD-10-CM

## 2019-11-04 DIAGNOSIS — C50.112 MALIGNANT NEOPLASM OF CENTRAL PORTION OF LEFT BREAST IN FEMALE, ESTROGEN RECEPTOR POSITIVE (HCC): ICD-10-CM

## 2019-11-04 DIAGNOSIS — Z17.0 MALIGNANT NEOPLASM OF CENTRAL PORTION OF LEFT BREAST IN FEMALE, ESTROGEN RECEPTOR POSITIVE (HCC): ICD-10-CM

## 2019-11-04 PROCEDURE — 77080 DXA BONE DENSITY AXIAL: CPT

## 2019-11-05 NOTE — PROGRESS NOTES
Please let her know this shows osteopenia, not osteoporosis, and to take 2000 international units of vit D3 daily with 2-3 servings of dietary calcium

## 2019-11-18 RX ORDER — HYDROCORTISONE SODIUM SUCCINATE 100 MG/2ML
100 INJECTION, POWDER, FOR SOLUTION INTRAMUSCULAR; INTRAVENOUS AS NEEDED
Status: CANCELLED | OUTPATIENT
Start: 2019-11-20

## 2019-11-18 RX ORDER — SODIUM CHLORIDE 9 MG/ML
10 INJECTION INTRAMUSCULAR; INTRAVENOUS; SUBCUTANEOUS AS NEEDED
Status: CANCELLED | OUTPATIENT
Start: 2019-11-20

## 2019-11-18 RX ORDER — EPINEPHRINE 1 MG/ML
0.3 INJECTION, SOLUTION, CONCENTRATE INTRAVENOUS AS NEEDED
Status: CANCELLED | OUTPATIENT
Start: 2019-12-11

## 2019-11-18 RX ORDER — HEPARIN 100 UNIT/ML
300-500 SYRINGE INTRAVENOUS AS NEEDED
Status: CANCELLED
Start: 2019-11-20

## 2019-11-18 RX ORDER — DIPHENHYDRAMINE HYDROCHLORIDE 50 MG/ML
50 INJECTION, SOLUTION INTRAMUSCULAR; INTRAVENOUS AS NEEDED
Status: CANCELLED
Start: 2019-12-11

## 2019-11-18 RX ORDER — SODIUM CHLORIDE 9 MG/ML
25 INJECTION, SOLUTION INTRAVENOUS CONTINUOUS
Status: CANCELLED | OUTPATIENT
Start: 2019-11-20

## 2019-11-18 RX ORDER — DIPHENHYDRAMINE HYDROCHLORIDE 50 MG/ML
50 INJECTION, SOLUTION INTRAMUSCULAR; INTRAVENOUS
Status: CANCELLED | OUTPATIENT
Start: 2019-12-11

## 2019-11-18 RX ORDER — SODIUM CHLORIDE 9 MG/ML
25 INJECTION, SOLUTION INTRAVENOUS CONTINUOUS
Status: CANCELLED | OUTPATIENT
Start: 2019-12-11

## 2019-11-18 RX ORDER — ALBUTEROL SULFATE 0.83 MG/ML
2.5 SOLUTION RESPIRATORY (INHALATION) AS NEEDED
Status: CANCELLED
Start: 2019-12-11

## 2019-11-18 RX ORDER — ONDANSETRON 2 MG/ML
8 INJECTION INTRAMUSCULAR; INTRAVENOUS AS NEEDED
Status: CANCELLED | OUTPATIENT
Start: 2019-12-11

## 2019-11-18 RX ORDER — ACETAMINOPHEN 325 MG/1
650 TABLET ORAL AS NEEDED
Status: CANCELLED
Start: 2019-11-20

## 2019-11-18 RX ORDER — ALBUTEROL SULFATE 0.83 MG/ML
2.5 SOLUTION RESPIRATORY (INHALATION) AS NEEDED
Status: CANCELLED
Start: 2019-11-20

## 2019-11-18 RX ORDER — DIPHENHYDRAMINE HYDROCHLORIDE 50 MG/ML
50 INJECTION, SOLUTION INTRAMUSCULAR; INTRAVENOUS AS NEEDED
Status: CANCELLED
Start: 2019-11-20

## 2019-11-18 RX ORDER — DIPHENHYDRAMINE HYDROCHLORIDE 50 MG/ML
50 INJECTION, SOLUTION INTRAMUSCULAR; INTRAVENOUS
Status: CANCELLED | OUTPATIENT
Start: 2019-11-20

## 2019-11-18 RX ORDER — ACETAMINOPHEN 325 MG/1
650 TABLET ORAL
Status: CANCELLED | OUTPATIENT
Start: 2019-12-11

## 2019-11-18 RX ORDER — SODIUM CHLORIDE 0.9 % (FLUSH) 0.9 %
10 SYRINGE (ML) INJECTION AS NEEDED
Status: CANCELLED
Start: 2019-12-11

## 2019-11-18 RX ORDER — SODIUM CHLORIDE 9 MG/ML
10 INJECTION INTRAMUSCULAR; INTRAVENOUS; SUBCUTANEOUS AS NEEDED
Status: CANCELLED | OUTPATIENT
Start: 2019-12-11

## 2019-11-18 RX ORDER — HEPARIN 100 UNIT/ML
300-500 SYRINGE INTRAVENOUS AS NEEDED
Status: CANCELLED
Start: 2019-12-11

## 2019-11-18 RX ORDER — HYDROCORTISONE SODIUM SUCCINATE 100 MG/2ML
100 INJECTION, POWDER, FOR SOLUTION INTRAMUSCULAR; INTRAVENOUS AS NEEDED
Status: CANCELLED | OUTPATIENT
Start: 2019-12-11

## 2019-11-18 RX ORDER — ACETAMINOPHEN 325 MG/1
650 TABLET ORAL
Status: CANCELLED | OUTPATIENT
Start: 2019-11-20

## 2019-11-18 RX ORDER — SODIUM CHLORIDE 0.9 % (FLUSH) 0.9 %
10 SYRINGE (ML) INJECTION AS NEEDED
Status: CANCELLED
Start: 2019-11-20

## 2019-11-18 RX ORDER — ONDANSETRON 2 MG/ML
8 INJECTION INTRAMUSCULAR; INTRAVENOUS AS NEEDED
Status: CANCELLED | OUTPATIENT
Start: 2019-11-20

## 2019-11-18 RX ORDER — EPINEPHRINE 1 MG/ML
0.3 INJECTION, SOLUTION, CONCENTRATE INTRAVENOUS AS NEEDED
Status: CANCELLED | OUTPATIENT
Start: 2019-11-20

## 2019-11-18 RX ORDER — ACETAMINOPHEN 325 MG/1
650 TABLET ORAL AS NEEDED
Status: CANCELLED
Start: 2019-12-11

## 2019-11-20 ENCOUNTER — OFFICE VISIT (OUTPATIENT)
Dept: ONCOLOGY | Age: 74
End: 2019-11-20

## 2019-11-20 ENCOUNTER — HOSPITAL ENCOUNTER (OUTPATIENT)
Dept: INFUSION THERAPY | Age: 74
Discharge: HOME | End: 2019-11-20
Payer: MEDICARE

## 2019-11-20 VITALS
RESPIRATION RATE: 16 BRPM | WEIGHT: 154.7 LBS | TEMPERATURE: 97 F | BODY MASS INDEX: 24.86 KG/M2 | DIASTOLIC BLOOD PRESSURE: 74 MMHG | HEIGHT: 66 IN | SYSTOLIC BLOOD PRESSURE: 128 MMHG | HEART RATE: 65 BPM | OXYGEN SATURATION: 98 %

## 2019-11-20 VITALS
HEART RATE: 73 BPM | OXYGEN SATURATION: 98 % | SYSTOLIC BLOOD PRESSURE: 119 MMHG | BODY MASS INDEX: 24.86 KG/M2 | TEMPERATURE: 96.8 F | HEIGHT: 66 IN | RESPIRATION RATE: 18 BRPM | WEIGHT: 154.7 LBS | DIASTOLIC BLOOD PRESSURE: 73 MMHG

## 2019-11-20 DIAGNOSIS — C50.112 MALIGNANT NEOPLASM OF CENTRAL PORTION OF LEFT BREAST IN FEMALE, ESTROGEN RECEPTOR POSITIVE (HCC): Primary | ICD-10-CM

## 2019-11-20 DIAGNOSIS — Z17.0 MALIGNANT NEOPLASM OF CENTRAL PORTION OF LEFT BREAST IN FEMALE, ESTROGEN RECEPTOR POSITIVE (HCC): Primary | ICD-10-CM

## 2019-11-20 DIAGNOSIS — Z01.89 ENCOUNTER FOR OTHER SPECIFIED SPECIAL EXAMINATIONS: ICD-10-CM

## 2019-11-20 PROCEDURE — 74011000250 HC RX REV CODE- 250: Performed by: NURSE PRACTITIONER

## 2019-11-20 PROCEDURE — 77030012965 HC NDL HUBR BBMI -A

## 2019-11-20 PROCEDURE — 96413 CHEMO IV INFUSION 1 HR: CPT

## 2019-11-20 PROCEDURE — 74011250636 HC RX REV CODE- 250/636: Performed by: NURSE PRACTITIONER

## 2019-11-20 PROCEDURE — 96417 CHEMO IV INFUS EACH ADDL SEQ: CPT

## 2019-11-20 RX ORDER — SODIUM CHLORIDE 9 MG/ML
25 INJECTION, SOLUTION INTRAVENOUS CONTINUOUS
Status: DISPENSED | OUTPATIENT
Start: 2019-11-20 | End: 2019-11-20

## 2019-11-20 RX ORDER — HEPARIN 100 UNIT/ML
300-500 SYRINGE INTRAVENOUS AS NEEDED
Status: ACTIVE | OUTPATIENT
Start: 2019-11-20 | End: 2019-11-20

## 2019-11-20 RX ORDER — SODIUM CHLORIDE 9 MG/ML
10 INJECTION INTRAMUSCULAR; INTRAVENOUS; SUBCUTANEOUS AS NEEDED
Status: ACTIVE | OUTPATIENT
Start: 2019-11-20 | End: 2019-11-20

## 2019-11-20 RX ORDER — SODIUM CHLORIDE 0.9 % (FLUSH) 0.9 %
10 SYRINGE (ML) INJECTION AS NEEDED
Status: ACTIVE | OUTPATIENT
Start: 2019-11-20 | End: 2019-11-20

## 2019-11-20 RX ADMIN — TRASTUZUMAB 446 MG: 150 INJECTION, POWDER, LYOPHILIZED, FOR SOLUTION INTRAVENOUS at 12:04

## 2019-11-20 RX ADMIN — Medication 10 ML: at 13:22

## 2019-11-20 RX ADMIN — Medication 10 ML: at 10:15

## 2019-11-20 RX ADMIN — PERTUZUMAB 420 MG: 30 INJECTION, SOLUTION, CONCENTRATE INTRAVENOUS at 12:46

## 2019-11-20 RX ADMIN — SODIUM CHLORIDE 25 ML/HR: 900 INJECTION, SOLUTION INTRAVENOUS at 11:58

## 2019-11-20 RX ADMIN — Medication 500 UNITS: at 13:22

## 2019-11-20 RX ADMIN — SODIUM CHLORIDE 10 ML: 9 INJECTION, SOLUTION INTRAMUSCULAR; INTRAVENOUS; SUBCUTANEOUS at 10:15

## 2019-11-20 NOTE — PROGRESS NOTES
[facility] VISIT NOTE  Date: 2019    Name: April Armendariz    MRN: [id number]         : 1945    1000  Ms. Krause Arrived ambulatory and in no distress for C11D1 of Herceptin/Perjeta Regimen. Assessment was completed, no acute issues at this time, no new complaints voiced. Right chest wall port accessed without difficulty. Chemotherapy Flowsheet 2019   Cycle C11   Date 2019   Drug / Regimen HP   Pre Meds -   Notes given       Vitals:  Patient Vitals for the past 12 hrs:   Temp Pulse Resp BP SpO2   19 1317 97 °F (36.1 °C) 65 -- 128/74 --   19 1001 96.8 °F (36 °C) 73 16 119/73 98 %        Lab results were obtained and reviewed.   N/A    Medications received:  Medications Administered     0.9% sodium chloride infusion     Admin Date  2019 Action  New Bag Dose  25 mL/hr Rate  25 mL/hr Route  IntraVENous Administered By  Shannon Mora RN          heparin (porcine) pf 300-500 Units     Admin Date  2019 Action  Given Dose  500 Units Route  InterCATHeter Administered By  Shannon Mora RN          pertuzumab (PERJETA) 420 mg in 0.9% sodium chloride 250 mL, overfill volume 25 mL IVPB     Admin Date  2019 Action  New Bag Dose  420 mg Rate  578 mL/hr Route  IntraVENous Administered By  Shannon Mora RN          saline peripheral flush soln 10 mL     Admin Date  2019 Action  Given Dose  10 mL Route  InterCATHeter Administered By  Shannon Mora RN           Admin Date  2019 Action  Given Dose  10 mL Route  InterCATHeter Administered By  Shannon Mora RN          sodium chloride 0.9% injection 10 mL     Admin Date  2019 Action  Given Dose  10 mL Route  IntraVENous Administered By  Shannon Mora RN          trastuzumab (HERCEPTIN) 446 mg in 0.9% sodium chloride 250 mL, overfill volume 25 mL IVPB     Admin Date  2019 Action  New Bag Dose  446 mg Rate  592.4 mL/hr Route  IntraVENous Administered By  Shannon Mora RN Ms. Valarie Dunbar tolerated treatment well and was discharged from [place] [address] in stable condition at 1325. Port de-accessed, flushed & heparinized per protocol. She is to return on 12/11/19 at 9:00 for her next appointment.     Fiorella Martinez RN  November 20, 2019    Future Appointments:  Future Appointments   Date Time Provider Cathryn Ac   12/11/2019  9:[facility]   12/18/2019  7:30 AM Carlos Mosher MD [facility]   12/31/2019  9:00 AM [facility]   1/22/2020  9:00 AM [facility]

## 2019-11-20 NOTE — PROGRESS NOTES
[facility] at [address], [address] [place] [address]  [facility] [place]: [phone]  F: [phone]      Reason for Visit:   Isac Martinez is a 66 y.o. female who is seen in consultation at the request of Dr. Luciano Jensen for evaluation of therapy for breast cancer. Treatment History:   · 2/15/19 Left core bx:   IDC, gr 2, 1.1 cm, ER + at 100%, MA + at 20%, HER 2 POSITIVE (IHC 2+; FISH ratio 3.9; sig/cell 9.2)  · 3/1/19 Left ax LN core bx:  + for breast cancer, ER + at 100%, MA negative, HER 2 POSITIVE (IHC 2+, FISH ratio 2; sig/cell 5.8)  · TCH-P 3/6/19-7/10/19  · #2 delayed due to diverticulitis abscess and surgery 4/17/19  · 8/5/19 Bilateral Mastecotomy: Right breast: lobular carcinomna in situ, fibrocystic changes including atypical and usual ductal hyperplasia, sclerosing adenosis apocrine metaplasia, microcalcification. Left breast: No evidence of residual invasive carcinoma or carcinoma in situ, no DCIS, 0/9 LNs; ypT0 yp N0 cM0, pCR  · [facility] HP 8/28/19-  · S/p XRT 11/27/19  · Anastrozole 11/2019-    History of Present Illness:   Pt noticed the left breast mass herself in Feb 2019, leading to the pathology above    She was seen in ED on 3/8/19 for abdominal pain and constipation CT abd/pelvis showed diverticulitis and received levaquin and flagyl. KUB also showed nonobstructive bowel gas pattern. Reports last good BM was 3/6 prior to chemo. She states on 3/10 and 3/12 had small watery BM. Sent to ED from office on 3/12/19 for worsening abdominal pain. She was found to have diverticulitis with multiple abscesses. S/p IV abx and perc drain with no relief of her pain, therefore proceeded with open sigmoidectomy and end colostomy on 3/15/19. She was discharged to SNF with IV abx until 3/29/19. Interval history:  In today for follow up.   Complains of gr 1 fatigue, gr 2 insomnia, gr 1 itching, gr 1 neuropathy    FH:  Paternal aunt breast cancer in her [de-identified]; no ovarian cancer, no prostate or pancreas cancer    Past Medical History:   Diagnosis Date    Adverse effect of anesthesia     \"difficulty waking up from anesthethia\"    Cancer ([address] 02/2019    left breast. current chemo patient    Depression     in past    GERD (gastroesophageal reflux disease)     High cholesterol     Hypertension       Past Surgical History:   Procedure Laterality Date    HX BREAST BIOPSY Right years ago    neg; needle bx    HX BREAST BIOPSY Left 02/15/2019    IDC    HX CATARACT REMOVAL      2013 (R), 2016(L)    HX COLONOSCOPY      HX COLOSTOMY Left 03/2019    HX MASTECTOMY Bilateral 8/5/2019    BILATERAL BREAST MASTECTOMIES AND LEFT BREAST SENTINEL NODE BIOPSY WITH BLUE DYE performed by Gabrielle Galindo MD at [address] HX ORTHOPAEDIC      carpal tunnel    HX VASCULAR ACCESS Right 02/2019    portacath    IR CHANGE DRAIN  ABC[facility] PERC  03/2019      Social History     Tobacco Use    Smoking status: Current Every Day Smoker     Packs/day: 0.25     Years: 55.00     Pack years: 13.75    Smokeless tobacco: Never Used    Tobacco comment: 1-2 cigs/day   Substance Use Topics    Alcohol use:  Yes     Alcohol/week: 1.0 standard drinks     Types: 1 Shots of liquor per week     Drinks per session: 5 or 6     Binge frequency: Weekly     Comment: Patient stated that she puts a splash of bourbon in her afternoon coffee around 5-6 days per week      Family History   Problem Relation Age of Onset    Breast Cancer Maternal Aunt         [de-identified]    Hypertension Mother     Hypertension Father     Cancer Father         Lung    Cancer Brother         leukemia    Diabetes Brother         2 brothers with diabetes    Hypertension Sister         2 sisters and 4 brothers with HTN     Current Outpatient Medications   Medication Sig    neomycin-polymyxin-hydrocortisone, buffered, (PEDIOTIC) 3.5-10,000-1 mg/mL-unit/mL-% otic suspension Place 3 drops at end of toenails and fingernails (3) times daily.    anastrozole (ARIMIDEX) 1 mg tablet Take 1 mg by mouth daily.  famotidine (PEPCID AC) 20 mg tablet Take 20 mg by mouth daily.  cyanocobalamin/mecobalamin (CYANOCOBALAMIN-METHYLCOBALAMIN SL) 5,000 mcg by SubLINGual route daily.  losartan-hydroCHLOROthiazide (HYZAAR) 100-25 mg per tablet Take 0.5 Tabs by mouth daily.  pravastatin (PRAVACHOL) 40 mg tablet Take 40 mg by mouth nightly.  STIOLTO RESPIMAT 2.5-2.5 mcg/actuation inhaler Take 2 Puffs by inhalation nightly.  venlafaxine (EFFEXOR) 50 mg tablet Take 50 mg by mouth daily.  cholecalciferol (VITAMIN D3) 1,000 unit cap Take 2,000 Units by mouth daily.  calcium-cholecalciferol, D3, (CALTRATE 600+D) tablet Take 1 Tab by mouth nightly.  aspirin delayed-release 81 mg tablet Take  by mouth every other day.  acetaminophen (TYLENOL EXTRA STRENGTH) 500 mg tablet Take 1,000 mg by mouth every six (6) hours as needed for Pain.  naproxen sodium (ALEVE PO) Take  by mouth two (2) times daily as needed.  lidocaine-prilocaine (EMLA) topical cream Apply  to affected area as needed for Pain. No current facility-administered medications for this visit. Facility-Administered Medications Ordered in Other Visits   Medication Dose Route Frequency    saline peripheral flush soln 10 mL  10 mL InterCATHeter PRN    sodium chloride 0.9% injection 10 mL  10 mL IntraVENous PRN    heparin (porcine) pf 300-500 Units  300-500 Units InterCATHeter PRN      Allergies   Allergen Reactions    Keflex [Cephalexin] Hives    Penicillins Rash    Other Medication Itching     CHLORHEXADINE ( wipes caused moderate to severe itching in preop 8/5/19)        Review of Systems: A complete review of systems was obtained, negative except as described above.     Physical Exam:     Visit Vitals  /73   Pulse 73   Temp 96.8 °F (36 °C) (Temporal)   Resp 18   Ht 5' 6\" (1.676 m)   Wt 154 lb 11.2 oz (70.2 kg)   SpO2 98%   BMI 24.97 kg/m²     ECOG PS: 0  General: No distress  Eyes: PERRLA, anicteric sclerae. HENT: Atraumatic, OP clear  Neck: Supple  Lymphatic: No cervical, supraclavicular adenopathy  Respiratory: clear to auscultation bilaterally  CV: Normal rate, regular rhythm, no murmurs, no peripheral edema  GI: Soft, tender, distended, no masses, +BS, ostomy in place  MS:  Digits without clubbing or cyanosis. Skin: No rashes, ecchymoses, or petechiae. Normal temperature, turgor, and texture. Psych: Alert, oriented, appropriate affect, normal judgment/insight  Breasts:  Bilateral mastectomies. Left breast healing well, no erythema. Right breast: healing well,  Mild erythema present at incision site. Results:     Lab Results   Component Value Date/Time    WBC 11.3 (H) 07/10/2019 07:57 AM    HGB 11.8 07/10/2019 07:57 AM    HCT 34.5 (L) 07/10/2019 07:57 AM    PLATELET 119 96/66/2339 07:57 AM    MCV 96.6 07/10/2019 07:57 AM    ABS. NEUTROPHILS 8.0 07/10/2019 07:57 AM     Lab Results   Component Value Date/Time    Sodium 138 07/10/2019 07:57 AM    Potassium 3.6 08/05/2019 06:45 AM    Chloride 100 07/10/2019 07:57 AM    CO2 32 07/10/2019 07:57 AM    Glucose 107 (H) 07/10/2019 07:57 AM    BUN 13 07/10/2019 07:57 AM    Creatinine 0.76 07/10/2019 07:57 AM    GFR est AA >60 07/10/2019 07:57 AM    GFR est non-AA >60 07/10/2019 07:57 AM    Calcium 9.9 07/10/2019 07:57 AM    Glucose (POC) 93 03/19/2019 06:56 AM     Lab Results   Component Value Date/Time    Bilirubin, total 0.4 07/10/2019 07:57 AM    ALT (SGPT) 17 07/10/2019 07:57 AM    AST (SGOT) 13 (L) 07/10/2019 07:57 AM    Alk. phosphatase 83 07/10/2019 07:57 AM    Protein, total 6.6 07/10/2019 07:57 AM    Albumin 3.5 07/10/2019 07:57 AM    Globulin 3.1 07/10/2019 07:57 AM       2/12/19 mammogram and US  MAMMOGRAPHY:  The breast demonstrates stable scattered density breast  architecture.   Underlying the site of clinical concern in the left breast is  masslike focal asymmetry in the anterior upper left breast. There is no other  dominant mass lesion, architectural distortion, asymmetry, or calcification. There is no skin thickening or nipple retraction.     ULTRASOUND:  Corresponding to the mammographic density there is a 2.4 x 3.1 x  3.7 cm hypoechoic lobulated mass at about the 12:00 position and 3 cm radial  distance from the nipple. No other suspicious cystic or solid lesions  Identified. Dr. Reshma Gonsalez has identified 2 large, suspicious ax LN on US:  1.7 cm and 1.2 cm    5/16/19 Left mammo and US:  The breast parenchyma is heterogeneously dense. Left breast biopsy clip is  unchanged in position. The central upper left breast mass seen on prior MR exam  dated February 2019 has decreased in size.     Targeted sonographic evaluation of the 12:00 position of the left breast is  performed. Within the 12:00 position of the left breast, there is a 2.1 cm x 1.3  cm x 3.1 cm irregular hypoechoic mass containing calcifications, decreased in  size and measuring 3.8 cm x 2.4 cm x 3.1 cm on prior ultrasound dated February 2018.     IMPRESSION: BI-RADS 6. Pathology proven left breast cancer. Interval decrease in size of 12:00 left breast mass, now measuring 2.1 cm x 1.3 cm x 3.1 cm. Findings were discussed with the patient at the time of interpretation.     11/4/19 dexa  Femoral Neck Left:  Bone mineral density (gm/cm2): 0.904  % of peak bone mass: 87  % for age matched controls: 116  T-score: -1.0  Z-score: 0.9     Femoral Neck Right:  Bone mineral density (gm/cm2): 0.866  % of peak bone mass: 83  % for age matched controls:  111  T-score: -1.2  Z-score: 0.6     Total Hip Left:  Bone mineral density (gm/cm2): 1.000  % of peak bone mass: 99  % for age matched controls: 126  T-score: -0.1  Z-score: 1.6     Total Hip Right:  Bone mineral density (gm/cm2): 0.935  % of peak bone mass: 93  % for age matched controls:  118  T-score: -0.6  Z-score: 1.1     Lumbar Spine: L1-L3  Bone mineral density (gm/cm2): 1.440  % of peak bone mass: 122  % for age matched controls: 145  T-score: 2.1  Z-score: 3.7     33% Radius Left:  Bone mineral density (gm/cm2): 0.807  % of peak bone mass: 92  % for age matched controls:  80  T-score: -0.8  Z-score: 1.6     IMPRESSION  Impression: This patient is osteopenic using the World Health Organization criteria  10 year probability of major osteoporotic fracture: 11.7%  10 year probability of hip fracture: 3.5%  Records reviewed and summarized above. Assessment/plan:   1. Left central IDC, gr 2, ER +, NC +, HER 2 positive:  cT2 cN1a cM0, stage IIB, prognostic stage IB; ypT0 ypN0 cM0    We explained to the patient that the goal of systemic adjuvant therapy is to improve the chances for cure and decrease the risk of relapse. We explained why a patient can have microscopic cancer spread now even though physical examination, laboratory studies and imaging studies are negative for cancer. We explained that the same treatments used now as adjuvant or preventive treatments rarely if ever are curative in women who develop metastases. TTE 3/6/19 EF 72%, TTE 6/4/19 EF 66%, TTE 9/3/19, EF 66%, next due 11/26/19. [facility] HP #5 today. Will see her back in 6 weeks for #7. RU, continue anastrozole    2. Emotional well being:  She has excellent support and is coping well with her disease    3. tob use:  Smokes 4 cigs/day; counseled to quit    4. Fatigue: Appetite continues to improve. Energy level has been improving. 5. GERD: due to doxycycline. This has resolved with decreased dose to daily as well as continuing Pepcid daily. 6. 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 Started after Cycle 3. Does not wish to start any medications at this time. Patient will let us know if this worsens. 7. Decreased EF:  EF dropped from 72% to 66% and global strain dropped from 18% to 15.7% between 3/2019 to 6/2019, however per Dr. Walter Fierro, visually there is no significant change.  Recommend close follow up and Troponin I level. Trop on 6/19/19 was normal. TTE on 9/3/19 showed no significant change in overall LV systolic function. Ordered TTE prior to 12/5    8. Hypokalemia: 20 meq K daily. I appreciate the opportunity to participate in Ms. Soraya Krause's care. Signed By: Mele Connors MD      No orders of the defined types were placed in this encounter.

## 2019-12-02 DIAGNOSIS — C50.912 MALIGNANT NEOPLASM OF LEFT FEMALE BREAST, UNSPECIFIED ESTROGEN RECEPTOR STATUS, UNSPECIFIED SITE OF BREAST (HCC): Primary | ICD-10-CM

## 2019-12-05 ENCOUNTER — TELEPHONE (OUTPATIENT)
Dept: ONCOLOGY | Age: 74
End: 2019-12-05

## 2019-12-05 NOTE — TELEPHONE ENCOUNTER
12/5/19 9:43 AM: Called patient and advised that she is due for another echocardiogram before her chemo next week and she has been scheduled with [facility] tomorrow at 9 AM. Patient verbalized understanding and denied further questions or concerns.

## 2019-12-11 ENCOUNTER — HOSPITAL ENCOUNTER (OUTPATIENT)
Dept: INFUSION THERAPY | Age: 74
Discharge: HOME | End: 2019-12-11
Payer: MEDICARE

## 2019-12-11 VITALS
DIASTOLIC BLOOD PRESSURE: 74 MMHG | BODY MASS INDEX: 26.48 KG/M2 | HEART RATE: 70 BPM | SYSTOLIC BLOOD PRESSURE: 121 MMHG | WEIGHT: 164.8 LBS | RESPIRATION RATE: 16 BRPM | OXYGEN SATURATION: 96 % | TEMPERATURE: 97.6 F | HEIGHT: 66 IN

## 2019-12-11 DIAGNOSIS — C50.112 MALIGNANT NEOPLASM OF CENTRAL PORTION OF LEFT BREAST IN FEMALE, ESTROGEN RECEPTOR POSITIVE (HCC): Primary | ICD-10-CM

## 2019-12-11 DIAGNOSIS — Z17.0 MALIGNANT NEOPLASM OF CENTRAL PORTION OF LEFT BREAST IN FEMALE, ESTROGEN RECEPTOR POSITIVE (HCC): Primary | ICD-10-CM

## 2019-12-11 PROCEDURE — 96417 CHEMO IV INFUS EACH ADDL SEQ: CPT

## 2019-12-11 PROCEDURE — 77030012965 HC NDL HUBR BBMI -A

## 2019-12-11 PROCEDURE — 96413 CHEMO IV INFUSION 1 HR: CPT

## 2019-12-11 PROCEDURE — 74011250636 HC RX REV CODE- 250/636: Performed by: NURSE PRACTITIONER

## 2019-12-11 RX ORDER — SODIUM CHLORIDE 9 MG/ML
10 INJECTION INTRAMUSCULAR; INTRAVENOUS; SUBCUTANEOUS AS NEEDED
Status: ACTIVE | OUTPATIENT
Start: 2019-12-11 | End: 2019-12-11

## 2019-12-11 RX ORDER — HEPARIN 100 UNIT/ML
300-500 SYRINGE INTRAVENOUS AS NEEDED
Status: ACTIVE | OUTPATIENT
Start: 2019-12-11 | End: 2019-12-11

## 2019-12-11 RX ORDER — SODIUM CHLORIDE 0.9 % (FLUSH) 0.9 %
10 SYRINGE (ML) INJECTION AS NEEDED
Status: ACTIVE | OUTPATIENT
Start: 2019-12-11 | End: 2019-12-11

## 2019-12-11 RX ORDER — SODIUM CHLORIDE 9 MG/ML
25 INJECTION, SOLUTION INTRAVENOUS CONTINUOUS
Status: DISPENSED | OUTPATIENT
Start: 2019-12-11 | End: 2019-12-11

## 2019-12-11 RX ADMIN — Medication 500 UNITS: at 11:15

## 2019-12-11 RX ADMIN — SODIUM CHLORIDE 25 ML/HR: 900 INJECTION, SOLUTION INTRAVENOUS at 09:55

## 2019-12-11 RX ADMIN — TRASTUZUMAB 446 MG: 150 INJECTION, POWDER, LYOPHILIZED, FOR SOLUTION INTRAVENOUS at 09:58

## 2019-12-11 RX ADMIN — Medication 10 ML: at 11:15

## 2019-12-11 RX ADMIN — PERTUZUMAB 420 MG: 30 INJECTION, SOLUTION, CONCENTRATE INTRAVENOUS at 10:37

## 2019-12-11 NOTE — PROGRESS NOTES
[facility] - Chemotherapy Progress Note    0900 Pt admit to [facility] for Herceptin/Perjeta ambulatory in stable condition. Assessment completed. Patient reports excoriation and irritation to the skin secondary to radiation. Port with positive blood return. Chemotherapy Flowsheet 12/11/2019   Cycle C12   Date 12/11/2019   Drug / Regimen HP   Pre Meds -   Notes given       Visit Vitals  /63 (BP 1 Location: Left arm, BP Patient Position: Sitting)   Pulse 75   Temp 97.6 °F (36.4 °C)   Resp 16   Ht 5' 6\" (1.676 m)   Wt 74.8 kg (164 lb 12.8 oz)   SpO2 96%   BMI 26.60 kg/m²     Patient declines pregnancy    Medications Administered     0.9% sodium chloride infusion     Admin Date  12/11/2019 Action  New Bag Dose  25 mL/hr Rate  25 mL/hr Route  IntraVENous Administered By  Sade Barrett RN          heparin (porcine) pf 300-500 Units     Admin Date  12/11/2019 Action  Given Dose  500 Units Route  InterCATHeter Administered By  Doc Tavares RN          pertuzumab (PERJETA) 420 mg in 0.9% sodium chloride 250 mL, overfill volume 25 mL IVPB     Admin Date  12/11/2019 Action  New Bag Dose  420 mg Rate  578 mL/hr Route  IntraVENous Administered By  Sade Barrett RN          saline peripheral flush soln 10 mL     Admin Date  12/11/2019 Action  Given Dose  10 mL Route  InterCATHeter Administered By  Doc Tavares RN          trastuzumab (HERCEPTIN) 446 mg in 0.9% sodium chloride 250 mL, overfill volume 25 mL IVPB     Admin Date  12/11/2019 Action  New Bag Dose  446 mg Rate  592.4 mL/hr Route  IntraVENous Administered By  Sade Barrett RN                1119 Pt tolerated treatment well. Port maintained positive blood return throughout treatment, flushed with positive blood return at conclusion and de-accessed. D/c home ambulatory in no distress. Pt aware of next appointment scheduled for 12/31.

## 2019-12-12 ENCOUNTER — DOCUMENTATION ONLY (OUTPATIENT)
Dept: SURGERY | Age: 74
End: 2019-12-12

## 2019-12-27 RX ORDER — DIPHENHYDRAMINE HYDROCHLORIDE 50 MG/ML
50 INJECTION, SOLUTION INTRAMUSCULAR; INTRAVENOUS AS NEEDED
Status: CANCELLED
Start: 2019-12-31

## 2019-12-27 RX ORDER — SODIUM CHLORIDE 9 MG/ML
10 INJECTION INTRAMUSCULAR; INTRAVENOUS; SUBCUTANEOUS AS NEEDED
Status: CANCELLED | OUTPATIENT
Start: 2019-12-31

## 2019-12-27 RX ORDER — ACETAMINOPHEN 325 MG/1
650 TABLET ORAL
Status: CANCELLED | OUTPATIENT
Start: 2019-12-31

## 2019-12-27 RX ORDER — ONDANSETRON 2 MG/ML
8 INJECTION INTRAMUSCULAR; INTRAVENOUS AS NEEDED
Status: CANCELLED | OUTPATIENT
Start: 2019-12-31

## 2019-12-27 RX ORDER — SODIUM CHLORIDE 0.9 % (FLUSH) 0.9 %
10 SYRINGE (ML) INJECTION AS NEEDED
Status: CANCELLED
Start: 2019-12-31

## 2019-12-27 RX ORDER — ACETAMINOPHEN 325 MG/1
650 TABLET ORAL AS NEEDED
Status: CANCELLED
Start: 2019-12-31

## 2019-12-27 RX ORDER — SODIUM CHLORIDE 9 MG/ML
25 INJECTION, SOLUTION INTRAVENOUS CONTINUOUS
Status: CANCELLED | OUTPATIENT
Start: 2019-12-31

## 2019-12-27 RX ORDER — HEPARIN 100 UNIT/ML
300-500 SYRINGE INTRAVENOUS AS NEEDED
Status: CANCELLED
Start: 2019-12-31

## 2019-12-27 RX ORDER — ALBUTEROL SULFATE 0.83 MG/ML
2.5 SOLUTION RESPIRATORY (INHALATION) AS NEEDED
Status: CANCELLED
Start: 2019-12-31

## 2019-12-27 RX ORDER — DIPHENHYDRAMINE HYDROCHLORIDE 50 MG/ML
50 INJECTION, SOLUTION INTRAMUSCULAR; INTRAVENOUS
Status: CANCELLED | OUTPATIENT
Start: 2019-12-31

## 2019-12-27 RX ORDER — HYDROCORTISONE SODIUM SUCCINATE 100 MG/2ML
100 INJECTION, POWDER, FOR SOLUTION INTRAMUSCULAR; INTRAVENOUS AS NEEDED
Status: CANCELLED | OUTPATIENT
Start: 2019-12-31

## 2019-12-27 RX ORDER — EPINEPHRINE 1 MG/ML
0.3 INJECTION, SOLUTION, CONCENTRATE INTRAVENOUS AS NEEDED
Status: CANCELLED | OUTPATIENT
Start: 2019-12-31

## 2019-12-30 NOTE — PROGRESS NOTES
[facility] [place] [facility] [place] [address] [address] [facility]overeign: [phone]  F: [phone]      Reason for Visit:   Bre Mckinney is a 66 y.o. female who is seen in follow up for evaluation of therapy for breast cancer. Treatment History:   · 2/15/19 Left core bx:   IDC, gr 2, 1.1 cm, ER + at 100%, WA + at 20%, HER 2 POSITIVE (IHC 2+; FISH ratio 3.9; sig/cell 9.2)  · 3/1/19 Left ax LN core bx:  + for breast cancer, ER + at 100%, WA negative, HER 2 POSITIVE (IHC 2+, FISH ratio 2; sig/cell 5.8)  · TCH-P 3/6/19-7/10/19  · #2 delayed due to diverticulitis abscess and surgery 4/17/19  · 8/5/19 Bilateral Mastecotomy: Right breast: lobular carcinomna in situ, fibrocystic changes including atypical and usual ductal hyperplasia, sclerosing adenosis apocrine metaplasia, microcalcification. Left breast: No evidence of residual invasive carcinoma or carcinoma in situ, no DCIS, 0/9 LNs; ypT0 yp N0 cM0, pCR  · Outback HP 8/28/19-  · S/p XRT 11/27/19  · Anastrozole 11/2019-    History of Present Illness:   Pt noticed the left breast mass herself in Feb 2019, leading to the pathology above    She was seen in ED on 3/8/19 for abdominal pain and constipation CT abd/pelvis showed diverticulitis and received levaquin and flagyl. KUB also showed nonobstructive bowel gas pattern. Reports last good BM was 3/6 prior to chemo. She states on 3/10 and 3/12 had small watery BM. Sent to ED from office on 3/12/19 for worsening abdominal pain. She was found to have diverticulitis with multiple abscesses. S/p IV abx and perc drain with no relief of her pain, therefore proceeded with open sigmoidectomy and end colostomy on 3/15/19. She was discharged to SNF with IV abx until 3/29/19. Interval history:  In today for follow up on therapy. Today is C7 of therapy with Herceptin and Perjeta.  Complains of gr 1 fatigue, gr 1 insomnia, gr 1 itching, gr 1 neuropathy    FH:  Paternal aunt breast cancer in her [de-identified]; no ovarian cancer, no prostate or pancreas cancer    Past Medical History:   Diagnosis Date    Adverse effect of anesthesia     \"difficulty waking up from anesthethia\"    Cancer ([address] 02/2019    left breast. current chemo patient    Depression     in past    GERD (gastroesophageal reflux disease)     High cholesterol     Hypertension       Past Surgical History:   Procedure Laterality Date    HX BREAST BIOPSY Right years ago    neg; needle bx    HX BREAST BIOPSY Left 02/15/2019    IDC    HX CATARACT REMOVAL      2013 (R), 2016(L)    HX COLONOSCOPY      HX COLOSTOMY Left 03/2019    HX MASTECTOMY Bilateral 8/5/2019    BILATERAL BREAST MASTECTOMIES AND LEFT BREAST SENTINEL NODE BIOPSY WITH BLUE DYE performed by Olean Leyden, MD at [address] HX ORTHOPAEDIC      carpal tunnel    HX VASCULAR ACCESS Right 02/2019    portacath    IR CHANGE DRAIN  ABCESS PERC  03/2019      Social History     Tobacco Use    Smoking status: Current Every Day Smoker     Packs/day: 0.25     Years: 55.00     Pack years: 13.75    Smokeless tobacco: Never Used    Tobacco comment: 1-2 cigs/day   Substance Use Topics    Alcohol use:  Yes     Alcohol/week: 1.0 standard drinks     Types: 1 Shots of liquor per week     Drinks per session: 5 or 6     Binge frequency: Weekly     Comment: Patient stated that she puts a splash of bourbon in her afternoon coffee around 5-6 days per week      Family History   Problem Relation Age of Onset    Breast Cancer Maternal Aunt         [de-identified]    Hypertension Mother     Hypertension Father     Cancer Father         Lung    Cancer Brother         leukemia    Diabetes Brother         2 brothers with diabetes    Hypertension Sister         2 sisters and 4 brothers with HTN     Current Outpatient Medications   Medication Sig    neomycin-polymyxin-hydrocortisone, buffered, (PEDIOTIC) 3.5-10,000-1 mg/mL-unit/mL-% otic suspension Place 3 drops at end of toenails and fingernails (3) times daily.  anastrozole (ARIMIDEX) 1 mg tablet Take 1 mg by mouth daily.  acetaminophen (TYLENOL EXTRA STRENGTH) 500 mg tablet Take 1,000 mg by mouth every six (6) hours as needed for Pain.  famotidine (PEPCID AC) 20 mg tablet Take 20 mg by mouth daily.  cyanocobalamin/mecobalamin (CYANOCOBALAMIN-METHYLCOBALAMIN SL) 5,000 mcg by SubLINGual route daily.  naproxen sodium (ALEVE PO) Take  by mouth two (2) times daily as needed.  lidocaine-prilocaine (EMLA) topical cream Apply  to affected area as needed for Pain.  losartan-hydroCHLOROthiazide (HYZAAR) 100-25 mg per tablet Take 0.5 Tabs by mouth daily.  pravastatin (PRAVACHOL) 40 mg tablet Take 40 mg by mouth nightly.  STIOLTO RESPIMAT 2.5-2.5 mcg/actuation inhaler Take 2 Puffs by inhalation nightly.  venlafaxine (EFFEXOR) 50 mg tablet Take 50 mg by mouth daily.  cholecalciferol (VITAMIN D3) 1,000 unit cap Take 2,000 Units by mouth daily.  calcium-cholecalciferol, D3, (CALTRATE 600+D) tablet Take 1 Tab by mouth nightly.  aspirin delayed-release 81 mg tablet Take  by mouth every other day. No current facility-administered medications for this visit. Facility-Administered Medications Ordered in Other Visits   Medication Dose Route Frequency    saline peripheral flush soln 10 mL  10 mL InterCATHeter PRN    0.9% sodium chloride injection 10 mL  10 mL IntraVENous PRN    heparin (porcine) pf 300-500 Units  300-500 Units InterCATHeter PRN      Allergies   Allergen Reactions    Keflex [Cephalexin] Hives    Penicillins Rash    Other Medication Itching     CHLORHEXADINE ( wipes caused moderate to severe itching in preop 8/5/19)        Review of Systems: A complete review of systems was obtained, negative except as described above.     Physical Exam:     Visit Vitals  /60 (BP 1 Location: Left arm, BP Patient Position: Sitting)   Pulse 80   Temp 96.7 °F (35.9 °C) (Temporal)   Resp 16   Ht 5' 6\" (1.676 m)   Wt 162 lb (73.5 kg)   SpO2 98%   BMI 26.15 kg/m²     ECOG PS: 0  General: No distress  Eyes: PERRLA, anicteric sclerae. HENT: Atraumatic, OP clear  Neck: Supple  Lymphatic: No cervical, supraclavicular adenopathy  Respiratory: clear to auscultation bilaterally  CV: Normal rate, regular rhythm, no murmurs, no peripheral edema  GI: Soft, tender, distended, no masses, +BS, ostomy in place  MS:  Digits without clubbing or cyanosis. Skin: No rashes, ecchymoses, or petechiae. Normal temperature, turgor, and texture. Psych: Alert, oriented, appropriate affect, normal judgment/insight  Breasts:  Bilateral mastectomies. Left breast healing well, small open wound, c/d. Right breast: healing well    Results:     Lab Results   Component Value Date/Time    WBC 11.3 (H) 07/10/2019 07:57 AM    HGB 11.8 07/10/2019 07:57 AM    HCT 34.5 (L) 07/10/2019 07:57 AM    PLATELET 717 71/30/4079 07:57 AM    MCV 96.6 07/10/2019 07:57 AM    ABS. NEUTROPHILS 8.0 07/10/2019 07:57 AM     Lab Results   Component Value Date/Time    Sodium 138 07/10/2019 07:57 AM    Potassium 3.6 08/05/2019 06:45 AM    Chloride 100 07/10/2019 07:57 AM    CO2 32 07/10/2019 07:57 AM    Glucose 107 (H) 07/10/2019 07:57 AM    BUN 13 07/10/2019 07:57 AM    Creatinine 0.76 07/10/2019 07:57 AM    GFR est AA >60 07/10/2019 07:57 AM    GFR est non-AA >60 07/10/2019 07:57 AM    Calcium 9.9 07/10/2019 07:57 AM    Glucose (POC) 93 03/19/2019 06:56 AM     Lab Results   Component Value Date/Time    Bilirubin, total 0.4 07/10/2019 07:57 AM    ALT (SGPT) 17 07/10/2019 07:57 AM    AST (SGOT) 13 (L) 07/10/2019 07:57 AM    Alk. phosphatase 83 07/10/2019 07:57 AM    Protein, total 6.6 07/10/2019 07:57 AM    Albumin 3.5 07/10/2019 07:57 AM    Globulin 3.1 07/10/2019 07:57 AM       2/12/19 mammogram and US  MAMMOGRAPHY:  The breast demonstrates stable scattered density breast  architecture.   Underlying the site of clinical concern in the left breast is  masslike focal asymmetry in the anterior upper left breast. There is no other  dominant mass lesion, architectural distortion, asymmetry, or calcification. There is no skin thickening or nipple retraction.     ULTRASOUND:  Corresponding to the mammographic density there is a 2.4 x 3.1 x  3.7 cm hypoechoic lobulated mass at about the 12:00 position and 3 cm radial  distance from the nipple. No other suspicious cystic or solid lesions  Identified. Dr. Go Nguyen has identified 2 large, suspicious ax LN on US:  1.7 cm and 1.2 cm    5/16/19 Left mammo and US:  The breast parenchyma is heterogeneously dense. Left breast biopsy clip is  unchanged in position. The central upper left breast mass seen on prior MR exam  dated February 2019 has decreased in size.     Targeted sonographic evaluation of the 12:00 position of the left breast is  performed. Within the 12:00 position of the left breast, there is a 2.1 cm x 1.3  cm x 3.1 cm irregular hypoechoic mass containing calcifications, decreased in  size and measuring 3.8 cm x 2.4 cm x 3.1 cm on prior ultrasound dated February 2018.     IMPRESSION: BI-RADS 6. Pathology proven left breast cancer. Interval decrease in size of 12:00 left breast mass, now measuring 2.1 cm x 1.3 cm x 3.1 cm. Findings were discussed with the patient at the time of interpretation.     11/4/19 dexa  Femoral Neck Left:  Bone mineral density (gm/cm2): 0.904  % of peak bone mass: 87  % for age matched controls: 116  T-score: -1.0  Z-score: 0.9     Femoral Neck Right:  Bone mineral density (gm/cm2): 0.866  % of peak bone mass: 83  % for age matched controls:  111  T-score: -1.2  Z-score: 0.6     Total Hip Left:  Bone mineral density (gm/cm2): 1.000  % of peak bone mass: 99  % for age matched controls: 126  T-score: -0.1  Z-score: 1.6     Total Hip Right:  Bone mineral density (gm/cm2): 0.935  % of peak bone mass: 93  % for age matched controls:  118  T-score: -0.6  Z-score: 1.1     Lumbar Spine: L1-L3  Bone mineral density (gm/cm2): 1.440  % of peak bone mass: 122  % for age matched controls: 145  T-score: 2.1  Z-score: 3.7     33% Radius Left:  Bone mineral density (gm/cm2): 0.807  % of peak bone mass: 92  % for age matched controls:  80  T-score: -0.8  Z-score: 1.6    Impression: This patient is osteopenic using the World Health Organization criteria  10 year probability of major osteoporotic fracture: 11.7%  10 year probability of hip fracture: 3.5%  Records reviewed and summarized above. Assessment/plan:   1. Left central IDC, gr 2, ER +, WI +, HER 2 positive:  cT2 cN1a cM0, stage IIB, prognostic stage IB; ypT0 ypN0 cM0    We explained to the patient that the goal of systemic adjuvant therapy is to improve the chances for cure and decrease the risk of relapse. We explained why a patient can have microscopic cancer spread now even though physical examination, laboratory studies and imaging studies are negative for cancer. We explained that the same treatments used now as adjuvant or preventive treatments rarely if ever are curative in women who develop metastases. TTE 3/6/19 EF 72%, TTE 6/4/19 EF 66%, TTE 9/3/19, EF 66%; TTE 12/6/2019 with EF 65%    Outback HP #7 today. Will see her back in 6 weeks for #9. RU, continue anastrozole    2. Emotional well being:  She has excellent support and is coping well with her disease    3. tob use:  Smokes 1/2ppd; counseled to quit    4. Fatigue: Appetite continues to improve. Energy level has been improving. 5. GERD: due to doxycycline. This has resolved with decreased dose to daily as well as continuing Pepcid daily. 6. 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 Started after Cycle 3. Does not wish to start any medications at this time. Patient will let us know if this worsens.      7. Decreased EF:  EF dropped from 72% to 66% and global strain dropped from 18% to 15.7% between 3/2019 to 6/2019, however per Dr. Anuel Rondon, visually there is no significant change. Recommend close follow up and Troponin I level. Trop on 6/19/19 was normal. TTE on 9/3/19 showed no significant change in overall LV systolic function. Latest TTE on 12/6/2019 with stable EF. 8. Hypokalemia: 20 meq K daily. I appreciate the opportunity to participate in Ms. Sushma Krause's care. Signed By: Marvia Hammans, MD      No orders of the defined types were placed in this encounter.

## 2019-12-31 ENCOUNTER — OFFICE VISIT (OUTPATIENT)
Dept: ONCOLOGY | Age: 74
End: 2019-12-31

## 2019-12-31 ENCOUNTER — HOSPITAL ENCOUNTER (OUTPATIENT)
Dept: INFUSION THERAPY | Age: 74
Discharge: HOME | End: 2019-12-31
Payer: MEDICARE

## 2019-12-31 VITALS
HEART RATE: 80 BPM | RESPIRATION RATE: 16 BRPM | HEIGHT: 66 IN | SYSTOLIC BLOOD PRESSURE: 133 MMHG | OXYGEN SATURATION: 98 % | WEIGHT: 162 LBS | BODY MASS INDEX: 26.03 KG/M2 | TEMPERATURE: 96.7 F | DIASTOLIC BLOOD PRESSURE: 60 MMHG

## 2019-12-31 VITALS
HEIGHT: 66 IN | RESPIRATION RATE: 16 BRPM | DIASTOLIC BLOOD PRESSURE: 59 MMHG | BODY MASS INDEX: 26.07 KG/M2 | WEIGHT: 162.2 LBS | HEART RATE: 66 BPM | SYSTOLIC BLOOD PRESSURE: 114 MMHG | OXYGEN SATURATION: 98 %

## 2019-12-31 DIAGNOSIS — C50.912 MALIGNANT NEOPLASM OF LEFT FEMALE BREAST, UNSPECIFIED ESTROGEN RECEPTOR STATUS, UNSPECIFIED SITE OF BREAST (HCC): Primary | ICD-10-CM

## 2019-12-31 DIAGNOSIS — C50.112 MALIGNANT NEOPLASM OF CENTRAL PORTION OF LEFT BREAST IN FEMALE, ESTROGEN RECEPTOR POSITIVE (HCC): Primary | ICD-10-CM

## 2019-12-31 DIAGNOSIS — Z17.0 MALIGNANT NEOPLASM OF CENTRAL PORTION OF LEFT BREAST IN FEMALE, ESTROGEN RECEPTOR POSITIVE (HCC): Primary | ICD-10-CM

## 2019-12-31 PROCEDURE — 96413 CHEMO IV INFUSION 1 HR: CPT

## 2019-12-31 PROCEDURE — 74011250636 HC RX REV CODE- 250/636: Performed by: NURSE PRACTITIONER

## 2019-12-31 PROCEDURE — 74011000250 HC RX REV CODE- 250: Performed by: NURSE PRACTITIONER

## 2019-12-31 PROCEDURE — 96417 CHEMO IV INFUS EACH ADDL SEQ: CPT

## 2019-12-31 RX ORDER — SODIUM CHLORIDE 9 MG/ML
25 INJECTION, SOLUTION INTRAVENOUS CONTINUOUS
Status: DISPENSED | OUTPATIENT
Start: 2019-12-31 | End: 2019-12-31

## 2019-12-31 RX ORDER — SODIUM CHLORIDE 9 MG/ML
10 INJECTION INTRAMUSCULAR; INTRAVENOUS; SUBCUTANEOUS AS NEEDED
Status: ACTIVE | OUTPATIENT
Start: 2019-12-31 | End: 2019-12-31

## 2019-12-31 RX ORDER — HEPARIN 100 UNIT/ML
300-500 SYRINGE INTRAVENOUS AS NEEDED
Status: ACTIVE | OUTPATIENT
Start: 2019-12-31 | End: 2019-12-31

## 2019-12-31 RX ORDER — SODIUM CHLORIDE 0.9 % (FLUSH) 0.9 %
10 SYRINGE (ML) INJECTION AS NEEDED
Status: ACTIVE | OUTPATIENT
Start: 2019-12-31 | End: 2019-12-31

## 2019-12-31 RX ADMIN — TRASTUZUMAB 446 MG: 150 INJECTION, POWDER, LYOPHILIZED, FOR SOLUTION INTRAVENOUS at 11:25

## 2019-12-31 RX ADMIN — SODIUM CHLORIDE 25 ML/HR: 900 INJECTION, SOLUTION INTRAVENOUS at 11:22

## 2019-12-31 RX ADMIN — HEPARIN 500 UNITS: 100 SYRINGE at 12:39

## 2019-12-31 RX ADMIN — Medication 10 ML: at 09:35

## 2019-12-31 RX ADMIN — SODIUM CHLORIDE 10 ML: 9 INJECTION, SOLUTION INTRAMUSCULAR; INTRAVENOUS; SUBCUTANEOUS at 09:34

## 2019-12-31 RX ADMIN — Medication 10 ML: at 12:39

## 2019-12-31 RX ADMIN — PERTUZUMAB 420 MG: 30 INJECTION, SOLUTION, CONCENTRATE INTRAVENOUS at 12:05

## 2019-12-31 NOTE — PROGRESS NOTES
[facility] Progress Note    Date: 2019    Name: Marina Burton    MRN: [id number]         : 1945    Ms. Krause Arrived ambulatory and in no distress for C13D1 of HP Regimen. Assessment was completed, no acute issues at this time, patient c/o open wounds on arms. Right chest wall port accessed without difficulty, no labs drawn today. ECHO from 19 with EF of 65%. Chemotherapy Flowsheet 2019   Cycle C13   Date 2019   Drug / Regimen HP   Pre Meds -   Notes -       0930 Patient proceed to appointment with Dr. Karlie Cuenca. Ms. Jonnathan Giraldo vitals were reviewed.   Visit Vitals  /60   Pulse 80   Resp 16   Ht 5' 6\" (1.676 m)   Wt 73.6 kg (162 lb 3.2 oz)   SpO2 98%   Breastfeeding No   BMI 26.18 kg/m²       Medications:  Medications Administered     0.9% sodium chloride infusion     Admin Date  2019 Action  New Bag Dose  25 mL/hr Rate  25 mL/hr Route  IntraVENous Administered By  Steven Miller RN          0.9% sodium chloride injection 10 mL     Admin Date  2019 Action  Given Dose  10 mL Route  IntraVENous Administered By  Félix Kaiser RN          heparin (porcine) pf 300-500 Units     Admin Date  2019 Action  Given Dose  500 Units Route  InterCATHeter Administered By  Steven Miller RN          pertuzumab (PERJETA) 420 mg in 0.9% sodium chloride 250 mL, overfill volume 25 mL IVPB     Admin Date  2019 Action  New Bag Dose  420 mg Rate  578 mL/hr Route  IntraVENous Administered By  Steven Miller RN          saline peripheral flush soln 10 mL     Admin Date  2019 Action  Given Dose  10 mL Route  InterCATHeter Administered By  Félix Kaiser RN           Admin Date  2019 Action  Given Dose  10 mL Route  InterCATHeter Administered By  Steven Miller RN          trastuzumab (HERCEPTIN) 446 mg in 0.9% sodium chloride 250 mL, overfill volume 25 mL IVPB     Admin Date  2019 Action  New Bag Dose  446 mg Rate  592.4 mL/hr Route  IntraVENous Administered By  Nomi Mike RN              Patient Vitals for the past 12 hrs:   Pulse Resp BP SpO2   12/31/19 1238 66 16 114/59 --   12/31/19 0925 -- -- -- 98 %   12/31/19 0923 80 16 133/60 --       Ms. Krause tolerated treatment well and was discharged from [place] [address] in stable condition at [phone]. Port de-accessed, flushed & heparinized per protocol. She is to return on January 22 at 0900 for her next appointment.     Malaika Peña RN  December 31, 2019

## 2020-01-17 RX ORDER — ALBUTEROL SULFATE 0.83 MG/ML
2.5 SOLUTION RESPIRATORY (INHALATION) AS NEEDED
Status: CANCELLED
Start: 2020-03-25

## 2020-01-17 RX ORDER — SODIUM CHLORIDE 9 MG/ML
10 INJECTION INTRAMUSCULAR; INTRAVENOUS; SUBCUTANEOUS AS NEEDED
Status: CANCELLED | OUTPATIENT
Start: 2020-03-04

## 2020-01-17 RX ORDER — ACETAMINOPHEN 325 MG/1
650 TABLET ORAL
Status: CANCELLED | OUTPATIENT
Start: 2020-02-12

## 2020-01-17 RX ORDER — DIPHENHYDRAMINE HYDROCHLORIDE 50 MG/ML
50 INJECTION, SOLUTION INTRAMUSCULAR; INTRAVENOUS
Status: CANCELLED | OUTPATIENT
Start: 2020-02-12

## 2020-01-17 RX ORDER — SODIUM CHLORIDE 0.9 % (FLUSH) 0.9 %
10 SYRINGE (ML) INJECTION AS NEEDED
Status: CANCELLED
Start: 2020-03-04

## 2020-01-17 RX ORDER — DIPHENHYDRAMINE HYDROCHLORIDE 50 MG/ML
50 INJECTION, SOLUTION INTRAMUSCULAR; INTRAVENOUS
Status: CANCELLED | OUTPATIENT
Start: 2020-03-04

## 2020-01-17 RX ORDER — EPINEPHRINE 1 MG/ML
0.3 INJECTION, SOLUTION, CONCENTRATE INTRAVENOUS AS NEEDED
Status: CANCELLED | OUTPATIENT
Start: 2020-03-04

## 2020-01-17 RX ORDER — HEPARIN 100 UNIT/ML
300-500 SYRINGE INTRAVENOUS AS NEEDED
Status: CANCELLED
Start: 2020-03-25

## 2020-01-17 RX ORDER — SODIUM CHLORIDE 9 MG/ML
10 INJECTION INTRAMUSCULAR; INTRAVENOUS; SUBCUTANEOUS AS NEEDED
Status: CANCELLED | OUTPATIENT
Start: 2020-01-22

## 2020-01-17 RX ORDER — ONDANSETRON 2 MG/ML
8 INJECTION INTRAMUSCULAR; INTRAVENOUS AS NEEDED
Status: CANCELLED | OUTPATIENT
Start: 2020-01-22

## 2020-01-17 RX ORDER — EPINEPHRINE 1 MG/ML
0.3 INJECTION, SOLUTION, CONCENTRATE INTRAVENOUS AS NEEDED
Status: CANCELLED | OUTPATIENT
Start: 2020-03-25

## 2020-01-17 RX ORDER — DIPHENHYDRAMINE HYDROCHLORIDE 50 MG/ML
50 INJECTION, SOLUTION INTRAMUSCULAR; INTRAVENOUS AS NEEDED
Status: CANCELLED
Start: 2020-01-22

## 2020-01-17 RX ORDER — DIPHENHYDRAMINE HYDROCHLORIDE 50 MG/ML
50 INJECTION, SOLUTION INTRAMUSCULAR; INTRAVENOUS
Status: CANCELLED | OUTPATIENT
Start: 2020-01-22

## 2020-01-17 RX ORDER — HYDROCORTISONE SODIUM SUCCINATE 100 MG/2ML
100 INJECTION, POWDER, FOR SOLUTION INTRAMUSCULAR; INTRAVENOUS AS NEEDED
Status: CANCELLED | OUTPATIENT
Start: 2020-01-22

## 2020-01-17 RX ORDER — SODIUM CHLORIDE 0.9 % (FLUSH) 0.9 %
10 SYRINGE (ML) INJECTION AS NEEDED
Status: CANCELLED
Start: 2020-03-25

## 2020-01-17 RX ORDER — SODIUM CHLORIDE 9 MG/ML
25 INJECTION, SOLUTION INTRAVENOUS CONTINUOUS
Status: CANCELLED | OUTPATIENT
Start: 2020-03-25

## 2020-01-17 RX ORDER — DIPHENHYDRAMINE HYDROCHLORIDE 50 MG/ML
50 INJECTION, SOLUTION INTRAMUSCULAR; INTRAVENOUS AS NEEDED
Status: CANCELLED
Start: 2020-03-25

## 2020-01-17 RX ORDER — SODIUM CHLORIDE 0.9 % (FLUSH) 0.9 %
10 SYRINGE (ML) INJECTION AS NEEDED
Status: CANCELLED
Start: 2020-02-12

## 2020-01-17 RX ORDER — ALBUTEROL SULFATE 0.83 MG/ML
2.5 SOLUTION RESPIRATORY (INHALATION) AS NEEDED
Status: CANCELLED
Start: 2020-03-04

## 2020-01-17 RX ORDER — SODIUM CHLORIDE 0.9 % (FLUSH) 0.9 %
10 SYRINGE (ML) INJECTION AS NEEDED
Status: CANCELLED
Start: 2020-01-22

## 2020-01-17 RX ORDER — ACETAMINOPHEN 325 MG/1
650 TABLET ORAL AS NEEDED
Status: CANCELLED
Start: 2020-03-04

## 2020-01-17 RX ORDER — DIPHENHYDRAMINE HYDROCHLORIDE 50 MG/ML
50 INJECTION, SOLUTION INTRAMUSCULAR; INTRAVENOUS AS NEEDED
Status: CANCELLED
Start: 2020-02-12

## 2020-01-17 RX ORDER — ALBUTEROL SULFATE 0.83 MG/ML
2.5 SOLUTION RESPIRATORY (INHALATION) AS NEEDED
Status: CANCELLED
Start: 2020-01-22

## 2020-01-17 RX ORDER — ALBUTEROL SULFATE 0.83 MG/ML
2.5 SOLUTION RESPIRATORY (INHALATION) AS NEEDED
Status: CANCELLED
Start: 2020-02-12

## 2020-01-17 RX ORDER — ONDANSETRON 2 MG/ML
8 INJECTION INTRAMUSCULAR; INTRAVENOUS AS NEEDED
Status: CANCELLED | OUTPATIENT
Start: 2020-03-04

## 2020-01-17 RX ORDER — SODIUM CHLORIDE 9 MG/ML
25 INJECTION, SOLUTION INTRAVENOUS CONTINUOUS
Status: CANCELLED | OUTPATIENT
Start: 2020-03-04

## 2020-01-17 RX ORDER — DIPHENHYDRAMINE HYDROCHLORIDE 50 MG/ML
50 INJECTION, SOLUTION INTRAMUSCULAR; INTRAVENOUS AS NEEDED
Status: CANCELLED
Start: 2020-03-04

## 2020-01-17 RX ORDER — ACETAMINOPHEN 325 MG/1
650 TABLET ORAL AS NEEDED
Status: CANCELLED
Start: 2020-03-25

## 2020-01-17 RX ORDER — HYDROCORTISONE SODIUM SUCCINATE 100 MG/2ML
100 INJECTION, POWDER, FOR SOLUTION INTRAMUSCULAR; INTRAVENOUS AS NEEDED
Status: CANCELLED | OUTPATIENT
Start: 2020-03-04

## 2020-01-17 RX ORDER — SODIUM CHLORIDE 9 MG/ML
25 INJECTION, SOLUTION INTRAVENOUS CONTINUOUS
Status: CANCELLED | OUTPATIENT
Start: 2020-01-22

## 2020-01-17 RX ORDER — ONDANSETRON 2 MG/ML
8 INJECTION INTRAMUSCULAR; INTRAVENOUS AS NEEDED
Status: CANCELLED | OUTPATIENT
Start: 2020-03-25

## 2020-01-17 RX ORDER — HYDROCORTISONE SODIUM SUCCINATE 100 MG/2ML
100 INJECTION, POWDER, FOR SOLUTION INTRAMUSCULAR; INTRAVENOUS AS NEEDED
Status: CANCELLED | OUTPATIENT
Start: 2020-02-12

## 2020-01-17 RX ORDER — ACETAMINOPHEN 325 MG/1
650 TABLET ORAL
Status: CANCELLED | OUTPATIENT
Start: 2020-03-25

## 2020-01-17 RX ORDER — HEPARIN 100 UNIT/ML
300-500 SYRINGE INTRAVENOUS AS NEEDED
Status: CANCELLED
Start: 2020-02-12

## 2020-01-17 RX ORDER — SODIUM CHLORIDE 9 MG/ML
10 INJECTION INTRAMUSCULAR; INTRAVENOUS; SUBCUTANEOUS AS NEEDED
Status: CANCELLED | OUTPATIENT
Start: 2020-02-12

## 2020-01-17 RX ORDER — HEPARIN 100 UNIT/ML
300-500 SYRINGE INTRAVENOUS AS NEEDED
Status: CANCELLED
Start: 2020-01-22

## 2020-01-17 RX ORDER — DIPHENHYDRAMINE HYDROCHLORIDE 50 MG/ML
50 INJECTION, SOLUTION INTRAMUSCULAR; INTRAVENOUS
Status: CANCELLED | OUTPATIENT
Start: 2020-03-25

## 2020-01-17 RX ORDER — ACETAMINOPHEN 325 MG/1
650 TABLET ORAL
Status: CANCELLED | OUTPATIENT
Start: 2020-01-22

## 2020-01-17 RX ORDER — SODIUM CHLORIDE 9 MG/ML
10 INJECTION INTRAMUSCULAR; INTRAVENOUS; SUBCUTANEOUS AS NEEDED
Status: CANCELLED | OUTPATIENT
Start: 2020-03-25

## 2020-01-17 RX ORDER — HYDROCORTISONE SODIUM SUCCINATE 100 MG/2ML
100 INJECTION, POWDER, FOR SOLUTION INTRAMUSCULAR; INTRAVENOUS AS NEEDED
Status: CANCELLED | OUTPATIENT
Start: 2020-03-25

## 2020-01-17 RX ORDER — SODIUM CHLORIDE 9 MG/ML
25 INJECTION, SOLUTION INTRAVENOUS CONTINUOUS
Status: CANCELLED | OUTPATIENT
Start: 2020-02-12

## 2020-01-17 RX ORDER — ACETAMINOPHEN 325 MG/1
650 TABLET ORAL
Status: CANCELLED | OUTPATIENT
Start: 2020-03-04

## 2020-01-17 RX ORDER — EPINEPHRINE 1 MG/ML
0.3 INJECTION, SOLUTION, CONCENTRATE INTRAVENOUS AS NEEDED
Status: CANCELLED | OUTPATIENT
Start: 2020-02-12

## 2020-01-17 RX ORDER — ACETAMINOPHEN 325 MG/1
650 TABLET ORAL AS NEEDED
Status: CANCELLED
Start: 2020-01-22

## 2020-01-17 RX ORDER — ONDANSETRON 2 MG/ML
8 INJECTION INTRAMUSCULAR; INTRAVENOUS AS NEEDED
Status: CANCELLED | OUTPATIENT
Start: 2020-02-12

## 2020-01-17 RX ORDER — EPINEPHRINE 1 MG/ML
0.3 INJECTION, SOLUTION, CONCENTRATE INTRAVENOUS AS NEEDED
Status: CANCELLED | OUTPATIENT
Start: 2020-01-22

## 2020-01-17 RX ORDER — HEPARIN 100 UNIT/ML
300-500 SYRINGE INTRAVENOUS AS NEEDED
Status: CANCELLED
Start: 2020-03-04

## 2020-01-17 RX ORDER — ACETAMINOPHEN 325 MG/1
650 TABLET ORAL AS NEEDED
Status: CANCELLED
Start: 2020-02-12

## 2020-01-22 ENCOUNTER — HOSPITAL ENCOUNTER (OUTPATIENT)
Dept: INFUSION THERAPY | Age: 75
Discharge: HOME | End: 2020-01-22
Payer: MEDICARE

## 2020-01-22 VITALS
HEART RATE: 55 BPM | TEMPERATURE: 97.1 F | SYSTOLIC BLOOD PRESSURE: 113 MMHG | DIASTOLIC BLOOD PRESSURE: 71 MMHG | BODY MASS INDEX: 25.73 KG/M2 | WEIGHT: 160.1 LBS | HEIGHT: 66 IN | RESPIRATION RATE: 16 BRPM

## 2020-01-22 DIAGNOSIS — Z17.0 MALIGNANT NEOPLASM OF CENTRAL PORTION OF LEFT BREAST IN FEMALE, ESTROGEN RECEPTOR POSITIVE (HCC): Primary | ICD-10-CM

## 2020-01-22 DIAGNOSIS — C50.112 MALIGNANT NEOPLASM OF CENTRAL PORTION OF LEFT BREAST IN FEMALE, ESTROGEN RECEPTOR POSITIVE (HCC): Primary | ICD-10-CM

## 2020-01-22 PROCEDURE — 74011250636 HC RX REV CODE- 250/636: Performed by: NURSE PRACTITIONER

## 2020-01-22 PROCEDURE — 77030012965 HC NDL HUBR BBMI -A

## 2020-01-22 PROCEDURE — 96417 CHEMO IV INFUS EACH ADDL SEQ: CPT

## 2020-01-22 PROCEDURE — 96413 CHEMO IV INFUSION 1 HR: CPT

## 2020-01-22 RX ORDER — HEPARIN 100 UNIT/ML
300-500 SYRINGE INTRAVENOUS AS NEEDED
Status: ACTIVE | OUTPATIENT
Start: 2020-01-22 | End: 2020-01-22

## 2020-01-22 RX ORDER — SODIUM CHLORIDE 0.9 % (FLUSH) 0.9 %
10 SYRINGE (ML) INJECTION AS NEEDED
Status: ACTIVE | OUTPATIENT
Start: 2020-01-22 | End: 2020-01-22

## 2020-01-22 RX ORDER — SODIUM CHLORIDE 9 MG/ML
25 INJECTION, SOLUTION INTRAVENOUS CONTINUOUS
Status: DISPENSED | OUTPATIENT
Start: 2020-01-22 | End: 2020-01-22

## 2020-01-22 RX ORDER — SODIUM CHLORIDE 9 MG/ML
10 INJECTION INTRAMUSCULAR; INTRAVENOUS; SUBCUTANEOUS AS NEEDED
Status: ACTIVE | OUTPATIENT
Start: 2020-01-22 | End: 2020-01-22

## 2020-01-22 RX ADMIN — PERTUZUMAB 420 MG: 30 INJECTION, SOLUTION, CONCENTRATE INTRAVENOUS at 11:14

## 2020-01-22 RX ADMIN — Medication 500 UNITS: at 11:49

## 2020-01-22 RX ADMIN — Medication 10 ML: at 09:20

## 2020-01-22 RX ADMIN — SODIUM CHLORIDE 10 ML: 9 INJECTION INTRAMUSCULAR; INTRAVENOUS; SUBCUTANEOUS at 09:20

## 2020-01-22 RX ADMIN — Medication 10 ML: at 11:49

## 2020-01-22 RX ADMIN — SODIUM CHLORIDE 25 ML/HR: 900 INJECTION, SOLUTION INTRAVENOUS at 10:12

## 2020-01-22 RX ADMIN — TRASTUZUMAB 446 MG: 150 INJECTION, POWDER, LYOPHILIZED, FOR SOLUTION INTRAVENOUS at 10:20

## 2020-01-22 NOTE — PROGRESS NOTES
[facility] VISIT NOTE  Date: 2020    Name: Ceci Veloz    MRN: [id number]         : 1945    910  Ms. Krause Arrived ambulatory and in no distress for C14D1 of Herpceptin/Perjeta Regimen. Assessment was completed, no acute issues at this time, no new complaints voiced. Left chest wall port accessed without difficulty. Chemotherapy Flowsheet 2020   Cycle C14   Date 2020   Drug / Regimen HP   Pre Meds -   Notes given       Vitals:  Patient Vitals for the past 12 hrs:   Temp Pulse Resp BP   20 1145 -- (!) 55 -- 113/71   20 0912 97.1 °F (36.2 °C) 74 16 123/67        Lab results were obtained and reviewed. No results found for this or any previous visit (from the past 12 hour(s)).     Medications received:  Medications Administered     0.9% sodium chloride infusion     Admin Date  2020 Action  New Bag Dose  25 mL/hr Rate  25 mL/hr Route  IntraVENous Administered By  Maryuri Kumar RN          0.9% sodium chloride injection 10 mL     Admin Date  2020 Action  Given Dose  10 mL Route  IntraVENous Administered By  Maryuri Kumar RN          heparin (porcine) pf 300-500 Units     Admin Date  2020 Action  Given Dose  500 Units Route  InterCATHeter Administered By  Maryuri Kumar RN          pertuzumab (PERJETA) 420 mg in 0.9% sodium chloride 250 mL, overfill volume 25 mL IVPB     Admin Date  2020 Action  New Bag Dose  420 mg Rate  578 mL/hr Route  IntraVENous Administered By  Maryuri Kumar RN          saline peripheral flush soln 10 mL     Admin Date  2020 Action  Given Dose  10 mL Route  InterCATHeter Administered By  Maryuri Kumar RN           Admin Date  2020 Action  Given Dose  10 mL Route  InterCATHeter Administered By  Maryuri Kumar RN          trastuzumab (HERCEPTIN) 446 mg in 0.9% sodium chloride 250 mL, overfill volume 25 mL IVPB     Admin Date  2020 Action  New Bag Dose  446 mg Rate  592.4 mL/hr Route  IntraVENous Administered By  Lawayne Dakins, RN                Ms. Karina Newman tolerated treatment well and was discharged from [place] [address] in stable condition at 1155. Port de-accessed, flushed & heparinized per protocol. She is to return on 2/12/2020 at 9:00 for her next appointment.     Raphael Bradley RN  January 22, 2020    Future Appointments:  Future Appointments   Date Time Provider Cathryn Ac   2/12/2020  9:00 AM SS INF4 CH3 <4H [facility]ana De La Garza Nancy   2/12/2020  9:30 AM Joseluis Mak NP [facility]   3/4/2020  9:00 AM SS INF2 CH3 <4H [facility] STDayana FARHAT   3/25/2020  9:00 AM SS INF1 CH3 <4H [facility] [address] [place] [address]

## 2020-01-24 DIAGNOSIS — Z17.0 MALIGNANT NEOPLASM OF CENTRAL PORTION OF LEFT BREAST IN FEMALE, ESTROGEN RECEPTOR POSITIVE (HCC): ICD-10-CM

## 2020-01-24 DIAGNOSIS — C50.112 MALIGNANT NEOPLASM OF CENTRAL PORTION OF LEFT BREAST IN FEMALE, ESTROGEN RECEPTOR POSITIVE (HCC): ICD-10-CM

## 2020-01-24 RX ORDER — LIDOCAINE AND PRILOCAINE 25; 25 MG/G; MG/G
CREAM TOPICAL
Qty: 30 G | Refills: 0 | Status: SHIPPED | OUTPATIENT
Start: 2020-01-24 | End: 2020-09-30

## 2020-02-12 ENCOUNTER — HOSPITAL ENCOUNTER (OUTPATIENT)
Dept: INFUSION THERAPY | Age: 75
Discharge: HOME | End: 2020-02-12
Payer: MEDICARE

## 2020-02-12 ENCOUNTER — OFFICE VISIT (OUTPATIENT)
Dept: ONCOLOGY | Age: 75
End: 2020-02-12

## 2020-02-12 VITALS
SYSTOLIC BLOOD PRESSURE: 123 MMHG | HEIGHT: 66 IN | RESPIRATION RATE: 18 BRPM | TEMPERATURE: 97.5 F | WEIGHT: 159 LBS | BODY MASS INDEX: 25.55 KG/M2 | HEART RATE: 96 BPM | OXYGEN SATURATION: 96 % | DIASTOLIC BLOOD PRESSURE: 56 MMHG

## 2020-02-12 VITALS
SYSTOLIC BLOOD PRESSURE: 117 MMHG | BODY MASS INDEX: 25.57 KG/M2 | DIASTOLIC BLOOD PRESSURE: 64 MMHG | TEMPERATURE: 97.6 F | WEIGHT: 159.1 LBS | OXYGEN SATURATION: 96 % | HEIGHT: 66 IN | RESPIRATION RATE: 16 BRPM | HEART RATE: 63 BPM

## 2020-02-12 DIAGNOSIS — C50.112 MALIGNANT NEOPLASM OF CENTRAL PORTION OF LEFT BREAST IN FEMALE, ESTROGEN RECEPTOR POSITIVE (HCC): Primary | ICD-10-CM

## 2020-02-12 DIAGNOSIS — Z17.0 MALIGNANT NEOPLASM OF CENTRAL PORTION OF LEFT BREAST IN FEMALE, ESTROGEN RECEPTOR POSITIVE (HCC): Primary | ICD-10-CM

## 2020-02-12 DIAGNOSIS — Z01.810 ENCOUNTER FOR PREPROCEDURAL CARDIOVASCULAR EXAMINATION: ICD-10-CM

## 2020-02-12 PROCEDURE — 74011250636 HC RX REV CODE- 250/636: Performed by: NURSE PRACTITIONER

## 2020-02-12 PROCEDURE — 77030012965 HC NDL HUBR BBMI -A

## 2020-02-12 PROCEDURE — 74011000250 HC RX REV CODE- 250: Performed by: NURSE PRACTITIONER

## 2020-02-12 PROCEDURE — 96417 CHEMO IV INFUS EACH ADDL SEQ: CPT

## 2020-02-12 PROCEDURE — 74011000258 HC RX REV CODE- 258: Performed by: NURSE PRACTITIONER

## 2020-02-12 PROCEDURE — 96413 CHEMO IV INFUSION 1 HR: CPT

## 2020-02-12 RX ORDER — SODIUM CHLORIDE 0.9 % (FLUSH) 0.9 %
10 SYRINGE (ML) INJECTION AS NEEDED
Status: DISPENSED | OUTPATIENT
Start: 2020-02-12 | End: 2020-02-12

## 2020-02-12 RX ORDER — HEPARIN 100 UNIT/ML
300-500 SYRINGE INTRAVENOUS AS NEEDED
Status: ACTIVE | OUTPATIENT
Start: 2020-02-12 | End: 2020-02-12

## 2020-02-12 RX ORDER — SODIUM CHLORIDE 9 MG/ML
25 INJECTION, SOLUTION INTRAVENOUS CONTINUOUS
Status: DISPENSED | OUTPATIENT
Start: 2020-02-12 | End: 2020-02-12

## 2020-02-12 RX ORDER — SODIUM CHLORIDE 9 MG/ML
10 INJECTION INTRAMUSCULAR; INTRAVENOUS; SUBCUTANEOUS AS NEEDED
Status: ACTIVE | OUTPATIENT
Start: 2020-02-12 | End: 2020-02-12

## 2020-02-12 RX ADMIN — Medication 10 ML: at 09:11

## 2020-02-12 RX ADMIN — SODIUM CHLORIDE 10 ML: 9 INJECTION, SOLUTION INTRAMUSCULAR; INTRAVENOUS; SUBCUTANEOUS at 09:11

## 2020-02-12 RX ADMIN — TRASTUZUMAB 446 MG: 150 INJECTION, POWDER, LYOPHILIZED, FOR SOLUTION INTRAVENOUS at 10:54

## 2020-02-12 RX ADMIN — PERTUZUMAB 420 MG: 30 INJECTION, SOLUTION, CONCENTRATE INTRAVENOUS at 11:30

## 2020-02-12 RX ADMIN — Medication 10 ML: at 12:03

## 2020-02-12 RX ADMIN — SODIUM CHLORIDE 25 ML/HR: 900 INJECTION, SOLUTION INTRAVENOUS at 10:49

## 2020-02-12 RX ADMIN — Medication 500 UNITS: at 12:03

## 2020-02-12 NOTE — PROGRESS NOTES
[facility] is a 66 y.o. female Follow up for the Evaluation for Breast Cancer. 1. Have you been to the ER, urgent care clinic since your last visit? Hospitalized since your last visit? No    2. Have you seen or consulted any other health care providers outside of the [address] [place] [address] since your last visit? Include any pap smears or colon screening.  No

## 2020-02-12 NOTE — PROGRESS NOTES
[facility] [place] [facility] [place] [address]  [address] [facility], [address] [facility] 100[address] [facility]  Chuy Hoop: [phone]  F: [phone]      Reason for Visit:   Charu Davison is a 66 y.o. female who is seen in follow up for evaluation of therapy for breast cancer. Treatment History:   · 2/15/19 Left core bx:   IDC, gr 2, 1.1 cm, ER + at 100%, AR + at 20%, HER 2 POSITIVE (IHC 2+; FISH ratio 3.9; sig/cell 9.2)  · 3/1/19 Left ax LN core bx:  + for breast cancer, ER + at 100%, AR negative, HER 2 POSITIVE (IHC 2+, FISH ratio 2; sig/cell 5.8)  · TCH-P 3/6/19-7/10/19  · #2 delayed due to diverticulitis abscess and surgery 4/17/19  · 8/5/19 Bilateral Mastecotomy: Right breast: lobular carcinomna in situ, fibrocystic changes including atypical and usual ductal hyperplasia, sclerosing adenosis apocrine metaplasia, microcalcification. Left breast: No evidence of residual invasive carcinoma or carcinoma in situ, no DCIS, 0/9 LNs; ypT0 yp N0 cM0, pCR  · Outback HP 8/28/19-  · S/p XRT 11/27/19  · Anastrozole 11/2019-    History of Present Illness:   Pt noticed the left breast mass herself in Feb 2019, leading to the pathology above    She was seen in ED on 3/8/19 for abdominal pain and constipation CT abd/pelvis showed diverticulitis and received levaquin and flagyl. KUB also showed nonobstructive bowel gas pattern. Reports last good BM was 3/6 prior to chemo. She states on 3/10 and 3/12 had small watery BM. Sent to ED from office on 3/12/19 for worsening abdominal pain. She was found to have diverticulitis with multiple abscesses. S/p IV abx and perc drain with no relief of her pain, therefore proceeded with open sigmoidectomy and end colostomy on 3/15/19. She was discharged to SNF with IV abx until 3/29/19. Interval history:  In today for follow up on therapy.  Complains of gr 1 fatigue, gr 1 neuropathy    FH:  Paternal aunt breast cancer in her [de-identified]; no ovarian cancer, no prostate or pancreas cancer    Past Medical History:   Diagnosis Date    Adverse effect of anesthesia     \"difficulty waking up from anesthethia\"    Cancer ([address] 02/2019    left breast. current chemo patient    Depression     in past    GERD (gastroesophageal reflux disease)     High cholesterol     Hypertension       Past Surgical History:   Procedure Laterality Date    HX BREAST BIOPSY Right years ago    neg; needle bx    HX BREAST BIOPSY Left 02/15/2019    IDC    HX CATARACT REMOVAL      2013 (R), 2016(L)    HX COLONOSCOPY      HX COLOSTOMY Left 03/2019    HX MASTECTOMY Bilateral 8/5/2019    BILATERAL BREAST MASTECTOMIES AND LEFT BREAST SENTINEL NODE BIOPSY WITH BLUE DYE performed by Mitali Ferrari MD at [address] HX ORTHOPAEDIC      carpal tunnel    HX VASCULAR ACCESS Right 02/2019    portacath    IR CHANGE DRAIN  ABCESS PERC  03/2019      Social History     Tobacco Use    Smoking status: Current Every Day Smoker     Packs/day: 0.25     Years: 55.00     Pack years: 13.75    Smokeless tobacco: Never Used    Tobacco comment: 1-2 cigs/day   Substance Use Topics    Alcohol use: Yes     Alcohol/week: 1.0 standard drinks     Types: 1 Shots of liquor per week     Drinks per session: 5 or 6     Binge frequency: Weekly     Comment: Patient stated that she puts a splash of bourbon in her afternoon coffee around 5-6 days per week      Family History   Problem Relation Age of Onset    Breast Cancer Maternal Aunt         [de-identified]    Hypertension Mother     Hypertension Father     Cancer Father         Lung    Cancer Brother         leukemia    Diabetes Brother         2 brothers with diabetes    Hypertension Sister         2 sisters and 4 brothers with HTN     Current Outpatient Medications   Medication Sig    lidocaine-prilocaine (EMLA) topical cream APPLY  CREAM TOPICALLY TO AFFECTED AREA AS NEEDED FOR PAIN    anastrozole (ARIMIDEX) 1 mg tablet Take 1 mg by mouth daily.     acetaminophen (TYLENOL EXTRA STRENGTH) 500 mg tablet Take 1,000 mg by mouth every six (6) hours as needed for Pain.  famotidine (PEPCID AC) 20 mg tablet Take 20 mg by mouth daily.  cyanocobalamin/mecobalamin (CYANOCOBALAMIN-METHYLCOBALAMIN SL) 5,000 mcg by SubLINGual route daily.  naproxen sodium (ALEVE PO) Take  by mouth two (2) times daily as needed.  losartan-hydroCHLOROthiazide (HYZAAR) 100-25 mg per tablet Take 0.5 Tabs by mouth daily.  pravastatin (PRAVACHOL) 40 mg tablet Take 40 mg by mouth nightly.  STIOLTO RESPIMAT 2.5-2.5 mcg/actuation inhaler Take 2 Puffs by inhalation nightly.  venlafaxine (EFFEXOR) 50 mg tablet Take 50 mg by mouth daily.  cholecalciferol (VITAMIN D3) 1,000 unit cap Take 2,000 Units by mouth daily.  calcium-cholecalciferol, D3, (CALTRATE 600+D) tablet Take 1 Tab by mouth nightly.  aspirin delayed-release 81 mg tablet Take  by mouth every other day.  neomycin-polymyxin-hydrocortisone, buffered, (PEDIOTIC) 3.5-10,000-1 mg/mL-unit/mL-% otic suspension Place 3 drops at end of toenails and fingernails (3) times daily. No current facility-administered medications for this visit. Facility-Administered Medications Ordered in Other Visits   Medication Dose Route Frequency    saline peripheral flush soln 10 mL  10 mL InterCATHeter PRN    0.9% sodium chloride injection 10 mL  10 mL IntraVENous PRN    heparin (porcine) pf 300-500 Units  300-500 Units InterCATHeter PRN      Allergies   Allergen Reactions    Keflex [Cephalexin] Hives    Penicillins Rash    Other Medication Itching     CHLORHEXADINE ( wipes caused moderate to severe itching in preop 8/5/19)        Review of Systems: A complete review of systems was obtained, negative except as described above.     Physical Exam:     Visit Vitals  /56 (BP 1 Location: Left arm, BP Patient Position: Sitting)   Pulse 96   Temp 97.5 °F (36.4 °C) (Temporal)   Resp 18   Ht 5' 6\" (1.676 m)   Wt 159 lb (72.1 kg)   SpO2 96%   BMI 25.66 kg/m²     ECOG PS: 0  General: No distress  Eyes: PERRLA, anicteric sclerae. HENT: Atraumatic, OP clear  Neck: Supple  Lymphatic: No cervical, supraclavicular adenopathy  Respiratory: clear to auscultation bilaterally  CV: Normal rate, regular rhythm, no murmurs, no peripheral edema  GI: Soft, tender, distended, no masses, +BS, ostomy in place  MS:  Digits without clubbing or cyanosis. Skin: No rashes, ecchymoses, or petechiae. Normal temperature, turgor, and texture. Psych: Alert, oriented, appropriate affect, normal judgment/insight  Breasts:  Bilateral mastectomies. Wounds have healed    Results:     Lab Results   Component Value Date/Time    WBC 11.3 (H) 07/10/2019 07:57 AM    HGB 11.8 07/10/2019 07:57 AM    HCT 34.5 (L) 07/10/2019 07:57 AM    PLATELET 381 70/06/1006 07:57 AM    MCV 96.6 07/10/2019 07:57 AM    ABS. NEUTROPHILS 8.0 07/10/2019 07:57 AM     Lab Results   Component Value Date/Time    Sodium 138 07/10/2019 07:57 AM    Potassium 3.6 08/05/2019 06:45 AM    Chloride 100 07/10/2019 07:57 AM    CO2 32 07/10/2019 07:57 AM    Glucose 107 (H) 07/10/2019 07:57 AM    BUN 13 07/10/2019 07:57 AM    Creatinine 0.76 07/10/2019 07:57 AM    GFR est AA >60 07/10/2019 07:57 AM    GFR est non-AA >60 07/10/2019 07:57 AM    Calcium 9.9 07/10/2019 07:57 AM    Glucose (POC) 93 03/19/2019 06:56 AM     Lab Results   Component Value Date/Time    Bilirubin, total 0.4 07/10/2019 07:57 AM    ALT (SGPT) 17 07/10/2019 07:57 AM    AST (SGOT) 13 (L) 07/10/2019 07:57 AM    Alk. phosphatase 83 07/10/2019 07:57 AM    Protein, total 6.6 07/10/2019 07:57 AM    Albumin 3.5 07/10/2019 07:57 AM    Globulin 3.1 07/10/2019 07:57 AM       2/12/19 mammogram and US  MAMMOGRAPHY:  The breast demonstrates stable scattered density breast  architecture.   Underlying the site of clinical concern in the left breast is  masslike focal asymmetry in the anterior upper left breast. There is no other  dominant mass lesion, architectural distortion, asymmetry, or calcification. There is no skin thickening or nipple retraction.     ULTRASOUND:  Corresponding to the mammographic density there is a 2.4 x 3.1 x  3.7 cm hypoechoic lobulated mass at about the 12:00 position and 3 cm radial  distance from the nipple. No other suspicious cystic or solid lesions  Identified. Dr. Haim Stephens has identified 2 large, suspicious ax LN on US:  1.7 cm and 1.2 cm    5/16/19 Left mammo and US:  The breast parenchyma is heterogeneously dense. Left breast biopsy clip is  unchanged in position. The central upper left breast mass seen on prior MR exam  dated February 2019 has decreased in size.     Targeted sonographic evaluation of the 12:00 position of the left breast is  performed. Within the 12:00 position of the left breast, there is a 2.1 cm x 1.3  cm x 3.1 cm irregular hypoechoic mass containing calcifications, decreased in  size and measuring 3.8 cm x 2.4 cm x 3.1 cm on prior ultrasound dated February 2018.     IMPRESSION: BI-RADS 6. Pathology proven left breast cancer. Interval decrease in size of 12:00 left breast mass, now measuring 2.1 cm x 1.3 cm x 3.1 cm. Findings were discussed with the patient at the time of interpretation.     11/4/19 dexa  Femoral Neck Left:  Bone mineral density (gm/cm2): 0.904  % of peak bone mass: 87  % for age matched controls: 116  T-score: -1.0  Z-score: 0.9     Femoral Neck Right:  Bone mineral density (gm/cm2): 0.866  % of peak bone mass: 83  % for age matched controls:  111  T-score: -1.2  Z-score: 0.6     Total Hip Left:  Bone mineral density (gm/cm2): 1.000  % of peak bone mass: 99  % for age matched controls: 126  T-score: -0.1  Z-score: 1.6     Total Hip Right:  Bone mineral density (gm/cm2): 0.935  % of peak bone mass: 93  % for age matched controls:  118  T-score: -0.6  Z-score: 1.1     Lumbar Spine: L1-L3  Bone mineral density (gm/cm2): 1.440  % of peak bone mass: 122  % for age matched controls: 145  T-score: 2.1  Z-score: 3.7     33% Radius Left:  Bone mineral density (gm/cm2): 0.807  % of peak bone mass: 92  % for age matched controls:  80  T-score: -0.8  Z-score: 1.6    Impression: This patient is osteopenic using the World Health Organization criteria  10 year probability of major osteoporotic fracture: 11.7%  10 year probability of hip fracture: 3.5%  Records reviewed and summarized above. Assessment/plan:   1. Left central IDC, gr 2, ER +, WI +, HER 2 positive:  cT2 cN1a cM0, stage IIB, prognostic stage IB; ypT0 ypN0 cM0    We explained to the patient that the goal of systemic adjuvant therapy is to improve the chances for cure and decrease the risk of relapse. We explained why a patient can have microscopic cancer spread now even though physical examination, laboratory studies and imaging studies are negative for cancer. We explained that the same treatments used now as adjuvant or preventive treatments rarely if ever are curative in women who develop metastases. TTE 3/6/19 EF 72%, TTE 6/4/19 EF 66%, TTE 9/3/19, EF 66%; TTE 12/6/2019 with EF 65%, next ordered for first week of march    [facility] HP #9 today. Will see her back in 6 weeks for #11/11. RU, continue anastrozole    2. Emotional well being:  She has excellent support and is coping well with her disease    3. tob use:  Smokes <1/2ppd; counseled to quit    4. Fatigue: Appetite continues to improve. Energy level has been improving. 5. GERD: due to doxycycline. This has resolved with decreased dose to daily as well as continuing Pepcid daily. 6. 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 Started after Cycle 3. Does not wish to start any medications at this time. Patient will let us know if this worsens. 7. Decreased EF:  EF dropped from 72% to 66% and global strain dropped from 18% to 15.7% between 3/2019 to 6/2019, however per Dr. Suzette Cowan, visually there is no significant change. Recommend close follow up and Troponin I level.   Trop on 6/19/19 was normal. TTE on 9/3/19 showed no significant change in overall LV systolic function. Latest TTE on 12/6/2019 with stable EF. 8. Alopecia:  May be due to taxotere, will monitor      I appreciate the opportunity to participate in Ms. Cody Krause's care. Signed By: Jasmyn Grace MD      No orders of the defined types were placed in this encounter.

## 2020-02-12 NOTE — PROGRESS NOTES
Spiritual Care Partner Volunteer visited patient in [facility] on 2/12/20. Documented by:  Angela Sims.      Paging Service: [phone])

## 2020-02-12 NOTE — PROGRESS NOTES
\[facility] [place] [facility]\"" Progress Note    Date: 2020    Name: Juve Sanchez    MRN: [id number]         : 1945    Ms. Krause Arrived ambulatory and in no distress for C15 of Herceptin/Perjeta Regimen. Assessment was completed, no acute issues at this time, no new complaints voiced. Right chest wall port accessed without difficulty, + blood return. Patient proceed to appointment with Dr. Edd Darling. MsDayana Gallardo Adrian vitals were reviewed. Patient Vitals for the past 12 hrs:   Temp Pulse Resp BP SpO2   20 0904 97.5 °F (36.4 °C) 96 18 123/56 96 %     Echo 19. EF 65% and within treatment parameters. Medications:  Herceptin IV  Perjeta IV    Ms. Krause tolerated treatment well and was discharged from [place] [address] in stable condition. Port de-accessed, flushed & heparinized per protocol. She is to return on 3/4/20 at 9:00 for her next appointment.     Royer Fagan RN  2020

## 2020-02-13 ENCOUNTER — TELEPHONE (OUTPATIENT)
Dept: CARDIOLOGY CLINIC | Age: 75
End: 2020-02-13

## 2020-02-13 NOTE — TELEPHONE ENCOUNTER
Dena-    Please call and schedule patient for Onc echo in the first week of March. Dx: Cardiotoxic therapy    Thank you!   Alyx Campbell Pt stated he would talk to his surgeon to see what he needs to do.

## 2020-02-17 ENCOUNTER — HOSPITAL ENCOUNTER (OUTPATIENT)
Dept: RADIATION THERAPY | Age: 75
Discharge: HOME | End: 2020-02-17

## 2020-03-04 ENCOUNTER — TELEPHONE (OUTPATIENT)
Dept: ONCOLOGY | Age: 75
End: 2020-03-04

## 2020-03-04 ENCOUNTER — HOSPITAL ENCOUNTER (OUTPATIENT)
Dept: INFUSION THERAPY | Age: 75
Discharge: HOME | End: 2020-03-04
Payer: MEDICARE

## 2020-03-04 VITALS
SYSTOLIC BLOOD PRESSURE: 111 MMHG | RESPIRATION RATE: 18 BRPM | TEMPERATURE: 97.9 F | OXYGEN SATURATION: 95 % | BODY MASS INDEX: 25.49 KG/M2 | HEART RATE: 88 BPM | HEIGHT: 66 IN | WEIGHT: 158.6 LBS | DIASTOLIC BLOOD PRESSURE: 63 MMHG

## 2020-03-04 DIAGNOSIS — Z17.0 MALIGNANT NEOPLASM OF CENTRAL PORTION OF LEFT BREAST IN FEMALE, ESTROGEN RECEPTOR POSITIVE (HCC): Primary | ICD-10-CM

## 2020-03-04 DIAGNOSIS — C50.112 MALIGNANT NEOPLASM OF CENTRAL PORTION OF LEFT BREAST IN FEMALE, ESTROGEN RECEPTOR POSITIVE (HCC): Primary | ICD-10-CM

## 2020-03-04 PROCEDURE — 74011250636 HC RX REV CODE- 250/636: Performed by: NURSE PRACTITIONER

## 2020-03-04 PROCEDURE — 96417 CHEMO IV INFUS EACH ADDL SEQ: CPT

## 2020-03-04 PROCEDURE — 96413 CHEMO IV INFUSION 1 HR: CPT

## 2020-03-04 PROCEDURE — 77030012965 HC NDL HUBR BBMI -A

## 2020-03-04 RX ORDER — SODIUM CHLORIDE 9 MG/ML
25 INJECTION, SOLUTION INTRAVENOUS CONTINUOUS
Status: DISPENSED | OUTPATIENT
Start: 2020-03-04 | End: 2020-03-04

## 2020-03-04 RX ORDER — SODIUM CHLORIDE 0.9 % (FLUSH) 0.9 %
10 SYRINGE (ML) INJECTION AS NEEDED
Status: DISPENSED | OUTPATIENT
Start: 2020-03-04 | End: 2020-03-04

## 2020-03-04 RX ORDER — HEPARIN 100 UNIT/ML
300-500 SYRINGE INTRAVENOUS AS NEEDED
Status: ACTIVE | OUTPATIENT
Start: 2020-03-04 | End: 2020-03-04

## 2020-03-04 RX ORDER — SODIUM CHLORIDE 9 MG/ML
10 INJECTION INTRAMUSCULAR; INTRAVENOUS; SUBCUTANEOUS AS NEEDED
Status: ACTIVE | OUTPATIENT
Start: 2020-03-04 | End: 2020-03-04

## 2020-03-04 RX ADMIN — HEPARIN 500 UNITS: 100 SYRINGE at 11:57

## 2020-03-04 RX ADMIN — PERTUZUMAB 420 MG: 30 INJECTION, SOLUTION, CONCENTRATE INTRAVENOUS at 11:25

## 2020-03-04 RX ADMIN — SODIUM CHLORIDE 25 ML/HR: 900 INJECTION, SOLUTION INTRAVENOUS at 10:00

## 2020-03-04 RX ADMIN — Medication 10 ML: at 11:57

## 2020-03-04 RX ADMIN — TRASTUZUMAB 446 MG: 150 INJECTION, POWDER, LYOPHILIZED, FOR SOLUTION INTRAVENOUS at 10:41

## 2020-03-04 NOTE — TELEPHONE ENCOUNTER
For this patient, she was requesting Mupirocin Ointment for the Scratches and sores on her arm due to her new puppy. Just need to know the direction's, Camial Bailey. And if any Refills for this please?

## 2020-03-04 NOTE — PROGRESS NOTES
[facility] Progress Note    Date: 2020    Name: Xenia Crandall    MRN: [id number]         : 1945    Ms. Krause Arrived ambulatory and in no distress for C16D1 of Herceptin/Perjeta Regimen. Assessment was completed. Patient complaining of scratches/sores on right arm from dog, pt specifically requesting a prescription for Mupiricin. MDs office aware and orders received. Right chest wall port accessed without difficulty, labs drawn & sent for processing. Chemotherapy Flowsheet 3/4/2020   Cycle C16   Date 3/4/2020   Drug / Regimen HP   Pre Meds -   Notes given       Ms. Krause's vitals were reviewed. Visit Vitals  /63   Pulse 88   Temp 97.9 °F (36.6 °C)   Resp 18   Ht 5' 6\" (1.676 m)   Wt 71.9 kg (158 lb 9.6 oz)   SpO2 95%   BMI 25.60 kg/m²       Medications:  Medications Administered     0.9% sodium chloride infusion     Admin Date  2020 Action  New Bag Dose  25 mL/hr Rate  25 mL/hr Route  IntraVENous Administered By  Eboyn Victoria RN          heparin (porcine) pf 300-500 Units     Admin Date  2020 Action  Given Dose  500 Units Route  InterCATHeter Administered By  Eboyn Victoria RN          pertuzumab (PERJETA) 420 mg in 0.9% sodium chloride 250 mL, overfill volume 25 mL IVPB     Admin Date  2020 Action  New Bag Dose  420 mg Rate  578 mL/hr Route  IntraVENous Administered By  Niurka Robin          saline peripheral flush soln 10 mL     Admin Date  2020 Action  Given Dose  10 mL Route  InterCATHeter Administered By  Ebony Victoria RN          trastuzumab (HERCEPTIN) 446 mg in 0.9% sodium chloride 250 mL, overfill volume 25 mL IVPB     Admin Date  2020 Action  New Bag Dose  446 mg Rate  592 mL/hr Route  IntraVENous Administered By  Ebony Victoria RN                Ms. Disha Meredith tolerated treatment well and was discharged from [place] [address] in stable condition. Port de-accessed, flushed & heparinized per protocol.  Pt aware of next appointment.      Eda Herron RN  March 4, 2020

## 2020-03-05 RX ORDER — MUPIROCIN 20 MG/G
OINTMENT TOPICAL DAILY
Qty: 22 G | Refills: 0 | Status: SHIPPED | OUTPATIENT
Start: 2020-03-05 | End: 2020-09-30

## 2020-03-25 ENCOUNTER — HOSPITAL ENCOUNTER (OUTPATIENT)
Dept: INFUSION THERAPY | Age: 75
Discharge: HOME | End: 2020-03-25
Payer: MEDICARE

## 2020-03-25 ENCOUNTER — OFFICE VISIT (OUTPATIENT)
Dept: ONCOLOGY | Age: 75
End: 2020-03-25

## 2020-03-25 VITALS
WEIGHT: 157 LBS | TEMPERATURE: 97.6 F | HEIGHT: 66 IN | RESPIRATION RATE: 16 BRPM | BODY MASS INDEX: 25.23 KG/M2 | DIASTOLIC BLOOD PRESSURE: 51 MMHG | HEART RATE: 78 BPM | SYSTOLIC BLOOD PRESSURE: 107 MMHG | OXYGEN SATURATION: 95 %

## 2020-03-25 VITALS
HEART RATE: 55 BPM | BODY MASS INDEX: 25.25 KG/M2 | HEIGHT: 66 IN | TEMPERATURE: 97 F | SYSTOLIC BLOOD PRESSURE: 123 MMHG | WEIGHT: 157.1 LBS | DIASTOLIC BLOOD PRESSURE: 61 MMHG | OXYGEN SATURATION: 95 % | RESPIRATION RATE: 15 BRPM

## 2020-03-25 DIAGNOSIS — G62.9 NEUROPATHY: ICD-10-CM

## 2020-03-25 DIAGNOSIS — C50.112 MALIGNANT NEOPLASM OF CENTRAL PORTION OF LEFT BREAST IN FEMALE, ESTROGEN RECEPTOR POSITIVE (HCC): Primary | ICD-10-CM

## 2020-03-25 DIAGNOSIS — Z17.0 MALIGNANT NEOPLASM OF CENTRAL PORTION OF LEFT BREAST IN FEMALE, ESTROGEN RECEPTOR POSITIVE (HCC): Primary | ICD-10-CM

## 2020-03-25 DIAGNOSIS — Z51.81 ENCOUNTER FOR MONITORING CARDIOTOXIC DRUG THERAPY: ICD-10-CM

## 2020-03-25 DIAGNOSIS — Z79.899 ENCOUNTER FOR MONITORING CARDIOTOXIC DRUG THERAPY: ICD-10-CM

## 2020-03-25 PROCEDURE — 96413 CHEMO IV INFUSION 1 HR: CPT

## 2020-03-25 PROCEDURE — 77030012965 HC NDL HUBR BBMI -A

## 2020-03-25 PROCEDURE — 96417 CHEMO IV INFUS EACH ADDL SEQ: CPT

## 2020-03-25 PROCEDURE — 74011250636 HC RX REV CODE- 250/636: Performed by: NURSE PRACTITIONER

## 2020-03-25 RX ORDER — SODIUM CHLORIDE 9 MG/ML
10 INJECTION INTRAMUSCULAR; INTRAVENOUS; SUBCUTANEOUS AS NEEDED
Status: ACTIVE | OUTPATIENT
Start: 2020-03-25 | End: 2020-03-25

## 2020-03-25 RX ORDER — SODIUM CHLORIDE 9 MG/ML
25 INJECTION, SOLUTION INTRAVENOUS CONTINUOUS
Status: DISPENSED | OUTPATIENT
Start: 2020-03-25 | End: 2020-03-25

## 2020-03-25 RX ORDER — SODIUM CHLORIDE 0.9 % (FLUSH) 0.9 %
10 SYRINGE (ML) INJECTION AS NEEDED
Status: DISPENSED | OUTPATIENT
Start: 2020-03-25 | End: 2020-03-25

## 2020-03-25 RX ORDER — HEPARIN 100 UNIT/ML
300-500 SYRINGE INTRAVENOUS AS NEEDED
Status: ACTIVE | OUTPATIENT
Start: 2020-03-25 | End: 2020-03-25

## 2020-03-25 RX ADMIN — Medication 10 ML: at 12:25

## 2020-03-25 RX ADMIN — TRASTUZUMAB 446 MG: 150 INJECTION, POWDER, LYOPHILIZED, FOR SOLUTION INTRAVENOUS at 11:15

## 2020-03-25 RX ADMIN — PERTUZUMAB 420 MG: 30 INJECTION, SOLUTION, CONCENTRATE INTRAVENOUS at 11:50

## 2020-03-25 RX ADMIN — SODIUM CHLORIDE 25 ML/HR: 900 INJECTION, SOLUTION INTRAVENOUS at 11:11

## 2020-03-25 RX ADMIN — Medication 500 UNITS: at 12:25

## 2020-03-25 NOTE — PROGRESS NOTES
[facility] Progress Note    Date: 2020    Name: Shiloh Barriga    MRN: [id number]         : 1945    Ms. Krause Arrived ambulatory and in no distress for C17D1 of Herceptin/Perjeta Regimen. Assessment was completed, no acute issues at this time, no new complaints voiced. Right chest wall port accessed without difficulty, + blood. Patient proceed to appointment with Dr. Sherley Bermeo. Ms. Santhosh Carson vitals were reviewed. Visit Vitals  /51   Pulse 78   Temp 97.6 °F (36.4 °C)   Resp 16   Ht 5' 6\" (1.676 m)   Wt 71.3 kg (157 lb 1.6 oz)   SpO2 95%   Breastfeeding No   BMI 25.36 kg/m²       Echo reviewed from 20. EF 65% and within treatment parameters. Medications:  Herceptin IV  Perjeta IV      Ms. Krause tolerated treatment well and was discharged from [place] [address] in stable condition. Port de-accessed, flushed & heparinized per protocol. Pt has completed treatment. No more [facility] appts needed at this times, pt to follow up with MD in 6 months.     Ruthann Silva RN  2020

## 2020-03-25 NOTE — PROGRESS NOTES
[facility] [place] [address]  [address] [facility], [address] [facility] 100[address] [facility]  Anne Mateo: [phone]  F: [phone]      Reason for Visit:   Ruth Watts is a 66 y.o. female who is seen in follow up for evaluation of therapy for breast cancer. Treatment History:   · 2/15/19 Left core bx:   IDC, gr 2, 1.1 cm, ER + at 100%, UT + at 20%, HER 2 POSITIVE (IHC 2+; FISH ratio 3.9; sig/cell 9.2)  · 3/1/19 Left ax LN core bx:  + for breast cancer, ER + at 100%, UT negative, HER 2 POSITIVE (IHC 2+, FISH ratio 2; sig/cell 5.8)  · TCH-P 3/6/19-7/10/19  · #2 delayed due to diverticulitis abscess and surgery 4/17/19  · 8/5/19 Bilateral Mastecotomy: Right breast: lobular carcinomna in situ, fibrocystic changes including atypical and usual ductal hyperplasia, sclerosing adenosis apocrine metaplasia, microcalcification. Left breast: No evidence of residual invasive carcinoma or carcinoma in situ, no DCIS, 0/9 LNs; ypT0 yp N0 cM0, pCR  · [facility] HP 8/28/19-3/25/2020  · S/p XRT 11/27/19  · Anastrozole 11/2019-3/25/20 (hand stiffness)    History of Present Illness:   Pt noticed the left breast mass herself in Feb 2019, leading to the pathology above    She was seen in ED on 3/8/19 for abdominal pain and constipation CT abd/pelvis showed diverticulitis and received levaquin and flagyl. KUB also showed nonobstructive bowel gas pattern. Reports last good BM was 3/6 prior to chemo. She states on 3/10 and 3/12 had small watery BM. Sent to ED from office on 3/12/19 for worsening abdominal pain. She was found to have diverticulitis with multiple abscesses. S/p IV abx and perc drain with no relief of her pain, therefore proceeded with open sigmoidectomy and end colostomy on 3/15/19. She was discharged to SNF with IV abx until 3/29/19. Interval history:  In today for follow up on therapy. Complains of gr 1 fatigue, gr 2 hair loss, gr 1 hot flashes, gr 2 insomnia, gr 1 neuropathy.     Complains of L hand severe stiffness. FH:  Paternal aunt breast cancer in her [de-identified]; no ovarian cancer, no prostate or pancreas cancer    Past Medical History:   Diagnosis Date    Adverse effect of anesthesia     \"difficulty waking up from anesthethia\"    Cancer ([address] 02/2019    left breast. current chemo patient    Depression     in past    GERD (gastroesophageal reflux disease)     High cholesterol     Hypertension       Past Surgical History:   Procedure Laterality Date    HX BREAST BIOPSY Right years ago    neg; needle bx    HX BREAST BIOPSY Left 02/15/2019    IDC    HX CATARACT REMOVAL      2013 (R), 2016(L)    HX COLONOSCOPY      HX COLOSTOMY Left 03/2019    HX MASTECTOMY Bilateral 8/5/2019    BILATERAL BREAST MASTECTOMIES AND LEFT BREAST SENTINEL NODE BIOPSY WITH BLUE DYE performed by Sam Forrester MD at [address] HX ORTHOPAEDIC      carpal tunnel    HX VASCULAR ACCESS Right 02/2019    portacath    IR CHANGE DRAIN  ABCESS PERC  03/2019      Social History     Tobacco Use    Smoking status: Current Every Day Smoker     Packs/day: 0.25     Years: 55.00     Pack years: 13.75    Smokeless tobacco: Never Used    Tobacco comment: 1-2 cigs/day   Substance Use Topics    Alcohol use: Yes     Alcohol/week: 1.0 standard drinks     Types: 1 Shots of liquor per week     Drinks per session: 5 or 6     Binge frequency: Weekly     Comment: Patient stated that she puts a splash of bourbon in her afternoon coffee around 5-6 days per week      Family History   Problem Relation Age of Onset    Breast Cancer Maternal Aunt         [de-identified]    Hypertension Mother     Hypertension Father     Cancer Father         Lung    Cancer Brother         leukemia    Diabetes Brother         2 brothers with diabetes    Hypertension Sister         2 sisters and 4 brothers with HTN     Current Outpatient Medications   Medication Sig    mupirocin (BACTROBAN) 2 % ointment Apply  to affected area daily.     lidocaine-prilocaine (EMLA) topical cream APPLY  CREAM TOPICALLY TO AFFECTED AREA AS NEEDED FOR PAIN    neomycin-polymyxin-hydrocortisone, buffered, (PEDIOTIC) 3.5-10,000-1 mg/mL-unit/mL-% otic suspension Place 3 drops at end of toenails and fingernails (3) times daily.  anastrozole (ARIMIDEX) 1 mg tablet Take 1 mg by mouth daily.  acetaminophen (TYLENOL EXTRA STRENGTH) 500 mg tablet Take 1,000 mg by mouth every six (6) hours as needed for Pain.  famotidine (PEPCID AC) 20 mg tablet Take 20 mg by mouth daily.  cyanocobalamin/mecobalamin (CYANOCOBALAMIN-METHYLCOBALAMIN SL) 5,000 mcg by SubLINGual route daily.  naproxen sodium (ALEVE PO) Take  by mouth two (2) times daily as needed.  losartan-hydroCHLOROthiazide (HYZAAR) 100-25 mg per tablet Take 0.5 Tabs by mouth daily.  pravastatin (PRAVACHOL) 40 mg tablet Take 40 mg by mouth nightly.  STIOLTO RESPIMAT 2.5-2.5 mcg/actuation inhaler Take 2 Puffs by inhalation nightly.  venlafaxine (EFFEXOR) 50 mg tablet Take 50 mg by mouth daily.  cholecalciferol (VITAMIN D3) 1,000 unit cap Take 2,000 Units by mouth daily.  calcium-cholecalciferol, D3, (CALTRATE 600+D) tablet Take 1 Tab by mouth nightly.  aspirin delayed-release 81 mg tablet Take  by mouth every other day. No current facility-administered medications for this visit. Facility-Administered Medications Ordered in Other Visits   Medication Dose Route Frequency    saline peripheral flush soln 10 mL  10 mL InterCATHeter PRN    0.9% sodium chloride injection 10 mL  10 mL IntraVENous PRN    heparin (porcine) pf 300-500 Units  300-500 Units InterCATHeter PRN      Allergies   Allergen Reactions    Keflex [Cephalexin] Hives    Penicillins Rash    Other Medication Itching     CHLORHEXADINE ( wipes caused moderate to severe itching in preop 8/5/19)        Review of Systems: A complete review of systems was obtained, negative except as described above.     Physical Exam:     Visit Vitals  /51 (BP 1 Location: Left arm, BP Patient Position: Sitting)   Pulse 78   Temp 97.6 °F (36.4 °C) (Temporal)   Resp 16   Ht 5' 6\" (1.676 m)   Wt 157 lb (71.2 kg)   SpO2 95%   BMI 25.34 kg/m²     ECOG PS: 0  General: No distress  Eyes: PERRLA, anicteric sclerae. HENT: Atraumatic, OP clear  Neck: Supple  Lymphatic: No cervical, supraclavicular adenopathy  Respiratory: clear to auscultation bilaterally  CV: Normal rate, regular rhythm, no murmurs, no peripheral edema  GI: Soft, tender, distended, no masses, +BS, ostomy in place  MS:  Digits without clubbing or cyanosis. Skin: No rashes, ecchymoses, or petechiae. Normal temperature, turgor, and texture. Psych: Alert, oriented, appropriate affect, normal judgment/insight  Breasts:  Bilateral mastectomies. Wounds have healed    Results:     Lab Results   Component Value Date/Time    WBC 11.3 (H) 07/10/2019 07:57 AM    HGB 11.8 07/10/2019 07:57 AM    HCT 34.5 (L) 07/10/2019 07:57 AM    PLATELET 021 67/51/4993 07:57 AM    MCV 96.6 07/10/2019 07:57 AM    ABS. NEUTROPHILS 8.0 07/10/2019 07:57 AM     Lab Results   Component Value Date/Time    Sodium 138 07/10/2019 07:57 AM    Potassium 3.6 08/05/2019 06:45 AM    Chloride 100 07/10/2019 07:57 AM    CO2 32 07/10/2019 07:57 AM    Glucose 107 (H) 07/10/2019 07:57 AM    BUN 13 07/10/2019 07:57 AM    Creatinine 0.76 07/10/2019 07:57 AM    GFR est AA >60 07/10/2019 07:57 AM    GFR est non-AA >60 07/10/2019 07:57 AM    Calcium 9.9 07/10/2019 07:57 AM    Glucose (POC) 93 03/19/2019 06:56 AM     Lab Results   Component Value Date/Time    Bilirubin, total 0.4 07/10/2019 07:57 AM    ALT (SGPT) 17 07/10/2019 07:57 AM    AST (SGOT) 13 (L) 07/10/2019 07:57 AM    Alk. phosphatase 83 07/10/2019 07:57 AM    Protein, total 6.6 07/10/2019 07:57 AM    Albumin 3.5 07/10/2019 07:57 AM    Globulin 3.1 07/10/2019 07:57 AM       2/12/19 mammogram and US  MAMMOGRAPHY:  The breast demonstrates stable scattered density breast  architecture.   Underlying the site of clinical concern in the left breast is  masslike focal asymmetry in the anterior upper left breast. There is no other  dominant mass lesion, architectural distortion, asymmetry, or calcification. There is no skin thickening or nipple retraction.     ULTRASOUND:  Corresponding to the mammographic density there is a 2.4 x 3.1 x  3.7 cm hypoechoic lobulated mass at about the 12:00 position and 3 cm radial  distance from the nipple. No other suspicious cystic or solid lesions  Identified. Dr. Jesus Mejia has identified 2 large, suspicious ax LN on US:  1.7 cm and 1.2 cm    5/16/19 Left mammo and US:  The breast parenchyma is heterogeneously dense. Left breast biopsy clip is  unchanged in position. The central upper left breast mass seen on prior MR exam  dated February 2019 has decreased in size.     Targeted sonographic evaluation of the 12:00 position of the left breast is  performed. Within the 12:00 position of the left breast, there is a 2.1 cm x 1.3  cm x 3.1 cm irregular hypoechoic mass containing calcifications, decreased in  size and measuring 3.8 cm x 2.4 cm x 3.1 cm on prior ultrasound dated February 2018.     IMPRESSION: BI-RADS 6. Pathology proven left breast cancer. Interval decrease in size of 12:00 left breast mass, now measuring 2.1 cm x 1.3 cm x 3.1 cm. Findings were discussed with the patient at the time of interpretation.     11/4/19 dexa  Femoral Neck Left:  Bone mineral density (gm/cm2): 0.904  % of peak bone mass: 87  % for age matched controls: 116  T-score: -1.0  Z-score: 0.9     Femoral Neck Right:  Bone mineral density (gm/cm2): 0.866  % of peak bone mass: 83  % for age matched controls:  111  T-score: -1.2  Z-score: 0.6     Total Hip Left:  Bone mineral density (gm/cm2): 1.000  % of peak bone mass: 99  % for age matched controls: 126  T-score: -0.1  Z-score: 1.6     Total Hip Right:  Bone mineral density (gm/cm2): 0.935  % of peak bone mass: 93  % for age matched controls:  118  T-score: -0.6  Z-score: 1.1     Lumbar Spine: L1-L3  Bone mineral density (gm/cm2): 1.440  % of peak bone mass: 122  % for age matched controls: 145  T-score: 2.1  Z-score: 3.7     33% Radius Left:  Bone mineral density (gm/cm2): 0.807  % of peak bone mass: 92  % for age matched controls:  80  T-score: -0.8  Z-score: 1.6    Impression: This patient is osteopenic using the World Health Organization criteria  10 year probability of major osteoporotic fracture: 11.7%  10 year probability of hip fracture: 3.5%  Records reviewed and summarized above. Assessment/plan:   1. Left central IDC, gr 2, ER +, AL +, HER 2 positive:  cT2 cN1a cM0, stage IIB, prognostic stage IB; ypT0 ypN0 cM0    We explained to the patient that the goal of systemic adjuvant therapy is to improve the chances for cure and decrease the risk of relapse. We explained why a patient can have microscopic cancer spread now even though physical examination, laboratory studies and imaging studies are negative for cancer. We explained that the same treatments used now as adjuvant or preventive treatments rarely if ever are curative in women who develop metastases. TTE 3/6/19 EF 72%, TTE 6/4/19 EF 66%, TTE 9/3/19, EF 66%; TTE 12/6/2019 with EF 65%, TTE on 2/28/2020 EF 65%. Next due in 6 months, ordered. [facility] HP #11 today. She will discuss with Dr. Lexi Tellez and Dr. Melanie Magaña about colostomy reversal and port removal.     RU, will hold anastrozole as below    2. Emotional well being:  She has excellent support and is coping well with her disease    3. tob use:  Smokes 3-5 cigs/day; counseled to quit. 4. Fatigue: Appetite continues to improve. Energy level has been improving. 5. GERD: due to doxycycline. This has resolved with decreased dose to daily as well as continuing Pepcid daily. 6. 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 Started after Cycle 3. Does not wish to start any medications at this time. Patient will let us know if this worsens. 7. Decreased EF:  EF dropped from 72% to 66% and global strain dropped from 18% to 15.7% between 3/2019 to 6/2019, however per Dr. Felix Figueroa, visually there is no significant change. Recommend close follow up and Troponin I level. Trop on 6/19/19 was normal. TTE on 9/3/19 showed no significant change in overall LV systolic function. TTE on 12/6/2019 with stable EF, TTE on 2/28/2020 EF 65%. 8. Alopecia:  May be due to taxotere, will monitor    9. L hand stiffness:  May be due to anastrozole, will stop for 2 weeks and then may change to exemestane      I appreciate the opportunity to participate in Ms. Aaron Krause's care. Signed By: Eben David MD      No orders of the defined types were placed in this encounter.

## 2020-03-25 NOTE — PROGRESS NOTES
Sophia Mckenzie is a 66 y.o. female Follow up for the Evaluation for Breast Cancer. 1. Have you been to the ER, urgent care clinic since your last visit? Hospitalized since your last visit? No    2. Have you seen or consulted any other health care providers outside of the [address] [place] [address] since your last visit? Include any pap smears or colon screening.  No

## 2020-04-09 ENCOUNTER — TELEPHONE (OUTPATIENT)
Dept: ONCOLOGY | Age: 75
End: 2020-04-09

## 2020-04-09 NOTE — TELEPHONE ENCOUNTER
4/9/2020 6:47 PM: Called patient to see if symptoms improved after stopping anastrozole for two weeks. Patient stated that it hasn't quite been two weeks yet since she stopped the medication and requested that this office call back next Wednesday. RN will set a reminder to call back. 4/16/2020 9:41 AM: Called patient to see how she is doing after stopping anastrozole for two weeks. No answer; left voicemail requesting call back.

## 2020-07-13 DIAGNOSIS — Z17.0 MALIGNANT NEOPLASM OF CENTRAL PORTION OF LEFT BREAST IN FEMALE, ESTROGEN RECEPTOR POSITIVE (HCC): ICD-10-CM

## 2020-07-13 DIAGNOSIS — C50.112 MALIGNANT NEOPLASM OF CENTRAL PORTION OF LEFT BREAST IN FEMALE, ESTROGEN RECEPTOR POSITIVE (HCC): ICD-10-CM

## 2020-07-13 RX ORDER — ANASTROZOLE 1 MG/1
TABLET ORAL
Qty: 90 TAB | Refills: 2 | Status: SHIPPED | OUTPATIENT
Start: 2020-07-13 | End: 2021-01-29

## 2020-08-24 ENCOUNTER — HOSPITAL ENCOUNTER (OUTPATIENT)
Dept: RADIATION THERAPY | Age: 75
Discharge: HOME | End: 2020-08-24

## 2020-09-25 ENCOUNTER — ANCILLARY PROCEDURE (OUTPATIENT)
Dept: CARDIOLOGY CLINIC | Age: 75
End: 2020-09-25
Payer: MEDICARE

## 2020-09-25 ENCOUNTER — TELEPHONE (OUTPATIENT)
Dept: ONCOLOGY | Age: 75
End: 2020-09-25

## 2020-09-25 VITALS
SYSTOLIC BLOOD PRESSURE: 138 MMHG | WEIGHT: 163 LBS | BODY MASS INDEX: 26.2 KG/M2 | DIASTOLIC BLOOD PRESSURE: 80 MMHG | HEIGHT: 66 IN

## 2020-09-25 DIAGNOSIS — Z51.81 ENCOUNTER FOR MONITORING CARDIOTOXIC DRUG THERAPY: ICD-10-CM

## 2020-09-25 DIAGNOSIS — Z17.0 MALIGNANT NEOPLASM OF CENTRAL PORTION OF LEFT BREAST IN FEMALE, ESTROGEN RECEPTOR POSITIVE (HCC): ICD-10-CM

## 2020-09-25 DIAGNOSIS — Z79.899 ENCOUNTER FOR MONITORING CARDIOTOXIC DRUG THERAPY: ICD-10-CM

## 2020-09-25 DIAGNOSIS — C50.112 MALIGNANT NEOPLASM OF CENTRAL PORTION OF LEFT BREAST IN FEMALE, ESTROGEN RECEPTOR POSITIVE (HCC): ICD-10-CM

## 2020-09-25 PROCEDURE — 93306 TTE W/DOPPLER COMPLETE: CPT | Performed by: INTERNAL MEDICINE

## 2020-09-25 NOTE — TELEPHONE ENCOUNTER
LVM for patient about a virtual appointment but need to be scheduled from 2:30pm though 4pm every 15 minutes but do not double book

## 2020-09-26 LAB
ECHO AO ASC DIAM: 2.8 CM
ECHO AO ROOT DIAM: 2.46 CM
ECHO AV AREA PEAK VELOCITY: 2.12 CM2
ECHO AV AREA VTI: 2.2 CM2
ECHO AV AREA/BSA PEAK VELOCITY: 1.2 CM2/M2
ECHO AV AREA/BSA VTI: 1.2 CM2/M2
ECHO AV MEAN GRADIENT: 7.09 MMHG
ECHO AV PEAK GRADIENT: 12.01 MMHG
ECHO AV PEAK VELOCITY: 173.24 CM/S
ECHO AV VTI: 32.57 CM
ECHO LA AREA 4C: 15.72 CM2
ECHO LA MAJOR AXIS: 3.44 CM
ECHO LA MINOR AXIS: 1.88 CM
ECHO LA VOL 2C: 54.91 ML (ref 22–52)
ECHO LA VOL 4C: 37.1 ML (ref 22–52)
ECHO LA VOL BP: 48.92 ML (ref 22–52)
ECHO LA VOL/BSA BIPLANE: 26.68 ML/M2 (ref 16–28)
ECHO LA VOLUME INDEX A2C: 29.95 ML/M2 (ref 16–28)
ECHO LA VOLUME INDEX A4C: 20.23 ML/M2 (ref 16–28)
ECHO LV E' LATERAL VELOCITY: 8.69 CM/S
ECHO LV E' SEPTAL VELOCITY: 8.04 CM/S
ECHO LV EDV A2C: 73.73 ML
ECHO LV EDV A4C: 76.11 ML
ECHO LV EDV BP: 75.7 ML (ref 56–104)
ECHO LV EDV INDEX A4C: 41.5 ML/M2
ECHO LV EDV INDEX BP: 41.3 ML/M2
ECHO LV EDV NDEX A2C: 40.2 ML/M2
ECHO LV EJECTION FRACTION A2C: 58 PERCENT
ECHO LV EJECTION FRACTION A4C: 69 PERCENT
ECHO LV EJECTION FRACTION BIPLANE: 63.1 PERCENT (ref 55–100)
ECHO LV ESV A2C: 31.19 ML
ECHO LV ESV A4C: 23.67 ML
ECHO LV ESV BP: 27.97 ML (ref 19–49)
ECHO LV ESV INDEX A2C: 17 ML/M2
ECHO LV ESV INDEX A4C: 12.9 ML/M2
ECHO LV ESV INDEX BP: 15.3 ML/M2
ECHO LV GLOBAL LONGITUDINAL STRAIN (GLS): 20.5 PERCENT
ECHO LV INTERNAL DIMENSION DIASTOLIC: 4.07 CM (ref 3.9–5.3)
ECHO LV INTERNAL DIMENSION SYSTOLIC: 2.93 CM
ECHO LV IVSD: 1.04 CM (ref 0.6–0.9)
ECHO LV MASS 2D: 137.1 G (ref 67–162)
ECHO LV MASS INDEX 2D: 74.8 G/M2 (ref 43–95)
ECHO LV POSTERIOR WALL DIASTOLIC: 1.03 CM (ref 0.6–0.9)
ECHO LVOT DIAM: 1.95 CM
ECHO LVOT PEAK GRADIENT: 6.04 MMHG
ECHO LVOT PEAK VELOCITY: 122.92 CM/S
ECHO LVOT SV: 71.5 ML
ECHO LVOT VTI: 23.94 CM
ECHO MV A VELOCITY: 101.08 CM/S
ECHO MV AREA PHT: 2.52 CM2
ECHO MV AREA VTI: 3.31 CM2
ECHO MV E DECELERATION TIME (DT): 0.3 S
ECHO MV E VELOCITY: 63.22 CM/S
ECHO MV E/A RATIO: 0.63
ECHO MV E/E' LATERAL: 7.28
ECHO MV E/E' RATIO (AVERAGED): 7.57
ECHO MV E/E' SEPTAL: 7.86
ECHO MV MAX VELOCITY: 106.89 CM/S
ECHO MV MEAN GRADIENT: 1.67 MMHG
ECHO MV PEAK GRADIENT: 4.57 MMHG
ECHO MV PRESSURE HALF TIME (PHT): 0.09 S
ECHO MV VTI: 21.64 CM
ECHO RA AREA 4C: 13.46 CM2
ECHO RA MAJOR AXIS: 3.3 CM
ECHO RV INTERNAL DIMENSION: 2.71 CM
ECHO RV TAPSE: 2.28 CM (ref 1.5–2)
ECHO TV REGURGITANT MAX VELOCITY: 237.82 CM/S
ECHO TV REGURGITANT PEAK GRADIENT: 22.62 MMHG
GLOBAL LONGITUDINAL STRAIN 2 CHAMBER: 21.2 PERCENT
GLOBAL LONGITUDINAL STRAIN 4 CHAMBER: 22.1 PERCENT
GLOBAL LONGITUDINAL STRAIN LONG AXIS: 18.1 PERCENT
LA VOL DISK BP: 45.61 ML (ref 22–52)

## 2020-09-30 ENCOUNTER — OFFICE VISIT (OUTPATIENT)
Dept: ONCOLOGY | Age: 75
End: 2020-09-30
Payer: MEDICARE

## 2020-09-30 VITALS
DIASTOLIC BLOOD PRESSURE: 75 MMHG | WEIGHT: 158.4 LBS | HEART RATE: 68 BPM | BODY MASS INDEX: 25.46 KG/M2 | OXYGEN SATURATION: 96 % | RESPIRATION RATE: 18 BRPM | HEIGHT: 66 IN | TEMPERATURE: 97.9 F | SYSTOLIC BLOOD PRESSURE: 146 MMHG

## 2020-09-30 DIAGNOSIS — C50.112 MALIGNANT NEOPLASM OF CENTRAL PORTION OF LEFT BREAST IN FEMALE, ESTROGEN RECEPTOR POSITIVE (HCC): Primary | ICD-10-CM

## 2020-09-30 DIAGNOSIS — Z17.0 MALIGNANT NEOPLASM OF CENTRAL PORTION OF LEFT BREAST IN FEMALE, ESTROGEN RECEPTOR POSITIVE (HCC): Primary | ICD-10-CM

## 2020-09-30 PROCEDURE — 99214 OFFICE O/P EST MOD 30 MIN: CPT | Performed by: INTERNAL MEDICINE

## 2020-09-30 PROCEDURE — 1090F PRES/ABSN URINE INCON ASSESS: CPT | Performed by: INTERNAL MEDICINE

## 2020-09-30 PROCEDURE — G8753 SYS BP > OR = 140: HCPCS | Performed by: INTERNAL MEDICINE

## 2020-09-30 PROCEDURE — 1101F PT FALLS ASSESS-DOCD LE1/YR: CPT | Performed by: INTERNAL MEDICINE

## 2020-09-30 PROCEDURE — G0463 HOSPITAL OUTPT CLINIC VISIT: HCPCS | Performed by: NURSE PRACTITIONER

## 2020-09-30 PROCEDURE — G8399 PT W/DXA RESULTS DOCUMENT: HCPCS | Performed by: INTERNAL MEDICINE

## 2020-09-30 PROCEDURE — G8754 DIAS BP LESS 90: HCPCS | Performed by: INTERNAL MEDICINE

## 2020-09-30 PROCEDURE — G8536 NO DOC ELDER MAL SCRN: HCPCS | Performed by: INTERNAL MEDICINE

## 2020-09-30 PROCEDURE — G8427 DOCREV CUR MEDS BY ELIG CLIN: HCPCS | Performed by: INTERNAL MEDICINE

## 2020-09-30 PROCEDURE — G8419 CALC BMI OUT NRM PARAM NOF/U: HCPCS | Performed by: INTERNAL MEDICINE

## 2020-09-30 PROCEDURE — G9717 DOC PT DX DEP/BP F/U NT REQ: HCPCS | Performed by: INTERNAL MEDICINE

## 2020-09-30 RX ORDER — LOSARTAN POTASSIUM 25 MG/1
TABLET ORAL DAILY
COMMUNITY

## 2020-09-30 NOTE — PROGRESS NOTES
Angélica Reyes is a 66 y.o. female Follow up for the Evaluation of Breast Cancer. 1. Have you been to the ER, urgent care clinic since your last visit? Hospitalized since your last visit? Patient had 2 Surgeries--June and July--At .     2. Have you seen or consulted any other health care providers outside of the [address] [place] [address] since your last visit? Include any pap smears or colon screening.  No

## 2020-09-30 NOTE — PROGRESS NOTES
[facility] at [address], [address] [place] [address] Ser: [phone]  F: [phone]      Reason for Visit:   Kiko Arellano is a 66 y.o. female who is seen in follow up for evaluation of therapy for breast cancer. Treatment History:   · 2/15/19 Left core bx:   IDC, gr 2, 1.1 cm, ER + at 100%, MA + at 20%, HER 2 POSITIVE (IHC 2+; FISH ratio 3.9; sig/cell 9.2)  · 3/1/19 Left ax LN core bx:  + for breast cancer, ER + at 100%, MA negative, HER 2 POSITIVE (IHC 2+, FISH ratio 2; sig/cell 5.8)  · TCH-P 3/6/19-7/10/19  · #2 delayed due to diverticulitis abscess and surgery 4/17/19  · 8/5/19 Bilateral Mastecotomy: Right breast: lobular carcinomna in situ, fibrocystic changes including atypical and usual ductal hyperplasia, sclerosing adenosis apocrine metaplasia, microcalcification. Left breast: No evidence of residual invasive carcinoma or carcinoma in situ, no DCIS, 0/9 LNs; ypT0 yp N0 cM0, pCR  · [facility] HP 8/28/19-3/25/2020  · S/p XRT 11/27/19  · Anastrozole 11/2019-3/25/20 (hand stiffness), resumed 4/9/2020. History of Present Illness:   Pt noticed the left breast mass herself in Feb 2019, leading to the pathology above    She was seen in ED on 3/8/19 for abdominal pain and constipation CT abd/pelvis showed diverticulitis and received levaquin and flagyl. KUB also showed nonobstructive bowel gas pattern. Reports last good BM was 3/6 prior to chemo. She states on 3/10 and 3/12 had small watery BM. Sent to ED from office on 3/12/19 for worsening abdominal pain. She was found to have diverticulitis with multiple abscesses. S/p IV abx and perc drain with no relief of her pain, therefore proceeded with open sigmoidectomy and end colostomy on 3/15/19. She was discharged to SNF with IV abx until 3/29/19. Interval history:  In today for follow up on therapy. Complains of gr 1 cough, gr 1 itching, gr 1 skin rash, gr 1 swelling.     FH:  Paternal aunt breast cancer in her [de-identified]; no ovarian cancer, no prostate or pancreas cancer    Past Medical History:   Diagnosis Date    Adverse effect of anesthesia     \"difficulty waking up from anesthethia\"    Cancer ([address] 02/2019    left breast. current chemo patient    Depression     in past    GERD (gastroesophageal reflux disease)     High cholesterol     Hypertension       Past Surgical History:   Procedure Laterality Date    HX BREAST BIOPSY Right years ago    neg; needle bx    HX BREAST BIOPSY Left 02/15/2019    IDC    HX CATARACT REMOVAL      2013 (R), 2016(L)    HX COLONOSCOPY      HX COLOSTOMY Left 03/2019    HX MASTECTOMY Bilateral 8/5/2019    BILATERAL BREAST MASTECTOMIES AND LEFT BREAST SENTINEL NODE BIOPSY WITH BLUE DYE performed by Lexi Mason MD at [address] HX ORTHOPAEDIC      carpal tunnel    HX VASCULAR ACCESS Right 02/2019    portacath    IR CHANGE DRAIN  [facility]  03/2019      Social History     Tobacco Use    Smoking status: Current Every Day Smoker     Packs/day: 0.25     Years: 55.00     Pack years: 13.75    Smokeless tobacco: Never Used    Tobacco comment: 1-2 cigs/day   Substance Use Topics    Alcohol use: Yes     Alcohol/week: 1.0 standard drinks     Types: 1 Shots of liquor per week     Drinks per session: 5 or 6     Binge frequency: Weekly     Comment: Patient stated that she puts a splash of bourbon in her afternoon coffee around 5-6 days per week      Family History   Problem Relation Age of Onset    Breast Cancer Maternal Aunt         [de-identified]    Hypertension Mother     Hypertension Father     Cancer Father         Lung    Cancer Brother         leukemia    Diabetes Brother         2 brothers with diabetes    Hypertension Sister         2 sisters and 4 brothers with HTN     Current Outpatient Medications   Medication Sig    losartan (COZAAR) 25 mg tablet Take  by mouth daily.     anastrozole (ARIMIDEX) 1 mg tablet TAKE 1 TABLET EVERY DAY    famotidine (PEPCID AC) 20 mg tablet Take 20 mg by mouth daily.  cyanocobalamin/mecobalamin (CYANOCOBALAMIN-METHYLCOBALAMIN SL) 5,000 mcg by SubLINGual route daily.  pravastatin (PRAVACHOL) 40 mg tablet Take 40 mg by mouth nightly.  STIOLTO RESPIMAT 2.5-2.5 mcg/actuation inhaler Take 2 Puffs by inhalation nightly.  venlafaxine (EFFEXOR) 50 mg tablet Take 50 mg by mouth daily.  cholecalciferol (VITAMIN D3) 1,000 unit cap Take 2,000 Units by mouth daily.  calcium-cholecalciferol, D3, (CALTRATE 600+D) tablet Take 1 Tab by mouth nightly.  aspirin delayed-release 81 mg tablet Take  by mouth every other day. No current facility-administered medications for this visit. Allergies   Allergen Reactions    Keflex [Cephalexin] Hives    Penicillins Rash    Other Medication Itching     CHLORHEXADINE ( wipes caused moderate to severe itching in preop 8/5/19)        Review of Systems: A complete review of systems was obtained, negative except as described above. Physical Exam:     Visit Vitals  BP (!) 146/75 (BP 1 Location: Left arm, BP Patient Position: Sitting)   Pulse 68   Temp 97.9 °F (36.6 °C) (Temporal)   Resp 18   Ht 5' 6\" (1.676 m)   Wt 158 lb 6.4 oz (71.8 kg)   SpO2 96%   BMI 25.57 kg/m²     ECOG PS: 0  General: No distress  Eyes: PERRLA, anicteric sclerae. HENT: Atraumatic, OP clear  Neck: Supple  Lymphatic: No cervical, supraclavicular adenopathy  Respiratory: clear to auscultation bilaterally  CV: Normal rate, regular rhythm, no murmurs, no peripheral edema  GI: Soft, tender, distended, no masses, +BS, ostomy in place  MS:  Digits without clubbing or cyanosis. Skin: No rashes, ecchymoses, or petechiae. Normal temperature, turgor, and texture. Psych: Alert, oriented, appropriate affect, normal judgment/insight  Breasts:  Bilateral mastectomies.  Wounds have healed    Results:     Lab Results   Component Value Date/Time    WBC 11.3 (H) 07/10/2019 07:57 AM    HGB 11.8 07/10/2019 07:57 AM    HCT 34.5 (L) 07/10/2019 07:57 AM    PLATELET 183 51/22/1494 07:57 AM    MCV 96.6 07/10/2019 07:57 AM    ABS. NEUTROPHILS 8.0 07/10/2019 07:57 AM     Lab Results   Component Value Date/Time    Sodium 138 07/10/2019 07:57 AM    Potassium 3.6 08/05/2019 06:45 AM    Chloride 100 07/10/2019 07:57 AM    CO2 32 07/10/2019 07:57 AM    Glucose 107 (H) 07/10/2019 07:57 AM    BUN 13 07/10/2019 07:57 AM    Creatinine 0.76 07/10/2019 07:57 AM    GFR est AA >60 07/10/2019 07:57 AM    GFR est non-AA >60 07/10/2019 07:57 AM    Calcium 9.9 07/10/2019 07:57 AM    Glucose (POC) 93 03/19/2019 06:56 AM     Lab Results   Component Value Date/Time    Bilirubin, total 0.4 07/10/2019 07:57 AM    ALT (SGPT) 17 07/10/2019 07:57 AM    Alk. phosphatase 83 07/10/2019 07:57 AM    Protein, total 6.6 07/10/2019 07:57 AM    Albumin 3.5 07/10/2019 07:57 AM    Globulin 3.1 07/10/2019 07:57 AM       2/12/19 mammogram and US  MAMMOGRAPHY:  The breast demonstrates stable scattered density breast  architecture. Underlying the site of clinical concern in the left breast is  masslike focal asymmetry in the anterior upper left breast. There is no other  dominant mass lesion, architectural distortion, asymmetry, or calcification. There is no skin thickening or nipple retraction.     ULTRASOUND:  Corresponding to the mammographic density there is a 2.4 x 3.1 x  3.7 cm hypoechoic lobulated mass at about the 12:00 position and 3 cm radial  distance from the nipple. No other suspicious cystic or solid lesions  Identified. Dr. An Edwards has identified 2 large, suspicious ax LN on US:  1.7 cm and 1.2 cm    5/16/19 Left mammo and US:  The breast parenchyma is heterogeneously dense. Left breast biopsy clip is  unchanged in position. The central upper left breast mass seen on prior MR exam  dated February 2019 has decreased in size.     Targeted sonographic evaluation of the 12:00 position of the left breast is  performed.  Within the 12:00 position of the left breast, there is a 2.1 cm x 1.3  cm x 3.1 cm irregular hypoechoic mass containing calcifications, decreased in  size and measuring 3.8 cm x 2.4 cm x 3.1 cm on prior ultrasound dated February 2018.     IMPRESSION: BI-RADS 6. Pathology proven left breast cancer. Interval decrease in size of 12:00 left breast mass, now measuring 2.1 cm x 1.3 cm x 3.1 cm. Findings were discussed with the patient at the time of interpretation. 11/4/19 dexa  Femoral Neck Left:  Bone mineral density (gm/cm2): 0.904  % of peak bone mass: 87  % for age matched controls: 116  T-score: -1.0  Z-score: 0.9     Femoral Neck Right:  Bone mineral density (gm/cm2): 0.866  % of peak bone mass: 83  % for age matched controls:  111  T-score: -1.2  Z-score: 0.6     Total Hip Left:  Bone mineral density (gm/cm2): 1.000  % of peak bone mass: 99  % for age matched controls: 126  T-score: -0.1  Z-score: 1.6     Total Hip Right:  Bone mineral density (gm/cm2): 0.935  % of peak bone mass: 93  % for age matched controls:  118  T-score: -0.6  Z-score: 1.1     Lumbar Spine: L1-L3  Bone mineral density (gm/cm2): 1.440  % of peak bone mass: 122  % for age matched controls: 145  T-score: 2.1  Z-score: 3.7     33% Radius Left:  Bone mineral density (gm/cm2): 0.807  % of peak bone mass: 92  % for age matched controls:  80  T-score: -0.8  Z-score: 1.6    Impression: This patient is osteopenic using the World Health Organization criteria  10 year probability of major osteoporotic fracture: 11.7%  10 year probability of hip fracture: 3.5%  Records reviewed and summarized above. Assessment/plan:   1. Left central IDC, gr 2, ER +, OR +, HER 2 positive:  cT2 cN1a cM0, stage IIB, prognostic stage IB; ypT0 ypN0 cM0    We explained to the patient that the goal of systemic adjuvant therapy is to improve the chances for cure and decrease the risk of relapse.  We explained why a patient can have microscopic cancer spread now even though physical examination, laboratory studies and imaging studies are negative for cancer. We explained that the same treatments used now as adjuvant or preventive treatments rarely if ever are curative in women who develop metastases. TTE 3/6/19 EF 72%, TTE 6/4/19 EF 66%, TTE 9/3/19, EF 66%; TTE 12/6/2019 with EF 65%, TTE on 2/28/2020 EF 65%. TTE on 9/25/2020, EF 63%. RU, continue anastrozole daily, this was resumed on 4/9/2020. Ostomy reversal in July 2020, port was removed in June 2020.    2. Emotional well being:  She has excellent support and is coping well with her disease    3. tob use:  Smokes 3-5 cigs/day; counseled to quit. 4. Fatigue: Appetite continues to improve. Energy level has been improving. 5. 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 Started after Cycle 3. Does not wish to start any medications at this time. Patient will let us know if this worsens. 6. Decreased EF:  EF dropped from 72% to 66% and global strain dropped from 18% to 15.7% between 3/2019 to 6/2019, however per Dr. Jacky Bullock, visually there is no significant change. Recommend close follow up and Troponin I level. Trop on 6/19/19 was normal. TTE on 9/3/19 showed no significant change in overall LV systolic function. TTE on 12/6/2019 with stable EF, TTE on 2/28/2020 EF 65%. Most recent EF on 9/25/2020, normal , GLS improved. 7. Alopecia:  May be due to taxotere, will monitor    8. hand stiffness: Worse in right hand. Previously believed it may have been related to anastrozole. She was given AI holiday with no improvement, resumed anastrozole. States it is worse in AM and improved with movement and heat. Will monitor. 9. Rash to back:  Ongoing since chemo. Has met with PCP, started on triamcinolone cream PRN. Recommend following up with her dermatologist.      10. Lymphedema: To left arm, very mild. Patient will let us know if this worsens. Referral to lymphedema.       I appreciate the opportunity to participate in Ms. Satish Krause's care. Signed By: Cecilia Vergara MD      No orders of the defined types were placed in this encounter.

## 2020-10-08 ENCOUNTER — TELEPHONE (OUTPATIENT)
Dept: ONCOLOGY | Age: 75
End: 2020-10-08

## 2020-10-08 NOTE — TELEPHONE ENCOUNTER
10/8/2020 4:30 PM: Called the [facility] lymphedema clinic to check on patient's referral and see if she has been scheduled. No answer; left voicemail requesting call back.

## 2020-10-14 ENCOUNTER — HOSPITAL ENCOUNTER (OUTPATIENT)
Dept: PHYSICAL THERAPY | Age: 75
Discharge: HOME | End: 2020-10-14
Payer: MEDICARE

## 2020-10-14 PROCEDURE — 97161 PT EVAL LOW COMPLEX 20 MIN: CPT

## 2020-10-14 PROCEDURE — 97530 THERAPEUTIC ACTIVITIES: CPT

## 2020-10-14 NOTE — PROGRESS NOTES
[facility]  [address] [facility]  [address]        INITIAL EVALUATION    NAME: Paul Hirsch AGE: 66 y.o. GENDER: female  DATE: 10/14/2020  REFERRING PHYSICIAN: Shena Cochran NP / Jodi Peter MD  HISTORY AND BACKGROUND:  Pt is a 67 yo female referred to Lymphedema clinic for evaluation of mild left UE swelling s/p bilateral mastectomies in Aug 2019 and left SLNB. Pt completed radiation in Nov 2019 and chemo in March 2020. Pt reports onset of very mild swelling at left medial elbow 3 weeks ago, but says it has since resolved. Pt denies any trauma to the area. No history of DVT or cellulitis. Breast cancer history as follows per oncology note:   · 2/15/19 Left core bx:   IDC, gr 2, 1.1 cm, ER + at 100%, AZ + at 20%, HER 2 POSITIVE (IHC 2+; FISH ratio 3.9; sig/cell 9.2)  · 3/1/19 Left ax LN core bx:  + for breast cancer, ER + at 100%, AZ negative, HER 2 POSITIVE (IHC 2+, FISH ratio 2; sig/cell 5.8)  · TCH-P 3/6/19-7/10/19  · #2 delayed due to diverticulitis abscess and surgery 4/17/19  · 8/5/19 Bilateral Mastecotomy: Right breast: lobular carcinomna in situ, fibrocystic changes including atypical and usual ductal hyperplasia, sclerosing adenosis apocrine metaplasia, microcalcification. Left breast: No evidence of residual invasive carcinoma or carcinoma in situ, no DCIS, 0/9 LNs; ypT0 yp N0 cM0, pCR  · [employer] HP 8/28/19-3/25/2020  · S/p XRT 11/27/19  · Anastrozole 11/2019-3/25/20 (hand stiffness), resumed 4/9/2020.    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 See scanned document. Past Medical History:   Past Medical History:   Diagnosis Date    Adverse effect of anesthesia     \"difficulty waking up from anesthethia\"    Cancer ([address] 02/2019    left breast. current chemo patient    Depression     in past    GERD (gastroesophageal reflux disease)     High cholesterol     Hypertension      Past Surgical History:   Procedure Laterality Date    HX BREAST BIOPSY Right years ago    neg; needle bx    HX BREAST BIOPSY Left 02/15/2019    IDC    HX CATARACT REMOVAL      2013 (R), 2016(L)    HX COLONOSCOPY      HX COLOSTOMY Left 03/2019    HX MASTECTOMY Bilateral 8/5/2019    BILATERAL BREAST MASTECTOMIES AND LEFT BREAST SENTINEL NODE BIOPSY WITH BLUE DYE performed by Jazmyn Schultz MD at [address] HX ORTHOPAEDIC      carpal tunnel    HX VASCULAR ACCESS Right 02/2019    portacath    IR CHANGE DRAIN  [facility]  03/2019     Current Medications:    Current Outpatient Medications   Medication Sig    losartan (COZAAR) 25 mg tablet Take  by mouth daily.  anastrozole (ARIMIDEX) 1 mg tablet TAKE 1 TABLET EVERY DAY    famotidine (PEPCID AC) 20 mg tablet Take 20 mg by mouth daily.  cyanocobalamin/mecobalamin (CYANOCOBALAMIN-METHYLCOBALAMIN SL) 5,000 mcg by SubLINGual route daily.  pravastatin (PRAVACHOL) 40 mg tablet Take 40 mg by mouth nightly.  STIOLTO RESPIMAT 2.5-2.5 mcg/actuation inhaler Take 2 Puffs by inhalation nightly.  venlafaxine (EFFEXOR) 50 mg tablet Take 50 mg by mouth daily.  cholecalciferol (VITAMIN D3) 1,000 unit cap Take 2,000 Units by mouth daily.  calcium-cholecalciferol, D3, (CALTRATE 600+D) tablet Take 1 Tab by mouth nightly.  aspirin delayed-release 81 mg tablet Take  by mouth every other day. No current facility-administered medications for this encounter. 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 \"     Patients goals for therapy: learn about lymphedema management     EVALUATION AND OBJECTIVE DATA SUMMARY:   Pain:   pt denies pain                                Self-Care and ADLs:    Grooming: Independent  Bathing: Independent    UB Dressing: Independent  LB Dressing: Independent    Don/Doff Shoes/Socks: Independent  Toileting: Independent    Other:     Skin and Tissue Assessment:  Observation:       Dermal Status:  [x]   Intact [x]  Dry   [x]  Tenuous [] Flaky   []  Wound/lesion present [x]  Scars: well healed bilateral mastectomy scars    []  Dermatitis Pt with bandaid just above wrist on the left - says her dog scratched her arm.     Texture/Consistency:  []  Boggy []  Pitting Edema   []  Brawny []  Combination   []  Fibrotic/Woody    Pigmentation/Color Change:  [x]  Normal []  Hemosiderin   []  Red []  Erythematous   []  Hyperpigmented []  Hyperlipodermatosclerosis   Anomalies:  []  Lymphorrhea []  Vesicles   []  Petechiae []  Warty Vercusis   []  Bullae []  Papilloma   Circulatory:  []  FLAVIO []  Varicosities:   [x]  Pulses normal  []  Vascular studies ruled out DVT in past   []  DVT History    Nails:  [x]   Normal  []   Fungus    Stemmers Sign: negative   Height:   5'6\"  Weight:   160 lbs   BMI:   25.8  (36 or greater: adversely affecting lymphedema)  Wound/Ulcer:  None       Wound Pain:   N/a Volumetric Measurements:   Right:  2120.11 mL Left:  2160.48 mL   % Difference: 1.9% Dominance: right hand dominant    (See scanned graph)  Range of Motion: WFL  Strength: WFL    Sensation:  Impaired - chemo induced neuropathy in hands and feet, reports numbness at left chest wall   Mobility:  Independent  Safety:  Patient is alert and oriented:  Yes, x 4   Safety awareness:  Good    Fall Risk?:  Low    Patient given written fall prevention handout: Yes   Precautions:  Standard lymphedema precautions to include avoiding blood pressure readings, injections and IVs or other procedures/acts that could lead to broken skin on affected area, and avoiding excessive heat, resistive activity or altitude without compression garment    Evaluation Time: 1:10 - 1:30 pm (20 minutes)    TREATMENT PROVIDED:   Treatment time:  1:30 - 2:00 pm  Minutes: 30  1. Treatment description:  Therapeutic Activity x 2  The patient was educated regarding the role and function of the lymphatic system, and instructed in the lymphedema management protocol of complete decongestive therapy (CDT). This includes skin care to prevent breakdown or infection, lymphedema exercises, custom compression therapy options (bandaging, compression garments, compression pump, Drema Sheen, JoViPak, [employer], etc. as needed), and decongestion with manual lymphatic drainage as indicated. We discussed the use of a prophylactic compression garment during \"high risk\" activities for preventing progression from stage 0 to stage I lymphedema, however pt declined. Diaphragmatic breathing instruction and skin care education was performed, applying low pH lotion to extremity using upward strokes to stimulate lymphatic vessels. Reviewed National Lymphedema Network recommendations for risk reduction practices for preventing lymphedema. Encouraged good skin care for infection prevention and ROM ex's for assisting lymphatic flow.  Advised pt to monitor for any increased swelling in left UE/chest and subjective reports of \"tightness or heaviness\" in her arm and to contact lymphedema clinic right away with any concerns. Pt provided with information folder today. ASSESSMENT:   Nola Murrell is a 66 y.o. female who presents with stage 0 left UE secondary lymphedema s/p bilateral mastectomies and SLNB on left. Patient will benefit from aspects of complete decongestive therapy (CDT) including manual lymphatic drainage (MLD) technique, short-stretch textile bandages/compression system to decongest limb, and kinesiotaping as appropriate. Patient will receive instruction in proper skin care to recognize signs/symptoms of and prevent infection, therapeutic exercise, and self-MLD for independent home program and restorative lymphatic performance. We discussed the use of a prophylactic compression garment during \"high risk\" activities for preventing progression from stage 0 to stage I lymphedema, however pt declined. Diaphragmatic breathing instruction and skin care education was performed, applying low pH lotion to extremity using upward strokes to stimulate lymphatic vessels. Reviewed National Lymphedema Network recommendations for risk reduction practices for preventing lymphedema. Encouraged good skin care for infection prevention and ROM ex's for assisting lymphatic flow. Advised pt to monitor for any increased swelling in left UE/chest and subjective reports of \"tightness or heaviness\" in her arm and to contact lymphedema clinic right away with any concerns. Pt verbalized understanding of all instructions. This care is medically necessary due to the infection risk with lymphedema and to improve functional activities. CDT is necessary to resolve swelling to allow patient to return to wearing normal clothes/footwear and prevent worsening of symptoms such as venous stasis ulcerations, infections, or hospitalizations.   Patient will be independent with home program strategies to allow improved ADL ability and mobility and to allow patient to return to greatest functional independence. PLAN OF CARE:   Recommendations and Planned Interventions:  No f/u visits recommended at this time. Pt to return for annual re-evaluations or sooner as needed. Physical Therapy Evaluation Charge Determination   History Examination Presentation Decision-Making   HIGH Complexity :3+ comorbidities / personal factors will impact the outcome/ POC  LOW Complexity : 1-2 Standardized tests and measures addressing body structure, function, activity limitation and / or participation in recreation  LOW Complexity : Stable, uncomplicated  Other outcome measures LLIS  LOW       Based on the above components, the patient evaluation is determined to be of the following complexity level: LOW     Patient has participated in goal setting and agrees to work toward plan of care. Patient was instructed to call if questions or concerns arise. Thank you for this referral.  Shanell Meza. Gary, PT, DPT, CLT    Time Calculation: 50 mins    TREATMENT PLAN EFFECTIVE DATES:   10/14/2020 TO 1/6/2021  I have read the above plan of care for [facility]. I certify the above prescribed services are required by this patient and are medically necessary.   The above plan of care has been developed in conjunction with the lymphedema/physical therapist.       Physician Signature: ____________________________________Date:______________

## 2021-01-29 DIAGNOSIS — C50.112 MALIGNANT NEOPLASM OF CENTRAL PORTION OF LEFT BREAST IN FEMALE, ESTROGEN RECEPTOR POSITIVE (HCC): ICD-10-CM

## 2021-01-29 DIAGNOSIS — Z17.0 MALIGNANT NEOPLASM OF CENTRAL PORTION OF LEFT BREAST IN FEMALE, ESTROGEN RECEPTOR POSITIVE (HCC): ICD-10-CM

## 2021-01-29 RX ORDER — ANASTROZOLE 1 MG/1
TABLET ORAL
Qty: 90 TAB | Refills: 2 | Status: SHIPPED | OUTPATIENT
Start: 2021-01-29 | End: 2022-02-03

## 2021-04-28 ENCOUNTER — OFFICE VISIT (OUTPATIENT)
Dept: ONCOLOGY | Age: 76
End: 2021-04-28
Payer: MEDICARE

## 2021-04-28 VITALS
DIASTOLIC BLOOD PRESSURE: 80 MMHG | TEMPERATURE: 98.7 F | SYSTOLIC BLOOD PRESSURE: 125 MMHG | OXYGEN SATURATION: 95 % | BODY MASS INDEX: 29.6 KG/M2 | HEIGHT: 66 IN | WEIGHT: 184.2 LBS | RESPIRATION RATE: 18 BRPM | HEART RATE: 78 BPM

## 2021-04-28 DIAGNOSIS — Z17.0 MALIGNANT NEOPLASM OF CENTRAL PORTION OF LEFT BREAST IN FEMALE, ESTROGEN RECEPTOR POSITIVE (HCC): Primary | ICD-10-CM

## 2021-04-28 DIAGNOSIS — C50.112 MALIGNANT NEOPLASM OF CENTRAL PORTION OF LEFT BREAST IN FEMALE, ESTROGEN RECEPTOR POSITIVE (HCC): Primary | ICD-10-CM

## 2021-04-28 PROCEDURE — G8399 PT W/DXA RESULTS DOCUMENT: HCPCS | Performed by: INTERNAL MEDICINE

## 2021-04-28 PROCEDURE — G8419 CALC BMI OUT NRM PARAM NOF/U: HCPCS | Performed by: INTERNAL MEDICINE

## 2021-04-28 PROCEDURE — 1101F PT FALLS ASSESS-DOCD LE1/YR: CPT | Performed by: INTERNAL MEDICINE

## 2021-04-28 PROCEDURE — G0463 HOSPITAL OUTPT CLINIC VISIT: HCPCS | Performed by: NURSE PRACTITIONER

## 2021-04-28 PROCEDURE — G8427 DOCREV CUR MEDS BY ELIG CLIN: HCPCS | Performed by: INTERNAL MEDICINE

## 2021-04-28 PROCEDURE — G8754 DIAS BP LESS 90: HCPCS | Performed by: INTERNAL MEDICINE

## 2021-04-28 PROCEDURE — 1090F PRES/ABSN URINE INCON ASSESS: CPT | Performed by: INTERNAL MEDICINE

## 2021-04-28 PROCEDURE — G8752 SYS BP LESS 140: HCPCS | Performed by: INTERNAL MEDICINE

## 2021-04-28 PROCEDURE — 99213 OFFICE O/P EST LOW 20 MIN: CPT | Performed by: INTERNAL MEDICINE

## 2021-04-28 PROCEDURE — G8536 NO DOC ELDER MAL SCRN: HCPCS | Performed by: INTERNAL MEDICINE

## 2021-04-28 PROCEDURE — G9717 DOC PT DX DEP/BP F/U NT REQ: HCPCS | Performed by: INTERNAL MEDICINE

## 2021-04-28 RX ORDER — FLUTICASONE FUROATE, UMECLIDINIUM BROMIDE AND VILANTEROL TRIFENATATE 100; 62.5; 25 UG/1; UG/1; UG/1
1 POWDER RESPIRATORY (INHALATION) DAILY
COMMUNITY

## 2021-04-28 NOTE — PROGRESS NOTES
[facility] [place] [address] [facility] [facility]  K[place]: [phone]  F: [phone]      Reason for Visit:   Miller Ray is a 64 y.o. female who is seen in follow up for evaluation of therapy for breast cancer. Treatment History:   · 2/15/19 Left core bx:   IDC, gr 2, 1.1 cm, ER + at 100%, CT + at 20%, HER 2 POSITIVE (IHC 2+; FISH ratio 3.9; sig/cell 9.2)  · 3/1/19 Left ax LN core bx:  + for breast cancer, ER + at 100%, CT negative, HER 2 POSITIVE (IHC 2+, FISH ratio 2; sig/cell 5.8)  · TCH-P 3/6/19-7/10/19  · #2 delayed due to diverticulitis abscess and surgery 4/17/19  · 8/5/19 Bilateral Mastecotomy: Right breast: lobular carcinomna in situ, fibrocystic changes including atypical and usual ductal hyperplasia, sclerosing adenosis apocrine metaplasia, microcalcification. Left breast: No evidence of residual invasive carcinoma or carcinoma in situ, no DCIS, 0/9 LNs; ypT0 yp N0 cM0, pCR  · [employer] [facility] 8/28/19-3/25/2020  · S/p XRT 11/27/19  · Anastrozole 11/2019-3/25/20 (hand stiffness), resumed 4/9/2020. History of Present Illness:   Pt noticed the left breast mass herself in Feb 2019, leading to the pathology above    She was seen in ED on 3/8/19 for abdominal pain and constipation CT abd/pelvis showed diverticulitis and received levaquin and flagyl. KUB also showed nonobstructive bowel gas pattern. Reports last good BM was 3/6 prior to chemo. She states on 3/10 and 3/12 had small watery BM. Sent to ED from office on 3/12/19 for worsening abdominal pain. She was found to have diverticulitis with multiple abscesses. S/p IV abx and perc drain with no relief of her pain, therefore proceeded with open sigmoidectomy and end colostomy on 3/15/19. She was discharged to SNF with IV abx until 3/29/19. Interval history:  In today for follow up on therapy.  Complains of gr 1 hair loss, gr 1 hot flashes, gr 2 insomnia, gr 1 itching, gr 1 neuropathy. FH:  Paternal aunt breast cancer in her [de-identified]; no ovarian cancer, no prostate or pancreas cancer    Past Medical History:   Diagnosis Date    Adverse effect of anesthesia     \"difficulty waking up from anesthethia\"    Cancer ([address] 02/2019    left breast. current chemo patient    Depression     in past    GERD (gastroesophageal reflux disease)     High cholesterol     Hypertension       Past Surgical History:   Procedure Laterality Date    HX BREAST BIOPSY Right years ago    neg; needle bx    HX BREAST BIOPSY Left 02/15/2019    IDC    HX CATARACT REMOVAL      2013 (R), 2016(L)    HX COLONOSCOPY      HX COLOSTOMY Left 03/2019    HX MASTECTOMY Bilateral 8/5/2019    BILATERAL BREAST MASTECTOMIES AND LEFT BREAST SENTINEL NODE BIOPSY WITH BLUE DYE performed by Joey Gonzalez MD at [address] HX ORTHOPAEDIC      carpal tunnel    HX VASCULAR ACCESS Right 02/2019    portacath    IR CHANGE DRAIN  ABCESS PERC  03/2019      Social History     Tobacco Use    Smoking status: Current Every Day Smoker     Packs/day: 0.25     Years: 55.00     Pack years: 13.75    Smokeless tobacco: Never Used    Tobacco comment: 1-2 cigs/day   Substance Use Topics    Alcohol use:  Yes     Alcohol/week: 1.0 standard drinks     Types: 1 Shots of liquor per week     Drinks per session: 5 or 6     Binge frequency: Weekly     Comment: Patient stated that she puts a splash of bourbon in her afternoon coffee around 5-6 days per week      Family History   Problem Relation Age of Onset    Breast Cancer Maternal Aunt         [de-identified]    Hypertension Mother     Hypertension Father     Cancer Father         Lung    Cancer Brother         leukemia    Diabetes Brother         2 brothers with diabetes    Hypertension Sister         2 sisters and 4 brothers with HTN     Current Outpatient Medications   Medication Sig    fluticasone-umeclidinium-vilanterol (Trelegy Ellipta) 100-62.5-25 mcg inhaler Take 1 Puff by inhalation daily.  anastrozole (ARIMIDEX) 1 mg tablet TAKE 1 TABLET EVERY DAY    losartan (COZAAR) 25 mg tablet Take  by mouth daily.  famotidine (PEPCID AC) 20 mg tablet Take 20 mg by mouth daily.  pravastatin (PRAVACHOL) 40 mg tablet Take 40 mg by mouth nightly.  venlafaxine (EFFEXOR) 50 mg tablet Take 50 mg by mouth daily.  aspirin delayed-release 81 mg tablet Take  by mouth every other day.  cyanocobalamin/mecobalamin (CYANOCOBALAMIN-METHYLCOBALAMIN SL) 5,000 mcg by SubLINGual route daily.  STIOLTO RESPIMAT 2.5-2.5 mcg/actuation inhaler Take 2 Puffs by inhalation nightly.  cholecalciferol (VITAMIN D3) 1,000 unit cap Take 2,000 Units by mouth daily.  calcium-cholecalciferol, D3, (CALTRATE 600+D) tablet Take 1 Tab by mouth nightly. No current facility-administered medications for this visit. Allergies   Allergen Reactions    Keflex [Cephalexin] Hives    Penicillins Rash    Other Medication Itching     CHLORHEXADINE ( wipes caused moderate to severe itching in preop 8/5/19)        Review of Systems: A complete review of systems was obtained, negative except as described above. Physical Exam:     Visit Vitals  /80   Pulse 78   Temp 98.7 °F (37.1 °C) (Temporal)   Resp 18   Ht 5' 6\" (1.676 m)   Wt 184 lb 3.2 oz (83.6 kg)   SpO2 95%   BMI 29.73 kg/m²     ECOG PS: 0  General: no distress  Respiratory: normal respiratory effort  CV: no peripheral edema  Skin: no rashes; no ecchymoses; no petechiae    Breasts:  Bilateral mastectomies. Results:     Lab Results   Component Value Date/Time    WBC 11.3 (H) 07/10/2019 07:57 AM    HGB 11.8 07/10/2019 07:57 AM    HCT 34.5 (L) 07/10/2019 07:57 AM    PLATELET 153 61/43/6313 07:57 AM    MCV 96.6 07/10/2019 07:57 AM    ABS.  NEUTROPHILS 8.0 07/10/2019 07:57 AM     Lab Results   Component Value Date/Time    Sodium 138 07/10/2019 07:57 AM    Potassium 3.6 08/05/2019 06:45 AM    Chloride 100 07/10/2019 07:57 AM    CO2 32 07/10/2019 07:57 AM    Glucose 107 (H) 07/10/2019 07:57 AM    BUN 13 07/10/2019 07:57 AM    Creatinine 0.76 07/10/2019 07:57 AM    GFR est AA >60 07/10/2019 07:57 AM    GFR est non-AA >60 07/10/2019 07:57 AM    Calcium 9.9 07/10/2019 07:57 AM    Glucose (POC) 93 03/19/2019 06:56 AM     Lab Results   Component Value Date/Time    Bilirubin, total 0.4 07/10/2019 07:57 AM    ALT (SGPT) 17 07/10/2019 07:57 AM    Alk. phosphatase 83 07/10/2019 07:57 AM    Protein, total 6.6 07/10/2019 07:57 AM    Albumin 3.5 07/10/2019 07:57 AM    Globulin 3.1 07/10/2019 07:57 AM       2/12/19 mammogram and US  MAMMOGRAPHY:  The breast demonstrates stable scattered density breast  architecture. Underlying the site of clinical concern in the left breast is  masslike focal asymmetry in the anterior upper left breast. There is no other  dominant mass lesion, architectural distortion, asymmetry, or calcification. There is no skin thickening or nipple retraction.     ULTRASOUND:  Corresponding to the mammographic density there is a 2.4 x 3.1 x  3.7 cm hypoechoic lobulated mass at about the 12:00 position and 3 cm radial  distance from the nipple. No other suspicious cystic or solid lesions  Identified. Dr. Vibha Ritter has identified 2 large, suspicious ax LN on US:  1.7 cm and 1.2 cm    5/16/19 Left mammo and US:  The breast parenchyma is heterogeneously dense. Left breast biopsy clip is  unchanged in position. The central upper left breast mass seen on prior MR exam  dated February 2019 has decreased in size.     Targeted sonographic evaluation of the 12:00 position of the left breast is  performed. Within the 12:00 position of the left breast, there is a 2.1 cm x 1.3  cm x 3.1 cm irregular hypoechoic mass containing calcifications, decreased in  size and measuring 3.8 cm x 2.4 cm x 3.1 cm on prior ultrasound dated February 2018.     IMPRESSION: BI-RADS 6. Pathology proven left breast cancer.  Interval decrease in size of 12:00 left breast mass, now measuring 2.1 cm x 1.3 cm x 3.1 cm. Findings were discussed with the patient at the time of interpretation. 11/4/19 dexa  Femoral Neck Left:  Bone mineral density (gm/cm2): 0.904  % of peak bone mass: 87  % for age matched controls: 116  T-score: -1.0  Z-score: 0.9     Femoral Neck Right:  Bone mineral density (gm/cm2): 0.866  % of peak bone mass: 83  % for age matched controls:  111  T-score: -1.2  Z-score: 0.6     Total Hip Left:  Bone mineral density (gm/cm2): 1.000  % of peak bone mass: 99  % for age matched controls: 126  T-score: -0.1  Z-score: 1.6     Total Hip Right:  Bone mineral density (gm/cm2): 0.935  % of peak bone mass: 93  % for age matched controls:  118  T-score: -0.6  Z-score: 1.1     Lumbar Spine: L1-L3  Bone mineral density (gm/cm2): 1.440  % of peak bone mass: 122  % for age matched controls: 145  T-score: 2.1  Z-score: 3.7     33% Radius Left:  Bone mineral density (gm/cm2): 0.807  % of peak bone mass: 92  % for age matched controls:  80  T-score: -0.8  Z-score: 1.6    Impression: This patient is osteopenic using the World Health Organization criteria  10 year probability of major osteoporotic fracture: 11.7%  10 year probability of hip fracture: 3.5%  Records reviewed and summarized above. Assessment/plan:   1. Left central IDC, gr 2, ER +, TN +, HER 2 positive:  cT2 cN1a cM0, stage IIB, prognostic stage IB; ypT0 ypN0 cM0    We explained to the patient that the goal of systemic adjuvant therapy is to improve the chances for cure and decrease the risk of relapse. We explained why a patient can have microscopic cancer spread now even though physical examination, laboratory studies and imaging studies are negative for cancer. We explained that the same treatments used now as adjuvant or preventive treatments rarely if ever are curative in women who develop metastases. TTE 3/6/19 EF 72%, TTE 6/4/19 EF 66%, TTE 9/3/19, EF 66%; TTE 12/6/2019 with EF 65%, TTE on 2/28/2020 EF 65%. TTE on 9/25/2020, EF 63%. RU, continue anastrozole daily, this was resumed on 4/9/2020. Ostomy reversal in July 2020, port was removed in June 2020.    2. Emotional well being:  She has excellent support and is coping well with her disease    3. tob use:  Smokes 3-5 cigs/day; counseled to quit. 4. Fatigue: Appetite continues to improve. Energy level has been improving. 5. 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 Started after Cycle 3. Does not wish to start any medications at this time. Patient will let us know if this worsens. 6. Decreased EF:  EF dropped from 72% to 66% and global strain dropped from 18% to 15.7% between 3/2019 to 6/2019, however per Dr. Marnie Erickson, visually there is no significant change. Recommend close follow up and Troponin I level. Trop on 6/19/19 was normal. TTE on 9/3/19 showed no significant change in overall LV systolic function. TTE on 12/6/2019 with stable EF, TTE on 2/28/2020 EF 65%. Most recent EF on 9/25/2020, normal , GLS improved. 7. Alopecia:  May be due to taxotere, will monitor    8. hand stiffness: Worse in right hand. Previously believed it may have been related to anastrozole. She was given AI holiday with no improvement, resumed anastrozole. States it is worse in AM and improved with movement and heat. She has met with ortho, s/p prednisone and gabapentin, reports improvement. Will monitor. 9. Rash to back:  Ongoing since chemo. Has met with PCP, started on triamcinolone cream PRN. Met with dermatologist.  Using creams. 10. Lymphedema: To left arm, very mild. Patient will let us know if this worsens. Referral to lymphedema. 11. Tightness/spasms: To left breast/axilla. Reports intermittent. Using massager at home. Discussed PT, she would like to hold off for now, and will let us know if she would like referral.     This patient was seen in conjunction with Мария Diez NP.  I personally reviewed the history and all points in the assessment and plan with the patient, and performed key points on the exam. Left breast cancer, ER +, HER 2 +, RU, continue anastrozole. RTC 6 months    I appreciate the opportunity to participate in Ms. Lisha Krause's care. Signed By: Gege Leslie MD      No orders of the defined types were placed in this encounter.

## 2021-04-28 NOTE — PROGRESS NOTES
Gayle Silva is a 64 y.o. female Follow up for the evaluation of breast cancer. 1. Have you been to the ER, urgent care clinic since your last visit? Hospitalized since your last visit? Yes, [facility], 07/20-08/20.     2. Have you seen or consulted any other health care providers outside of the [address] [place] [address] since your last visit? Include any pap smears or colon screening. Yes [facility].

## 2021-06-29 ENCOUNTER — HOSPITAL ENCOUNTER (OUTPATIENT)
Dept: RADIATION THERAPY | Age: 76
Discharge: HOME | End: 2021-06-29

## 2021-06-29 VITALS — HEIGHT: 66 IN | BODY MASS INDEX: 28.61 KG/M2 | WEIGHT: 178 LBS

## 2021-08-10 ENCOUNTER — TELEPHONE (OUTPATIENT)
Dept: ONCOLOGY | Age: 76
End: 2021-08-10

## 2021-08-10 RX ORDER — UBIDECARENONE/VIT E ACET 100MG-5
100 CAPSULE ORAL
COMMUNITY

## 2021-08-10 RX ORDER — LANOLIN ALCOHOL/MO/W.PET/CERES
1000 CREAM (GRAM) TOPICAL DAILY
COMMUNITY

## 2021-08-10 RX ORDER — PERPHENAZINE 8 MG
500 TABLET ORAL
COMMUNITY

## 2021-08-10 NOTE — TELEPHONE ENCOUNTER
Patient left a voicemail stating that she would like to discuss taking peptide. Please advise.     CB# [phone]

## 2021-08-10 NOTE — TELEPHONE ENCOUNTER
8/10/21 4:07 PM: Called patient, who stated that she found a supplement at [facility] called Vital Protein Collagen Peptides and wanted to know if she may take this. Patient stated she has heard it is good for energy and for your skin. Patient stated she takes several other supplements but has been taking these since fall of 2020. Patient stated she takes vitamin B12 5,000 mcg, Zinc 50 mg, vitamin D3 5,000 iu, , resveratrol 100 mg, turmeric 1,500 mg, fibermucil, and a healthy skin and nails supplement. Advised patient that Octavio Guerrero NP, will be consulted and the patient will receive a call back. 8/11/21 4:28 PM: Called patient and advised that Dr. Reanna Richmond is out of the office this week and Octavio Guerrero NP, would like to discuss the supplements with him before making a recommendation. Patient stated someone can call her back next week when Dr. Reanna Richmond is back in the office.

## 2021-08-18 NOTE — TELEPHONE ENCOUNTER
08/18/2021 at 8:34 am--This user called and spoke with the patient and let her know our recommendation's and she verbalized understanding and did not have any further question's or concerns at that time.

## 2021-10-27 ENCOUNTER — OFFICE VISIT (OUTPATIENT)
Dept: ONCOLOGY | Age: 76
End: 2021-10-27
Payer: MEDICARE

## 2021-10-27 VITALS
TEMPERATURE: 98 F | HEART RATE: 76 BPM | BODY MASS INDEX: 28.48 KG/M2 | SYSTOLIC BLOOD PRESSURE: 131 MMHG | HEIGHT: 66 IN | OXYGEN SATURATION: 94 % | RESPIRATION RATE: 18 BRPM | DIASTOLIC BLOOD PRESSURE: 72 MMHG | WEIGHT: 177.2 LBS

## 2021-10-27 DIAGNOSIS — Z17.0 MALIGNANT NEOPLASM OF CENTRAL PORTION OF LEFT BREAST IN FEMALE, ESTROGEN RECEPTOR POSITIVE (HCC): ICD-10-CM

## 2021-10-27 DIAGNOSIS — C50.112 MALIGNANT NEOPLASM OF CENTRAL PORTION OF LEFT BREAST IN FEMALE, ESTROGEN RECEPTOR POSITIVE (HCC): ICD-10-CM

## 2021-10-27 DIAGNOSIS — Z78.0 POST-MENOPAUSAL: Primary | ICD-10-CM

## 2021-10-27 PROCEDURE — 1101F PT FALLS ASSESS-DOCD LE1/YR: CPT | Performed by: INTERNAL MEDICINE

## 2021-10-27 PROCEDURE — G8752 SYS BP LESS 140: HCPCS | Performed by: INTERNAL MEDICINE

## 2021-10-27 PROCEDURE — G8536 NO DOC ELDER MAL SCRN: HCPCS | Performed by: INTERNAL MEDICINE

## 2021-10-27 PROCEDURE — G8419 CALC BMI OUT NRM PARAM NOF/U: HCPCS | Performed by: INTERNAL MEDICINE

## 2021-10-27 PROCEDURE — 99214 OFFICE O/P EST MOD 30 MIN: CPT | Performed by: INTERNAL MEDICINE

## 2021-10-27 PROCEDURE — G8399 PT W/DXA RESULTS DOCUMENT: HCPCS | Performed by: INTERNAL MEDICINE

## 2021-10-27 PROCEDURE — G0463 HOSPITAL OUTPT CLINIC VISIT: HCPCS | Performed by: NURSE PRACTITIONER

## 2021-10-27 PROCEDURE — G8427 DOCREV CUR MEDS BY ELIG CLIN: HCPCS | Performed by: INTERNAL MEDICINE

## 2021-10-27 PROCEDURE — G8754 DIAS BP LESS 90: HCPCS | Performed by: INTERNAL MEDICINE

## 2021-10-27 PROCEDURE — 1090F PRES/ABSN URINE INCON ASSESS: CPT | Performed by: INTERNAL MEDICINE

## 2021-10-27 PROCEDURE — G9717 DOC PT DX DEP/BP F/U NT REQ: HCPCS | Performed by: INTERNAL MEDICINE

## 2021-10-27 NOTE — PROGRESS NOTES
[facility] [place] at [place] [address] [facility] [address] [facility]  Cathleen Beattyosh: [phone]  F: [phone]      Reason for Visit:   Zelalem Arana is a 64 y.o. female who is seen in follow up for evaluation of therapy for breast cancer. Treatment History:   · 2/15/19 Left core bx:   IDC, gr 2, 1.1 cm, ER + at 100%, GA + at 20%, HER 2 POSITIVE (IHC 2+; FISH ratio 3.9; sig/cell 9.2)  · 3/1/19 Left ax LN core bx:  + for breast cancer, ER + at 100%, GA negative, HER 2 POSITIVE (IHC 2+, FISH ratio 2; sig/cell 5.8)  · TCH-P 3/6/19-7/10/19  · #2 delayed due to diverticulitis abscess and surgery 4/17/19  · 8/5/19 Bilateral Mastecotomy: Right breast: lobular carcinomna in situ, fibrocystic changes including atypical and usual ductal hyperplasia, sclerosing adenosis apocrine metaplasia, microcalcification. Left breast: No evidence of residual invasive carcinoma or carcinoma in situ, no DCIS, 0/9 LNs; ypT0 yp N0 cM0, pCR  · [employer] [facility] 8/28/19-3/25/2020  · S/p XRT 11/27/19  · Anastrozole 11/2019-3/25/20 (hand stiffness), resumed 4/9/2020. History of Present Illness:   Pt noticed the left breast mass herself in Feb 2019, leading to the pathology above    She was seen in ED on 3/8/19 for abdominal pain and constipation CT abd/pelvis showed diverticulitis and received levaquin and flagyl. KUB also showed nonobstructive bowel gas pattern. Reports last good BM was 3/6 prior to chemo. She states on 3/10 and 3/12 had small watery BM. Sent to ED from office on 3/12/19 for worsening abdominal pain. She was found to have diverticulitis with multiple abscesses. S/p IV abx and perc drain with no relief of her pain, therefore proceeded with open sigmoidectomy and end colostomy on 3/15/19. She was discharged to SNF with IV abx until 3/29/19. Interval history:  In today for follow up. Reports gr 1 fever, gr 1 hot flashes, gr 1 insomnia, gr 1 itching, gr 1 neuropathy.        She had bilateral inguinal hernia repair 6/7/2021. FH:  Paternal aunt breast cancer in her [de-identified]; no ovarian cancer, no prostate or pancreas cancer    Past Medical History:   Diagnosis Date    Adverse effect of anesthesia     \"difficulty waking up from anesthethia\"    Cancer ([address] 02/2019    left breast. current chemo patient    Depression     in past    GERD (gastroesophageal reflux disease)     High cholesterol     Hypertension       Past Surgical History:   Procedure Laterality Date    HX BREAST BIOPSY Right years ago    neg; needle bx    HX BREAST BIOPSY Left 02/15/2019    IDC    HX CATARACT REMOVAL      2013 (R), 2016(L)    HX COLONOSCOPY      HX COLOSTOMY Left 03/2019    HX MASTECTOMY Bilateral 8/5/2019    BILATERAL BREAST MASTECTOMIES AND LEFT BREAST SENTINEL NODE BIOPSY WITH BLUE DYE performed by Mercedes Leblanc MD at [place] [address] HX ORTHOPAEDIC      carpal tunnel    HX VASCULAR ACCESS Right 02/2019    portacath    IR CHANGE DRAIN  ABCESS PERC  03/2019      Social History     Tobacco Use    Smoking status: Current Every Day Smoker     Packs/day: 0.25     Years: 55.00     Pack years: 13.75    Smokeless tobacco: Never Used    Tobacco comment: 1-2 cigs/day   Substance Use Topics    Alcohol use: Yes     Alcohol/week: 1.0 standard drinks     Types: 1 Shots of liquor per week     Comment: Patient stated that she puts a splash of bourbon in her afternoon coffee around 5-6 days per week      Family History   Problem Relation Age of Onset    Breast Cancer Maternal Aunt         [de-identified]    Hypertension Mother     Hypertension Father     Cancer Father         Lung    Cancer Brother         leukemia    Diabetes Brother         2 brothers with diabetes    Hypertension Sister         2 sisters and 4 brothers with HTN     Current Outpatient Medications   Medication Sig    acetylcysteine 500 mg cap Take 500 mg by mouth.  cyanocobalamin 1,000 mcg tablet Take 1,000 mcg by mouth daily.     resveratroL 100 mg cap Take 100 mg by mouth.  TURMERIC PO Take 1,500 mg by mouth.  fluticasone-umeclidinium-vilanterol (Trelegy Ellipta) 100-62.5-25 mcg inhaler Take 1 Puff by inhalation daily.  anastrozole (ARIMIDEX) 1 mg tablet TAKE 1 TABLET EVERY DAY    losartan (COZAAR) 25 mg tablet Take  by mouth daily.  famotidine (PEPCID AC) 20 mg tablet Take 20 mg by mouth daily.  cyanocobalamin/mecobalamin (CYANOCOBALAMIN-METHYLCOBALAMIN SL) 5,000 mcg by SubLINGual route daily.  pravastatin (PRAVACHOL) 40 mg tablet Take 40 mg by mouth nightly.  STIOLTO RESPIMAT 2.5-2.5 mcg/actuation inhaler Take 2 Puffs by inhalation nightly.  venlafaxine (EFFEXOR) 50 mg tablet Take 50 mg by mouth daily.  cholecalciferol (VITAMIN D3) 1,000 unit cap Take 2,000 Units by mouth daily.  calcium-cholecalciferol, D3, (CALTRATE 600+D) tablet Take 1 Tab by mouth nightly.  aspirin delayed-release 81 mg tablet Take  by mouth every other day. No current facility-administered medications for this visit. Allergies   Allergen Reactions    Keflex [Cephalexin] Hives    Penicillins Rash    Other Medication Itching     CHLORHEXADINE ( wipes caused moderate to severe itching in preop 8/5/19)        Review of Systems: A complete review of systems was obtained, negative except as described above. Physical Exam:     Visit Vitals  /72   Pulse 76   Temp 98 °F (36.7 °C) (Temporal)   Resp 18   Ht 5' 6\" (1.676 m)   Wt 177 lb 3.2 oz (80.4 kg)   SpO2 94%   BMI 28.60 kg/m²     ECOG PS: 0  General: no distress  Respiratory: normal respiratory effort  CV: no peripheral edema  Skin: no rashes; no ecchymoses; no petechiae  Breasts:  Bilateral mastectomies. Results:     Lab Results   Component Value Date/Time    WBC 11.3 (H) 07/10/2019 07:57 AM    HGB 11.8 07/10/2019 07:57 AM    HCT 34.5 (L) 07/10/2019 07:57 AM    PLATELET 091 37/99/1606 07:57 AM    MCV 96.6 07/10/2019 07:57 AM    ABS.  NEUTROPHILS 8.0 07/10/2019 07:57 AM     Lab Results   Component Value Date/Time    Sodium 138 07/10/2019 07:57 AM    Potassium 3.6 08/05/2019 06:45 AM    Chloride 100 07/10/2019 07:57 AM    CO2 32 07/10/2019 07:57 AM    Glucose 107 (H) 07/10/2019 07:57 AM    BUN 13 07/10/2019 07:57 AM    Creatinine 0.76 07/10/2019 07:57 AM    GFR est AA >60 07/10/2019 07:57 AM    GFR est non-AA >60 07/10/2019 07:57 AM    Calcium 9.9 07/10/2019 07:57 AM    Glucose (POC) 93 03/19/2019 06:56 AM     Lab Results   Component Value Date/Time    Bilirubin, total 0.4 07/10/2019 07:57 AM    ALT (SGPT) 17 07/10/2019 07:57 AM    Alk. phosphatase 83 07/10/2019 07:57 AM    Protein, total 6.6 07/10/2019 07:57 AM    Albumin 3.5 07/10/2019 07:57 AM    Globulin 3.1 07/10/2019 07:57 AM       2/12/19 mammogram and US  MAMMOGRAPHY:  The breast demonstrates stable scattered density breast  architecture. Underlying the site of clinical concern in the left breast is  masslike focal asymmetry in the anterior upper left breast. There is no other  dominant mass lesion, architectural distortion, asymmetry, or calcification. There is no skin thickening or nipple retraction.     ULTRASOUND:  Corresponding to the mammographic density there is a 2.4 x 3.1 x  3.7 cm hypoechoic lobulated mass at about the 12:00 position and 3 cm radial  distance from the nipple. No other suspicious cystic or solid lesions  Identified. Dr. Zaheer Ponce has identified 2 large, suspicious ax LN on US:  1.7 cm and 1.2 cm    5/16/19 Left mammo and US:  The breast parenchyma is heterogeneously dense. Left breast biopsy clip is  unchanged in position. The central upper left breast mass seen on prior MR exam  dated February 2019 has decreased in size.     Targeted sonographic evaluation of the 12:00 position of the left breast is  performed.  Within the 12:00 position of the left breast, there is a 2.1 cm x 1.3  cm x 3.1 cm irregular hypoechoic mass containing calcifications, decreased in  size and measuring 3.8 cm x 2.4 cm x 3.1 cm on prior ultrasound dated February 2018.     IMPRESSION: BI-RADS 6. Pathology proven left breast cancer. Interval decrease in size of 12:00 left breast mass, now measuring 2.1 cm x 1.3 cm x 3.1 cm. Findings were discussed with the patient at the time of interpretation. 11/4/19 dexa  Femoral Neck Left:  Bone mineral density (gm/cm2): 0.904  % of peak bone mass: 87  % for age matched controls: 116  T-score: -1.0  Z-score: 0.9     Femoral Neck Right:  Bone mineral density (gm/cm2): 0.866  % of peak bone mass: 83  % for age matched controls:  111  T-score: -1.2  Z-score: 0.6     Total Hip Left:  Bone mineral density (gm/cm2): 1.000  % of peak bone mass: 99  % for age matched controls: 126  T-score: -0.1  Z-score: 1.6     Total Hip Right:  Bone mineral density (gm/cm2): 0.935  % of peak bone mass: 93  % for age matched controls:  118  T-score: -0.6  Z-score: 1.1     Lumbar Spine: L1-L3  Bone mineral density (gm/cm2): 1.440  % of peak bone mass: 122  % for age matched controls: 145  T-score: 2.1  Z-score: 3.7     33% Radius Left:  Bone mineral density (gm/cm2): 0.807  % of peak bone mass: 92  % for age matched controls:  80  T-score: -0.8  Z-score: 1.6    Impression: This patient is osteopenic using the World Health Organization criteria  10 year probability of major osteoporotic fracture: 11.7%  10 year probability of hip fracture: 3.5%  Records reviewed and summarized above. Assessment/plan:   1. Left central IDC, gr 2, ER +, IL +, HER 2 positive:  cT2 cN1a cM0, stage IIB, prognostic stage IB; ypT0 ypN0 cM0    We explained to the patient that the goal of systemic adjuvant therapy is to improve the chances for cure and decrease the risk of relapse. We explained why a patient can have microscopic cancer spread now even though physical examination, laboratory studies and imaging studies are negative for cancer.  We explained that the same treatments used now as adjuvant or preventive treatments rarely if ever are curative in women who develop metastases. TTE 3/6/19 EF 72%, TTE 6/4/19 EF 66%, TTE 9/3/19, EF 66%; TTE 12/6/2019 with EF 65%, TTE on 2/28/2020 EF 65%. TTE on 9/25/2020, EF 63%. RU, continue anastrozole daily, this was resumed on 4/9/2020. Ostomy reversal in July 2020, port was removed in June 2020.    2. Emotional well being:  She has excellent support and is coping well with her disease    3. tob use:  Smokes 3-5 cigs/day; counseled to quit. 4. Fatigue: Appetite continues to improve. Energy level has been improving. 5. 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 Started after Cycle 3. Does not wish to start any medications at this time. Patient will let us know if this worsens. 6. Decreased EF:  EF dropped from 72% to 66% and global strain dropped from 18% to 15.7% between 3/2019 to 6/2019, however per Dr. Flaco Parks, visually there is no significant change. Recommend close follow up and Troponin I level. Trop on 6/19/19 was normal. TTE on 9/3/19 showed no significant change in overall LV systolic function. TTE on 12/6/2019 with stable EF, TTE on 2/28/2020 EF 65%. Most recent EF on 9/25/2020, normal , GLS improved. 7. Alopecia:  May be due to taxotere, will monitor    8. Hand stiffness: Worse in right hand. Previously believed it may have been related to anastrozole. She was given AI holiday with no improvement, resumed anastrozole. States it is worse in AM and improved with movement and heat. She has met with ortho, s/p prednisone and gabapentin. She has improvement in symptoms. 9. Rash to back:  Ongoing since chemo. Has met with PCP, started on triamcinolone cream PRN. Met with dermatologist.  Using creams. 10. Lymphedema: To left arm, very mild. Patient will let us know if this worsens. She has seen lymphedema. Stable. 11. Tightness/spasms: To left breast/axilla. Reports intermittent. Using massager at home.  Previously discussed PT, she feels this is stable/improved with massager. 12. Osteopenia: seen on DEXA 11/2019. She is taking 200 international units per day and 2-3 servings of dietary calcium. Repeat DEXA due 11/2021. This patient was seen in conjunction with Criselda Lutz NP. I personally reviewed the history and all points in the assessment and plan, and performed key points on the exam.   Left breast cancer, ER +, HER 2 +, on anastrozole, RU. dexa to monitor osteopenia. Has alopecia due to taxotere. Has hand stiffness due to AI, monitoring    I appreciate the opportunity to participate in Ms. Bibi Krause's care. Signed By: Mele Andrade MD      No orders of the defined types were placed in this encounter.

## 2021-10-27 NOTE — PROGRESS NOTES
Jarad Mishra is a 64 y.o. female Follow up for the evaluation of breast cancer. 1. Have you been to the ER, urgent care clinic since your last visit? Hospitalized since your last visit? Yes.     2. Have you seen or consulted any other health care providers outside of the [address] [place] [address] since your last visit? Include any pap smears or colon screening. Yes JW in June.

## 2021-11-18 ENCOUNTER — HOSPITAL ENCOUNTER (OUTPATIENT)
Dept: MAMMOGRAPHY | Age: 76
Discharge: HOME | End: 2021-11-18
Attending: NURSE PRACTITIONER
Payer: MEDICARE

## 2021-11-18 DIAGNOSIS — Z78.0 POST-MENOPAUSAL: ICD-10-CM

## 2021-11-18 DIAGNOSIS — C50.112 MALIGNANT NEOPLASM OF CENTRAL PORTION OF LEFT BREAST IN FEMALE, ESTROGEN RECEPTOR POSITIVE (HCC): ICD-10-CM

## 2021-11-18 DIAGNOSIS — Z17.0 MALIGNANT NEOPLASM OF CENTRAL PORTION OF LEFT BREAST IN FEMALE, ESTROGEN RECEPTOR POSITIVE (HCC): ICD-10-CM

## 2021-11-18 PROCEDURE — 77080 DXA BONE DENSITY AXIAL: CPT

## 2021-12-16 ENCOUNTER — TELEPHONE (OUTPATIENT)
Dept: ONCOLOGY | Age: 76
End: 2021-12-16

## 2021-12-16 PROBLEM — M85.89 OSTEOPENIA OF MULTIPLE SITES: Status: ACTIVE | Noted: 2021-12-16

## 2021-12-16 RX ORDER — DIPHENHYDRAMINE HYDROCHLORIDE 50 MG/ML
25 INJECTION, SOLUTION INTRAMUSCULAR; INTRAVENOUS AS NEEDED
Start: 2023-09-13

## 2021-12-16 RX ORDER — HYDROCORTISONE SODIUM SUCCINATE 100 MG/2ML
100 INJECTION, POWDER, FOR SOLUTION INTRAMUSCULAR; INTRAVENOUS AS NEEDED
OUTPATIENT
Start: 2024-02-28

## 2021-12-16 RX ORDER — SODIUM CHLORIDE 0.9 % (FLUSH) 0.9 %
10 SYRINGE (ML) INJECTION AS NEEDED
OUTPATIENT
Start: 2024-08-14

## 2021-12-16 RX ORDER — ONDANSETRON 2 MG/ML
8 INJECTION INTRAMUSCULAR; INTRAVENOUS AS NEEDED
OUTPATIENT
Start: 2023-09-13

## 2021-12-16 RX ORDER — SODIUM CHLORIDE 9 MG/ML
10 INJECTION INTRAMUSCULAR; INTRAVENOUS; SUBCUTANEOUS AS NEEDED
OUTPATIENT
Start: 2023-03-29

## 2021-12-16 RX ORDER — SODIUM CHLORIDE 9 MG/ML
10 INJECTION INTRAMUSCULAR; INTRAVENOUS; SUBCUTANEOUS AS NEEDED
OUTPATIENT
Start: 2023-09-13

## 2021-12-16 RX ORDER — ALBUTEROL SULFATE 0.83 MG/ML
2.5 SOLUTION RESPIRATORY (INHALATION) AS NEEDED
Start: 2022-04-27

## 2021-12-16 RX ORDER — ONDANSETRON 2 MG/ML
8 INJECTION INTRAMUSCULAR; INTRAVENOUS AS NEEDED
OUTPATIENT
Start: 2024-02-28

## 2021-12-16 RX ORDER — HYDROCORTISONE SODIUM SUCCINATE 100 MG/2ML
100 INJECTION, POWDER, FOR SOLUTION INTRAMUSCULAR; INTRAVENOUS AS NEEDED
OUTPATIENT
Start: 2023-03-29

## 2021-12-16 RX ORDER — HYDROCORTISONE SODIUM SUCCINATE 100 MG/2ML
100 INJECTION, POWDER, FOR SOLUTION INTRAMUSCULAR; INTRAVENOUS AS NEEDED
OUTPATIENT
Start: 2023-09-13

## 2021-12-16 RX ORDER — DIPHENHYDRAMINE HYDROCHLORIDE 50 MG/ML
50 INJECTION, SOLUTION INTRAMUSCULAR; INTRAVENOUS AS NEEDED
Start: 2023-03-29

## 2021-12-16 RX ORDER — HYDROCORTISONE SODIUM SUCCINATE 100 MG/2ML
100 INJECTION, POWDER, FOR SOLUTION INTRAMUSCULAR; INTRAVENOUS AS NEEDED
OUTPATIENT
Start: 2022-10-12

## 2021-12-16 RX ORDER — SODIUM CHLORIDE 0.9 % (FLUSH) 0.9 %
10 SYRINGE (ML) INJECTION AS NEEDED
OUTPATIENT
Start: 2024-02-28

## 2021-12-16 RX ORDER — SODIUM CHLORIDE 0.9 % (FLUSH) 0.9 %
10 SYRINGE (ML) INJECTION AS NEEDED
OUTPATIENT
Start: 2023-03-29

## 2021-12-16 RX ORDER — ACETAMINOPHEN 325 MG/1
650 TABLET ORAL AS NEEDED
Start: 2024-02-28

## 2021-12-16 RX ORDER — ALBUTEROL SULFATE 0.83 MG/ML
2.5 SOLUTION RESPIRATORY (INHALATION) AS NEEDED
Start: 2024-02-28

## 2021-12-16 RX ORDER — HEPARIN 100 UNIT/ML
300-500 SYRINGE INTRAVENOUS AS NEEDED
Start: 2022-10-12

## 2021-12-16 RX ORDER — DIPHENHYDRAMINE HYDROCHLORIDE 50 MG/ML
25 INJECTION, SOLUTION INTRAMUSCULAR; INTRAVENOUS AS NEEDED
Start: 2023-03-29

## 2021-12-16 RX ORDER — EPINEPHRINE 1 MG/ML
0.3 INJECTION, SOLUTION, CONCENTRATE INTRAVENOUS AS NEEDED
OUTPATIENT
Start: 2023-03-29

## 2021-12-16 RX ORDER — ALBUTEROL SULFATE 0.83 MG/ML
2.5 SOLUTION RESPIRATORY (INHALATION) AS NEEDED
Start: 2024-08-14

## 2021-12-16 RX ORDER — HEPARIN 100 UNIT/ML
300-500 SYRINGE INTRAVENOUS AS NEEDED
Start: 2023-03-29

## 2021-12-16 RX ORDER — EPINEPHRINE 1 MG/ML
0.3 INJECTION, SOLUTION, CONCENTRATE INTRAVENOUS AS NEEDED
OUTPATIENT
Start: 2023-09-13

## 2021-12-16 RX ORDER — SODIUM CHLORIDE 9 MG/ML
25 INJECTION, SOLUTION INTRAVENOUS CONTINUOUS
OUTPATIENT
Start: 2024-08-14

## 2021-12-16 RX ORDER — HEPARIN 100 UNIT/ML
300-500 SYRINGE INTRAVENOUS AS NEEDED
Start: 2024-08-14

## 2021-12-16 RX ORDER — DIPHENHYDRAMINE HYDROCHLORIDE 50 MG/ML
25 INJECTION, SOLUTION INTRAMUSCULAR; INTRAVENOUS AS NEEDED
Start: 2022-10-12

## 2021-12-16 RX ORDER — ONDANSETRON 2 MG/ML
8 INJECTION INTRAMUSCULAR; INTRAVENOUS AS NEEDED
OUTPATIENT
Start: 2022-04-27

## 2021-12-16 RX ORDER — SODIUM CHLORIDE 9 MG/ML
25 INJECTION, SOLUTION INTRAVENOUS CONTINUOUS
OUTPATIENT
Start: 2024-02-28

## 2021-12-16 RX ORDER — ALBUTEROL SULFATE 0.83 MG/ML
2.5 SOLUTION RESPIRATORY (INHALATION) AS NEEDED
Start: 2022-10-12

## 2021-12-16 RX ORDER — HYDROCORTISONE SODIUM SUCCINATE 100 MG/2ML
100 INJECTION, POWDER, FOR SOLUTION INTRAMUSCULAR; INTRAVENOUS AS NEEDED
OUTPATIENT
Start: 2024-08-14

## 2021-12-16 RX ORDER — SODIUM CHLORIDE 9 MG/ML
25 INJECTION, SOLUTION INTRAVENOUS CONTINUOUS
OUTPATIENT
Start: 2022-10-12

## 2021-12-16 RX ORDER — ONDANSETRON 2 MG/ML
8 INJECTION INTRAMUSCULAR; INTRAVENOUS AS NEEDED
OUTPATIENT
Start: 2022-10-12

## 2021-12-16 RX ORDER — HEPARIN 100 UNIT/ML
300-500 SYRINGE INTRAVENOUS AS NEEDED
Start: 2024-02-28

## 2021-12-16 RX ORDER — SODIUM CHLORIDE 9 MG/ML
10 INJECTION INTRAMUSCULAR; INTRAVENOUS; SUBCUTANEOUS AS NEEDED
OUTPATIENT
Start: 2022-04-27

## 2021-12-16 RX ORDER — SODIUM CHLORIDE 0.9 % (FLUSH) 0.9 %
10 SYRINGE (ML) INJECTION AS NEEDED
OUTPATIENT
Start: 2023-09-13

## 2021-12-16 RX ORDER — SODIUM CHLORIDE 0.9 % (FLUSH) 0.9 %
10 SYRINGE (ML) INJECTION AS NEEDED
OUTPATIENT
Start: 2022-10-12

## 2021-12-16 RX ORDER — SODIUM CHLORIDE 9 MG/ML
10 INJECTION INTRAMUSCULAR; INTRAVENOUS; SUBCUTANEOUS AS NEEDED
OUTPATIENT
Start: 2024-02-28

## 2021-12-16 RX ORDER — EPINEPHRINE 1 MG/ML
0.3 INJECTION, SOLUTION, CONCENTRATE INTRAVENOUS AS NEEDED
OUTPATIENT
Start: 2024-02-28

## 2021-12-16 RX ORDER — DIPHENHYDRAMINE HYDROCHLORIDE 50 MG/ML
50 INJECTION, SOLUTION INTRAMUSCULAR; INTRAVENOUS AS NEEDED
Start: 2024-08-14

## 2021-12-16 RX ORDER — EPINEPHRINE 1 MG/ML
0.3 INJECTION, SOLUTION, CONCENTRATE INTRAVENOUS AS NEEDED
OUTPATIENT
Start: 2022-04-27

## 2021-12-16 RX ORDER — ONDANSETRON 2 MG/ML
8 INJECTION INTRAMUSCULAR; INTRAVENOUS AS NEEDED
OUTPATIENT
Start: 2023-03-29

## 2021-12-16 RX ORDER — DIPHENHYDRAMINE HYDROCHLORIDE 50 MG/ML
25 INJECTION, SOLUTION INTRAMUSCULAR; INTRAVENOUS AS NEEDED
Start: 2024-08-14

## 2021-12-16 RX ORDER — HEPARIN 100 UNIT/ML
300-500 SYRINGE INTRAVENOUS AS NEEDED
Start: 2023-09-13

## 2021-12-16 RX ORDER — SODIUM CHLORIDE 9 MG/ML
10 INJECTION INTRAMUSCULAR; INTRAVENOUS; SUBCUTANEOUS AS NEEDED
OUTPATIENT
Start: 2024-08-14

## 2021-12-16 RX ORDER — DIPHENHYDRAMINE HYDROCHLORIDE 50 MG/ML
50 INJECTION, SOLUTION INTRAMUSCULAR; INTRAVENOUS AS NEEDED
Start: 2023-09-13

## 2021-12-16 RX ORDER — ACETAMINOPHEN 325 MG/1
650 TABLET ORAL AS NEEDED
Start: 2024-08-14

## 2021-12-16 RX ORDER — ALBUTEROL SULFATE 0.83 MG/ML
2.5 SOLUTION RESPIRATORY (INHALATION) AS NEEDED
Start: 2023-09-13

## 2021-12-16 RX ORDER — DIPHENHYDRAMINE HYDROCHLORIDE 50 MG/ML
50 INJECTION, SOLUTION INTRAMUSCULAR; INTRAVENOUS AS NEEDED
Start: 2024-02-28

## 2021-12-16 RX ORDER — DIPHENHYDRAMINE HYDROCHLORIDE 50 MG/ML
25 INJECTION, SOLUTION INTRAMUSCULAR; INTRAVENOUS AS NEEDED
Start: 2022-04-27

## 2021-12-16 RX ORDER — SODIUM CHLORIDE 9 MG/ML
10 INJECTION INTRAMUSCULAR; INTRAVENOUS; SUBCUTANEOUS AS NEEDED
OUTPATIENT
Start: 2022-10-12

## 2021-12-16 RX ORDER — EPINEPHRINE 1 MG/ML
0.3 INJECTION, SOLUTION, CONCENTRATE INTRAVENOUS AS NEEDED
OUTPATIENT
Start: 2022-10-12

## 2021-12-16 RX ORDER — DIPHENHYDRAMINE HYDROCHLORIDE 50 MG/ML
50 INJECTION, SOLUTION INTRAMUSCULAR; INTRAVENOUS AS NEEDED
Start: 2022-10-12

## 2021-12-16 RX ORDER — DIPHENHYDRAMINE HYDROCHLORIDE 50 MG/ML
50 INJECTION, SOLUTION INTRAMUSCULAR; INTRAVENOUS AS NEEDED
Start: 2022-04-27

## 2021-12-16 RX ORDER — HYDROCORTISONE SODIUM SUCCINATE 100 MG/2ML
100 INJECTION, POWDER, FOR SOLUTION INTRAMUSCULAR; INTRAVENOUS AS NEEDED
OUTPATIENT
Start: 2022-04-27

## 2021-12-16 RX ORDER — ACETAMINOPHEN 325 MG/1
650 TABLET ORAL AS NEEDED
Start: 2022-04-27

## 2021-12-16 RX ORDER — SODIUM CHLORIDE 9 MG/ML
25 INJECTION, SOLUTION INTRAVENOUS CONTINUOUS
OUTPATIENT
Start: 2023-09-13

## 2021-12-16 RX ORDER — SODIUM CHLORIDE 9 MG/ML
25 INJECTION, SOLUTION INTRAVENOUS CONTINUOUS
OUTPATIENT
Start: 2022-04-27

## 2021-12-16 RX ORDER — SODIUM CHLORIDE 0.9 % (FLUSH) 0.9 %
10 SYRINGE (ML) INJECTION AS NEEDED
OUTPATIENT
Start: 2022-04-27

## 2021-12-16 RX ORDER — ACETAMINOPHEN 325 MG/1
650 TABLET ORAL AS NEEDED
Start: 2022-10-12

## 2021-12-16 RX ORDER — HEPARIN 100 UNIT/ML
300-500 SYRINGE INTRAVENOUS AS NEEDED
Start: 2022-04-27

## 2021-12-16 RX ORDER — EPINEPHRINE 1 MG/ML
0.3 INJECTION, SOLUTION, CONCENTRATE INTRAVENOUS AS NEEDED
OUTPATIENT
Start: 2024-08-14

## 2021-12-16 RX ORDER — ACETAMINOPHEN 325 MG/1
650 TABLET ORAL AS NEEDED
Start: 2023-03-29

## 2021-12-16 RX ORDER — ACETAMINOPHEN 325 MG/1
650 TABLET ORAL AS NEEDED
Start: 2023-09-13

## 2021-12-16 RX ORDER — ALBUTEROL SULFATE 0.83 MG/ML
2.5 SOLUTION RESPIRATORY (INHALATION) AS NEEDED
Start: 2023-03-29

## 2021-12-16 RX ORDER — SODIUM CHLORIDE 9 MG/ML
25 INJECTION, SOLUTION INTRAVENOUS CONTINUOUS
OUTPATIENT
Start: 2023-03-29

## 2021-12-16 RX ORDER — ONDANSETRON 2 MG/ML
8 INJECTION INTRAMUSCULAR; INTRAVENOUS AS NEEDED
OUTPATIENT
Start: 2024-08-14

## 2021-12-16 RX ORDER — DIPHENHYDRAMINE HYDROCHLORIDE 50 MG/ML
25 INJECTION, SOLUTION INTRAMUSCULAR; INTRAVENOUS AS NEEDED
Start: 2024-02-28

## 2021-12-16 NOTE — TELEPHONE ENCOUNTER
Patient left a voicemail stating that she is returning a phone call. Please advise.     CB# [phone]

## 2021-12-16 NOTE — TELEPHONE ENCOUNTER
Returned call to patient. ID verified x2. Discussed with patient bone density results with worsening osteopenia. We discussed the data from [address] [facility], [address] [facility] 2013, for the meta-analysis for adjuvant bisphosphonate use in breast cancer. We discussed that in postmenopausal women, it appears that the bisphosphate zolendronic acid, given as in ABCSG 12 at 4 mg IV q 6 months x 3 years, is beneficial in improving bone DFS and OS. We discussed side effects such as ONJ and the need to avoid invasive dental procedures while on these medications, renal damage, and hypocalcemia. She is agreeable to this but is planning for some dental work in February. Will schedule to start with concurrent OV visit in April. She will let us know the official date of her procedure to ensure adequate time prior to zometa infusion.

## 2022-01-05 ENCOUNTER — TELEPHONE (OUTPATIENT)
Dept: ONCOLOGY | Age: 77
End: 2022-01-05

## 2022-01-05 NOTE — TELEPHONE ENCOUNTER
[employer] at [facility]  [phone]        01/05/22 1:39 PM Attempted to call patient x 2 via contact number provided. Call dropped each time. Will attempt to call patient again later if no return call received.

## 2022-01-05 NOTE — TELEPHONE ENCOUNTER
PT called stating that she need Dr. Valentina Edwards to put in an order for Left arm swelling- please advise a CB# [phone]

## 2022-01-07 DIAGNOSIS — I89.0 LYMPHEDEMA OF LEFT ARM: ICD-10-CM

## 2022-01-07 DIAGNOSIS — C50.112 MALIGNANT NEOPLASM OF CENTRAL PORTION OF LEFT BREAST IN FEMALE, ESTROGEN RECEPTOR POSITIVE (HCC): Primary | ICD-10-CM

## 2022-01-07 DIAGNOSIS — Z17.0 MALIGNANT NEOPLASM OF CENTRAL PORTION OF LEFT BREAST IN FEMALE, ESTROGEN RECEPTOR POSITIVE (HCC): Primary | ICD-10-CM

## 2022-01-07 NOTE — TELEPHONE ENCOUNTER
[employer] at [place]  [phone]    01/07/22 2:54 PM - Attempted to call the lymphedema clinic. There was no answer. Left a voicemail for the office to call back at their earliest convenience along with the phone number to our office.

## 2022-01-07 NOTE — TELEPHONE ENCOUNTER
[address] at [facility]  [phone]    01/07/22 3:16 PM - Per Marie Ham from the Lymphedema clinic called and stated Dr. Renny Velez just needs to put in a referral in [facility]. [address] at [facility]  [phone]    01/07/22 3:47 PM - Called and spoke with the patient. Advised the referral has been placed and faxed to the lymphedema clinic. The patient voiced understanding. No further questions or concerns.

## 2022-01-07 NOTE — TELEPHONE ENCOUNTER
[address] at [facility]  [phone]    01/07/22 9:29 AM - Called and spoke with the patient to gather more information. Patient states she has an appointment on 02/18 with the \"arm\" clinic located next door to us (lymphedema). She states our office needs to get in touch with the clinic to approve them seeing her. Advised this nurse would relay her message. Patient voiced understanding. No further questions or concerns.

## 2022-02-02 DIAGNOSIS — Z17.0 MALIGNANT NEOPLASM OF CENTRAL PORTION OF LEFT BREAST IN FEMALE, ESTROGEN RECEPTOR POSITIVE (HCC): ICD-10-CM

## 2022-02-02 DIAGNOSIS — C50.112 MALIGNANT NEOPLASM OF CENTRAL PORTION OF LEFT BREAST IN FEMALE, ESTROGEN RECEPTOR POSITIVE (HCC): ICD-10-CM

## 2022-02-03 RX ORDER — ANASTROZOLE 1 MG/1
TABLET ORAL
Qty: 90 TABLET | Refills: 2 | Status: SHIPPED | OUTPATIENT
Start: 2022-02-03 | End: 2022-10-24 | Stop reason: SDUPTHER

## 2022-02-11 ENCOUNTER — HOSPITAL ENCOUNTER (OUTPATIENT)
Dept: RADIATION THERAPY | Age: 77
Discharge: HOME | End: 2022-02-11

## 2022-02-18 ENCOUNTER — HOSPITAL ENCOUNTER (OUTPATIENT)
Dept: PHYSICAL THERAPY | Age: 77
Discharge: HOME | End: 2022-02-18
Payer: MEDICARE

## 2022-02-18 PROCEDURE — 97161 PT EVAL LOW COMPLEX 20 MIN: CPT

## 2022-02-18 PROCEDURE — 97140 MANUAL THERAPY 1/> REGIONS: CPT

## 2022-02-18 PROCEDURE — 97530 THERAPEUTIC ACTIVITIES: CPT

## 2022-02-18 NOTE — PROGRESS NOTES
Statement of Medical Necessity  Page 1 of 2      Sean Hankins 1945 Today's Date: 2/18/2022 JULIETTE: [employer]   Payor: Deborah Bowens / Plan: [place] MEDICARE PART A & B / Product Type: Medicare /  ME: TBD  Refills: 2               Diagnosis  [x]   I97.2 Post-Mastectomy Lymphedema []   I87.2 Venous Insufficiency   []   I89.0 Lymphedema, other secondary  []   I83.019 Venous Stasis Ulcer LE, Right   []   I89.9 Unspecified Lymphatic Disorder []   I83.029 Venous Stasis Ulcer LE, Left   [x]   R60.9 Swelling not relieved by elevation []   Q82.0 Hereditary/ Congenital Lymphedema   []   C50.211 Malignant neoplasm of breast, Right []   G89.3  Cancer associated pain   [x]   C50.212 Malignant neoplasm of breast, Left []   L03.115 LE Cellulitis, Right   []    []   L03.116 LE Cellulitis, Left                                                           Sean Hankins    1945  Page 2 of 2    Physician Order for DME for Diagnosis of Post-mastectomy secondary lymphedema as Listed on Statement of Medical Necessity, Page 1        Recommended Product:  Units Upper Extremity Rt Lt Units Lower Extremity Rt Lt    Circ-Aid, Ready Wrap, Sigvaris Arm    Inelastic binders (Sandi Torres)  []Foot   []Below Knee   []Knee   []Thigh      Circ-Aid Ready Wrap, Sigvaris hand    Dae Yohannes, night use []Full Leg  []Lower Leg      Tribute Arm, night use    Tribute, night use  []Full Leg  []Lower Leg      Dae Yohannes Arm, night use    Ciro Sleeve Leg/ Foot, night use     4 Gradiant Compression Sleeves & Gloves  [x]Custom [x] RM Arm:  [x]CCL1 [x]CCL2 []CCL3  [x]Custom [x] RM Glove: [x]CCL1 [x]CCL2 []CCL3    x  Gradient Compression Stockings   []Custom  []RM Lower Extremity:   []CCL1       []CCL2         []CCL3   []Knee       []Thigh        []Waist Length      Ciro sleeve arm w/ hand, night use    Tribute Wrap, night use      Compression Bra         1 vasopneumatic pump   x       The above patient was referred for treatment of Lymphedema due to the diagnosis indicated above. Lymphedema is a progressive and chronic condition. It may cause acute infections, muscle atrophy, discomfort, and connective tissue fibrosis with irreversible tissue damage, decreased ROM in the affected limb and diminished functional mobility possibly interfering with independence and ability to work. Recurrent infections and wound complications that commonly occur with Lymphedema often require hospitalization and extensive wound care, thus increasing medical costs. The patient has received complete decongestive therapy which includes manual lymphatic drainage, lymphedema specific exercises, compression bandaging, and hygiene/skin care. Goals of therapy are to reduce the edema and prevent re-accumulation of fluid with its complications. It is medically necessary for this patient to have daytime garments to control this condition. They will need 2 sets (one set to wear and one set to wash for adequate skin care and wearing time). Garments must be replaced every 4-6 months for effectiveness. There are no substitutes available that offer acceptable compression treatment for this Lymphedema patient. If further information is requested, please contact our certified lymphedema therapists at [phone].     [x] Janet López, PT, DPT, [facility]IG[facility]  [] Yair Shetty PT, [facility]  [] Jennifer Stephens, PT, DPT, [facility]      Printed  Provider Name Tisha Harris MD   Provider Signature  Date    Provider NPI [id number]

## 2022-02-18 NOTE — PROGRESS NOTES
[facility]  Lymphedema Clinic and Cancer Rehabilitation  [address] [place] [address].  [address]  [address]    INITIAL EVALUATION    NAME: Ade Funez AGE: 64 y.o. GENDER: female  DATE: 2/18/2022  REFERRING PHYSICIAN: Emeli Penn MD  HISTORY AND BACKGROUND:  Pt is a 63 yo female referred to Lymphedema clinic for re-evaluation of left UE swelling s/p bilateral mastectomies in Aug 2019 and left SLNB. Pt completed radiation in Nov 2019 and chemo in March 2020. Pt previously evaluated in Oct 2020. Recommended prophylactic UE garments, however pt declined at that time. Pt reports increased left UE swelling about 2 months ago. Pt denies any change in medical history or activity level. Pt does report that she bumped her left elbow a few months back and had a large hematoma that she eventually \"punctured\" to help it drain. Pt says she cleaned the area and applied antibiotic ointment, denies any infection. Pt also c/o tightness in left axilla and left chest wall.      Breast cancer history as follows per oncology note:   · 2/15/19 Left core bx:   IDC, gr 2, 1.1 cm, ER + at 100%, RI + at 20%, HER 2 POSITIVE (IHC 2+; FISH ratio 3.9; sig/cell 9.2)  · 3/1/19 Left ax LN core bx:  + for breast cancer, ER + at 100%, RI negative, HER 2 POSITIVE (IHC 2+, FISH ratio 2; sig/cell 5.8)  · TCH-P 3/6/19-7/10/19  · #2 delayed due to diverticulitis abscess and surgery 4/17/19  · 8/5/19 Bilateral Mastecotomy: Right breast: lobular carcinomna in situ, fibrocystic changes including atypical and usual ductal hyperplasia, sclerosing adenosis apocrine metaplasia, microcalcification.  Left breast: No evidence of residual invasive carcinoma or carcinoma in situ, no DCIS, 0/9 LNs; ypT0 yp N0 cM0, pCR  · [employer] 8/28/19-3/25/2020  · S/p XRT 11/27/19  Anastrozole 11/2019-3/25/20 (hand stiffness), resumed 4/9/2020.    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 Present Symptoms and Functional Limitations: left UE swelling primarily at posterior elbow, forearm, upper arm; soft tissue adhesions left chest wall; left shoulder dysfunction  Lymphedema Life Impact Scale: 1/68, 1% impairment     Past Medical History:   Past Medical History:   Diagnosis Date    Adverse effect of anesthesia     \"difficulty waking up from anesthethia\"    Cancer ([address] 02/2019    left breast. current chemo patient    Depression     in past    GERD (gastroesophageal reflux disease)     High cholesterol     Hypertension      Past Surgical History:   Procedure Laterality Date    HX BREAST BIOPSY Right years ago    neg; needle bx    HX BREAST BIOPSY Left 02/15/2019    IDC    HX CATARACT REMOVAL      2013 (R), 2016(L)    HX COLONOSCOPY      HX COLOSTOMY Left 03/2019    HX MASTECTOMY Bilateral 8/5/2019    BILATERAL BREAST MASTECTOMIES AND LEFT BREAST SENTINEL NODE BIOPSY WITH BLUE DYE performed by Man Dunn MD at [place] [address] HX ORTHOPAEDIC      carpal tunnel    HX VASCULAR ACCESS Right 02/2019    portacath    IR CHANGE DRAIN  [facility]  03/2019     Current Medications:    Current Outpatient Medications   Medication Sig    anastrozole (ARIMIDEX) 1 mg tablet TAKE 1 TABLET EVERY DAY    acetylcysteine 500 mg cap Take 500 mg by mouth.  cyanocobalamin 1,000 mcg tablet Take 1,000 mcg by mouth daily.  resveratroL 100 mg cap Take 100 mg by mouth.  TURMERIC PO Take 1,500 mg by mouth.  fluticasone-umeclidinium-vilanterol (Trelegy Ellipta) 100-62.5-25 mcg inhaler Take 1 Puff by inhalation daily.  losartan (COZAAR) 25 mg tablet Take  by mouth daily.  famotidine (PEPCID AC) 20 mg tablet Take 20 mg by mouth daily.     cyanocobalamin/mecobalamin (CYANOCOBALAMIN-METHYLCOBALAMIN SL) 5,000 mcg by SubLINGual route daily.  pravastatin (PRAVACHOL) 40 mg tablet Take 40 mg by mouth nightly.  STIOLTO RESPIMAT 2.5-2.5 mcg/actuation inhaler Take 2 Puffs by inhalation nightly.  venlafaxine (EFFEXOR) 50 mg tablet Take 50 mg by mouth daily.  cholecalciferol (VITAMIN D3) 1,000 unit cap Take 2,000 Units by mouth daily.  calcium-cholecalciferol, D3, (CALTRATE 600+D) tablet Take 1 Tab by mouth nightly.  aspirin delayed-release 81 mg tablet Take  by mouth every other day. No current facility-administered medications for this encounter. Allergies: Allergies   Allergen Reactions    Keflex [Cephalexin] Hives    Penicillins Rash    Other Medication Itching     CHLORHEXADINE ( wipes caused moderate to severe itching in preop 8/5/19)        Prior Level of Function/Social/Work History/Personal factors and/or comorbidities impacting plan of care: Patient is a retired hairdresser. Independent and able to complete all functional tasks. Pt completely independent and active prior to breast cancer diagnosis in 2019. Living Situation: lives alone in [address] [facility] [address] in [address] [place] [address]?: No  Mobility: Independent gait without any assistive device for community distances  Sleeping Arrangement:  in bed  Adaptive Equipment Owned: none  Other: Patient is able to don compression garments and does not have assistance   [facility] [address] (if applicable): Yes    Previous Therapy:  Patient has been treated at [address] [facility] in Oct 2020. Compression/Lymphedema Equipment:  None, pt declined prophylactic sleeve/gauntlet recommended in 2020. SUBJECTIVE:   Patients goals for therapy: decrease swelling and \"just to make sure swelling is ok. \" .   OBJECTIVE DATA SUMMARY:   EXAMINATION/PRESENTATION/DECISION MAKING:   Pain:       Self-Care and ADLs: independent     Skin and Tissue Assessment:  Photos:           JACQUELYN Chavarria, anterior view    Posterior view showing left upper arm fullness            Close up of lower arms, hands     Dermal Status: Intact, Tenuous, Dry and Scars; bilateral well healed mastectomy scars. Pt with very tenuous skin, easy bruising, arrives with scrape on right arm from brushing against clinic door. Texture/Consistency: Boggy: L upper arm, posterior elbow and Fibrotic/Woody: L forearm     Pigmentation/Color Change: Hyperpigmented left forearm     Anomalies: N/A    Circulatory: Pulses palpable     Nails: Normal    Stemmers Sign: Negative    Height:   5'6\"  Weight:   170 lbs   BMI:   27.4  (36 or greater: adversely affecting lymphedema)  Wound/Ulcer:  None         Volumetric Measurements:   Today 2/18/22:   Right:  2365. 55 mL Left:  2872. 95 mL   % Difference: 21.45% Dominance: right hand dominant   (See scanned graph)    10/14/2020:   Right:  2120.11 mL Left:  2160.48 mL   % Difference: 1.9% Dominance: right hand dominant   (See scanned graph)    Range of Motion:    Left shoulder ROM Right shoulder ROM   Flexion: 145 deg Flexion: full ROM    External rotation: to occiput  External rotation: to C7    Abduction: 160 deg Abduction: full ROM      Strength: WFL  Sensation:  Impaired numbness left posterior upper arm     Mobility:    Bed/Chair Mobility: Independent  Transfers: Independent  Gait: Independent  Endurance: good   Other:     Safety:  Patient is alert and oriented: yes, x 4  Safety awareness: good   Fall risk?: low, pt denies any recent falls   Patient given written fall prevention handout: Yes       Physical Therapy Evaluation Charge Determination   History Examination Presentation Decision-Making   HIGH Complexity :3+ comorbidities / personal factors will impact the outcome/ POC  MEDIUM Complexity : 3 Standardized tests and measures addressing body structure, function, activity limitation and / or participation in recreation  MEDIUM Complexity : Evolving with changing characteristics  Other outcome measures LLIS  LOW       Based on the above components, the patient evaluation is determined to be of the following complexity level: LOW     Evaluation Time: 10:20 - 10:40 am (20 minutes)    TREATMENT PROVIDED:   1. Treatment description: Therapeutic activity x 2  The patient was re-educated regarding the role and function of the lymphatic system, and instructed in the lymphedema management protocol of complete decongestive therapy (CDT). This includes skin care to prevent breakdown or infection, lymphedema exercises, custom compression therapy options (bandaging, compression garments, compression pump, Teena Pu, [employer], [employer], etc. as needed), and decongestion with manual lymphatic drainage as indicated. We discussed the need for consistent compression system for lymphedema management. Diaphragmatic breathing instruction and skin care education was performed,applying low pH lotion to extremity using upward strokes to stimulate lymphatic vessels. Due to 21% limb volume difference, increased risk of infection, and soft tissue changes as above, decision made to initiate phase I CDT for left UE decongestion prior to measuring for compression garments. Pt was given information regarding obtaining bandaging supplies. Decision made to order fleece vs foam due to tenuous skin and concern for pt not tolerating foam denisity. Pt will also benefit from manual therapy to help reduce soft tissue adhesions, improve scar tissue mobility, and increase left shoulder ROM. Pt in agreement with plan. Treatment time:  10:40 - 11:05 am  Minutes: 25    2. Treatment description:  Manual Therapy x 1                                                                                                                                                                                                                                                   Patient instructed in skin care principles and anatomy of the lymphatic system.   Therapist able to demonstrate manual lymphatic drainage techniques for the drainage of left UE/trunk and skin care protocol: instructed in self MLD and provided with handout today. Encouraged pt to perform self-MLD daily. Standard lymphedema precautions to include avoiding blood pressure readings, injections and IVs or other procedures/acts that could lead to broken skin on affected area, and avoiding excessive heat, resistive activity or altitude without compression garment. Reviewed signs of infection today. Pt with scratches from her dog. Recommend pt clean any cuts or abrasions and apply topical antibiotic ointment for infection prevention. Treatment time:  11:05 - 11:20 am  Minutes: 15  Pain Level: 0  ASSESSMENT:   Pascual Avitia is a 64 y.o. female who presents with secondary lymphedema of her UE: left palm wrist elbow mid forearm axilla, as well as soft tissue adhesions and left shoulder dysfunction. Pt is s/p bilateral mastectomies and radiation in 2019, 9 LNs removed from left axilla. Patient is highly motivated to improve her condition and is seeking to expand her compression to include left UE garments once swelling reduces. Pt presents today with 21% limb volume difference, L>R and will benefit from decongestion prior to measuring for garments. Patient will benefit from complete decongestive therapy (CDT) including: Manual lymphatic drainage (MLD) technique and Short-stretch textile bandages/compression system to decongest limb as appropriate. Due to patient's cancer status, the lymphedema has the potential to significantly worsen over time if it is not managed well. This care is medically necessary due to the infection risk with lymphedema and to improve functional activities. CDT is necessary to resolve swelling to allow patient to return to wearing normal clothes/footwear and prevent worsening of symptoms, such as infections and/or hospitalizations.  Patient will be independent with home program strategies to allow improved ADL ability and mobility and to allow patient to return to greatest functional independence. Rehabilitation potential is considered to be Good. Factors which may influence rehabilitation potential include lack of caregiver assistance, cost of garments. Patient will benefit from 2x/week physical therapy visits over 10-12 weeks to optimize improvement in these areas. PLAN OF CARE:   Recommendations and Planned Interventions: Manual lymph drainage/decongestive therapy, Multi-layer compression bandaging (short-stretch), Compression garment fitting/provision, Lymphedema therapeutic exercise, AROM/PROM/Strength/Coordination, Self-care training, Education in skin care and lymphedema precautions, Self-MLD education per home program, Self-bandaging education per home program and Would care as needed    GOALS  Short term goals  Time frame: 4-6 weeks  1. Patient will demonstrate knowledge of signs/symptoms of infections/cellulitis and be independent in skin care to prevent cellulitis. 2.  Patient will demonstrate independence in lymphedema home program of therapeutic exercises to improve circulation and decongest limb to improve ADLs. 3.  Patient will tolerate multi-layer bandages (MLB) and show measureable decrease in left UE limb volume by 200 - 500 mL with <10% limb volume difference to allow ordering of home compression system (daytime, nighttime garments and pump as needed). Long term goals  Time frame: 10-12 weeks  1. Patient will increase left UE shoulder flexion ROM by 10-15 deg for improved tolerance for overhead reaching activities and functional tasks. 2.  Patient will be independent with don/doff of compression system and use in order to prevent reaccumulation of fluid at discharge. 3.  Pt will be independent in self-MLD and show stable limb volumes showing decongestion and pt. ready for transition to independent restorative phase of lymphedema therapy.      Patient has participated in goal setting and agrees to work toward plan of care. Patient was instructed to call if questions or concerns arise. Thank you for this referral.  Kenny Davies. Gary, PT, DPT, CLLES-SOLANGE    Time Calculation: 60 mins    TREATMENT PLAN EFFECTIVE DATES:   2/18/2022 TO 5/13/2022  I have read the above plan of care for [facility]. I certify the above prescribed services are required by this patient and are medically necessary.   The above plan of care has been developed in conjunction with the lymphedema/physical therapist.       Physician Signature: ____________________________________Date:______________

## 2022-03-08 ENCOUNTER — APPOINTMENT (OUTPATIENT)
Dept: PHYSICAL THERAPY | Age: 77
End: 2022-03-08
Payer: MEDICARE

## 2022-03-11 ENCOUNTER — APPOINTMENT (OUTPATIENT)
Dept: PHYSICAL THERAPY | Age: 77
End: 2022-03-11
Payer: MEDICARE

## 2022-03-15 ENCOUNTER — APPOINTMENT (OUTPATIENT)
Dept: PHYSICAL THERAPY | Age: 77
End: 2022-03-15
Payer: MEDICARE

## 2022-03-18 ENCOUNTER — HOSPITAL ENCOUNTER (OUTPATIENT)
Dept: PHYSICAL THERAPY | Age: 77
Discharge: HOME | End: 2022-03-18
Payer: MEDICARE

## 2022-03-18 PROBLEM — I10 HYPERTENSION: Status: ACTIVE | Noted: 2019-03-12

## 2022-03-18 PROBLEM — C80.1 CANCER (HCC): Status: ACTIVE | Noted: 2019-02-01

## 2022-03-18 PROBLEM — K57.92 DIVERTICULITIS: Status: ACTIVE | Noted: 2019-03-12

## 2022-03-18 PROBLEM — M85.89 OSTEOPENIA OF MULTIPLE SITES: Status: ACTIVE | Noted: 2021-12-16

## 2022-03-18 PROCEDURE — 97140 MANUAL THERAPY 1/> REGIONS: CPT

## 2022-03-18 NOTE — PROGRESS NOTES
LYMPHEDEMA PT DAILY TREATMENT NOTE - [facility] -15    Patient Name: Gui Argueta  Date:3/18/2022  : 1945  [x]  Patient  Verified  [place]or: [place] MED[place] / Plan: [place] MEDICARE PART A & B / Product Type: Medicare /    In time: 2:00 pm  Out time: 2:45 pm  Total Treatment Time (min): 45  Total Timed Codes (min): 45  1:1 Treatment Time ( [address] only): 45   Visit #: 2     Treatment Area: Lymphedema, not elsewhere classified [I89.0]    SUBJECTIVE  Pain Level (0-10 scale): pt denies pain, c/o tightness with ROM/stretching   Any medication changes, allergies to medications, adverse drug reactions, diagnosis change, or new procedure performed?: [x] No    [] Yes (see summary sheet for update)  Subjective functional status/changes:   [] No changes reported  Patient paid for bandaging supplies and agreeable to initiate MLB today. OBJECTIVE     min Therapeutic Exercise:  [] Jessica Golds Exercises [x] Remedial Lymphedema Exercise Program [] Axillary Web Exercise Program      [] Shoulder ROM Exercises   Rationale: Activate muscle pump to improve lymphatic fluid movement and decrease swelling to improve the patients ability to perform ADL and IADL skills and prevent worsening of swelling over time. 45 min Manual Therapy    Manual Lymphatic Drainage (MLD):  Area to decongest: UE: left palm wrist elbow mid forearm axilla trunk   Sequence used and effectiveness: Modifications were made to manual lymph drainage sequence to exclude cervical techniques secondary to patient's age. Secondary sequence for upper extremity with trunk involvement. 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 Diaphragmatic breathing x 5 pre and post-MLD. Performed full left UE sequence. Instructed in self-MLD for chest/axilla. Skin/wound care/debridement: Reviewed skin care principles:   Performed skin care with low pH lotion following manual lymph principles.    Skin care products  Hygiene  Prevention of cellulitis     calmoseptine applied to posterior upper arm over red bumps. Eucerin/anti-itch lotion mix left UE. Applied multi-layer compression bandaging to: Patient arrived without adequate bandage in place that was applied by caregiver. left  upper extremity    The following multi-layer bandages were applied:  Protouch Stockinette    Padding layer:  Fleece from MCP to axilla     Foam:  none    Short stretch bandages:   6 cm  8 cm  10 cm    tubigrip applied to cover taped areas. Compression bandaging instructions:  1. Compression bandaging will be applied twice a week by therapist in clinic, with adjustments to be made at home as indicated if bandaging becomes loose or uncomfortable. 2. If tolerated, remain bandaged between appointments with therapist, removing under the following conditionsDO NOT WAIT FOR A RETURN PHONE Sabi Salinas:  -Numbness/tingling in extremity different from what you have experienced without the bandages in place.  -Compromise in circulation, monitoring blood refill into toes after applying gentle pressure to toes.  -Onset of pain in extremity that is sudden and severe in nature. -Redness, warmth in limb, and/or fever, flu-like symptoms, which may indicate infection. If this occurs, call your doctor right away. If you note a sudden increase in swelling in extremity, with or without redness/warmth, go to the emergency room as soon as possible to have blood clot ruled out. 3. Compression bandaging supplies that can be laundered are the brown compression wraps and any foam applied for padding. Launder in washer/dryer with NO fabric softener, bleach, woolite. Dry on a low heat. 4. Once compression garments are ordered, compression bandaging will be continued until garments arrive for fitting. This process can take several weeks. Upper/Lower Extremity Compression: to be measured for sleeve and glove/gauntlet once limb volume reduced.       Rationale: increase ROM, increase tissue extensibility and decrease edema  to improve the patients ability to perform ADLs and functional tasks. Vasopneumatic Device:    Body Part: [] R [] L [] Bilateral  Pressure: [] Decreased [] Normal [] Increased  Treatment Cycles: [] 1  [] 2  [] 3   Rationale: To improve lymphatic fluid movement and decrease swelling to improve the patients ability to perform ADL and IADL skills and prevent worsening of swelling over time. With   [] TE   [] TA   [x] MT   [] SC   [] other: Patient Education: [x] Review HEP    [x] MLD Patient Education Reviewed with patient, as well as demonstration and instruction during MLD portion of the session. Continued education in self MLD technique with bathing and skin care. Provided patient with the written instructions to follow. Educated patient in soft tissue mobilization for axillary web. [] Progressed/Changed HEP based on:   [] positioning   [] Kinesiotape   [x] Skin care   [] wound care   [] other:   [x] Patient/caregiver in multi-layer bandaging donning principles. , Precautions. and Handout provided. Patient / caregiver re-demonstrated bandaging. [] Yes  [x] No  Compression bandaging/garment precautions reviewed: [x] Yes  [] No     Other Objective/Functional Measures:   Height:     Weight:      BMI:     (36 or greater: adversely affecting lymphedema)    Pain Level (0-10 scale) post treatment: 0      ASSESSMENT/Changes in Function:   Patient with good tolerance to initial MLD/MLB today. Pt instructed in self-MLD to perform to left axilla/chest wall. Reviewed bandaging precautions today.    Patient will continue to benefit from skilled PT services to  address ROM deficits, address swelling, analyze and address soft tissue restrictions, analyze compression product fit and use, assess and modify postural abnormalities, instruct in home and community integration, instruct in home lymphedema management program, measure for compression products and modify and progress therapeutic interventions to attain remaining goals. [x]  See Plan of Care  []  See progress note/recertification  []  See Discharge Summary         Progress towards goals / Updated goals:  Short term goals  Time frame: 4-6 weeks  1. Patient will demonstrate knowledge of signs/symptoms of infections/cellulitis and be independent in skin care to prevent cellulitis. 2.  Patient will demonstrate independence in lymphedema home program of therapeutic exercises to improve circulation and decongest limb to improve ADLs. 3.  Patient will tolerate multi-layer bandages (MLB) and show measureable decrease in left UE limb volume by 200 - 500 mL with <10% limb volume difference to allow ordering of home compression system (daytime, nighttime garments and pump as needed).      Long term goals  Time frame: 10-12 weeks  1. Patient will increase left UE shoulder flexion ROM by 10-15 deg for improved tolerance for overhead reaching activities and functional tasks. 2.  Patient will be independent with don/doff of compression system and use in order to prevent reaccumulation of fluid at discharge. 3.  Pt will be independent in self-MLD and show stable limb volumes showing decongestion and pt. ready for transition to independent restorative phase of lymphedema therapy. PLAN  []  Upgrade activities as tolerated     [x]  Continue plan of care  []  Update interventions per flow sheet       []  Discharge due to:_  [x]  Other: instruct in HEP next visit     Kenny Davies.  Gary, PT, DPT, CLT-SOLANGE  3/18/2022

## 2022-03-19 PROBLEM — T41.45XA ADVERSE EFFECT OF ANESTHESIA: Status: ACTIVE | Noted: 2019-03-12

## 2022-03-19 PROBLEM — E87.6 HYPOKALEMIA: Status: ACTIVE | Noted: 2019-03-12

## 2022-03-19 PROBLEM — E87.1 HYPONATREMIA: Status: ACTIVE | Noted: 2019-03-12

## 2022-03-19 PROBLEM — K21.9 GERD (GASTROESOPHAGEAL REFLUX DISEASE): Status: ACTIVE | Noted: 2019-03-12

## 2022-03-19 PROBLEM — L02.91 ABSCESS: Status: ACTIVE | Noted: 2019-03-12

## 2022-03-19 PROBLEM — F32.A DEPRESSION: Status: ACTIVE | Noted: 2019-03-12

## 2022-03-19 PROBLEM — C50.912 BREAST CANCER, LEFT (HCC): Status: ACTIVE | Noted: 2019-02-19

## 2022-03-19 PROBLEM — E78.00 HIGH CHOLESTEROL: Status: ACTIVE | Noted: 2019-03-12

## 2022-03-22 ENCOUNTER — HOSPITAL ENCOUNTER (OUTPATIENT)
Dept: PHYSICAL THERAPY | Age: 77
Discharge: HOME | End: 2022-03-22
Payer: MEDICARE

## 2022-03-22 PROCEDURE — 97140 MANUAL THERAPY 1/> REGIONS: CPT

## 2022-03-22 NOTE — PROGRESS NOTES
LYMPHEDEMA PT DAILY TREATMENT NOTE - [facility] 2-15    Patient Name: Lis Parikh  Date:3/22/2022  : 1945  [x]  Patient  Verified  Payor: [place] MEDICARE / Plan: [place] MEDICARE PART A & B / Product Type: Medicare /    In time: 2:10 pm  Out time: 2:50 pm  Total Treatment Time (min): 40  Total Timed Codes (min): 40  1:1 Treatment Time ( only): 40   Visit #: 3    Treatment Area: Lymphedema, not elsewhere classified [I89.0]    SUBJECTIVE  Pain Level (0-10 scale): pt denies pain, c/o tightness with ROM/stretching   Any medication changes, allergies to medications, adverse drug reactions, diagnosis change, or new procedure performed?: [x] No    [] Yes (see summary sheet for update)  Subjective functional status/changes:   [] No changes reported  Patient arrives to appt with MLB intact left UE, reports good tolerance to bandages. Pt c/o \"stiffness\" in her fingers but denies swelling. Pt says she has been performing self-MLD on trunk/chest as instructed last visit. Pt agreeable to treatment today. OBJECTIVE     min Therapeutic Exercise:  [] Tate  Exercises [x] Remedial Lymphedema Exercise Program [] Axillary Web Exercise Program      [] Shoulder ROM Exercises   Rationale: Activate muscle pump to improve lymphatic fluid movement and decrease swelling to improve the patients ability to perform ADL and IADL skills and prevent worsening of swelling over time. 40 min Manual Therapy    Manual Lymphatic Drainage (MLD):  Area to decongest: UE: left palm wrist elbow mid forearm axilla trunk   Sequence used and effectiveness: Modifications were made to manual lymph drainage sequence to exclude cervical techniques secondary to patient's age. Secondary sequence for upper extremity with trunk involvement. 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 Diaphragmatic breathing x 5 pre and post-MLD. Performed full left UE sequence. Instructed in self-MLD for chest/axilla. Skin/wound care/debridement: Reviewed skin care principles:   Performed skin care with low pH lotion following manual lymph principles. Skin care products  Hygiene  Prevention of cellulitis     calmoseptine applied to posterior upper arm over red bumps. Eucerin/anti-itch lotion mix left UE. Applied multi-layer compression bandaging to: Patient arrived without adequate bandage in place that was applied by caregiver. left  upper extremity    The following multi-layer bandages were applied:  Protouch Stockinette    Padding layer:  Fleece from MCP to axilla     Foam:  none    Short stretch bandages:   6 cm  8 cm  10 cm    tubigrip applied to cover taped areas. Compression bandaging instructions:  1. Compression bandaging will be applied twice a week by therapist in clinic, with adjustments to be made at home as indicated if bandaging becomes loose or uncomfortable. 2. If tolerated, remain bandaged between appointments with therapist, removing under the following conditionsDO NOT WAIT FOR A RETURN PHONE Sabi 69:  -Numbness/tingling in extremity different from what you have experienced without the bandages in place.  -Compromise in circulation, monitoring blood refill into toes after applying gentle pressure to toes.  -Onset of pain in extremity that is sudden and severe in nature. -Redness, warmth in limb, and/or fever, flu-like symptoms, which may indicate infection. If this occurs, call your doctor right away. If you note a sudden increase in swelling in extremity, with or without redness/warmth, go to the emergency room as soon as possible to have blood clot ruled out. 3. Compression bandaging supplies that can be laundered are the brown compression wraps and any foam applied for padding. Launder in washer/dryer with NO fabric softener, bleach, woolite. Dry on a low heat.     4. Once compression garments are ordered, compression bandaging will be continued until garments arrive for fitting. This process can take several weeks. Upper/Lower Extremity Compression: to be measured for sleeve and glove/gauntlet once limb volume reduced. Rationale: increase ROM, increase tissue extensibility and decrease edema  to improve the patients ability to perform ADLs and functional tasks. Vasopneumatic Device:    Body Part: [] R [] L [] Bilateral  Pressure: [] Decreased [] Normal [] Increased  Treatment Cycles: [] 1  [] 2  [] 3   Rationale: To improve lymphatic fluid movement and decrease swelling to improve the patients ability to perform ADL and IADL skills and prevent worsening of swelling over time. With   [] TE   [] TA   [x] MT   [] SC   [] other: Patient Education: [x] Review HEP    [x] MLD Patient Education Reviewed with patient, as well as demonstration and instruction during MLD portion of the session. Continued education in self MLD technique with bathing and skin care. Provided patient with the written instructions to follow. Educated patient in soft tissue mobilization for axillary web. [] Progressed/Changed HEP based on:   [] positioning   [] Kinesiotape   [x] Skin care   [] wound care   [] other:   [x] Patient/caregiver in multi-layer bandaging donning principles. , Precautions. and Handout provided. Patient / caregiver re-demonstrated bandaging. [] Yes  [x] No  Compression bandaging/garment precautions reviewed: [x] Yes  [] No     Other Objective/Functional Measures:   Height:     Weight:      BMI:     (36 or greater: adversely affecting lymphedema)    Pain Level (0-10 scale) post treatment: 0      ASSESSMENT/Changes in Function:   Patient with good tolerance to continued MLD/MLB today. Noted reduction in swelling and improvement in skin tissue turgor. Pt instructed in self-MLD to perform to left axilla/chest wall. Reviewed bandaging precautions today. Will plan to recheck volumes in 1-2 visits.    Patient will continue to benefit from skilled PT services to  address ROM deficits, address swelling, analyze and address soft tissue restrictions, analyze compression product fit and use, assess and modify postural abnormalities, instruct in home and community integration, instruct in home lymphedema management program, measure for compression products and modify and progress therapeutic interventions to attain remaining goals. [x]  See Plan of Care  []  See progress note/recertification  []  See Discharge Summary         Progress towards goals / Updated goals:  Short term goals  Time frame: 4-6 weeks  1. Patient will demonstrate knowledge of signs/symptoms of infections/cellulitis and be independent in skin care to prevent cellulitis. 2.  Patient will demonstrate independence in lymphedema home program of therapeutic exercises to improve circulation and decongest limb to improve ADLs. 3.  Patient will tolerate multi-layer bandages (MLB) and show measureable decrease in left UE limb volume by 200 - 500 mL with <10% limb volume difference to allow ordering of home compression system (daytime, nighttime garments and pump as needed).      Long term goals  Time frame: 10-12 weeks  1. Patient will increase left UE shoulder flexion ROM by 10-15 deg for improved tolerance for overhead reaching activities and functional tasks. 2.  Patient will be independent with don/doff of compression system and use in order to prevent reaccumulation of fluid at discharge. 3.  Pt will be independent in self-MLD and show stable limb volumes showing decongestion and pt. ready for transition to independent restorative phase of lymphedema therapy. PLAN  []  Upgrade activities as tolerated     [x]  Continue plan of care  []  Update interventions per flow sheet       []  Discharge due to:_  [x]  Other: instruct in HEP next visit, recheck volumes next visit     Chris Morris.  Gary, PT, DPT, CLT-[facility]  3/22/2022

## 2022-03-25 ENCOUNTER — HOSPITAL ENCOUNTER (OUTPATIENT)
Dept: PHYSICAL THERAPY | Age: 77
Discharge: HOME | End: 2022-03-25
Payer: MEDICARE

## 2022-03-25 PROCEDURE — 97140 MANUAL THERAPY 1/> REGIONS: CPT

## 2022-03-25 NOTE — PROGRESS NOTES
LYMPHEDEMA PT DAILY TREATMENT NOTE - [facility] 2-15    Patient Name: Arvind Mack  Date:3/25/2022  : 1945  [x]  Patient  Verified  Payor: [place] MEDICARE / Plan: [place] MEDICARE PART A & B / Product Type: Medicare /    In time: 1:50 pm  Out time: 2:40 pm  Total Treatment Time (min): 50  Total Timed Codes (min): 50  1:1 Treatment Time ( only): 50   Visit #: 4    Treatment Area: Lymphedema, not elsewhere classified [I89.0]    SUBJECTIVE  Pain Level (0-10 scale): pt denies pain, c/o tightness with ROM/stretching   Any medication changes, allergies to medications, adverse drug reactions, diagnosis change, or new procedure performed?: [x] No    [] Yes (see summary sheet for update)  Subjective functional status/changes:   [] No changes reported  Patient arrives to appt with MLB removed left UE, says she took bandages off at home 45 min prior to appt to shower. Pt reports good tolerance to bandages. Pt c/o \"stiffness\" in her fingers but denies swelling. Pt says she has been performing self-MLD on trunk/chest as instructed last visit. Pt agreeable to treatment today. OBJECTIVE     min Therapeutic Exercise:  [] [place] Seen Exercises [x] Remedial Lymphedema Exercise Program [] Axillary Web Exercise Program      [] Shoulder ROM Exercises   Rationale: Activate muscle pump to improve lymphatic fluid movement and decrease swelling to improve the patients ability to perform ADL and IADL skills and prevent worsening of swelling over time. 50 min Manual Therapy    Manual Lymphatic Drainage (MLD):  Area to decongest: UE: left palm wrist elbow mid forearm axilla trunk   Sequence used and effectiveness: Modifications were made to manual lymph drainage sequence to exclude cervical techniques secondary to patient's age. Secondary sequence for upper extremity with trunk involvement. 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 Diaphragmatic breathing x 5 pre and post-MLD.  Performed full left UE sequence. Instructed in self-MLD for chest/axilla. Skin/wound care/debridement: Reviewed skin care principles:   Performed skin care with low pH lotion following manual lymph principles. Skin care products  Hygiene  Prevention of cellulitis     calmoseptine applied to posterior upper arm over red bumps. Eucerin/anti-itch lotion mix left UE. Applied multi-layer compression bandaging to: Patient arrived without adequate bandage in place that was applied by caregiver. left  upper extremity    The following multi-layer bandages were applied:  Protouch Stockinette    Padding layer:  Fleece from MCP to axilla     Foam:  none    Short stretch bandages:   6 cm  8 cm  10 cm    tubigrip applied to cover taped areas. Compression bandaging instructions:  1. Compression bandaging will be applied twice a week by therapist in clinic, with adjustments to be made at home as indicated if bandaging becomes loose or uncomfortable. 2. If tolerated, remain bandaged between appointments with therapist, removing under the following conditionsDO NOT WAIT FOR A RETURN PHONE Sabi 69:  -Numbness/tingling in extremity different from what you have experienced without the bandages in place.  -Compromise in circulation, monitoring blood refill into toes after applying gentle pressure to toes.  -Onset of pain in extremity that is sudden and severe in nature. -Redness, warmth in limb, and/or fever, flu-like symptoms, which may indicate infection. If this occurs, call your doctor right away. If you note a sudden increase in swelling in extremity, with or without redness/warmth, go to the emergency room as soon as possible to have blood clot ruled out. 3. Compression bandaging supplies that can be laundered are the brown compression wraps and any foam applied for padding. Launder in washer/dryer with NO fabric softener, bleach, woolite. Dry on a low heat.     4. Once compression garments are ordered, compression bandaging will be continued until garments arrive for fitting. This process can take several weeks. Upper/Lower Extremity Compression: to be measured for sleeve and glove/gauntlet once limb volume reduced. Rationale: increase ROM, increase tissue extensibility and decrease edema  to improve the patients ability to perform ADLs and functional tasks. Vasopneumatic Device:    Body Part: [] R [] L [] Bilateral  Pressure: [] Decreased [] Normal [] Increased  Treatment Cycles: [] 1  [] 2  [] 3   Rationale: To improve lymphatic fluid movement and decrease swelling to improve the patients ability to perform ADL and IADL skills and prevent worsening of swelling over time. With   [] TE   [] TA   [x] MT   [] SC   [] other: Patient Education: [x] Review HEP    [x] MLD Patient Education Reviewed with patient, as well as demonstration and instruction during MLD portion of the session. Continued education in self MLD technique with bathing and skin care. Provided patient with the written instructions to follow. Educated patient in soft tissue mobilization for axillary web. [] Progressed/Changed HEP based on:   [] positioning   [] Kinesiotape   [x] Skin care   [] wound care   [] other:   [x] Patient/caregiver in multi-layer bandaging donning principles. , Precautions. and Handout provided. Patient / caregiver re-demonstrated bandaging. [] Yes  [x] No  Compression bandaging/garment precautions reviewed: [x] Yes  [] No     Other Objective/Functional Measures:   Height:     Weight:      BMI:     (36 or greater: adversely affecting lymphedema)    Pain Level (0-10 scale) post treatment: 0    Volumetric Measurements:     Date:  Right Left % difference    3/25/22 2365.55 2701.44 14.2   2/18/22 2365.55 2872.95 21.45                     ASSESSMENT/Changes in Function:   Patient with good tolerance to continued MLD/MLB today. Noted reduction in swelling and improvement in skin tissue turgor. Pt instructed in self-MLD to perform to left axilla/chest wall. Reviewed bandaging precautions today. Pt now with 14% limb volume difference compared to 21% on eval.   Patient will continue to benefit from skilled PT services to  address ROM deficits, address swelling, analyze and address soft tissue restrictions, analyze compression product fit and use, assess and modify postural abnormalities, instruct in home and community integration, instruct in home lymphedema management program, measure for compression products and modify and progress therapeutic interventions to attain remaining goals. [x]  See Plan of Care  []  See progress note/recertification  []  See Discharge Summary         Progress towards goals / Updated goals:  Short term goals  Time frame: 4-6 weeks  1. Patient will demonstrate knowledge of signs/symptoms of infections/cellulitis and be independent in skin care to prevent cellulitis. 2.  Patient will demonstrate independence in lymphedema home program of therapeutic exercises to improve circulation and decongest limb to improve ADLs. 3.  Patient will tolerate multi-layer bandages (MLB) and show measureable decrease in left UE limb volume by 200 - 500 mL with <10% limb volume difference to allow ordering of home compression system (daytime, nighttime garments and pump as needed).      Long term goals  Time frame: 10-12 weeks  1. Patient will increase left UE shoulder flexion ROM by 10-15 deg for improved tolerance for overhead reaching activities and functional tasks. 2.  Patient will be independent with don/doff of compression system and use in order to prevent reaccumulation of fluid at discharge. 3.  Pt will be independent in self-MLD and show stable limb volumes showing decongestion and pt. ready for transition to independent restorative phase of lymphedema therapy.     PLAN  []  Upgrade activities as tolerated     [x]  Continue plan of care  []  Update interventions per flow sheet []  Discharge due to:_  [x]  Other: instruct in HEP next visit, recheck volumes next 1-2 visits    Ting Vega, PT, DPT, CLT-[facility]  3/25/2022

## 2022-03-29 ENCOUNTER — HOSPITAL ENCOUNTER (OUTPATIENT)
Dept: PHYSICAL THERAPY | Age: 77
Discharge: HOME | End: 2022-03-29
Payer: MEDICARE

## 2022-03-29 PROCEDURE — 97140 MANUAL THERAPY 1/> REGIONS: CPT

## 2022-03-29 NOTE — PROGRESS NOTES
LYMPHEDEMA PT DAILY TREATMENT NOTE - [facility] -15    Patient Name: Jovanny Worthington  Date:3/29/2022  : 1945  [x]  Patient  Verified  Payor: José Bekah / Plan: [place] MEDICARE PART A & B / Product Type: Medicare /    In time: 2:10 pm  Out time: 2:55 pm  Total Treatment Time (min): 45  Total Timed Codes (min): 45  1:1 Treatment Time ( [address] only): 45   Visit #: 5    Treatment Area: Lymphedema, not elsewhere classified [I89.0]    SUBJECTIVE  Pain Level (0-10 scale): pt denies pain, c/o tightness with ROM/stretching   Any medication changes, allergies to medications, adverse drug reactions, diagnosis change, or new procedure performed?: [x] No    [] Yes (see summary sheet for update)  Subjective functional status/changes:   [] No changes reported  Patient arrives to appt with MLB intact left UE. Pt reports good tolerance to bandages overall, however c/o itching at elbow. Pt c/o \"stiffness\" in her fingers but denies swelling. Pt says she has been performing self-MLD on trunk/chest as instructed last visit. Pt agreeable to treatment today. OBJECTIVE     min Therapeutic Exercise:  [] Yany Gutierrez Exercises [x] Remedial Lymphedema Exercise Program [x] Axillary Web Exercise Program      [] Shoulder ROM Exercises    3/29/22: instructed in ex's for IRINEO as follows: shoulder abd to 90 deg, then 120 deg, then 180 deg combined with wrist flex/ext. Good return demonstration today. Rationale: Activate muscle pump to improve lymphatic fluid movement and decrease swelling to improve the patients ability to perform ADL and IADL skills and prevent worsening of swelling over time. 45 min Manual Therapy    Manual Lymphatic Drainage (MLD):  Area to decongest: UE: left palm wrist elbow mid forearm axilla trunk   Sequence used and effectiveness: Modifications were made to manual lymph drainage sequence to exclude cervical techniques secondary to patient's age. Secondary sequence for upper extremity with trunk involvement.   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 Diaphragmatic breathing x 5 pre and post-MLD. Performed full left UE sequence. Instructed in self-MLD for chest/axilla. Skin/wound care/debridement: Reviewed skin care principles:   Performed skin care with low pH lotion following manual lymph principles. Skin care products  Hygiene  Prevention of cellulitis     calmoseptine applied to posterior upper arm over red bumps and at anterior elbow. Eucerin/anti-itch lotion mix left UE. Applied multi-layer compression bandaging to: Patient arrived without adequate bandage in place that was applied by caregiver. left  upper extremity    The following multi-layer bandages were applied:  Protouch Stockinette    Padding layer:  Fleece from MCP to axilla     Foam:  none    Short stretch bandages:   6 cm  8 cm  10 cm    tubigrip applied to cover taped areas. Compression bandaging instructions:  1. Compression bandaging will be applied twice a week by therapist in clinic, with adjustments to be made at home as indicated if bandaging becomes loose or uncomfortable. 2. If tolerated, remain bandaged between appointments with therapist, removing under the following conditionsDO NOT WAIT FOR A RETURN PHONE Sabi Salinas:  -Numbness/tingling in extremity different from what you have experienced without the bandages in place.  -Compromise in circulation, monitoring blood refill into toes after applying gentle pressure to toes.  -Onset of pain in extremity that is sudden and severe in nature. -Redness, warmth in limb, and/or fever, flu-like symptoms, which may indicate infection. If this occurs, call your doctor right away. If you note a sudden increase in swelling in extremity, with or without redness/warmth, go to the emergency room as soon as possible to have blood clot ruled out.     3. Compression bandaging supplies that can be laundered are the brown compression wraps and any foam applied for padding. Launder in washer/dryer with NO fabric softener, bleach, woolite. Dry on a low heat. 4. Once compression garments are ordered, compression bandaging will be continued until garments arrive for fitting. This process can take several weeks. Upper/Lower Extremity Compression: to be measured for sleeve and glove/gauntlet once limb volume reduced. Rationale: increase ROM, increase tissue extensibility and decrease edema  to improve the patients ability to perform ADLs and functional tasks. Vasopneumatic Device:    Body Part: [] R [] L [] Bilateral  Pressure: [] Decreased [] Normal [] Increased  Treatment Cycles: [] 1  [] 2  [] 3   Rationale: To improve lymphatic fluid movement and decrease swelling to improve the patients ability to perform ADL and IADL skills and prevent worsening of swelling over time. With   [] TE   [] TA   [x] MT   [] SC   [] other: Patient Education: [x] Review HEP    [x] MLD Patient Education Reviewed with patient, as well as demonstration and instruction during MLD portion of the session. Continued education in self MLD technique with bathing and skin care. Provided patient with the written instructions to follow. Educated patient in soft tissue mobilization for axillary web. [] Progressed/Changed HEP based on:   [] positioning   [] Kinesiotape   [x] Skin care   [] wound care   [] other:   [x] Patient/caregiver in multi-layer bandaging donning principles. , Precautions. and Handout provided. Patient / caregiver re-demonstrated bandaging.  [] Yes  [x] No  Compression bandaging/garment precautions reviewed: [x] Yes  [] No     Other Objective/Functional Measures:   Height:     Weight:      BMI:     (36 or greater: adversely affecting lymphedema)    Pain Level (0-10 scale) post treatment: 0    Volumetric Measurements:     Date:  Right Left % difference    3/25/22 2365.55 2701.44 14.2   2/18/22 2365.55 2872.95 21.45 ASSESSMENT/Changes in Function:   Patient with good tolerance to continued MLD/MLB today. Noted reduction in swelling and improvement in skin tissue turgor. Pt instructed in self-MLD to perform to left axilla/chest wall. Reviewed bandaging precautions today. Pt now with 14% limb volume difference compared to 21% on eval. Will plan to recheck volume measurements next visit and determine readiness for garments. Pt eager to transition to garments. May trial Juzo 3511 RM sleeve and seamless gauntlet since pt pays out of pocket. Patient will continue to benefit from skilled PT services to  address ROM deficits, address swelling, analyze and address soft tissue restrictions, analyze compression product fit and use, assess and modify postural abnormalities, instruct in home and community integration, instruct in home lymphedema management program, measure for compression products and modify and progress therapeutic interventions to attain remaining goals. [x]  See Plan of Care  []  See progress note/recertification  []  See Discharge Summary         Progress towards goals / Updated goals:  Short term goals  Time frame: 4-6 weeks  1. Patient will demonstrate knowledge of signs/symptoms of infections/cellulitis and be independent in skin care to prevent cellulitis. 2.  Patient will demonstrate independence in lymphedema home program of therapeutic exercises to improve circulation and decongest limb to improve ADLs. 3.  Patient will tolerate multi-layer bandages (MLB) and show measureable decrease in left UE limb volume by 200 - 500 mL with <10% limb volume difference to allow ordering of home compression system (daytime, nighttime garments and pump as needed).      Long term goals  Time frame: 10-12 weeks  1. Patient will increase left UE shoulder flexion ROM by 10-15 deg for improved tolerance for overhead reaching activities and functional tasks.    2.  Patient will be independent with don/doff of compression system and use in order to prevent reaccumulation of fluid at discharge. 3.  Pt will be independent in self-MLD and show stable limb volumes showing decongestion and pt. ready for transition to independent restorative phase of lymphedema therapy. PLAN  []  Upgrade activities as tolerated     [x]  Continue plan of care  []  Update interventions per flow sheet       []  Discharge due to:_  [x]  Other: instruct in HEP next visit, recheck volumes next visit and measure for garments? Ayaan Vega, PT, DPT, CLT-[facility]  3/29/2022

## 2022-04-01 ENCOUNTER — HOSPITAL ENCOUNTER (OUTPATIENT)
Dept: PHYSICAL THERAPY | Age: 77
Discharge: HOME | End: 2022-04-01
Payer: MEDICARE

## 2022-04-01 PROCEDURE — 97140 MANUAL THERAPY 1/> REGIONS: CPT

## 2022-04-01 NOTE — PROGRESS NOTES
LYMPHEDEMA PT DAILY TREATMENT NOTE - [facility] 2-15    Patient Name: Gerda Holter  TRBZ:[id number]  : 1945  [x]  Patient  Verified  Payor: [place] MEDICARE / Plan: [place] MEDICARE PART A & B / Product Type: Medicare /    In time: 1:55 pm  Out time: 2:40 pm  Total Treatment Time (min): 45  Total Timed Codes (min): 45  1:1 Treatment Time ( [address] only): 39   Visit #: 6    Treatment Area: Lymphedema, not elsewhere classified [I89.0]    SUBJECTIVE  Pain Level (0-10 scale): pt denies pain, c/o tightness with ROM/stretching   Any medication changes, allergies to medications, adverse drug reactions, diagnosis change, or new procedure performed?: [x] No    [] Yes (see summary sheet for update)  Subjective functional status/changes:   [] No changes reported  Patient arrives to appt with MLB intact left UE. Pt reports good tolerance to bandages overall, however c/o itching at elbow - improved with addition of calmoseptine last visit. Pt c/o \"stiffness\" in her fingers but denies swelling. Pt says she has been performing self-MLD on trunk/chest as instructed previous visit visit. Pt reports significant improvement in left shoulder tightness after completing ex's for IRINEO the last few days. Pt agreeable to treatment today. OBJECTIVE     min Therapeutic Exercise:  [] Ulus Redo Exercises [x] Remedial Lymphedema Exercise Program [x] Axillary Web Exercise Program      [] Shoulder ROM Exercises    3/29/22: instructed in ex's for IRINEO as follows: shoulder abd to 90 deg, then 120 deg, then 180 deg combined with wrist flex/ext. Good return demonstration today. Rationale: Activate muscle pump to improve lymphatic fluid movement and decrease swelling to improve the patients ability to perform ADL and IADL skills and prevent worsening of swelling over time.     45 min Manual Therapy    Manual Lymphatic Drainage (MLD):  Area to decongest: UE: left palm wrist elbow mid forearm axilla trunk   Sequence used and effectiveness: deferred due to garment measurements  Modifications were made to manual lymph drainage sequence to exclude cervical techniques secondary to patient's age. Secondary sequence for upper extremity with trunk involvement. 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 Diaphragmatic breathing x 5 pre and post-MLD. Performed full left UE sequence. Instructed in self-MLD for chest/axilla. Skin/wound care/debridement: Reviewed skin care principles:   Performed skin care with low pH lotion following manual lymph principles. Skin care products  Hygiene  Prevention of cellulitis     calmoseptine applied to posterior upper arm over red bumps and at anterior elbow. Eucerin/anti-itch lotion mix left UE. Applied multi-layer compression bandaging to: Patient arrived without adequate bandage in place that was applied by caregiver. left  upper extremity    The following multi-layer bandages were applied:  Protouch Stockinette    Padding layer:  Fleece from MCP to axilla     Foam:  none    Short stretch bandages:   6 cm  8 cm  10 cm    tubigrip applied to cover taped areas. Compression bandaging instructions:  1. Compression bandaging will be applied twice a week by therapist in clinic, with adjustments to be made at home as indicated if bandaging becomes loose or uncomfortable. 2. If tolerated, remain bandaged between appointments with therapist, removing under the following conditionsDO NOT WAIT FOR A RETURN PHONE Sabi 69:  -Numbness/tingling in extremity different from what you have experienced without the bandages in place.  -Compromise in circulation, monitoring blood refill into toes after applying gentle pressure to toes.  -Onset of pain in extremity that is sudden and severe in nature. -Redness, warmth in limb, and/or fever, flu-like symptoms, which may indicate infection. If this occurs, call your doctor right away.   If you note a sudden increase in swelling in extremity, with or without redness/warmth, go to the emergency room as soon as possible to have blood clot ruled out. 3. Compression bandaging supplies that can be laundered are the brown compression wraps and any foam applied for padding. Launder in washer/dryer with NO fabric softener, bleach, woolite. Dry on a low heat. 4. Once compression garments are ordered, compression bandaging will be continued until garments arrive for fitting. This process can take several weeks. Upper/Lower Extremity Compression:   4/1/22:    Measured for the following compression products:    UE: left palm wrist elbow mid forearm axilla left upper extremity: Arm sleeve, Gauntlet and Top band silicone border    Style: Circular knit    Brand: [employer]    Type: Non-Custom: Size: [id number] 3511 size II Max long arm sleeve; Juzo 2301 size medium seamless gauntlet    Vendor: [employer]    Education: to be performed at garment fitting    Patient/family demonstrated donning and doffing with to be performed at garment fitting     Discussed custom vs RM garments; decision made to trial Juzo dynamic sleeve first due to out of pocket cost. If garment not effectively managing lymphedema, pt will require custom sleeve/glove. Rationale: increase ROM, increase tissue extensibility and decrease edema  to improve the patients ability to perform ADLs and functional tasks. Vasopneumatic Device:    Body Part: [] R [] L [] Bilateral  Pressure: [] Decreased [] Normal [] Increased  Treatment Cycles: [] 1  [] 2  [] 3   Rationale: To improve lymphatic fluid movement and decrease swelling to improve the patients ability to perform ADL and IADL skills and prevent worsening of swelling over time. With   [] TE   [] TA   [x] MT   [] SC   [] other: Patient Education: [x] Review HEP    [x] MLD Patient Education Reviewed with patient, as well as demonstration and instruction during MLD portion of the session.    Continued education in self MLD technique with bathing and skin care. Provided patient with the written instructions to follow. Educated patient in soft tissue mobilization for axillary web. [] Progressed/Changed HEP based on:   [] positioning   [] Kinesiotape   [x] Skin care   [] wound care   [] other:   [x] Patient/caregiver in multi-layer bandaging donning principles. , Precautions. and Handout provided. Patient / caregiver re-demonstrated bandaging. [] Yes  [x] No  Compression bandaging/garment precautions reviewed: [x] Yes  [] No     Other Objective/Functional Measures:   Height:     Weight:      BMI:     (36 or greater: adversely affecting lymphedema)    Pain Level (0-10 scale) post treatment: 0    Volumetric Measurements:     Date:  Right Left % difference    4/1/22 2365.55 2623.11 10.89   3/25/22 2365.55 2701.44 14.2   2/18/22 2365.55 2872.95 21.45                     ASSESSMENT/Changes in Function:   Patient with good tolerance to continued MLD/MLB today. Noted reduction in swelling and improvement in skin tissue turgor. Pt instructed in self-MLD to perform to left axilla/chest wall. Reviewed bandaging precautions today. Pt now with 10% limb volume difference compared to 21% on eval. Pt eager to transition to garments. Will trial Juzo 3511 RM sleeve and seamless gauntlet due to self-pay. Pt measured for garments with order sent to [facility]. If garments not effectively managing swelling, will need to switch to custom garments. Patient will continue to benefit from skilled PT services to  address ROM deficits, address swelling, analyze and address soft tissue restrictions, analyze compression product fit and use, assess and modify postural abnormalities, instruct in home and community integration, instruct in home lymphedema management program, measure for compression products and modify and progress therapeutic interventions to attain remaining goals.      [x]  See Plan of Care  []  See progress note/recertification  []  See Discharge Summary         Progress towards goals / Updated goals:  Short term goals  Time frame: 4-6 weeks  1. Patient will demonstrate knowledge of signs/symptoms of infections/cellulitis and be independent in skin care to prevent cellulitis. MET 4/1/22  2. Patient will demonstrate independence in lymphedema home program of therapeutic exercises to improve circulation and decongest limb to improve ADLs. MET 4/1/22  3. Patient will tolerate multi-layer bandages (MLB) and show measureable decrease in left UE limb volume by 200 - 500 mL with <10% limb volume difference to allow ordering of home compression system (daytime, nighttime garments and pump as needed). Ongoing      Long term goals  Time frame: 10-12 weeks  1. Patient will increase left UE shoulder flexion ROM by 10-15 deg for improved tolerance for overhead reaching activities and functional tasks. 2.  Patient will be independent with don/doff of compression system and use in order to prevent reaccumulation of fluid at discharge. 3.  Pt will be independent in self-MLD and show stable limb volumes showing decongestion and pt. ready for transition to independent restorative phase of lymphedema therapy. PLAN  []  Upgrade activities as tolerated     [x]  Continue plan of care  []  Update interventions per flow sheet       []  Discharge due to:_  [x]  Other: fit garments upon receiving     Ting Vega, PT, DPT, CLT-SOLANGE  4/1/2022

## 2022-04-05 ENCOUNTER — HOSPITAL ENCOUNTER (OUTPATIENT)
Dept: PHYSICAL THERAPY | Age: 77
Discharge: HOME | End: 2022-04-05
Payer: MEDICARE

## 2022-04-05 PROCEDURE — 97140 MANUAL THERAPY 1/> REGIONS: CPT

## 2022-04-05 NOTE — PROGRESS NOTES
LYMPHEDEMA PT DAILY TREATMENT NOTE - [facility] 2-15    Patient Name: Zac Acevedo  Date:2022  : 1945  [x]  Patient  Verified  Payor: [place] MEDICARE / Plan: [place] MEDICARE PART A & B / Product Type: Medicare /    In time: 2:05 pm  Out time: 2:50 pm  Total Treatment Time (min): 45  Total Timed Codes (min): 45  1:1 Treatment Time ( [address] only): 39   Visit #: 7    Treatment Area: Lymphedema, not elsewhere classified [I89.0]    SUBJECTIVE  Pain Level (0-10 scale): pt denies pain, c/o tightness with ROM/stretching   Any medication changes, allergies to medications, adverse drug reactions, diagnosis change, or new procedure performed?: [x] No    [] Yes (see summary sheet for update)  Subjective functional status/changes:   [] No changes reported  Patient arrives to appt with MLB intact left UE. Pt reports good tolerance to bandages overall, however c/o itching at elbow - improved with addition of calmoseptine last visit. Pt c/o \"stiffness\" in her fingers but denies swelling. Pt says she has been performing self-MLD on trunk/chest as instructed previous visit visit. Pt reports significant improvement in left shoulder tightness after completing ex's for IRINEO the last few days. Pt agreeable to treatment today. Pt says she made payment for garments yesterday and hopes to have them by next visit. OBJECTIVE     min Therapeutic Exercise:  [] [place] Exercises [x] Remedial Lymphedema Exercise Program [x] Axillary Web Exercise Program      [] Shoulder ROM Exercises    3/29/22: instructed in ex's for IRINEO as follows: shoulder abd to 90 deg, then 120 deg, then 180 deg combined with wrist flex/ext. Good return demonstration today. Rationale: Activate muscle pump to improve lymphatic fluid movement and decrease swelling to improve the patients ability to perform ADL and IADL skills and prevent worsening of swelling over time.     45 min Manual Therapy    Manual Lymphatic Drainage (MLD):  Area to decongest: UE: left palm wrist elbow mid forearm axilla trunk   Sequence used and effectiveness: Manual therapy  Modifications were made to manual lymph drainage sequence to exclude cervical techniques secondary to patient's age. Secondary sequence for upper extremity with trunk involvement. 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 Diaphragmatic breathing x 5 pre and post-MLD. Performed full left UE sequence. Instructed in self-MLD for chest/axilla. Skin/wound care/debridement: Reviewed skin care principles:   Performed skin care with low pH lotion following manual lymph principles. Skin care products  Hygiene  Prevention of cellulitis     calmoseptine applied to posterior upper arm over red bumps and at anterior elbow. Eucerin/anti-itch lotion mix left UE. Applied multi-layer compression bandaging to: Patient arrived without adequate bandage in place that was applied by caregiver. left  upper extremity    The following multi-layer bandages were applied:  Protouch Stockinette    Padding layer:  Fleece from MCP to axilla     Foam:  none    Short stretch bandages:   6 cm  8 cm  10 cm    tubigrip applied to cover taped areas. Compression bandaging instructions:  1. Compression bandaging will be applied twice a week by therapist in clinic, with adjustments to be made at home as indicated if bandaging becomes loose or uncomfortable. 2. If tolerated, remain bandaged between appointments with therapist, removing under the following conditionsDO NOT WAIT FOR A RETURN PHONE Sabi Salinas:  -Numbness/tingling in extremity different from what you have experienced without the bandages in place.  -Compromise in circulation, monitoring blood refill into toes after applying gentle pressure to toes.  -Onset of pain in extremity that is sudden and severe in nature. -Redness, warmth in limb, and/or fever, flu-like symptoms, which may indicate infection.   If this occurs, call your doctor right away.  If you note a sudden increase in swelling in extremity, with or without redness/warmth, go to the emergency room as soon as possible to have blood clot ruled out. 3. Compression bandaging supplies that can be laundered are the brown compression wraps and any foam applied for padding. Launder in washer/dryer with NO fabric softener, bleach, woolite. Dry on a low heat. 4. Once compression garments are ordered, compression bandaging will be continued until garments arrive for fitting. This process can take several weeks. Upper/Lower Extremity Compression:   4/1/22:    Measured for the following compression products:    UE: left palm wrist elbow mid forearm axilla left upper extremity: Arm sleeve, Gauntlet and Top band silicone border    Style: Circular knit    Brand: [employer]    Type: Non-Custom: Size: Juzo 3511 size II Max long arm sleeve; Juzo 2301 size medium seamless gauntlet    Vendor: [employer]    Education: to be performed at garment fitting    Patient/family demonstrated donning and doffing with to be performed at garment fitting     Discussed custom vs RM garments; decision made to trial Juzo dynamic sleeve first due to out of pocket cost. If garment not effectively managing lymphedema, pt will require custom sleeve/glove. Rationale: increase ROM, increase tissue extensibility and decrease edema  to improve the patients ability to perform ADLs and functional tasks. Vasopneumatic Device:    Body Part: [] R [] L [] Bilateral  Pressure: [] Decreased [] Normal [] Increased  Treatment Cycles: [] 1  [] 2  [] 3   Rationale: To improve lymphatic fluid movement and decrease swelling to improve the patients ability to perform ADL and IADL skills and prevent worsening of swelling over time.               With   [] TE   [] TA   [x] MT   [] SC   [] other: Patient Education: [x] Review HEP    [x] MLD Patient Education Reviewed with patient, as well as demonstration and instruction during MLD portion of the session. Continued education in self MLD technique with bathing and skin care. Provided patient with the written instructions to follow. Educated patient in soft tissue mobilization for axillary web. [] Progressed/Changed HEP based on:   [] positioning   [] Kinesiotape   [x] Skin care   [] wound care   [] other:   [x] Patient/caregiver in multi-layer bandaging donning principles. , Precautions. and Handout provided. Patient / caregiver re-demonstrated bandaging. [] Yes  [x] No  Compression bandaging/garment precautions reviewed: [x] Yes  [] No     Other Objective/Functional Measures:   Height:     Weight:      BMI:     (36 or greater: adversely affecting lymphedema)    Pain Level (0-10 scale) post treatment: 0    Volumetric Measurements:     Date:  Right Left % difference    4/1/22 2365.55 2623.11 10.89   3/25/22 2365.55 2701.44 14.2   2/18/22 2365.55 2872.95 21.45                     ASSESSMENT/Changes in Function:   Patient with good tolerance to continued MLD/MLB today. Noted reduction in swelling and improvement in skin tissue turgor. Pt instructed in self-MLD to perform to left axilla/chest wall. Reviewed bandaging precautions today. Pt now with 10% limb volume difference compared to 21% on eval. Pt eager to transition to garments. Will trial Juzo 3511 RM sleeve and seamless gauntlet due to self-pay. Pt measured for garments with order sent to [facility]. If garments not effectively managing swelling, will need to switch to custom garments.    Patient will continue to benefit from skilled PT services to  address ROM deficits, address swelling, analyze and address soft tissue restrictions, analyze compression product fit and use, assess and modify postural abnormalities, instruct in home and community integration, instruct in home lymphedema management program, measure for compression products and modify and progress therapeutic interventions to attain remaining goals. [x]  See Plan of Care  []  See progress note/recertification  []  See Discharge Summary         Progress towards goals / Updated goals:  Short term goals  Time frame: 4-6 weeks  1. Patient will demonstrate knowledge of signs/symptoms of infections/cellulitis and be independent in skin care to prevent cellulitis. MET 4/1/22  2. Patient will demonstrate independence in lymphedema home program of therapeutic exercises to improve circulation and decongest limb to improve ADLs. MET 4/1/22  3. Patient will tolerate multi-layer bandages (MLB) and show measureable decrease in left UE limb volume by 200 - 500 mL with <10% limb volume difference to allow ordering of home compression system (daytime, nighttime garments and pump as needed). Ongoing      Long term goals  Time frame: 10-12 weeks  1. Patient will increase left UE shoulder flexion ROM by 10-15 deg for improved tolerance for overhead reaching activities and functional tasks. 2.  Patient will be independent with don/doff of compression system and use in order to prevent reaccumulation of fluid at discharge. 3.  Pt will be independent in self-MLD and show stable limb volumes showing decongestion and pt. ready for transition to independent restorative phase of lymphedema therapy. PLAN  []  Upgrade activities as tolerated     [x]  Continue plan of care  []  Update interventions per flow sheet       []  Discharge due to:_  [x]  Other: fit garments upon receiving     Ting Vega, PT, DPT, CLT-SOLANGE  4/5/2022

## 2022-04-08 ENCOUNTER — HOSPITAL ENCOUNTER (OUTPATIENT)
Dept: PHYSICAL THERAPY | Age: 77
Discharge: HOME | End: 2022-04-08
Payer: MEDICARE

## 2022-04-08 PROCEDURE — 97140 MANUAL THERAPY 1/> REGIONS: CPT

## 2022-04-08 NOTE — PROGRESS NOTES
LYMPHEDEMA PT DAILY TREATMENT NOTE - [facility] 2-15    Patient Name: Wes Number  Date:2022  : 1945  [x]  Patient  Verified  Payor: [place] MEDICARE / Plan: [place] MEDICARE PART A & B / Product Type: Medicare /    In time: 2:00 pm  Out time: 2:30 pm  Total Treatment Time (min): 30  Total Timed Codes (min): 30  1:1 Treatment Time ( only): 30   Visit #: 8    Treatment Area: Lymphedema, not elsewhere classified [I89.0]    SUBJECTIVE  Pain Level (0-10 scale): pt denies pain, c/o tightness with ROM/stretching   Any medication changes, allergies to medications, adverse drug reactions, diagnosis change, or new procedure performed?: [x] No    [] Yes (see summary sheet for update)  Subjective functional status/changes:   [] No changes reported  Patient arrives to appt with MLB removed left UE, says she took bandages off to shower just prior to appt. Pt reports good tolerance to bandages overall, however c/o itching at elbow - improved with addition of calmoseptine last visit. Pt c/o \"stiffness\" in her fingers but denies swelling. Pt says she has been performing self-MLD on trunk/chest as instructed previous visit visit. Pt reports significant improvement in left shoulder tightness after completing ex's for IRINEO the last few days. Pt agreeable to treatment today. Pt received gauntlet, but not sleeve in the mail - called Rishi Hurst at [facility] who reports that sleeve was backordered and should ship soon. OBJECTIVE     min Therapeutic Exercise:  [] [employer] Exercises [x] Remedial Lymphedema Exercise Program [x] Axillary Web Exercise Program      [] Shoulder ROM Exercises    3/29/22: instructed in ex's for IRINEO as follows: shoulder abd to 90 deg, then 120 deg, then 180 deg combined with wrist flex/ext. Good return demonstration today. Rationale:  Activate muscle pump to improve lymphatic fluid movement and decrease swelling to improve the patients ability to perform ADL and IADL skills and prevent worsening of swelling over time. 30 min Manual Therapy    Manual Lymphatic Drainage (MLD):  Area to decongest: UE: left palm wrist elbow mid forearm axilla trunk   Sequence used and effectiveness: deferred today  Modifications were made to manual lymph drainage sequence to exclude cervical techniques secondary to patient's age. Secondary sequence for upper extremity with trunk involvement. 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 Diaphragmatic breathing x 5 pre and post-MLD. Performed full left UE sequence. Instructed in self-MLD for chest/axilla. Skin/wound care/debridement: Reviewed skin care principles:   Performed skin care with low pH lotion following manual lymph principles. Skin care products  Hygiene  Prevention of cellulitis     calmoseptine applied to posterior upper arm over red bumps and at anterior elbow. Eucerin/anti-itch lotion mix left UE. Applied multi-layer compression bandaging to: Patient arrived without adequate bandage in place that was applied by caregiver. left  upper extremity    The following multi-layer bandages were applied:  Protouch Stockinette    Padding layer:  Fleece from MCP to axilla     Foam:  none    Short stretch bandages:   6 cm  8 cm  10 cm    tubigrip applied to cover taped areas. Compression bandaging instructions:  1. Compression bandaging will be applied twice a week by therapist in clinic, with adjustments to be made at home as indicated if bandaging becomes loose or uncomfortable. 2. If tolerated, remain bandaged between appointments with therapist, removing under the following conditionsDO NOT WAIT FOR A RETURN PHONE [address]:  -Numbness/tingling in extremity different from what you have experienced without the bandages in place.  -Compromise in circulation, monitoring blood refill into toes after applying gentle pressure to toes.  -Onset of pain in extremity that is sudden and severe in nature.   -Redness, warmth in limb, and/or fever, flu-like symptoms, which may indicate infection. If this occurs, call your doctor right away. If you note a sudden increase in swelling in extremity, with or without redness/warmth, go to the emergency room as soon as possible to have blood clot ruled out. 3. Compression bandaging supplies that can be laundered are the brown compression wraps and any foam applied for padding. Launder in washer/dryer with NO fabric softener, bleach, woolite. Dry on a low heat. 4. Once compression garments are ordered, compression bandaging will be continued until garments arrive for fitting. This process can take several weeks. Upper/Lower Extremity Compression:   4/1/22:    Measured for the following compression products:    UE: left palm wrist elbow mid forearm axilla left upper extremity: Arm sleeve, Gauntlet and Top band silicone border    Style: Circular knit    Brand: [employer]App    Type: Non-Custom: Size: BandApp 3511 size II Max long arm sleeve; BandApp 2301 size medium seamless gauntlet    Vendor: [employer]    Education: to be performed at garment fitting    Patient/family demonstrated donning and doffing with to be performed at garment fitting     Discussed custom vs RM garments; decision made to trial [employer] dynamic sleeve first due to out of pocket cost. If garment not effectively managing lymphedema, pt will require custom sleeve/glove. Rationale: increase ROM, increase tissue extensibility and decrease edema  to improve the patients ability to perform ADLs and functional tasks. Vasopneumatic Device:    Body Part: [] R [] L [] Bilateral  Pressure: [] Decreased [] Normal [] Increased  Treatment Cycles: [] 1  [] 2  [] 3   Rationale: To improve lymphatic fluid movement and decrease swelling to improve the patients ability to perform ADL and IADL skills and prevent worsening of swelling over time.               With   [] TE   [] TA   [x] MT   [] SC   [] other: Patient Education: [x] Review HEP    [x] MLD Patient Education Reviewed with patient, as well as demonstration and instruction during MLD portion of the session. Continued education in self MLD technique with bathing and skin care. Provided patient with the written instructions to follow. Educated patient in soft tissue mobilization for axillary web. [] Progressed/Changed HEP based on:   [] positioning   [] Kinesiotape   [x] Skin care   [] wound care   [] other:   [x] Patient/caregiver in multi-layer bandaging donning principles. , Precautions. and Handout provided. Patient / caregiver re-demonstrated bandaging. [] Yes  [x] No  Compression bandaging/garment precautions reviewed: [x] Yes  [] No     Other Objective/Functional Measures:   Height:     Weight:      BMI:     (36 or greater: adversely affecting lymphedema)    Pain Level (0-10 scale) post treatment: 0    Volumetric Measurements:     Date:  Right Left % difference    4/8/22 2365.55 2612.0 10.42   4/1/22 2365.55 2623.11 10.89   3/25/22 2365.55 2701.44 14.2   2/18/22 2365.55 2872.95 21.45       ASSESSMENT/Changes in Function:   Patient with good tolerance to continued MLD/MLB today. Noted reduction in swelling and improvement in skin tissue turgor. Pt instructed in self-MLD to perform to left axilla/chest wall. Reviewed bandaging precautions today. Pt now with 10% limb volume difference compared to 21% on eval. Pt eager to transition to garments. Will trial Juzo 3511 RM sleeve and seamless gauntlet due to self-pay. Pt measured for garments with order sent to [facility]. If garments not effectively managing swelling, will need to switch to custom garments.    Patient will continue to benefit from skilled PT services to  address ROM deficits, address swelling, analyze and address soft tissue restrictions, analyze compression product fit and use, assess and modify postural abnormalities, instruct in home and community integration, instruct in home lymphedema management program, measure for compression products and modify and progress therapeutic interventions to attain remaining goals. [x]  See Plan of Care  []  See progress note/recertification  []  See Discharge Summary         Progress towards goals / Updated goals:  Short term goals  Time frame: 4-6 weeks  1. Patient will demonstrate knowledge of signs/symptoms of infections/cellulitis and be independent in skin care to prevent cellulitis. MET 4/1/22  2. Patient will demonstrate independence in lymphedema home program of therapeutic exercises to improve circulation and decongest limb to improve ADLs. MET 4/1/22  3. Patient will tolerate multi-layer bandages (MLB) and show measureable decrease in left UE limb volume by 200 - 500 mL with <10% limb volume difference to allow ordering of home compression system (daytime, nighttime garments and pump as needed). Ongoing      Long term goals  Time frame: 10-12 weeks  1. Patient will increase left UE shoulder flexion ROM by 10-15 deg for improved tolerance for overhead reaching activities and functional tasks. 2.  Patient will be independent with don/doff of compression system and use in order to prevent reaccumulation of fluid at discharge. 3.  Pt will be independent in self-MLD and show stable limb volumes showing decongestion and pt. ready for transition to independent restorative phase of lymphedema therapy. PLAN  []  Upgrade activities as tolerated     [x]  Continue plan of care  []  Update interventions per flow sheet       []  Discharge due to:_  [x]  Other: fit garments upon receiving (pt received gauntlet, waiting on Juzo sleeve that is backordered)    Taj Knight.  Gary, PT, DPT, CLT-SOLANGE  4/8/2022

## 2022-04-12 ENCOUNTER — HOSPITAL ENCOUNTER (OUTPATIENT)
Dept: PHYSICAL THERAPY | Age: 77
Discharge: HOME | End: 2022-04-12
Payer: MEDICARE

## 2022-04-12 PROCEDURE — 97760 ORTHOTIC MGMT&TRAING 1ST ENC: CPT

## 2022-04-12 PROCEDURE — 97140 MANUAL THERAPY 1/> REGIONS: CPT

## 2022-04-12 NOTE — PROGRESS NOTES
LYMPHEDEMA PT DAILY TREATMENT NOTE - [facility] -15    Patient Name: Beny Bobo  Date:2022  : 1945  [x]  Patient  Verified  Payor: Leyla Calderon / Plan: [place] MEDICARE PART A & B / Product Type: Medicare /    In time: 1:55 pm  Out time: 2:20 pm  Total Treatment Time (min): 25  Total Timed Codes (min): 25  1:1 Treatment Time ( only): 25   Visit #: 9    Treatment Area: Lymphedema, not elsewhere classified [I89.0]    SUBJECTIVE  Pain Level (0-10 scale): pt denies pain, c/o tightness with ROM/stretching   Any medication changes, allergies to medications, adverse drug reactions, diagnosis change, or new procedure performed?: [x] No    [] Yes (see summary sheet for update)  Subjective functional status/changes:   [] No changes reported  Patient arrives to appt with MLB intact left UE. Pt reports good tolerance to bandages. Pt says she has been performing self-MLD on trunk/chest as instructed previous visit visit. Pt reports significant improvement in left shoulder tightness after completing ex's for IRINEO the last few days. Pt agreeable to treatment today. Pt received garments and brought for fitting today. OBJECTIVE     min Therapeutic Exercise:  [] [employer] Exercises [x] Remedial Lymphedema Exercise Program [x] Axillary Web Exercise Program      [] Shoulder ROM Exercises    3/29/22: instructed in ex's for IRINEO as follows: shoulder abd to 90 deg, then 120 deg, then 180 deg combined with wrist flex/ext. Good return demonstration today. Rationale: Activate muscle pump to improve lymphatic fluid movement and decrease swelling to improve the patients ability to perform ADL and IADL skills and prevent worsening of swelling over time.     10  15 Min  Min  Manual Therapy  Orthotic management     Manual Lymphatic Drainage (MLD):  Area to decongest: UE: left palm wrist elbow mid forearm axilla trunk   Sequence used and effectiveness: deferred today due to garment fitting  Modifications were made to manual lymph drainage sequence to exclude cervical techniques secondary to patient's age. Secondary sequence for upper extremity with trunk involvement. 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 Diaphragmatic breathing x 5 pre and post-MLD. Performed full left UE sequence. Instructed in self-MLD for chest/axilla. Skin/wound care/debridement: Reviewed skin care principles:   Performed skin care with low pH lotion following manual lymph principles. Skin care products  Hygiene  Prevention of cellulitis     Skin intact. Reviewed skin care routine for home upon transition to garments. Applied multi-layer compression bandaging to:  Deferred due to garment fitting  Patient arrived without adequate bandage in place that was applied by caregiver. left  upper extremity    The following multi-layer bandages were applied:  Protouch Stockinette    Padding layer:  Fleece from MCP to axilla     Foam:  none    Short stretch bandages:   6 cm  8 cm  10 cm    tubigrip applied to cover taped areas. Compression bandaging instructions:  1. Compression bandaging will be applied twice a week by therapist in clinic, with adjustments to be made at home as indicated if bandaging becomes loose or uncomfortable. 2. If tolerated, remain bandaged between appointments with therapist, removing under the following conditionsDO NOT WAIT FOR A RETURN PHONE [address]:  -Numbness/tingling in extremity different from what you have experienced without the bandages in place.  -Compromise in circulation, monitoring blood refill into toes after applying gentle pressure to toes.  -Onset of pain in extremity that is sudden and severe in nature. -Redness, warmth in limb, and/or fever, flu-like symptoms, which may indicate infection. If this occurs, call your doctor right away.   If you note a sudden increase in swelling in extremity, with or without redness/warmth, go to the emergency room as soon as possible to have blood clot ruled out. 3. Compression bandaging supplies that can be laundered are the brown compression wraps and any foam applied for padding. Launder in washer/dryer with NO fabric softener, bleach, woolite. Dry on a low heat. 4. Once compression garments are ordered, compression bandaging will be continued until garments arrive for fitting. This process can take several weeks. Upper/Lower Extremity Compression:   4/12/22:    Fitted for the following compression products:    UE: left palm wrist elbow mid forearm axilla left upper extremity: Arm sleeve, Gauntlet and Top band silicone border    Style: Circular knit    Brand: [employer]    Type: Non-Custom: Size: Juzo 3511 size II Max long arm sleeve; Juzo 2301 size medium seamless gauntlet    Vendor: [employer]    Education: Educated patient in compression garment donning and doffing. Glove use with donning. Daily wear schedule. Daily laundering. Garment lifespan. Return and reordering process (by bringing prior garments into the clinic). Educated patient to monitor for redness or pressure points on the skin. If new pain occurs they should contact their therapist.    Patient/family demonstrated donning and doffing with independence and verbal cues     Discussed custom vs RM garments; decision made to trial Juzo dynamic sleeve first due to out of pocket cost. If garment not effectively managing lymphedema, pt will require custom sleeve/glove. HOME PROGRAM:  Compression Arm Sleeve Instructions:  Apply the sleeve and glove on a clean, dry arm first thing in the morning. Wear all day until you go to bed. Adjust the sleeve during the day as needed with rubber gloves to protect the fabric, watching area at the wrist and bend in the elbow, removing any creases in the sleeve that may occur with movement. Perform arm exercises as instructed with sleeve in place.    Do not wear the sleeve without the hand piece for extended periods of time; it is ok to eat meals, wash dishes, or perform grooming activities without the glove in place, but remember to put it back on afterwards. Discontinue wearing your sleeve under the following conditions:  -numbness/tingling in the extremity   - Compromise in circulation: fingers feel cold   -onset of pain in the arm that is sudden and severe in nature  -Redness, warmth in the limb and/or fever, flu-like symptoms, which may indicate an infection. If this occurs, call your doctor right away and discontinue wearing the sleeve and gauntlet   -skin irritation or breakdown    Wash instructions: You need to wash and dry both your glove and your sleeve after each wear in order for this piece of medical equipment to offer prescribed compression to effectively manage your lymphedema. Wash it in a garment bag or pillow case in cool water, gentle cycle, no bleach, no fabric softener, and no woolite. You may hand wash it if you prefer. Place it in the dryer on low heat to help restore the elasticity of the garments. Return to the clinic next week with your compression DME on for reassessment of fit and effectiveness of the garments. Rationale: increase ROM, increase tissue extensibility and decrease edema  to improve the patients ability to perform ADLs and functional tasks. Vasopneumatic Device:    Body Part: [] R [] L [] Bilateral  Pressure: [] Decreased [] Normal [] Increased  Treatment Cycles: [] 1  [] 2  [] 3   Rationale: To improve lymphatic fluid movement and decrease swelling to improve the patients ability to perform ADL and IADL skills and prevent worsening of swelling over time. With   [] TE   [] TA   [x] MT   [] SC   [x] other: Patient Education: [x] Review HEP    [x] MLD Patient Education Reviewed with patient, as well as demonstration and instruction during MLD portion of the session. Continued education in self MLD technique with bathing and skin care. Provided patient with the written instructions to follow. Educated patient in soft tissue mobilization for axillary web. [] Progressed/Changed HEP based on:   [] positioning   [] Kinesiotape   [x] Skin care   [] wound care   [x] other: garment instructions   [x] Patient/caregiver in multi-layer bandaging donning principles. , Precautions. and Handout provided. Patient / caregiver re-demonstrated bandaging. [] Yes  [x] No  Compression bandaging/garment precautions reviewed: [x] Yes  [] No     Other Objective/Functional Measures:   Height:     Weight:      BMI:     (36 or greater: adversely affecting lymphedema)    Pain Level (0-10 scale) post treatment: 0    Volumetric Measurements:     Date:  Right Left % difference    4/12/22 2365.55 2585.52 9.3   4/8/22 2365.55 2612.0 10.42   4/1/22 2365.55 2623.11 10.89   3/25/22 2365.55 2701.44 14.2   2/18/22 2365.55 2872.95 21.45       ASSESSMENT/Changes in Function:   Patient seen for fitting of new Juzo 3511 sleeve and seamless gauntlet. Good fit noted. Pt instructed in self-MLD to perform to left axilla/chest wall. Pt now with 9% limb volume difference compared to 21% on eval. Pt to return to clinic next week for garment/volume recheck. If garments not effectively managing swelling, will need to switch to custom garments. Patient will continue to benefit from skilled PT services to  address ROM deficits, address swelling, analyze and address soft tissue restrictions, analyze compression product fit and use, assess and modify postural abnormalities, instruct in home and community integration, instruct in home lymphedema management program, measure for compression products and modify and progress therapeutic interventions to attain remaining goals. [x]  See Plan of Care  []  See progress note/recertification  []  See Discharge Summary         Progress towards goals / Updated goals:  Short term goals  Time frame: 4-6 weeks  1.   Patient will demonstrate knowledge of signs/symptoms of infections/cellulitis and be independent in skin care to prevent cellulitis. MET 4/1/22  2. Patient will demonstrate independence in lymphedema home program of therapeutic exercises to improve circulation and decongest limb to improve ADLs. MET 4/1/22  3. Patient will tolerate multi-layer bandages (MLB) and show measureable decrease in left UE limb volume by 200 - 500 mL with <10% limb volume difference to allow ordering of home compression system (daytime, nighttime garments and pump as needed). Ongoing      Long term goals  Time frame: 10-12 weeks  1. Patient will increase left UE shoulder flexion ROM by 10-15 deg for improved tolerance for overhead reaching activities and functional tasks. 2.  Patient will be independent with don/doff of compression system and use in order to prevent reaccumulation of fluid at discharge. MET 4/12/22  3. Pt will be independent in self-MLD and show stable limb volumes showing decongestion and pt. ready for transition to independent restorative phase of lymphedema therapy. PLAN  []  Upgrade activities as tolerated     [x]  Continue plan of care  []  Update interventions per flow sheet       []  Discharge due to:_  [x]  Other: recheck volumes, discharge to phase II CDT if volumes stable and pt effectively managing lymphedema at home    Jason Dillon.  Gary, PT, DPT, CLT-SOLANGE  4/12/2022

## 2022-04-15 ENCOUNTER — APPOINTMENT (OUTPATIENT)
Dept: PHYSICAL THERAPY | Age: 77
End: 2022-04-15
Payer: MEDICARE

## 2022-04-19 ENCOUNTER — TELEPHONE (OUTPATIENT)
Dept: ONCOLOGY | Age: 77
End: 2022-04-19

## 2022-04-19 ENCOUNTER — HOSPITAL ENCOUNTER (OUTPATIENT)
Dept: PHYSICAL THERAPY | Age: 77
Discharge: HOME | End: 2022-04-19
Payer: MEDICARE

## 2022-04-19 PROCEDURE — 97763 ORTHC/PROSTC MGMT SBSQ ENC: CPT

## 2022-04-19 PROCEDURE — 97140 MANUAL THERAPY 1/> REGIONS: CPT

## 2022-04-19 NOTE — PROGRESS NOTES
LYMPHEDEMA PT DAILY TREATMENT NOTE - [facility] -15    Patient Name: Chance Paget  Date:2022  : 1945  [x]  Patient  Verified  Payor: Emmy Mendenhall / Plan: [place] PART [place] & B / Product Type: Medicare /    In time: 1:55 pm  Out time: 2:20 pm  Total Treatment Time (min): 25  Total Timed Codes (min): 25  1:1 Treatment Time ( only): 25   Visit #: 10    Treatment Area: Lymphedema, not elsewhere classified [I89.0]    SUBJECTIVE  Pain Level (0-10 scale): pt denies pain, c/o tightness with ROM/stretching   Any medication changes, allergies to medications, adverse drug reactions, diagnosis change, or new procedure performed?: [x] No    [] Yes (see summary sheet for update)  Subjective functional status/changes:   [] No changes reported  Patient arrives to appt wearing sleeve/gauntlet left UE. Pt reports good tolerance to new garments, says she is able to don/doff independently using gardening glove as recommended. Pt says she has been washing/drying garments every 3 days. Pt continues to perform self-MLD on trunk/chest as instructed previous visit. Pt reports significant improvement in left shoulder tightness after completing ex's for IRINEO the last few days. Pt agreeable to treatment today. OBJECTIVE     min Therapeutic Exercise:  [] Donnella Kill Exercises [x] Remedial Lymphedema Exercise Program [x] Axillary Web Exercise Program      [] Shoulder ROM Exercises    3/29/22: instructed in ex's for IRINEO as follows: shoulder abd to 90 deg, then 120 deg, then 180 deg combined with wrist flex/ext. Good return demonstration today. Rationale: Activate muscle pump to improve lymphatic fluid movement and decrease swelling to improve the patients ability to perform ADL and IADL skills and prevent worsening of swelling over time.     10  15 Min  Min  Manual Therapy  Orthotic management     Manual Lymphatic Drainage (MLD):  Area to decongest: UE: left palm wrist elbow mid forearm axilla trunk   Sequence used and effectiveness: deferred today due to garment fitting  Modifications were made to manual lymph drainage sequence to exclude cervical techniques secondary to patient's age. Secondary sequence for upper extremity with trunk involvement. 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 Diaphragmatic breathing x 5 pre and post-MLD. Performed full left UE sequence. Instructed in self-MLD for chest/axilla. Skin/wound care/debridement: Reviewed skin care principles:   Performed skin care with low pH lotion following manual lymph principles. Skin care products  Hygiene  Prevention of cellulitis     Skin intact. Reviewed skin care routine for home upon transition to garments. Applied multi-layer compression bandaging to:  Deferred due to garment fitting  Patient arrived without adequate bandage in place that was applied by caregiver. left  upper extremity    The following multi-layer bandages were applied:  Protouch Stockinette    Padding layer:  Fleece from MCP to axilla     Foam:  none    Short stretch bandages:   6 cm  8 cm  10 cm    tubigrip applied to cover taped areas. Upper/Lower Extremity Compression:   4/12/22:    Fitted for the following compression products:    UE: left palm wrist elbow mid forearm axilla left upper extremity: Arm sleeve, Gauntlet and Top band silicone border    Style: Circular knit    Brand: [employer]    Type: Non-Custom: Size: eShares 3511 size II Max long arm sleeve; eShares 2301 size medium seamless gauntlet    Vendor: [employer]    Education: Educated patient in compression garment donning and doffing. Glove use with donning. Daily wear schedule. Daily laundering. Garment lifespan. Return and reordering process (by bringing prior garments into the clinic). Educated patient to monitor for redness or pressure points on the skin.   If new pain occurs they should contact their therapist.    Patient/family demonstrated donning and doffing with independence and verbal cues     Discussed custom vs RM garments; decision made to trial Juzo dynamic sleeve first due to out of pocket cost. If garment not effectively managing lymphedema, pt will require custom sleeve/glove. HOME PROGRAM:  Compression Arm Sleeve Instructions:  Apply the sleeve and glove on a clean, dry arm first thing in the morning. Wear all day until you go to bed. Adjust the sleeve during the day as needed with rubber gloves to protect the fabric, watching area at the wrist and bend in the elbow, removing any creases in the sleeve that may occur with movement. Perform arm exercises as instructed with sleeve in place. Do not wear the sleeve without the hand piece for extended periods of time; it is ok to eat meals, wash dishes, or perform grooming activities without the glove in place, but remember to put it back on afterwards. Discontinue wearing your sleeve under the following conditions:  -numbness/tingling in the extremity   - Compromise in circulation: fingers feel cold   -onset of pain in the arm that is sudden and severe in nature  -Redness, warmth in the limb and/or fever, flu-like symptoms, which may indicate an infection. If this occurs, call your doctor right away and discontinue wearing the sleeve and gauntlet   -skin irritation or breakdown    Wash instructions: You need to wash and dry both your glove and your sleeve after each wear in order for this piece of medical equipment to offer prescribed compression to effectively manage your lymphedema. Wash it in a garment bag or pillow case in cool water, gentle cycle, no bleach, no fabric softener, and no woolite. You may hand wash it if you prefer. Place it in the dryer on low heat to help restore the elasticity of the garments. Return to the clinic next week with your compression DME on for reassessment of fit and effectiveness of the garments. 4/19/22: reassessed fit of garments.  Pt able to don garments independently in clinic. Recommend pt wash/dry garments daily to help restore elasticity of garments and prevent stretching which could decrease effectiveness of garments over time. Pt verbalized understanding. Rationale: increase ROM, increase tissue extensibility and decrease edema  to improve the patients ability to perform ADLs and functional tasks. Vasopneumatic Device:    Body Part: [] R [] L [] Bilateral  Pressure: [] Decreased [] Normal [] Increased  Treatment Cycles: [] 1  [] 2  [] 3   Rationale: To improve lymphatic fluid movement and decrease swelling to improve the patients ability to perform ADL and IADL skills and prevent worsening of swelling over time. With   [] TE   [] TA   [x] MT   [] SC   [x] other: Patient Education: [x] Review HEP    [x] MLD Patient Education Reviewed with patient, as well as demonstration and instruction during MLD portion of the session. Continued education in self MLD technique with bathing and skin care. Provided patient with the written instructions to follow. Educated patient in soft tissue mobilization for axillary web. [] Progressed/Changed HEP based on:   [] positioning   [] Kinesiotape   [x] Skin care   [] wound care   [x] other: garment instructions   [x] Patient/caregiver in multi-layer bandaging donning principles. , Precautions. and Handout provided. Patient / caregiver re-demonstrated bandaging.  [] Yes  [x] No  Compression bandaging/garment precautions reviewed: [x] Yes  [] No     Other Objective/Functional Measures:   Height:     Weight:      BMI:     (36 or greater: adversely affecting lymphedema)    Pain Level (0-10 scale) post treatment: 0    Volumetric Measurements:     Date:  Right Left % difference    4/19/22 2365.55 2593.28 9.63   4/12/22 2365.55 2585.52 9.3   4/8/22 2365.55 2612.0 10.42   4/1/22 2365.55 2623.11 10.89   3/25/22 2365.55 2701.44 14.2   2/18/22 2365.55 2872.95 21.45       ASSESSMENT/Changes in Function:   Patient seen for garment/volume recheck today. Pt has worn garments daily, says she only washes them every 3 days. Pt's volumes slightly increased today, pt with 9.6% limb volume difference, was 9.3% last visit. Encouraged pt to wash/dry garments nightly for optimal effectiveness. Also encouraged self-MLD daily. Pt to return to clinic next week for garment/volume recheck. If volumes stable, will plan to discharge to phase II CDT. Patient will continue to benefit from skilled PT services to  address ROM deficits, address swelling, analyze and address soft tissue restrictions, analyze compression product fit and use, assess and modify postural abnormalities, instruct in home and community integration, instruct in home lymphedema management program, measure for compression products and modify and progress therapeutic interventions to attain remaining goals. [x]  See Plan of Care  []  See progress note/recertification  []  See Discharge Summary         Progress towards goals / Updated goals:  Short term goals  Time frame: 4-6 weeks  1. Patient will demonstrate knowledge of signs/symptoms of infections/cellulitis and be independent in skin care to prevent cellulitis. MET 4/1/22  2. Patient will demonstrate independence in lymphedema home program of therapeutic exercises to improve circulation and decongest limb to improve ADLs. MET 4/1/22  3. Patient will tolerate multi-layer bandages (MLB) and show measureable decrease in left UE limb volume by 200 - 500 mL with <10% limb volume difference to allow ordering of home compression system (daytime, nighttime garments and pump as needed). Ongoing      Long term goals  Time frame: 10-12 weeks  1. Patient will increase left UE shoulder flexion ROM by 10-15 deg for improved tolerance for overhead reaching activities and functional tasks.    2.  Patient will be independent with don/doff of compression system and use in order to prevent reaccumulation of fluid at discharge. MET 4/12/22  3. Pt will be independent in self-MLD and show stable limb volumes showing decongestion and pt. ready for transition to independent restorative phase of lymphedema therapy. PLAN  []  Upgrade activities as tolerated     [x]  Continue plan of care  []  Update interventions per flow sheet       []  Discharge due to:_  [x]  Other: recheck volumes, discharge to phase II CDT if volumes stable and pt effectively managing lymphedema at home    Jason Dillon.  [place], PT, DPT, CLT-SOLANGE  4/19/2022

## 2022-04-21 ENCOUNTER — TELEPHONE (OUTPATIENT)
Dept: ONCOLOGY | Age: 77
End: 2022-04-21

## 2022-04-21 NOTE — TELEPHONE ENCOUNTER
Called to patient and discussed moving follow up appt 4/27/22 to 4/25/22 at 11:30. She is agreeable to this.

## 2022-04-21 NOTE — TELEPHONE ENCOUNTER
Called to patient. She states her PCP did not recommend zometa and recommended prolia. Discussed why we recommend zometa vs prolia. Patient states she would like to HOLD on any treatment and will continue taking 2000 international units vitamin D3 and 2-3 servings of dietary calcium per day.
Patient came into the office, and stated she did not want the shots for her bones. Patient stated she spoke with her doctor and they told her not to do them. Please call patient to discuss further and gain more clarity on the situation.    #[phone]
Parent(s)

## 2022-04-22 ENCOUNTER — APPOINTMENT (OUTPATIENT)
Dept: PHYSICAL THERAPY | Age: 77
End: 2022-04-22
Payer: MEDICARE

## 2022-04-25 ENCOUNTER — OFFICE VISIT (OUTPATIENT)
Dept: ONCOLOGY | Age: 77
End: 2022-04-25
Payer: MEDICARE

## 2022-04-25 VITALS
WEIGHT: 172 LBS | OXYGEN SATURATION: 95 % | SYSTOLIC BLOOD PRESSURE: 140 MMHG | HEIGHT: 66 IN | BODY MASS INDEX: 27.64 KG/M2 | DIASTOLIC BLOOD PRESSURE: 84 MMHG | TEMPERATURE: 97.8 F | RESPIRATION RATE: 18 BRPM | HEART RATE: 80 BPM

## 2022-04-25 DIAGNOSIS — C50.112 MALIGNANT NEOPLASM OF CENTRAL PORTION OF LEFT BREAST IN FEMALE, ESTROGEN RECEPTOR POSITIVE (HCC): ICD-10-CM

## 2022-04-25 DIAGNOSIS — Z17.0 MALIGNANT NEOPLASM OF CENTRAL PORTION OF LEFT BREAST IN FEMALE, ESTROGEN RECEPTOR POSITIVE (HCC): ICD-10-CM

## 2022-04-25 PROCEDURE — G8754 DIAS BP LESS 90: HCPCS | Performed by: INTERNAL MEDICINE

## 2022-04-25 PROCEDURE — G8399 PT W/DXA RESULTS DOCUMENT: HCPCS | Performed by: INTERNAL MEDICINE

## 2022-04-25 PROCEDURE — 99214 OFFICE O/P EST MOD 30 MIN: CPT | Performed by: INTERNAL MEDICINE

## 2022-04-25 PROCEDURE — G9717 DOC PT DX DEP/BP F/U NT REQ: HCPCS | Performed by: INTERNAL MEDICINE

## 2022-04-25 PROCEDURE — G8427 DOCREV CUR MEDS BY ELIG CLIN: HCPCS | Performed by: INTERNAL MEDICINE

## 2022-04-25 PROCEDURE — 1090F PRES/ABSN URINE INCON ASSESS: CPT | Performed by: INTERNAL MEDICINE

## 2022-04-25 PROCEDURE — G8536 NO DOC ELDER MAL SCRN: HCPCS | Performed by: INTERNAL MEDICINE

## 2022-04-25 PROCEDURE — G8753 SYS BP > OR = 140: HCPCS | Performed by: INTERNAL MEDICINE

## 2022-04-25 PROCEDURE — G8419 CALC BMI OUT NRM PARAM NOF/U: HCPCS | Performed by: INTERNAL MEDICINE

## 2022-04-25 PROCEDURE — 1101F PT FALLS ASSESS-DOCD LE1/YR: CPT | Performed by: INTERNAL MEDICINE

## 2022-04-25 PROCEDURE — G0463 HOSPITAL OUTPT CLINIC VISIT: HCPCS | Performed by: INTERNAL MEDICINE

## 2022-04-25 RX ORDER — HYDROCHLOROTHIAZIDE 50 MG/1
25 TABLET ORAL DAILY
COMMUNITY

## 2022-04-25 NOTE — PROGRESS NOTES
Cancer [place] [facility] [place] [address] [address] [facility] [place]: [phone]  F: [phone]      Reason for Visit:   Carlos Boucher is a 76 y.o. female who is seen in follow up for evaluation of therapy for breast cancer. Treatment History:   · 2/15/19 Left core bx:   IDC, gr 2, 1.1 cm, ER + at 100%, MS + at 20%, HER 2 POSITIVE (IHC 2+; FISH ratio 3.9; sig/cell 9.2)  · 3/1/19 Left ax LN core bx:  + for breast cancer, ER + at 100%, MS negative, HER 2 POSITIVE (IHC 2+, FISH ratio 2; sig/cell 5.8)  · TCH-P 3/6/19-7/10/19  · #2 delayed due to diverticulitis abscess and surgery 4/17/19  · 8/5/19 Bilateral Mastecotomy: Right breast: lobular carcinomna in situ, fibrocystic changes including atypical and usual ductal hyperplasia, sclerosing adenosis apocrine metaplasia, microcalcification. Left breast: No evidence of residual invasive carcinoma or carcinoma in situ, no DCIS, 0/9 LNs; ypT0 yp N0 cM0, pCR  · [employer] 8/28/19-3/25/2020  · S/p XRT 11/27/19  · Anastrozole 11/2019-3/25/20 (hand stiffness), resumed 4/9/2020. History of Present Illness:   Pt noticed the left breast mass herself in Feb 2019, leading to the pathology above      Interval history:  In today for follow up. Reports gr 1 hot flashes, gr 1 insomnia, gr 1 neuropathy, gr 1 LE edema, gr 1 increase in urinary incontinence    She had bilateral inguinal hernia repair 6/7/2021.      FH:  Paternal aunt breast cancer in her [de-identified]; no ovarian cancer, no prostate or pancreas cancer    Past Medical History:   Diagnosis Date    Adverse effect of anesthesia     \"difficulty waking up from anesthethia\"    Cancer ([address] 02/2019    left breast. current chemo patient    Depression     in past    GERD (gastroesophageal reflux disease)     High cholesterol     Hypertension       Past Surgical History:   Procedure Laterality Date    HX BREAST BIOPSY Right years ago    neg; needle bx    HX BREAST BIOPSY Left 02/15/2019 IDC    HX CATARACT REMOVAL      2013 (R), 2016(L)    HX COLONOSCOPY      HX COLOSTOMY Left 03/2019    HX MASTECTOMY Bilateral 8/5/2019    BILATERAL BREAST MASTECTOMIES AND LEFT BREAST SENTINEL NODE BIOPSY WITH BLUE DYE performed by Nerissa Emery MD at [address] HX ORTHOPAEDIC      carpal tunnel    HX VASCULAR ACCESS Right 02/2019    portacath    IR CHANGE DRAIN  [facility]  03/2019      Social History     Tobacco Use    Smoking status: Current Every Day Smoker     Packs/day: 0.25     Years: 55.00     Pack years: 13.75    Smokeless tobacco: Never Used    Tobacco comment: 1-2 cigs/day   Substance Use Topics    Alcohol use: Yes     Alcohol/week: 1.0 standard drink     Types: 1 Shots of liquor per week     Comment: Patient stated that she puts a splash of bourbon in her afternoon coffee around 5-6 days per week      Family History   Problem Relation Age of Onset    Breast Cancer Maternal Aunt         [de-identified]    Hypertension Mother     Hypertension Father     Cancer Father         Lung    Cancer Brother         leukemia    Diabetes Brother         2 brothers with diabetes    Hypertension Sister         2 sisters and 4 brothers with HTN     Current Outpatient Medications   Medication Sig    hydroCHLOROthiazide (HYDRODIURIL) 50 mg tablet Take 25 mg by mouth daily.  anastrozole (ARIMIDEX) 1 mg tablet TAKE 1 TABLET EVERY DAY    acetylcysteine 500 mg cap Take 500 mg by mouth.  cyanocobalamin 1,000 mcg tablet Take 1,000 mcg by mouth daily.  resveratroL 100 mg cap Take 100 mg by mouth.  TURMERIC PO Take 1,500 mg by mouth.  fluticasone-umeclidinium-vilanterol (Trelegy Ellipta) 100-62.5-25 mcg inhaler Take 1 Puff by inhalation daily.  losartan (COZAAR) 25 mg tablet Take  by mouth daily.  famotidine (PEPCID AC) 20 mg tablet Take 20 mg by mouth daily.  cyanocobalamin/mecobalamin (CYANOCOBALAMIN-METHYLCOBALAMIN SL) 5,000 mcg by SubLINGual route daily.     pravastatin (PRAVACHOL) 40 mg tablet Take 40 mg by mouth nightly.  STIOLTO RESPIMAT 2.5-2.5 mcg/actuation inhaler Take 2 Puffs by inhalation nightly.  venlafaxine (EFFEXOR) 50 mg tablet Take 50 mg by mouth daily.  cholecalciferol (VITAMIN D3) 1,000 unit cap Take 2,000 Units by mouth daily.  calcium-cholecalciferol, D3, (CALTRATE 600+D) tablet Take 1 Tab by mouth nightly.  aspirin delayed-release 81 mg tablet Take  by mouth every other day. No current facility-administered medications for this visit. Allergies   Allergen Reactions    Keflex [Cephalexin] Hives    Penicillins Rash    Other Medication Itching     CHLORHEXADINE ( wipes caused moderate to severe itching in preop 8/5/19)        Review of Systems: A complete review of systems was obtained, negative except as described above. Physical Exam:     Visit Vitals  BP (!) 140/84   Pulse 80   Temp 97.8 °F (36.6 °C) (Temporal)   Resp 18   Ht 5' 6\" (1.676 m)   Wt 172 lb (78 kg)   SpO2 95%   BMI 27.76 kg/m²     ECOG PS: 0  General: no distress  Respiratory: normal respiratory effort  CV: no peripheral edema  Skin: no rashes; no ecchymoses; no petechiae  Breasts:  Bilateral mastectomies. Results:     Lab Results   Component Value Date/Time    WBC 11.3 (H) 07/10/2019 07:57 AM    HGB 11.8 07/10/2019 07:57 AM    HCT 34.5 (L) 07/10/2019 07:57 AM    PLATELET 722 56/13/3872 07:57 AM    MCV 96.6 07/10/2019 07:57 AM    ABS.  NEUTROPHILS 8.0 07/10/2019 07:57 AM     Lab Results   Component Value Date/Time    Sodium 138 07/10/2019 07:57 AM    Potassium 3.6 08/05/2019 06:45 AM    Chloride 100 07/10/2019 07:57 AM    CO2 32 07/10/2019 07:57 AM    Glucose 107 (H) 07/10/2019 07:57 AM    BUN 13 07/10/2019 07:57 AM    Creatinine 0.76 07/10/2019 07:57 AM    GFR est AA >60 07/10/2019 07:57 AM    GFR est non-AA >60 07/10/2019 07:57 AM    Calcium 9.9 07/10/2019 07:57 AM    Glucose (POC) 93 03/19/2019 06:56 AM     Lab Results   Component Value Date/Time    Bilirubin, total 0.4 07/10/2019 07:57 AM    ALT (SGPT) 17 07/10/2019 07:57 AM    Alk. phosphatase 83 07/10/2019 07:57 AM    Protein, total 6.6 07/10/2019 07:57 AM    Albumin 3.5 07/10/2019 07:57 AM    Globulin 3.1 07/10/2019 07:57 AM       2/12/19 mammogram and US  MAMMOGRAPHY:  The breast demonstrates stable scattered density breast  architecture. Underlying the site of clinical concern in the left breast is  masslike focal asymmetry in the anterior upper left breast. There is no other  dominant mass lesion, architectural distortion, asymmetry, or calcification. There is no skin thickening or nipple retraction.     ULTRASOUND:  Corresponding to the mammographic density there is a 2.4 x 3.1 x  3.7 cm hypoechoic lobulated mass at about the 12:00 position and 3 cm radial  distance from the nipple. No other suspicious cystic or solid lesions  Identified. Dr. Fawn Olsmtead has identified 2 large, suspicious ax LN on US:  1.7 cm and 1.2 cm    5/16/19 Left mammo and US:  The breast parenchyma is heterogeneously dense. Left breast biopsy clip is  unchanged in position. The central upper left breast mass seen on prior MR exam  dated February 2019 has decreased in size.     Targeted sonographic evaluation of the 12:00 position of the left breast is  performed. Within the 12:00 position of the left breast, there is a 2.1 cm x 1.3  cm x 3.1 cm irregular hypoechoic mass containing calcifications, decreased in  size and measuring 3.8 cm x 2.4 cm x 3.1 cm on prior ultrasound dated February 2018.     IMPRESSION: BI-RADS 6. Pathology proven left breast cancer. Interval decrease in size of 12:00 left breast mass, now measuring 2.1 cm x 1.3 cm x 3.1 cm. Findings were discussed with the patient at the time of interpretation.     11/4/19 dexa  Femoral Neck Left:  Bone mineral density (gm/cm2): 0.904  % of peak bone mass: 87  % for age matched controls: 116  T-score: -1.0  Z-score: 0.9     Femoral Neck Right:  Bone mineral density (gm/cm2): 0.866  % of peak bone mass: 83  % for age matched controls:  111  T-score: -1.2  Z-score: 0.6     Total Hip Left:  Bone mineral density (gm/cm2): 1.000  % of peak bone mass: 99  % for age matched controls: 126  T-score: -0.1  Z-score: 1.6     Total Hip Right:  Bone mineral density (gm/cm2): 0.935  % of peak bone mass: 93  % for age matched controls:  118  T-score: -0.6  Z-score: 1.1     Lumbar Spine: L1-L3  Bone mineral density (gm/cm2): 1.440  % of peak bone mass: 122  % for age matched controls: 145  T-score: 2.1  Z-score: 3.7     33% Radius Left:  Bone mineral density (gm/cm2): 0.807  % of peak bone mass: 92  % for age matched controls:  80  T-score: -0.8  Z-score: 1.6    Impression:      This patient is osteopenic using the World Health Organization criteria  10 year probability of major osteoporotic fracture: 11.7%  10 year probability of hip fracture: 3.5%    11/18/21 dexa  Findings:     Fractures identified on Lateral scanogram: None     Femoral Neck Left:  Bone mineral density (gm/cm2): 0.911 g/cm?  % of peak bone mass: 88%  % for age matched controls: 119%  T-score: -0.9   Z-score: 1.1      Femoral Neck Right:  Bone mineral density (gm/cm2): 0.883 g/cm?  % of peak bone mass: 85%  % for age matched controls:  115%  T-score: -1.1   Z-score: 0.8      Total Hip Left:  Bone mineral density (gm/cm2): 1.047 g/cm?  % of peak bone mass: 104%  % for age matched controls: 134%  T-score: 0.3   Z-score: 2.1      Total Hip Right:  Bone mineral density (gm/cm2): 0.949 g/cm?  % of peak bone mass: 94%  % for age matched controls:  121%  T-score: -0.5   Z-score: 1.3      Lumbar Spine: L1-L4  Bone mineral density (gm/cm2): 1.455 g/cm?  % of peak bone mass: 121%  % for age matched controls: 146%  T-score: 2.1   Z-score: 3.7      33% Radius Left:  Bone mineral density (gm/cm2): 0.893 g/cm?  % of peak bone mass: 102%  % for age matched controls:  137%  T-score: 0.2   Z-score: 2.8         IMPRESSION     This patient is osteopenic using the World Health Organization criteria  As compared to the prior study, there has been a statistically significant  decrease in bone mineral density. 10 year probability of major osteoporotic fracture: 12.1    10 year probability of hip fracture: 3.7         Records reviewed and summarized above. Assessment/plan:   1. Left central IDC, gr 2, ER +, [place] +, HER 2 positive:  cT2 cN1a cM0, stage IIB, prognostic stage IB; ypT0 ypN0 cM0    We explained to the patient that the goal of systemic adjuvant therapy is to improve the chances for cure and decrease the risk of relapse. We explained why a patient can have microscopic cancer spread now even though physical examination, laboratory studies and imaging studies are negative for cancer. We explained that the same treatments used now as adjuvant or preventive treatments rarely if ever are curative in women who develop metastases. TTE 3/6/19 EF 72%, TTE 6/4/19 EF 66%, TTE 9/3/19, EF 66%; TTE 12/6/2019 with EF 65%, TTE on 2/28/2020 EF 65%. TTE on 9/25/2020, EF 63%. RU, continue anastrozole daily, this was resumed on 4/9/2020. Ostomy reversal in July 2020, port was removed in June 2020.    2. Emotional well being:  She has excellent support and is coping well with her disease    3. tob use:  Smokes 3-5 cigs/day; counseled to quit. 4. 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 Started after Cycle 3. Does not wish to start any medications at this time. Patient will let us know if this worsens. 5. Decreased EF:  EF dropped from 72% to 66% and global strain dropped from 18% to 15.7% between 3/2019 to 6/2019, however per Dr. Wille Gaucher, visually there is no significant change. Recommend close follow up and Troponin I level. Trop on 6/19/19 was normal. TTE on 9/3/19 showed no significant change in overall LV systolic function. TTE on 12/6/2019 with stable EF, TTE on 2/28/2020 EF 65%.   Most recent EF on 9/25/2020, normal, GLS improved. 6. Alopecia:  May be due to taxotere, will monitor    7. Hand stiffness: Worse in right hand. Previously believed it may have been related to anastrozole. She was given AI holiday with no improvement, resumed anastrozole. States it is worse in AM and improved with movement and heat. She has met with ortho, s/p prednisone and gabapentin. She has improvement in symptoms. 8. Lymphedema: To left arm, very mild. Patient will let us know if this worsens. She has seen lymphedema. Stable. 9. Osteopenia: seen on DEXA 11/2019. She is taking 2000 international units per day and 2-3 servings of dietary calcium. Repeat DEXA 11/18/21, worse    We discussed the data from [id number] [place] [address], [facility] 2013, for the meta-analysis for adjuvant bisphosphonate use in breast cancer. We discussed that in postmenopausal women, it appears that the bisphosphate zolendronic acid, given as in ABCSG 12 at 4 mg IV q 6 months x 3 years, is beneficial in improving bone DFS and OS. We discussed side effects such as ONJ and the need to avoid invasive dental procedures while on these medications, renal damage, and hypocalcemia. She declines zometa        I appreciate the opportunity to participate in Ms. Mara Krause's care. Signed By: Cornelio Kim MD      No orders of the defined types were placed in this encounter.

## 2022-04-25 NOTE — PROGRESS NOTES
Cuong Shore is a 76 y.o. female Follow up for the evaluation of breast cancer. 1. Have you been to the ER, urgent care clinic since your last visit? Hospitalized since your last visit? No    2. Have you seen or consulted any other health care providers outside of the [address] [place] [address] since your last visit? Include any pap smears or colon screening.  No

## 2022-04-27 ENCOUNTER — HOSPITAL ENCOUNTER (OUTPATIENT)
Dept: INFUSION THERAPY | Age: 77
End: 2022-04-27

## 2022-04-28 ENCOUNTER — HOSPITAL ENCOUNTER (OUTPATIENT)
Dept: PHYSICAL THERAPY | Age: 77
Discharge: HOME | End: 2022-04-28
Payer: MEDICARE

## 2022-04-28 PROCEDURE — 97140 MANUAL THERAPY 1/> REGIONS: CPT

## 2022-04-28 NOTE — PROGRESS NOTES
LYMPHEDEMA PT DAILY TREATMENT NOTE - [facility] -15    Patient Name: Errol Aragon  Date:2022  : 1945  [x]  Patient  Verified  [place]or: [place] MED[place]ARE / Plan: [place] MEDICARE PART A & B / Product Type: Medicare /    In time: 2:00 pm  Out time: 2:25 pm  Total Treatment Time (min): 25  Total Timed Codes (min): 25  1:1 Treatment Time ( only): 25   Visit #: 11     Treatment Area: Lymphedema, not elsewhere classified [I89.0]    SUBJECTIVE  Pain Level (0-10 scale): pt denies pain, c/o tightness with ROM/stretching   Any medication changes, allergies to medications, adverse drug reactions, diagnosis change, or new procedure performed?: [x] No    [] Yes (see summary sheet for update)  Subjective functional status/changes:   [] No changes reported  Patient arrives to appt wearing sleeve/gauntlet left UE. Pt reports good tolerance to new garments, says she is able to don/doff independently using gardening glove as recommended. Pt says she has been washing/drying garments every other day. Pt continues to perform self-MLD on trunk/chest as instructed previous visit. Pt reports significant improvement in left shoulder tightness after completing ex's for IRINEO. Pt agreeable to treatment today. OBJECTIVE     min Therapeutic Exercise:  [] Louvenia Langton Exercises [x] Remedial Lymphedema Exercise Program [x] Axillary Web Exercise Program      [] Shoulder ROM Exercises    3/29/22: instructed in ex's for IRINEO as follows: shoulder abd to 90 deg, then 120 deg, then 180 deg combined with wrist flex/ext. Good return demonstration today. Rationale: Activate muscle pump to improve lymphatic fluid movement and decrease swelling to improve the patients ability to perform ADL and IADL skills and prevent worsening of swelling over time.     25 Min Manual Therapy    Manual Lymphatic Drainage (MLD):  Area to decongest: UE: left palm wrist elbow mid forearm axilla trunk   Sequence used and effectiveness: deferred today due to garment recheck Modifications were made to manual lymph drainage sequence to exclude cervical techniques secondary to patient's age. Secondary sequence for upper extremity with trunk involvement. 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 Diaphragmatic breathing x 5 pre and post-MLD. Performed full left UE sequence. Instructed in self-MLD for chest/axilla. Skin/wound care/debridement: Reviewed skin care principles:   Performed skin care with low pH lotion following manual lymph principles. Skin care products  Hygiene  Prevention of cellulitis     Skin intact. Reviewed skin care routine for home upon transition to garments. Applied multi-layer compression bandaging to:  Deferred due to garment fitting  Patient arrived without adequate bandage in place that was applied by caregiver. left  upper extremity    The following multi-layer bandages were applied:  Protouch Stockinette    Padding layer:  Fleece from MCP to axilla     Foam:  none    Short stretch bandages:   6 cm  8 cm  10 cm    tubigrip applied to cover taped areas. Upper/Lower Extremity Compression:   4/12/22:    Fitted for the following compression products:    UE: left palm wrist elbow mid forearm axilla left upper extremity: Arm sleeve, Gauntlet and Top band silicone border    Style: Circular knit    Brand: [employer]    Type: Non-Custom: Size: [employer] 3511 size II Max long arm sleeve; [employer] 2301 size medium seamless gauntlet    Vendor: [employer]    Education: Educated patient in compression garment donning and doffing. Glove use with donning. Daily wear schedule. Daily laundering. Garment lifespan. Return and reordering process (by bringing prior garments into the clinic). Educated patient to monitor for redness or pressure points on the skin.   If new pain occurs they should contact their therapist.    Patient/family demonstrated donning and doffing with independence and verbal cues     Discussed custom vs RM garments; decision made to trial Juzo dynamic sleeve first due to out of pocket cost. If garment not effectively managing lymphedema, pt will require custom sleeve/glove. HOME PROGRAM:  Compression Arm Sleeve Instructions:  Apply the sleeve and glove on a clean, dry arm first thing in the morning. Wear all day until you go to bed. Adjust the sleeve during the day as needed with rubber gloves to protect the fabric, watching area at the wrist and bend in the elbow, removing any creases in the sleeve that may occur with movement. Perform arm exercises as instructed with sleeve in place. Do not wear the sleeve without the hand piece for extended periods of time; it is ok to eat meals, wash dishes, or perform grooming activities without the glove in place, but remember to put it back on afterwards. Discontinue wearing your sleeve under the following conditions:  -numbness/tingling in the extremity   - Compromise in circulation: fingers feel cold   -onset of pain in the arm that is sudden and severe in nature  -Redness, warmth in the limb and/or fever, flu-like symptoms, which may indicate an infection. If this occurs, call your doctor right away and discontinue wearing the sleeve and gauntlet   -skin irritation or breakdown    Wash instructions: You need to wash and dry both your glove and your sleeve after each wear in order for this piece of medical equipment to offer prescribed compression to effectively manage your lymphedema. Wash it in a garment bag or pillow case in cool water, gentle cycle, no bleach, no fabric softener, and no woolite. You may hand wash it if you prefer. Place it in the dryer on low heat to help restore the elasticity of the garments. Return to the clinic next week with your compression DME on for reassessment of fit and effectiveness of the garments. 4/28/22: reassessed fit of garments. Pt able to don garments independently in clinic.  Recommend pt wash/dry garments daily to help restore elasticity of garments and prevent stretching which could decrease effectiveness of garments over time. Pt verbalized understanding. Rationale: increase ROM, increase tissue extensibility and decrease edema  to improve the patients ability to perform ADLs and functional tasks. Vasopneumatic Device:    Body Part: [] R [] L [] Bilateral  Pressure: [] Decreased [] Normal [] Increased  Treatment Cycles: [] 1  [] 2  [] 3   Rationale: To improve lymphatic fluid movement and decrease swelling to improve the patients ability to perform ADL and IADL skills and prevent worsening of swelling over time. With   [] TE   [] TA   [x] MT   [] SC   [x] other: Patient Education: [x] Review HEP    [x] MLD Patient Education Reviewed with patient, as well as demonstration and instruction during MLD portion of the session. Continued education in self MLD technique with bathing and skin care. Provided patient with the written instructions to follow. Educated patient in soft tissue mobilization for axillary web. [] Progressed/Changed HEP based on:   [] positioning   [] Kinesiotape   [x] Skin care   [] wound care   [x] other: garment instructions   [x] Patient/caregiver in multi-layer bandaging donning principles. , Precautions. and Handout provided. Patient / caregiver re-demonstrated bandaging.  [] Yes  [x] No  Compression bandaging/garment precautions reviewed: [x] Yes  [] No     Other Objective/Functional Measures:   LLIS: 1/68, 1% impairment     Height:     Weight:      BMI:     (36 or greater: adversely affecting lymphedema)    Pain Level (0-10 scale) post treatment: 0    Volumetric Measurements:     Date:  Right Left % difference    4/28/22 2365.55 2564.92 8.43   4/19/22 2365.55 2593.28 9.63   4/12/22 2365.55 2585.52 9.3   4/8/22 2365.55 2612.0 10.42   4/1/22 2365.55 2623.11 10.89   3/25/22 2365.55 2701.44 14.2   2/18/22 2365.55 2872.95 21.45       ASSESSMENT/Changes in Function:   Patient seen for garment/volume recheck today. Pt has worn garments daily, washing every other day now. Pt's volumes are reduced, pt with 8.4% limb volume difference, was 21% at evaluation. Encouraged pt to wash/dry garments nightly for optimal effectiveness. Also encouraged self-MLD daily. Pt has met all goals and discharged to phase II CDT due to stable limb volumes. Recommend f/u in 6 months for re-evaluation or sooner as needed. []  See Plan of Care  []  See progress note/recertification  [x]  See Discharge Summary         Progress towards goals / Updated goals:  Short term goals  Time frame: 4-6 weeks  1. Patient will demonstrate knowledge of signs/symptoms of infections/cellulitis and be independent in skin care to prevent cellulitis. MET 4/1/22  2. Patient will demonstrate independence in lymphedema home program of therapeutic exercises to improve circulation and decongest limb to improve ADLs. MET 4/1/22  3. Patient will tolerate multi-layer bandages (MLB) and show measureable decrease in left UE limb volume by 200 - 500 mL with <10% limb volume difference to allow ordering of home compression system (daytime, nighttime garments and pump as needed). MET 4/28/22     Long term goals  Time frame: 10-12 weeks  1. Patient will increase left UE shoulder flexion ROM by 10-15 deg for improved tolerance for overhead reaching activities and functional tasks. MET 4/28/22  2. Patient will be independent with don/doff of compression system and use in order to prevent reaccumulation of fluid at discharge. MET 4/12/22  3. Pt will be independent in self-MLD and show stable limb volumes showing decongestion and pt. ready for transition to independent restorative phase of lymphedema therapy. MET 4/28/22    PLAN  []  Upgrade activities as tolerated     []  Continue plan of care  []  Update interventions per flow sheet       [x]  Discharge due to: goals met  []  Other:    Donnalee Mail.  Gary, PT, DPT, CLT-SOLANGE  4/28/2022

## 2022-10-24 ENCOUNTER — OFFICE VISIT (OUTPATIENT)
Dept: ONCOLOGY | Age: 77
End: 2022-10-24
Payer: MEDICARE

## 2022-10-24 VITALS
HEIGHT: 66 IN | WEIGHT: 166.8 LBS | BODY MASS INDEX: 26.81 KG/M2 | TEMPERATURE: 98 F | SYSTOLIC BLOOD PRESSURE: 132 MMHG | DIASTOLIC BLOOD PRESSURE: 70 MMHG | OXYGEN SATURATION: 90 % | HEART RATE: 78 BPM | RESPIRATION RATE: 18 BRPM

## 2022-10-24 DIAGNOSIS — C50.112 MALIGNANT NEOPLASM OF CENTRAL PORTION OF LEFT BREAST IN FEMALE, ESTROGEN RECEPTOR POSITIVE (HCC): Primary | ICD-10-CM

## 2022-10-24 DIAGNOSIS — Z17.0 MALIGNANT NEOPLASM OF CENTRAL PORTION OF LEFT BREAST IN FEMALE, ESTROGEN RECEPTOR POSITIVE (HCC): Primary | ICD-10-CM

## 2022-10-24 PROCEDURE — G8399 PT W/DXA RESULTS DOCUMENT: HCPCS | Performed by: INTERNAL MEDICINE

## 2022-10-24 PROCEDURE — 99214 OFFICE O/P EST MOD 30 MIN: CPT | Performed by: INTERNAL MEDICINE

## 2022-10-24 PROCEDURE — G8417 CALC BMI ABV UP PARAM F/U: HCPCS | Performed by: INTERNAL MEDICINE

## 2022-10-24 PROCEDURE — 1101F PT FALLS ASSESS-DOCD LE1/YR: CPT | Performed by: INTERNAL MEDICINE

## 2022-10-24 PROCEDURE — G8427 DOCREV CUR MEDS BY ELIG CLIN: HCPCS | Performed by: INTERNAL MEDICINE

## 2022-10-24 PROCEDURE — G8754 DIAS BP LESS 90: HCPCS | Performed by: INTERNAL MEDICINE

## 2022-10-24 PROCEDURE — G9717 DOC PT DX DEP/BP F/U NT REQ: HCPCS | Performed by: INTERNAL MEDICINE

## 2022-10-24 PROCEDURE — 1123F ACP DISCUSS/DSCN MKR DOCD: CPT | Performed by: INTERNAL MEDICINE

## 2022-10-24 PROCEDURE — G8752 SYS BP LESS 140: HCPCS | Performed by: INTERNAL MEDICINE

## 2022-10-24 PROCEDURE — G8536 NO DOC ELDER MAL SCRN: HCPCS | Performed by: INTERNAL MEDICINE

## 2022-10-24 PROCEDURE — G0463 HOSPITAL OUTPT CLINIC VISIT: HCPCS | Performed by: INTERNAL MEDICINE

## 2022-10-24 PROCEDURE — 1090F PRES/ABSN URINE INCON ASSESS: CPT | Performed by: INTERNAL MEDICINE

## 2022-10-24 RX ORDER — ANASTROZOLE 1 MG/1
TABLET ORAL
Qty: 90 TABLET | Refills: 3 | Status: SHIPPED | OUTPATIENT
Start: 2022-10-24 | End: 2022-10-27 | Stop reason: SDUPTHER

## 2022-10-24 NOTE — PROGRESS NOTES
[facility] [place] at [address], [address] [facility] [address] [facility]  W: [phone]  F: [phone]      Reason for Visit:   Lynette Hernandez is a 76 y.o. female who is seen in follow up for breast cancer. Treatment History:   2/15/19 Left core bx:   IDC, gr 2, 1.1 cm, ER + at 100%, NV + at 20%, HER 2 POSITIVE (IHC 2+; FISH ratio 3.9; sig/cell 9.2)  3/1/19 Left ax LN core bx:  + for breast cancer, ER + at 100%, NV negative, HER 2 POSITIVE (IHC 2+, FISH ratio 2; sig/cell 5.8)  [facility] 3/6/19-7/10/19  #2 delayed due to diverticulitis abscess and surgery 4/17/19 8/5/19 Bilateral Mastecotomy: Right breast: lobular carcinomna in situ, fibrocystic changes including atypical and usual ductal hyperplasia, sclerosing adenosis apocrine metaplasia, microcalcification. Left breast: No evidence of residual invasive carcinoma or carcinoma in situ, no DCIS, 0/9 LNs; ypT0 yp N0 cM0, pCR  [employer] [facility] 8/28/19-3/25/2020  S/p XRT 11/27/19  Anastrozole 11/2019-3/25/20 (hand stiffness), resumed 4/9/2020- current    History of Present Illness:   Pt noticed the left breast mass herself in Feb 2019, leading to the pathology above    Interval history:  In today for follow up on anastrozole. Reports gr 1 hair loss, gr 1 hot flashes. She continues to follow up with lymphedema every 6 months.        FH:  Paternal aunt breast cancer in her [de-identified]; no ovarian cancer, no prostate or pancreas cancer    Past Medical History:   Diagnosis Date    Adverse effect of anesthesia     \"difficulty waking up from anesthethia\"    Cancer ([facility] [address] 02/2019    left breast. current chemo patient    Depression     in past    GERD (gastroesophageal reflux disease)     High cholesterol     Hypertension       Past Surgical History:   Procedure Laterality Date    HX BREAST BIOPSY Right years ago    neg; needle bx    HX BREAST BIOPSY Left 02/15/2019    IDC    HX CATARACT REMOVAL      2013 (R), 2016(L)    HX COLONOSCOPY      HX COLOSTOMY Left 03/2019    HX MASTECTOMY Bilateral 8/5/2019    BILATERAL BREAST MASTECTOMIES AND LEFT BREAST SENTINEL NODE BIOPSY WITH BLUE DYE performed by Richard Dye MD at [address]    HX ORTHOPAEDIC      carpal tunnel    HX VASCULAR ACCESS Right 02/2019    portacath    IR CHANGE DRAIN  [facility]  03/2019      Social History     Tobacco Use    Smoking status: Every Day     Packs/day: 0.25     Years: 55.00     Pack years: 13.75     Types: Cigarettes    Smokeless tobacco: Never    Tobacco comments:     1-2 cigs/day   Substance Use Topics    Alcohol use: Yes     Alcohol/week: 1.0 standard drink     Types: 1 Shots of liquor per week     Comment: Patient stated that she puts a splash of bourbon in her afternoon coffee around 5-6 days per week      Family History   Problem Relation Age of Onset    Breast Cancer Maternal Aunt         [de-identified]    Hypertension Mother     Hypertension Father     Cancer Father         Lung    Cancer Brother         leukemia    Diabetes Brother         2 brothers with diabetes    Hypertension Sister         2 sisters and 4 brothers with HTN     Current Outpatient Medications   Medication Sig    anastrozole (ARIMIDEX) 1 mg tablet TAKE 1 TABLET EVERY DAY    hydroCHLOROthiazide (HYDRODIURIL) 50 mg tablet Take 25 mg by mouth daily. acetylcysteine 500 mg cap Take 500 mg by mouth.    cyanocobalamin 1,000 mcg tablet Take 1,000 mcg by mouth daily. resveratroL 100 mg cap Take 100 mg by mouth. TURMERIC PO Take 1,500 mg by mouth. fluticasone-umeclidinium-vilanterol (Trelegy Ellipta) 100-62.5-25 mcg inhaler Take 1 Puff by inhalation daily. losartan (COZAAR) 25 mg tablet Take  by mouth daily. famotidine (PEPCID) 20 mg tablet Take 20 mg by mouth daily. cyanocobalamin/mecobalamin (CYANOCOBALAMIN-METHYLCOBALAMIN SL) 5,000 mcg by SubLINGual route daily. pravastatin (PRAVACHOL) 40 mg tablet Take 40 mg by mouth nightly.     STIOLTO RESPIMAT 2.5-2.5 mcg/actuation inhaler Take 2 Puffs by inhalation nightly. venlafaxine (EFFEXOR) 50 mg tablet Take 50 mg by mouth daily. cholecalciferol (VITAMIN D3) 25 mcg (1,000 unit) cap Take 2,000 Units by mouth daily. calcium-cholecalciferol, D3, (CALTRATE 600+D) tablet Take 1 Tab by mouth nightly. aspirin delayed-release 81 mg tablet Take  by mouth every other day. No current facility-administered medications for this visit. Allergies   Allergen Reactions    Keflex [Cephalexin] Hives    Penicillins Rash    Other Medication Itching     CHLORHEXADINE ( wipes caused moderate to severe itching in preop 8/5/19)        Review of Systems: A complete review of systems was obtained, negative except as described above. Physical Exam:     Visit Vitals  /70   Pulse 78   Temp 98 °F (36.7 °C) (Temporal)   Resp 18   Ht 5' 6\" (1.676 m)   Wt 166 lb 12.8 oz (75.7 kg)   SpO2 90%   BMI 26.92 kg/m²     ECOG PS: 0  General: no distress  Respiratory: normal respiratory effort  CV: left arm with mild edema - sleeve in place  Skin: no rashes; no ecchymoses; no petechiae    Results:     Lab Results   Component Value Date/Time    WBC 11.3 (H) 07/10/2019 07:57 AM    HGB 11.8 07/10/2019 07:57 AM    HCT 34.5 (L) 07/10/2019 07:57 AM    PLATELET 441 87/36/1011 07:57 AM    MCV 96.6 07/10/2019 07:57 AM    ABS. NEUTROPHILS 8.0 07/10/2019 07:57 AM     Lab Results   Component Value Date/Time    Sodium 138 07/10/2019 07:57 AM    Potassium 3.6 08/05/2019 06:45 AM    Chloride 100 07/10/2019 07:57 AM    CO2 32 07/10/2019 07:57 AM    Glucose 107 (H) 07/10/2019 07:57 AM    BUN 13 07/10/2019 07:57 AM    Creatinine 0.76 07/10/2019 07:57 AM    GFR est AA >60 07/10/2019 07:57 AM    GFR est non-AA >60 07/10/2019 07:57 AM    Calcium 9.9 07/10/2019 07:57 AM    Glucose (POC) 93 03/19/2019 06:56 AM     Lab Results   Component Value Date/Time    Bilirubin, total 0.4 07/10/2019 07:57 AM    ALT (SGPT) 17 07/10/2019 07:57 AM    Alk.  phosphatase 83 07/10/2019 07:57 AM    Protein, total 6.6 07/10/2019 07:57 AM    Albumin 3.5 07/10/2019 07:57 AM    Globulin 3.1 07/10/2019 07:57 AM       2/12/19 mammogram and US  MAMMOGRAPHY:  The breast demonstrates stable scattered density breast  architecture. Underlying the site of clinical concern in the left breast is  masslike focal asymmetry in the anterior upper left breast. There is no other  dominant mass lesion, architectural distortion, asymmetry, or calcification. There is no skin thickening or nipple retraction. ULTRASOUND:  Corresponding to the mammographic density there is a 2.4 x 3.1 x  3.7 cm hypoechoic lobulated mass at about the 12:00 position and 3 cm radial  distance from the nipple. No other suspicious cystic or solid lesions  Identified. Dr. Elías Rogers has identified 2 large, suspicious ax LN on US:  1.7 cm and 1.2 cm    5/16/19 Left mammo and US:  The breast parenchyma is heterogeneously dense. Left breast biopsy clip is  unchanged in position. The central upper left breast mass seen on prior MR exam  dated February 2019 has decreased in size. Targeted sonographic evaluation of the 12:00 position of the left breast is  performed. Within the 12:00 position of the left breast, there is a 2.1 cm x 1.3  cm x 3.1 cm irregular hypoechoic mass containing calcifications, decreased in  size and measuring 3.8 cm x 2.4 cm x 3.1 cm on prior ultrasound dated February 2018. IMPRESSION: BI-RADS 6. Pathology proven left breast cancer. Interval decrease in size of 12:00 left breast mass, now measuring 2.1 cm x 1.3 cm x 3.1 cm. Findings were discussed with the patient at the time of interpretation. 11/4/19 dexa  Impression:      This patient is osteopenic using the World Health Organization criteria  10 year probability of major osteoporotic fracture: 11.7%  10 year probability of hip fracture: 3.5%    11/18/21 dexa  IMPRESSION     This patient is osteopenic using the World Health Organization criteria  As compared to the prior study, there has been a statistically significant  decrease in bone mineral density. 10 year probability of major osteoporotic fracture: 12.1    10 year probability of hip fracture: 3.7         Records reviewed and summarized above. Assessment/plan:   1. Left central IDC, gr 2, ER +, DC +, HER 2 positive:  cT2 cN1a cM0, stage IIB, prognostic stage IB; ypT0 ypN0 cM0    We explained to the patient that the goal of systemic adjuvant therapy is to improve the chances for cure and decrease the risk of relapse. We explained why a patient can have microscopic cancer spread now even though physical examination, laboratory studies and imaging studies are negative for cancer. We explained that the same treatments used now as adjuvant or preventive treatments rarely if ever are curative in women who develop metastases. TTE 3/6/19 EF 72%, TTE 6/4/19 EF 66%, TTE 9/3/19, EF 66%; TTE 12/6/2019 with EF 65%, TTE on 2/28/2020 EF 65%. TTE on 9/25/2020, EF 63%. RU, continue anastrozole daily, this was resumed on 4/9/2020. She has continued and tolerating well. Ostomy reversal in July 2020, port was removed in June 2020.    2. Emotional well being:  She has excellent support and is coping well with her disease    3. tob use:  Smokes 3-5 cigs/day; counseled to quit. 4. 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 Started after Cycle 3. Does not wish to start any medications at this time. Patient will let us know if this worsens. 5. Decreased EF:  EF dropped from 72% to 66% and global strain dropped from 18% to 15.7% between 3/2019 to 6/2019, however per Dr. Kam Patel, visually there is no significant change. Recommend close follow up and Troponin I level. Trop on 6/19/19 was normal. TTE on 9/3/19 showed no significant change in overall LV systolic function. TTE on 12/6/2019 with stable EF, TTE on 2/28/2020 EF 65%. Most recent EF on 9/25/2020, normal, GLS improved. Continues to follow with Dr. Kam Patel. 6. Alopecia:  May be due to taxotere. 7. Hand stiffness: Worse in right hand. Previously believed it may have been related to anastrozole. She was given AI holiday with no improvement, resumed anastrozole. States it is worse in AM and improved with movement and heat. She has met with ortho, s/p prednisone and gabapentin. She has improvement in symptoms - no worsening since last visit. 8. Lymphedema: To left arm, very mild. Patient will let us know if this worsens. She continues to follow with lymphedema. Stable. 9. Osteopenia: seen on DEXA 11/2019. She is taking 2000 international units per day and 2-3 servings of dietary calcium. Repeat DEXA 11/18/21, worse. We discussed the data from [id number] [place], [place] [address], [facility] 2013, for the meta-analysis for adjuvant bisphosphonate use in breast cancer. We discussed that in postmenopausal women, it appears that the bisphosphate zolendronic acid, given as in ABCSG 12 at 4 mg IV q 6 months x 3 years, is beneficial in improving bone DFS and OS. We discussed side effects such as ONJ and the need to avoid invasive dental procedures while on these medications, renal damage, and hypocalcemia. She declines zometa. Next DEXA due 11/2023.     10. Hot flashes: due to AI, stable. I saw and evaluated the patient on 10/24/22 and discussed care with collaborating provider, Dr. Livier Mccord. This signature serves as Sharon Gonzales's attestation. I personally saw and evaluated the patient and performed the entire medical decision making. The history, physical exam, and documentation were performed by Priscilla Redding NP. I reviewed and verified the above documentation and modified it as needed. Left breast ER +, HER 2 + breast cancer, on anastrozole, doing well, RU. Having hot flashes. Dexa due next year for osteopenia, RTC 1 year    I appreciate the opportunity to participate in Ms. Pascual Krause's care.     Signed By: Angelica Durand MD      No orders of the defined types were placed in this encounter.

## 2022-10-24 NOTE — PROGRESS NOTES
Lis Parikh is a 76 y.o. female Follow up for the evaluation of breast cancer. 1. Have you been to the ER, urgent care clinic since your last visit? Hospitalized since your last visit? No    2. Have you seen or consulted any other health care providers outside of the [address] [place] [address] since your last visit? Include any pap smears or colon screening.  No

## 2022-10-27 ENCOUNTER — TELEPHONE (OUTPATIENT)
Dept: ONCOLOGY | Age: 77
End: 2022-10-27

## 2022-10-27 DIAGNOSIS — C50.112 MALIGNANT NEOPLASM OF CENTRAL PORTION OF LEFT BREAST IN FEMALE, ESTROGEN RECEPTOR POSITIVE (HCC): ICD-10-CM

## 2022-10-27 DIAGNOSIS — Z17.0 MALIGNANT NEOPLASM OF CENTRAL PORTION OF LEFT BREAST IN FEMALE, ESTROGEN RECEPTOR POSITIVE (HCC): ICD-10-CM

## 2022-10-27 RX ORDER — ANASTROZOLE 1 MG/1
TABLET ORAL
Qty: 90 TABLET | Refills: 3 | Status: SHIPPED | OUTPATIENT
Start: 2022-10-27

## 2022-10-27 NOTE — TELEPHONE ENCOUNTER
[address] at [facility]  [phone]    10/27/2022--This user called and spoke with the patient and let her know that our NP sent the RX in to pharmacy. Patient verbalized understanding and had no further question's at that time.

## 2022-10-27 NOTE — TELEPHONE ENCOUNTER
Patient needs her anastrozole sent to [facility] she wants all her medications changed to this mail order emma

## 2022-10-31 ENCOUNTER — HOSPITAL ENCOUNTER (OUTPATIENT)
Dept: PHYSICAL THERAPY | Age: 77
Discharge: HOME | End: 2022-10-31
Payer: MEDICARE

## 2022-10-31 PROCEDURE — 97140 MANUAL THERAPY 1/> REGIONS: CPT

## 2022-10-31 PROCEDURE — 97161 PT EVAL LOW COMPLEX 20 MIN: CPT

## 2022-10-31 NOTE — PROGRESS NOTES
[facility]  [address] [place] [address]  [address]  [address]    INITIAL EVALUATION    NAME: Eddie Farooq AGE: 76 y.o. GENDER: female  DATE: 10/31/2022  REFERRING PHYSICIAN: Yandel Varner MD  HISTORY AND BACKGROUND:  Pt is a 77 yo female seen today for 6 month re-evaluation of left UE swelling s/p bilateral mastectomies in Aug 2019 and left SLNB. Pt completed radiation in Nov 2019 and chemo in March 2020. Pt arrives today wearing Juzo 3511 sleeve and seamless gauntlet. Pt says she wears garments daily and performs self-MLD daily. Pt recently ordered a second set of garments. Pt says she asked Bobby Rivera from [facility] about a compression bra due to continued fluid accumulation at left chest wall, however bra was $150 and she decided to try to find a less expensive option herself. Pt denies changes to medical history since last seen in April 2022. Breast cancer history as follows per oncology note:   2/15/19 Left core bx:   IDC, gr 2, 1.1 cm, ER + at 100%, WI + at 20%, HER 2 POSITIVE (IHC 2+; FISH ratio 3.9; sig/cell 9.2)  3/1/19 Left ax LN core bx:  + for breast cancer, ER + at 100%, WI negative, HER 2 POSITIVE (IHC 2+, FISH ratio 2; sig/cell 5.8)  [facility] 3[facility]10[facility]  #2 delayed due to diverticulitis abscess and surgery 4/17/19 8/5/19 Bilateral Mastecotomy: Right breast: lobular carcinomna in situ, fibrocystic changes including atypical and usual ductal hyperplasia, sclerosing adenosis apocrine metaplasia, microcalcification. Left breast: No evidence of residual invasive carcinoma or carcinoma in situ, no DCIS, 0/9 LNs; ypT0 yp N0 cM0, pCR  [employer] 8/28/19-3/25/2020  S/p XRT 11/27/19  Anastrozole 11/2019-3/25/20 (hand stiffness), resumed 4/9/2020.    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 Present Symptoms and Functional Limitations: left UE swelling primarily at posterior elbow, forearm, upper arm; soft tissue adhesions left chest wall; left shoulder dysfunction  Lymphedema Life Impact Scale: 1/68, 1% impairment     Past Medical History:   Past Medical History:   Diagnosis Date    Adverse effect of anesthesia     \"difficulty waking up from anesthethia\"    Cancer ([address] 02/2019    left breast. current chemo patient    Depression     in past    GERD (gastroesophageal reflux disease)     High cholesterol     Hypertension      Past Surgical History:   Procedure Laterality Date    HX BREAST BIOPSY Right years ago    neg; needle bx    HX BREAST BIOPSY Left 02/15/2019    IDC    HX CATARACT REMOVAL      2013 (R), 2016(L)    HX COLONOSCOPY      HX COLOSTOMY Left 03/2019    HX MASTECTOMY Bilateral 8/5/2019    BILATERAL BREAST MASTECTOMIES AND LEFT BREAST SENTINEL NODE BIOPSY WITH BLUE DYE performed by Patricia Varghese MD at [address]    HX ORTHOPAEDIC      carpal tunnel    HX VASCULAR ACCESS Right 02/2019    portacath    IR CHANGE DRAIN  [facility]  03/2019     Current Medications:    Current Outpatient Medications   Medication Sig    anastrozole (ARIMIDEX) 1 mg tablet TAKE 1 TABLET EVERY DAY    hydroCHLOROthiazide (HYDRODIURIL) 50 mg tablet Take 25 mg by mouth daily. acetylcysteine 500 mg cap Take 500 mg by mouth.    cyanocobalamin 1,000 mcg tablet Take 1,000 mcg by mouth daily. resveratroL 100 mg cap Take 100 mg by mouth. TURMERIC PO Take 1,500 mg by mouth. fluticasone-umeclidinium-vilanterol (Trelegy Ellipta) 100-62.5-25 mcg inhaler Take 1 Puff by inhalation daily. losartan (COZAAR) 25 mg tablet Take  by mouth daily. famotidine (PEPCID) 20 mg tablet Take 20 mg by mouth daily.     cyanocobalamin/mecobalamin (CYANOCOBALAMIN-METHYLCOBALAMIN SL) 5,000 mcg by SubLINGual route daily.    pravastatin (PRAVACHOL) 40 mg tablet Take 40 mg by mouth nightly. STIOLTO RESPIMAT 2.5-2.5 mcg/actuation inhaler Take 2 Puffs by inhalation nightly. venlafaxine (EFFEXOR) 50 mg tablet Take 50 mg by mouth daily. cholecalciferol (VITAMIN D3) 25 mcg (1,000 unit) cap Take 2,000 Units by mouth daily. calcium-cholecalciferol, D3, (CALTRATE 600+D) tablet Take 1 Tab by mouth nightly. aspirin delayed-release 81 mg tablet Take  by mouth every other day. No current facility-administered medications for this encounter. Allergies: Allergies   Allergen Reactions    Keflex [Cephalexin] Hives    Penicillins Rash    Other Medication Itching     CHLORHEXADINE ( wipes caused moderate to severe itching in preop 8/5/19)        Prior Level of Function/Social/Work History/Personal factors and/or comorbidities impacting plan of care: Patient is a retired hairdresser. Independent and able to complete all functional tasks. Pt completely independent and active prior to breast cancer diagnosis in 2019. Living Situation: lives alone in [address] [place] home in [address] [place] [address]?: No  Mobility: Independent gait without any assistive device for community distances  Sleeping Arrangement:  in bed  Adaptive Equipment Owned: none  Other: Patient is able to don compression garments and does not have assistance   [address] (if applicable): Yes    Previous Therapy:  Initiated treatment at [facility] in Oct 2020. Compression/Lymphedema Equipment:  Juzo 3511 sleeve size II Max long, Juzo 2301 seamless gauntlet size medium (2 sets). SUBJECTIVE:   Patients goals for therapy: decrease swelling and \"just to make sure swelling is ok. \"  .   OBJECTIVE DATA SUMMARY:   EXAMINATION/PRESENTATION/DECISION MAKING:   Pain:   Pt denies pain     Self-Care and ADLs: independent     Skin and Tissue Assessment:    Dermal Status: Intact, Tenuous, Dry and Scars; bilateral well healed mastectomy scars. Pt with very tenuous skin, easy bruising, pt with scratch on left forearm today from her dog. Pt had it covered with band-aid. Reviewed signs/symptoms of infectin. Texture/Consistency: Boggy: L upper arm, posterior elbow    Pigmentation/Color Change: Hyperpigmented left forearm     Anomalies: N/A    Circulatory: Pulses palpable     Nails: Normal    Stemmers Sign: Negative    Height:     Weight:      BMI:     (36 or greater: adversely affecting lymphedema)  Wound/Ulcer:  None         Volumetric Measurements:   Date:  Right Left % difference    10/31/22 2289.72 2475.84 8.13   4/28/22 2365.55 2564.92 8.43   2/18/22 2365.55 2872.95 21.45   10/14/20 2120.11 2160.48 1.9     Range of Motion:     Left shoulder ROM Right shoulder ROM   Flexion: 160 deg Flexion: full ROM    External rotation: to occiput  External rotation: to C7    Abduction: 160 deg Abduction: full ROM      Strength: WFL  Sensation:  Impaired numbness left posterior upper arm     Mobility:    Bed/Chair Mobility: Independent  Transfers: Independent  Gait: Independent  Endurance: good   Other:     Safety:  Patient is alert and oriented: yes, x 4  Safety awareness: good   Fall risk?: low, pt denies any recent falls   Patient given written fall prevention handout: Yes       Physical Therapy Evaluation Charge Determination   History Examination Presentation Decision-Making   HIGH Complexity :3+ comorbidities / personal factors will impact the outcome/ POC  LOW Complexity : 1-2 Standardized tests and measures addressing body structure, function, activity limitation and / or participation in recreation  MEDIUM Complexity : Evolving with changing characteristics  Other outcome measures LLIS  LOW       Based on the above components, the patient evaluation is determined to be of the following complexity level: LOW     Evaluation Time: 10:00 - 10:20 am (20 minutes)    TREATMENT PROVIDED:   1.   Treatment description: Manual therapy x 1  The patient was re-educated regarding the role and function of the lymphatic system, and instructed in the lymphedema management protocol of complete decongestive therapy (CDT). This includes skin care to prevent breakdown or infection, lymphedema exercises, custom compression therapy options (bandaging, compression garments, compression pump, Dallin Boris, Jo[employer]Pak, [employer], etc. as needed), and decongestion with manual lymphatic drainage as indicated. We discussed the need for consistent compression system for lymphedema management. Diaphragmatic breathing instruction and skin care education was performed,applying low pH lotion to extremity using upward strokes to stimulate lymphatic vessels. Patient instructed in skin care principles and anatomy of the lymphatic system. Therapist able to demonstrate manual lymphatic drainage techniques for the drainage of left UE/trunk and skin care protocol: reviewed self MLD and provided with handout today. Encouraged pt to perform self-MLD daily. Standard lymphedema precautions to include avoiding blood pressure readings, injections and IVs or other procedures/acts that could lead to broken skin on affected area, and avoiding excessive heat, resistive activity or altitude without compression garment. Reviewed signs of infection today. Pt with scratch from her dog. Recommend pt clean any cuts or abrasions and apply topical antibiotic ointment for infection prevention. Pt's limb volumes stable at this time, both UEs reduced and pt with 8% limb volume difference. Pt not in need of replacing garments, says she just recently ordered new sleeve and gauntlet. Reviewed home compression routine. Treatment time:  10:20 - 10:35 am  Minutes: 15    Pain Level: 0  ASSESSMENT:   Ema Vallecillo is a 76 y.o. female who presents with secondary lymphedema of her UE: left palm wrist elbow mid forearm axilla, as well as soft tissue adhesions and left shoulder dysfunction.  Pt is s/p bilateral mastectomies and radiation in 2019, 9 LNs removed from left axilla. Patient is highly motivated to improve her condition and is seeking to expand her compression to include left UE garments. Pt presents today with 8% limb volume difference, L>R. Limb volumes stable since transitioning to garments in April 2022. Patient not in need of replacement garments at this time. Reviewed self-MLD and home compression routine. This care is medically necessary due to the infection risk with lymphedema and to improve functional activities. CDT is necessary to resolve swelling to allow patient to return to wearing normal clothes/footwear and prevent worsening of symptoms, such as infections and/or hospitalizations. Patient will be independent with home program strategies to allow improved ADL ability and mobility and to allow patient to return to greatest functional independence. PLAN OF CARE:   Recommendations and Planned Interventions:   No follow up visits needed. Pt to return in 6 months for re-evaluation or sooner as needed. Patient has participated in goal setting and agrees to work toward plan of care. Patient was instructed to call if questions or concerns arise. Thank you for this referral.  Jack Palomino. Gary, PT, DPT, CLT-SOLANGE    Time Calculation: 35 mins    TREATMENT PLAN EFFECTIVE DATES:   10/31/2022 TO 1/23/2023  I have read the above plan of care for [facility]. I certify the above prescribed services are required by this patient and are medically necessary.   The above plan of care has been developed in conjunction with the lymphedema/physical therapist.       Physician Signature: ____________________________________Date:______________

## 2023-01-03 ENCOUNTER — OFFICE VISIT (OUTPATIENT)
Dept: SURGERY | Age: 78
End: 2023-01-03
Payer: MEDICARE

## 2023-01-03 VITALS — WEIGHT: 166 LBS | HEIGHT: 66 IN | BODY MASS INDEX: 26.68 KG/M2

## 2023-01-03 DIAGNOSIS — Z85.3 HX OF BREAST CANCER: Primary | ICD-10-CM

## 2023-01-03 PROCEDURE — 1123F ACP DISCUSS/DSCN MKR DOCD: CPT | Performed by: SURGERY

## 2023-01-03 PROCEDURE — 99212 OFFICE O/P EST SF 10 MIN: CPT | Performed by: SURGERY

## 2023-01-03 PROCEDURE — 1101F PT FALLS ASSESS-DOCD LE1/YR: CPT | Performed by: SURGERY

## 2023-01-03 PROCEDURE — G8399 PT W/DXA RESULTS DOCUMENT: HCPCS | Performed by: SURGERY

## 2023-01-03 PROCEDURE — 1090F PRES/ABSN URINE INCON ASSESS: CPT | Performed by: SURGERY

## 2023-01-03 PROCEDURE — G8427 DOCREV CUR MEDS BY ELIG CLIN: HCPCS | Performed by: SURGERY

## 2023-01-03 PROCEDURE — G9717 DOC PT DX DEP/BP F/U NT REQ: HCPCS | Performed by: SURGERY

## 2023-01-03 PROCEDURE — G8417 CALC BMI ABV UP PARAM F/U: HCPCS | Performed by: SURGERY

## 2023-01-03 PROCEDURE — G8536 NO DOC ELDER MAL SCRN: HCPCS | Performed by: SURGERY

## 2023-01-03 PROCEDURE — 64450 NJX AA&/STRD OTHER PN/BRANCH: CPT | Performed by: SURGERY

## 2023-01-03 NOTE — LETTER
1/4/2023    Patient: Ava Larkin   YOB: 1945   Date of Visit: 1/3/2023     Felix Saravia MD  [address] [address]  Via Fax: [phone]    Dear Felix Saravia MD,      Thank you for referring Ms. Jonathan Doss to [facility] for evaluation. My notes for this consultation are attached. If you have questions, please do not hesitate to call me. I look forward to following your patient along with you.       Sincerely,    Johnathan Garza MD

## 2023-01-03 NOTE — PROGRESS NOTES
HISTORY OF PRESENT ILLNESS  Ava Larkin is a 76 y.o. female. HPI ESTABLISHED Patient here for problems with surgical site from 2019. The LEFT chest is tight. She also has LEFT arm lymphedema. 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 OK 20%, Her 2 positive  Positive axillary node, %. OK neg, Her 2 positive  [facility]  8/2019 bilateral mastectomy. LEFT COMPLETE RESPONSE, 0/7 nodes. RIGHT  Lobular carcinoma in situ. XRT 11/2019, anastrozole      ROS    Physical Exam  Chest:   Breasts:     Breasts are symmetrical.      Right: Absent. No inverted nipple, mass, nipple discharge, skin change or tenderness. Left: Skin change (skin is adherent to chest wall resulting in fat patch superior to scar) present. No inverted nipple, mass, nipple discharge or tenderness. Comments: Bilateral mastectomy. No recon  Lymphadenopathy:      Upper Body:      Right upper body: No supraclavicular or axillary adenopathy. Left upper body: No supraclavicular or axillary adenopathy. Injected marcaine 7cc under the skin  ASSESSMENT and PLAN    ICD-10-CM ICD-9-CM    1. Hx of breast cancer  Z85.3 V10.3       Total time spent on chart review and patient visit: 20 minutes    Chest wall tightness after mastectomy. The skin is adherent to the chest wall resulting in a large fatty mass superior to the incision. I injected marcaine to see if discomfort could be aleviated, and adhesions released. Follow up if another injection would be useful.   1/5/2023  Pt called to say the marcaine injection caused some relief :)  She will follow up if needed

## 2023-01-05 ENCOUNTER — TELEPHONE (OUTPATIENT)
Dept: SURGERY | Age: 78
End: 2023-01-05

## 2023-01-05 NOTE — TELEPHONE ENCOUNTER
Patient called, wanted to let us know she already has so much relief from injection done on Tuesday and wanted to thank Dr. Joaquim Mcdonald. Says she will make another appointment when it starts to wear off.

## 2023-05-01 ENCOUNTER — HOSPITAL ENCOUNTER (OUTPATIENT)
Dept: PHYSICAL THERAPY | Age: 78
Discharge: HOME | End: 2023-05-01
Payer: MEDICARE

## 2023-05-01 ENCOUNTER — APPOINTMENT (OUTPATIENT)
Facility: HOSPITAL | Age: 78
End: 2023-05-01
Payer: MEDICARE

## 2023-05-01 DIAGNOSIS — C50.112 MALIGNANT NEOPLASM OF CENTRAL PORTION OF LEFT BREAST IN FEMALE, ESTROGEN RECEPTOR POSITIVE (HCC): Primary | ICD-10-CM

## 2023-05-01 DIAGNOSIS — I89.0 LYMPHEDEMA: ICD-10-CM

## 2023-05-01 DIAGNOSIS — Z17.0 MALIGNANT NEOPLASM OF CENTRAL PORTION OF LEFT BREAST IN FEMALE, ESTROGEN RECEPTOR POSITIVE (HCC): Primary | ICD-10-CM

## 2023-05-01 PROCEDURE — 97161 PT EVAL LOW COMPLEX 20 MIN: CPT

## 2023-05-01 PROCEDURE — 97760 ORTHOTIC MGMT&TRAING 1ST ENC: CPT

## 2023-05-01 PROCEDURE — 97140 MANUAL THERAPY 1/> REGIONS: CPT

## 2023-05-01 PROCEDURE — 9990 CHARGE CONVERSION

## 2023-05-01 NOTE — THERAPY EVALUATION
[address]  [facility]  [address] [facility]  [address]      INITIAL EVALUATION    NAME: Jose Carrera AGE: [de-identified] y.o. GENDER: female  DATE: 5/1/2023  REFERRING PHYSICIAN: Christiano Montgomery MD  HISTORY AND BACKGROUND:   Pt is an [de-identified] yo female seen today for 6 month re-evaluation of left UE swelling s/p bilateral mastectomies in Aug 2019 and left SLNB. Pt completed radiation in Nov 2019 and chemo in March 2020. Pt arrives today wearing Juzo 3511 sleeve and seamless gauntlet. Pt says she wears garments daily and performs self-MLD daily. Pt says she tried wearing a compression bra due to continued fluid accumulation at left chest wall, however did not notice a difference. Pt says she is going to schedule appt with a plastic surgeon to discuss another reconstructive surgery due to feelings of tightness in left chest wall from soft tissue adhesions. Pt had recent steroid injection in left shoulder and notes improvement in pain. Pt denies changes to medical history since last seen in October 2022. Breast cancer history as follows per oncology note:   2/15/19 Left core bx:   IDC, gr 2, 1.1 cm, ER + at 100%, [place] + at 20%, HER 2 POSITIVE (IHC 2+; FISH ratio 3.9; sig/cell 9.2)  3/1/19 Left ax LN core bx:  + for breast cancer, ER + at 100%, [place] negative, HER 2 POSITIVE (IHC 2+, FISH ratio 2; sig/cell 5.8)  TC-P 3/6/19-7/10/19  #2 delayed due to diverticulitis abscess and surgery 4/17/19 8/5/19 Bilateral Mastecotomy: Right breast: lobular carcinomna in situ, fibrocystic changes including atypical and usual ductal hyperplasia, sclerosing adenosis apocrine metaplasia, microcalcification. Left breast: No evidence of residual invasive carcinoma or carcinoma in situ, no DCIS, 0/9 LNs; ypT0 yp N0 cM0, pCR  [employer] 8/28/19-3/25/2020  S/p XRT 11/27/19  Anastrozole 11/2019-3/25/20 (hand stiffness), resumed 4/9/2020. 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 Past Medical History:   Past Medical History:   Diagnosis Date    Adverse effect of anesthesia     \"difficulty waking up from anesthethia\"    Cancer ([facility] [address] 02/2019    left breast. current chemo patient    Depression     in past    GERD (gastroesophageal reflux disease)     High cholesterol     Hypertension      Past Surgical History:   Procedure Laterality Date    HX BREAST BIOPSY Right years ago    neg; needle bx    HX BREAST BIOPSY Left 02/15/2019    IDC    HX CATARACT REMOVAL      2013 (R), 2016(L)    HX COLONOSCOPY      HX COLOSTOMY Left 03/2019    HX MASTECTOMY Bilateral 8/5/2019    BILATERAL BREAST MASTECTOMIES AND LEFT BREAST SENTINEL NODE BIOPSY WITH BLUE DYE performed by Whit Ward MD at [facility]    HX ORTHOPAEDIC      carpal tunnel    HX VASCULAR ACCESS Right 02/2019    portacath    IR CHANGE DRAIN  [facility]  03/2019     Current Medications:    Current Outpatient Medications   Medication Sig    anastrozole (ARIMIDEX) 1 mg tablet TAKE 1 TABLET EVERY DAY    hydroCHLOROthiazide (HYDRODIURIL) 50 mg tablet Take 25 mg by mouth daily. acetylcysteine 500 mg cap Take 500 mg by mouth.    cyanocobalamin 1,000 mcg tablet Take 1,000 mcg by mouth daily. resveratroL 100 mg cap Take 100 mg by mouth. TURMERIC PO Take 1,500 mg by mouth. fluticasone-umeclidinium-vilanterol (Trelegy Ellipta) 100-62.5-25 mcg inhaler Take 1 Puff by inhalation daily. losartan (COZAAR) 25 mg tablet Take  by mouth daily.     famotidine (PEPCID) 20 mg tablet Take 20 mg by mouth daily. cyanocobalamin/mecobalamin (CYANOCOBALAMIN-METHYLCOBALAMIN SL) 5,000 mcg by SubLINGual route daily. pravastatin (PRAVACHOL) 40 mg tablet Take 40 mg by mouth nightly. STIOLTO RESPIMAT 2.5-2.5 mcg/actuation inhaler Take 2 Puffs by inhalation nightly. venlafaxine (EFFEXOR) 50 mg tablet Take 50 mg by mouth daily. cholecalciferol (VITAMIN D3) 25 mcg (1,000 unit) cap Take 2,000 Units by mouth daily. calcium-cholecalciferol, D3, (CALTRATE 600+D) tablet Take 1 Tab by mouth nightly. aspirin delayed-release 81 mg tablet Take  by mouth every other day. No current facility-administered medications for this encounter. Allergies: Allergies   Allergen Reactions    Keflex [Cephalexin] Hives    Penicillins Rash    Other Medication Itching     CHLORHEXADINE ( wipes caused moderate to severe itching in preop 8/5/19)        Prior Level of Function/Social/Work History/Personal factors and/or comorbidities impacting plan of care: Patient is a retired hairdresser. Independent and able to complete all functional tasks. Pt completely independent and active prior to breast cancer diagnosis in 2019. Living Situation: lives alone in [address] [place] home in [address] [place] [address]?: No  Mobility: Independent gait without any assistive device for community distances  Sleeping Arrangement:  in bed  Adaptive Equipment Owned: none  Other: Patient is able to don compression garments and does not have assistance. Community Resources Addressed (if applicable): Yes  Preferred methods of learning: written, demonstration  Previous Therapy:  Initial eval Oct 2020. Phase I CDT Feb 2022 - April 2022, re-evals every 6 months  Compression/Lymphedema Equipment: [employer] 3511 sleeve size II Max long, Juzo 2301 seamless gauntlet size medium (2 sets about 3year old). SUBJECTIVE:   Patients goals for therapy:  \"no swelling\" .    OBJECTIVE DATA SUMMARY:   EXAMINATION/PRESENTATION/DECISION MAKING:   Pain:   Left shoulder 1/10    Self-Care and ADLs: independent     Skin and Tissue Assessment:  Dermal Status:  Intact, Tenuous, Dry and Scars; bilateral well healed mastectomy scars. Pt with very tenuous skin, easy bruising.      Texture/Consistency: Boggy L upper arm, posterior elbow    Pigmentation/Color Change: Hyperpigmented left forearm     Anomalies: N/A    Circulatory: Pulses    Nails: Normal    Stemmers Sign: Negative    Height:   5'6\"  Weight:   170 lbs   BMI:   27.4  (36 or greater: adversely affecting lymphedema)  Wound/Ulcer:  none         Volumetric Measurements:   Date:  Right Left % difference    5/1/23 2367.67 2502.96 5.71   10/31/22 2289.72 2475.84 8.13   4/28/22 2365.55 2564.92 8.43   2/18/22 2365.55 2872.95 21.45   10/14/20 2120.11 2160.48 1.9     Range of Motion:     Left shoulder ROM Right shoulder ROM   Flexion: 160 deg Flexion: full ROM    External rotation: to occiput  External rotation: to C7    Abduction: 160 deg Abduction: full ROM       Strength: WFL  Sensation:  Impaired numbness left posterior upper arm    Mobility:    Bed/Chair Mobility: Independent  Transfers: Independent  Gait: Independent  Endurance: good   Other:     Safety:  Patient is alert and oriented: yes, x 4  Safety awareness: good   Fall risk?: low, pt denies any recent falls   Patient given written fall prevention handout: Yes       Physical Therapy Evaluation Charge Determination   History Examination Presentation Decision-Making   HIGH Complexity :3+ comorbidities / personal factors will impact the outcome/ POC  LOW Complexity : 1-2 Standardized tests and measures addressing body structure, function, activity limitation and / or participation in recreation  MEDIUM Complexity : Evolving with changing characteristics  Other outcome measures LLIS  LOW       Based on the above components, the patient evaluation is determined to be of the following complexity level: LOW     Evaluation Time: 1016 - 1039 (20 minutes)    TREATMENT PROVIDED:   1. Treatment description:  Manual therapy x 1  The patient was re-educated regarding the role and function of the lymphatic system, and instructed in the lymphedema management protocol of complete decongestive therapy (CDT). This includes skin care to prevent breakdown or infection, lymphedema exercises, custom compression therapy options (bandaging, compression garments, compression pump, Malon Peon, JoViPak, The El-Alexy, etc. as needed), and decongestion with manual lymphatic drainage as indicated. We discussed the need for consistent compression system for lymphedema management. Diaphragmatic breathing instruction and skin care education was performed, applying low pH lotion to extremity using upward strokes to stimulate lymphatic vessels. Patient instructed in activity restrictions and exercise guidelines. Patient instructed in skin care principles and anatomy of the lymphatic system. Standard lymphedema precautions to include avoiding blood pressure readings, injections and IVs or other procedures/acts that could lead to broken skin on affected area, and avoiding excessive heat, resistive activity or altitude without compression garment. Reviewed limb volumes as above. Pt presents today with increased limb volumes bilaterally, says she may have gained weight. Pt with only 5.7% limb volume difference compared to 8% limb volume difference in Oct 2022. Pt in need of replacement garments since hers are worn and less effective. Treatment time:  3425 - 0201  Minutes: 10    2. Treatment description:  Orthotic management x 1  Measured for the following compression products:  UE: left palm wrist elbow mid forearm axilla left upper extremity: Arm sleeve, Gauntlet, and Top band silicone border  Style: Circular knit  Brand: [employer]  Type: Non-Custom: Size: [employer] 3511 II Max Long arm sleeve, top silicone border, beige; ClearServezo 2301 size medium gauntlet, beige. Vendor:  [employer]  Education: Educated patient in compression garment donning and doffing. Glove use with donning. Daily wear schedule. Daily laundering. Garment lifespan. Return and reordering process (by bringing prior garments into the clinic). Educated patient to monitor for redness or pressure points on the skin. If new pain occurs they should contact their therapist.  Patient/family demonstrated donning and doffing with independence    Pt says she is concerned about swelling in left shoulder cap. Discussed compression garments to address this area including RM torso style garment with sleeves. Pt says she could not tolerate that in the summer, but can revisit this option in the fall if she would like to. Also discussed use of Juzo adhesive lotion to help prevent sleeve from sliding. Treatment time:  1965 - 1100  Minutes: 15    Pain Level: 1/10  ASSESSMENT:   Mavis Andino is a [de-identified] y.o. female who presents with secondary lymphedema of her UE: left palm wrist elbow mid forearm axilla, as well as soft tissue adhesions and left shoulder dysfunction. Pt is s/p bilateral mastectomies and radiation in 2019, 9 LNs removed from left axilla. Patient is highly motivated to improve her condition and is seeking to expand her compression to include replacement left UE garments. Pt presents today with 5.7% limb volume difference, L>R. Limb volumes stable since transitioning to garments in April 2022. Patient measured for replacement garments this date since hers are old and less effective. Reviewed self-MLD and home compression routine. This care is medically necessary due to the infection risk with lymphedema and to improve functional activities. CDT is necessary to resolve swelling to allow patient to return to wearing normal clothes/footwear and prevent worsening of symptoms, such as infections and/or hospitalizations.  Patient will be independent with home program strategies to allow improved ADL ability and mobility and to allow patient to return to greatest functional independence. Rehabilitation potential is considered to be Good. Factors which may influence rehabilitation potential include cost of garments. Patient will benefit from 6 physical therapy visits over 12 weeks to optimize improvement in these areas. PLAN OF CARE:   Recommendations and Planned Interventions: Manual lymph drainage/decongestive therapy, Compression garment fitting/provision, Lymphedema therapeutic exercise, Education in skin care and lymphedema precautions, Self-MLD education per home program, and Caregiver education as needed    GOALS  Short Term goals to be accomplished in 6 weeks:   Patient to perform 5/5 lymphedema remedial exercises in session with modified independence utilizing HEP handout, in order to promote optimal independence with management of condition, as well as promote optimal limb volume reduction required for proper fit of donned clothing in 6 weeks. Patient/Caregiver to verbalize 3/3 signs and symptoms of infection without external cueing, in order to promote optimal self-management of condition in 6 weeks. Long Term goals to be accomplished in 12 weeks:   Patient/caregiver will demonstrate improved edema management as evidenced by performing donning/doffing of garments modified independent 3/3 times within session to aid in reducing risk for infection and promote transition to maintenance phase of CDT in 12 weeks. Patient and/or caregiver will demonstrate independence with application of multi-layer bandaging for continued volume reduction and prevention of re-accumulation of fluid during maintenance phase of CDT within 12 weeks. Patient has participated in goal setting and agrees to work toward plan of care. Patient was instructed to call if questions or concerns arise. Thank you for this referral.  Raymond Coello.  Gary, PT, DPT, CLLES-SOLANGE    Time Calculation: 45 mins    TREATMENT PLAN EFFECTIVE DATES: 80 DAYS  I have read the above plan of care for [facility]. I certify the above prescribed services are required by this patient and are medically necessary.   The above plan of care has been developed in conjunction with the lymphedema/physical therapist.       Physician Signature: ____________________________________Date:______________

## 2023-05-01 NOTE — THERAPY EVALUATION
Statement of Medical Necessity  Page 1 of 2      Kusum Go 1945 Today's Date: 5/1/2023 JULIETTE: Lifetime   Payor: [place] Points / Plan: [place] MEDICARE PART A & B / Product Type: Medicare /  ME: TBD  Refills: 2               Diagnosis  [x]   I97.2 Post-Mastectomy Lymphedema []   I87.2 Venous Insufficiency   []   I89.0 Lymphedema, other secondary  []   I83.019 Venous Stasis Ulcer LE, Right   []   I89.9 Unspecified Lymphatic Disorder []   I83.029 Venous Stasis Ulcer LE, Left   [x]   R60.9 Swelling not relieved by elevation []   Q82.0 Hereditary/ Congenital Lymphedema   []   C50.211 Malignant neoplasm of breast, Right []   G89.3  Cancer associated pain   [x]   C50.212 Malignant neoplasm of breast, Left []   L03.115 LE Cellulitis, Right   []    []   L03.116 LE Cellulitis, Left                                                           Kusum Go    1945  Page 2 of 2    Physician Order for DME for Diagnosis of Post-mastectomy Lymphedema as Listed on Statement of Medical Necessity, Page 1        Recommended Product:  Units Upper Extremity Rt Lt Units Lower Extremity Rt Lt    Circ-Aid, Ready Wrap, Sigvaris Arm    Inelastic binders ([place] [place]an)  []Foot   []Below Knee   []Knee   []Thigh      Circ-Aid Ready Wrap, Sigvaris hand    Dea Yohannes, night use []Full Leg  []Lower Leg      Tribute Arm, night use    Tribute, night use  []Full Leg  []Lower Leg      Dae Yohannes Arm, night use    Ciro Sleeve Leg/ Foot, night use     4 Gradiant Compression Sleeves & Gloves  [x]Custom [x] RM Arm:  [x]CCL1 [x]CCL2 []CCL3  [x]Custom [x] RM Glove: [x]CCL1 [x]CCL2 []CCL3    x  Gradient Compression Stockings   []Custom  []RM Lower Extremity:   []CCL1       []CCL2         []CCL3   []Knee       []Thigh        []Waist Length      Ciro sleeve arm w/ hand, night use    Tribute Wrap, night use     1 Compression Bra         1 Compression Vest         The above patient was referred for treatment of Lymphedema due to the diagnosis indicated above. Lymphedema is a progressive and chronic condition. It may cause acute infections, muscle atrophy, discomfort, and connective tissue fibrosis with irreversible tissue damage, decreased ROM in the affected limb and diminished functional mobility possibly interfering with independence and ability to work. Recurrent infections and wound complications that commonly occur with Lymphedema often require hospitalization and extensive wound care, thus increasing medical costs. The patient has received complete decongestive therapy which includes manual lymphatic drainage, lymphedema specific exercises, compression bandaging, and hygiene/skin care. Goals of therapy are to reduce the edema and prevent re-accumulation of fluid with its complications. It is medically necessary for this patient to have daytime garments to control this condition. They will need 2 sets (one set to wear and one set to wash for adequate skin care and wearing time). Garments must be replaced every 4-6 months for effectiveness. There are no substitutes available that offer acceptable compression treatment for this Lymphedema patient. If further information is requested, please contact our certified lymphedema therapists at [phone].     [x] Becky Verdugo, PT, DPT, CLT-SOLANGE  [] Jaydon Willson, PT, [facility]  [] Katrina Wyatt, PT, DPT, CLT-SOLANGE  [] Adrianna Constantino, PT, DPT, [address]    Printed  Provider Name Susy Costello MD Provider Signature  Date    Provider NPI [id number]

## 2023-09-22 DIAGNOSIS — C50.112 MALIGNANT NEOPLASM OF CENTRAL PORTION OF LEFT BREAST IN FEMALE, ESTROGEN RECEPTOR POSITIVE (HCC): Primary | ICD-10-CM

## 2023-09-22 DIAGNOSIS — Z17.0 MALIGNANT NEOPLASM OF CENTRAL PORTION OF LEFT BREAST IN FEMALE, ESTROGEN RECEPTOR POSITIVE (HCC): Primary | ICD-10-CM

## 2023-10-05 ENCOUNTER — HOSPITAL ENCOUNTER (OUTPATIENT)
Facility: HOSPITAL | Age: 78
Setting detail: RECURRING SERIES
Discharge: HOME | End: 2023-10-08
Payer: MEDICARE

## 2023-10-05 PROCEDURE — 97535 SELF CARE MNGMENT TRAINING: CPT

## 2023-10-05 PROCEDURE — 97161 PT EVAL LOW COMPLEX 20 MIN: CPT

## 2023-10-05 PROCEDURE — 97760 ORTHOTIC MGMT&TRAING 1ST ENC: CPT

## 2023-10-05 NOTE — THERAPY EVALUATION
[facility]   a part of [address] [place] [address]  [address] [facility], [address] [place] [address]   René Contreras                                                    [phone]   Q:[phone]                                                                                 PHYSICAL THERAPY LYMPHEDEMA - MEDICARE EVALUATION/PLAN OF CARE NOTE (updated 3/23)      Date: 10/5/2023          Patient Name:  Rochelle Daley :  1945   Medical   Diagnosis:  Lymphedema, not elsewhere classified [I89.0] Treatment Diagnosis:  I97.2     Post-Mastectomy lymphedema syndrome, M25.512  Pain in LEFT shoulder, M25.612  Stiffness of LEFT shoulder, NOS, R60.9     Edema, unspecified, and Z48.3      Aftercare following surgery for neoplasm    Referral Source:  Rob Marvin MD Provider #:  [id number]                Insurance: Payor: MEDICARE / Plan: MEDICARE PART A AND B / Product Type: *No Product type* /        Patient  verified yes     Visit #   Current  / Total 1 N/A   Time   In / Out 1405 1445   Total Treatment Time 40   Total Timed Codes 30   1:1 Treatment Time 40      [facility] Totals Reminder:  bill using total billable   min of TIMED therapeutic procedures and modalities. 8-22 min = 1 unit; 23-37 min = 2 units; 38-52 min = 3 units;  53-67 min = 4 units; 68-82 min = 5 units         SUBJECTIVE  Pain Level (0-10 scale): pt denies pain   []constant []intermittent []improving []worsening []no change since onset    Any medication changes, allergies to medications, adverse drug reactions, diagnosis change, or new procedure performed?: [x] No    [] Yes (see summary sheet for update)  Medications: Verified on Patient Summary List    Subjective functional status/changes:     Pt is an 79 yo female seen today for 6 month re-evaluation of left UE swelling s/p bilateral mastectomies in Aug 2019 and left SLNB. Pt completed radiation in 2019 and chemo in 2020. Pt arrives today wearing Juzo 3511 sleeve.  Pt

## 2023-10-05 NOTE — THERAPY EVALUATION
Statement of Medical Necessity    Page 1 of 2      Tammie Frazier 1945 Today's Date: 10/5/2023 WICHO: Lifetime   Payor: MEDICARE / Plan: MEDICARE PART A AND B / Product Type: *No Product type* /  ME: TBD  Refills: 2               Diagnosis  [x]   I97.2 Post-Mastectomy Lymphedema []   I87.2 Venous Insufficiency   []   I89.0 Lymphedema, other secondary  []   I83.019 Venous Stasis Ulcer LE, Right   []   I89.9 Unspecified Lymphatic Disorder []   I83.029 Venous Stasis Ulcer LE, Left   [x]   R60.9 Swelling not relieved by elevation []   Q82.0 Hereditary/ Congenital Lymphedema   []   C50.211 Malignant neoplasm of breast, Right [x]   G89.3  Cancer associated pain   [x]   C50.212 Malignant neoplasm of breast, Left []   L03.115 LE Cellulitis, Right   []    []   L03.116 LE Cellulitis, Left                                                           Tammie Frazier    1945  Page 2 of 2    Physician Order for DME for Diagnosis of Post-Mastectomy Lymphedema as Listed on Statement of Medical Necessity, Page 1        Recommended Product:  Units Upper Extremity Rt Lt Units Lower Extremity Rt Lt    Circ-Aid, Ready Wrap, Sigvaris Arm    Inelastic binders (Bonifacio Sharps)  []Foot   []Below Knee   []Knee   []Thigh      Circ-Aid Ready Wrap, Sigvaris hand    Evaristo Dima, night use []Full Leg  []Lower Leg      Tribute Arm, night use    Tribute, night use  []Full Leg  []Lower Leg      Evaristo Dima Arm, night use    Calin Sleeve Leg/ Foot, night use     4 Gradiant Compression Sleeves & Gloves  [x]Custom [x] RM Arm:  [x]CCL1 [x]CCL2 []CCL3  [x]Custom [x] RM Glove: [x]CCL1 [x]CCL2 []CCL3    x  Gradient Compression Stockings   []Custom  []RM Lower Extremity:   []CCL1       []CCL2         []CCL3   []Knee       []Thigh        []Waist Length      Calin sleeve arm w/ hand, night use    Tribute Wrap, night use      Compression Bra          Compression Vest         The above patient was referred for treatment of Lymphedema due to the diagnosis indicated

## 2023-10-26 ENCOUNTER — OFFICE VISIT (OUTPATIENT)
Age: 78
End: 2023-10-26
Payer: MEDICARE

## 2023-10-26 VITALS
WEIGHT: 165 LBS | OXYGEN SATURATION: 97 % | HEART RATE: 74 BPM | SYSTOLIC BLOOD PRESSURE: 120 MMHG | BODY MASS INDEX: 26.63 KG/M2 | TEMPERATURE: 97.9 F | DIASTOLIC BLOOD PRESSURE: 70 MMHG

## 2023-10-26 DIAGNOSIS — C50.112 MALIGNANT NEOPLASM OF CENTRAL PORTION OF LEFT BREAST IN FEMALE, ESTROGEN RECEPTOR POSITIVE (HCC): Primary | ICD-10-CM

## 2023-10-26 DIAGNOSIS — Z79.811 LONG TERM CURRENT USE OF AROMATASE INHIBITOR: ICD-10-CM

## 2023-10-26 DIAGNOSIS — Z17.0 MALIGNANT NEOPLASM OF CENTRAL PORTION OF LEFT BREAST IN FEMALE, ESTROGEN RECEPTOR POSITIVE (HCC): Primary | ICD-10-CM

## 2023-10-26 PROCEDURE — 1090F PRES/ABSN URINE INCON ASSESS: CPT | Performed by: NURSE PRACTITIONER

## 2023-10-26 PROCEDURE — G8427 DOCREV CUR MEDS BY ELIG CLIN: HCPCS | Performed by: NURSE PRACTITIONER

## 2023-10-26 PROCEDURE — 99213 OFFICE O/P EST LOW 20 MIN: CPT | Performed by: NURSE PRACTITIONER

## 2023-10-26 PROCEDURE — G8399 PT W/DXA RESULTS DOCUMENT: HCPCS | Performed by: NURSE PRACTITIONER

## 2023-10-26 PROCEDURE — 3074F SYST BP LT 130 MM HG: CPT | Performed by: NURSE PRACTITIONER

## 2023-10-26 PROCEDURE — 3078F DIAST BP <80 MM HG: CPT | Performed by: NURSE PRACTITIONER

## 2023-10-26 PROCEDURE — 1123F ACP DISCUSS/DSCN MKR DOCD: CPT | Performed by: NURSE PRACTITIONER

## 2023-10-26 PROCEDURE — G8419 CALC BMI OUT NRM PARAM NOF/U: HCPCS | Performed by: NURSE PRACTITIONER

## 2023-10-26 PROCEDURE — 4004F PT TOBACCO SCREEN RCVD TLK: CPT | Performed by: NURSE PRACTITIONER

## 2023-10-26 PROCEDURE — G8484 FLU IMMUNIZE NO ADMIN: HCPCS | Performed by: NURSE PRACTITIONER

## 2023-10-26 RX ORDER — ANASTROZOLE 1 MG/1
1 TABLET ORAL DAILY
Qty: 90 TABLET | Refills: 3 | Status: SHIPPED | OUTPATIENT
Start: 2023-10-26

## 2023-10-26 NOTE — PROGRESS NOTES
Satish Ervin is a 78 y.o. female here today to follow up for breast cancer    1. Have you been to the ER, urgent care clinic since your last visit? Hospitalized since your last visit?no    2. Have you seen or consulted any other health care providers outside of the [address] [place] [address] since your last visit? Include any pap smears or colon screening.   no
support and is coping well with her disease      3. tob use:  Smokes 5-6 cigs/day; Counseled to quit, she states she is not interested in quitting today. She reports annual screening Chest CT with her PCP. 4. 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 Started after Cycle 3. Does not wish to start any medications at this time. 5. Decreased EF:  EF dropped from 72% to 66% and global strain dropped from 18% to 15.7% between 3/2019 to 6/2019, however per Dr. Derrick White, visually there is no significant change. Recommend close follow up and Troponin I level. Trop on 6/19/19 was normal.  TTE on 9/3/19 showed no significant change in overall LV systolic function. TTE on 12/6/2019 with stable EF, TTE on 2/28/2020 EF 65%. Most recent EF on 9/25/2020, normal, GLS improved. She is no longer following with Dr. Derrick White. 6. Alopecia:  May be due to taxotere. 7. Hand stiffness: Worse in right hand. Previously believed it may have been related to anastrozole. She was given AI holiday with no improvement, resumed anastrozole. States it is worse in AM and improved with movement and heat. She has met with  ortho, s/p prednisone and gabapentin. She feels this is stable, no changes to plan today. 8. Lymphedema: To left arm, very mild. Patient will let us know if this worsens. She continues to follow with lymphedema. Stable. 9. Osteopenia: seen on DEXA 11/2019. She is taking 2000 international units per day and 2-3 servings of dietary calcium. Repeat DEXA 11/18/21, worse. We discussed the data from [facility], [address] [facility] [address], for the meta-analysis for adjuvant bisphosphonate use in breast cancer. We discussed that in postmenopausal women, it appears that the bisphosphate  zolendronic acid, given as in ABCSG 12 at 4 mg IV q 6 months x 3 years, is beneficial in improving bone DFS and OS.   We discussed side effects such as ONJ and the need to avoid invasive dental

## 2023-12-04 ENCOUNTER — CLINICAL DOCUMENTATION (OUTPATIENT)
Age: 78
End: 2023-12-04

## 2023-12-04 NOTE — PROGRESS NOTES
The patient called requesting records be faxed to Dr. Rita Boateng plastic surgeon. This was completed with confirmation.

## 2024-03-26 DIAGNOSIS — I89.0 LYMPHEDEMA: ICD-10-CM

## 2024-03-26 DIAGNOSIS — C50.112 MALIGNANT NEOPLASM OF CENTRAL PORTION OF LEFT BREAST IN FEMALE, ESTROGEN RECEPTOR POSITIVE (HCC): Primary | ICD-10-CM

## 2024-03-26 DIAGNOSIS — Z17.0 MALIGNANT NEOPLASM OF CENTRAL PORTION OF LEFT BREAST IN FEMALE, ESTROGEN RECEPTOR POSITIVE (HCC): Primary | ICD-10-CM

## 2024-04-04 ENCOUNTER — HOSPITAL ENCOUNTER (OUTPATIENT)
Facility: HOSPITAL | Age: 79
Setting detail: RECURRING SERIES
Discharge: HOME | End: 2024-04-07
Payer: MEDICARE

## 2024-04-04 PROCEDURE — 97161 PT EVAL LOW COMPLEX 20 MIN: CPT

## 2024-04-04 PROCEDURE — 97535 SELF CARE MNGMENT TRAINING: CPT

## 2024-04-04 PROCEDURE — 97760 ORTHOTIC MGMT&TRAING 1ST ENC: CPT

## 2024-04-04 NOTE — THERAPY EVALUATION
Statement of Medical Necessity    Page 1 of 2      Mulu Morris 1945 Today's Date: 4/4/2024 WICHO: Lifetime   Payor: MEDICARE / Plan: MEDICARE PART A AND B / Product Type: *No Product type* /  ME: TBD  Refills: 2               Diagnosis  [x]   I97.2 Post-Mastectomy Lymphedema []   I87.2 Venous Insufficiency   []   I89.0 Lymphedema, other secondary  []   I83.019 Venous Stasis Ulcer LE, Right   []   I89.9 Unspecified Lymphatic Disorder []   I83.029 Venous Stasis Ulcer LE, Left   [x]   R60.9 Swelling not relieved by elevation []   Q82.0 Hereditary/ Congenital Lymphedema   []   C50.211 Malignant neoplasm of breast, Right [x]   G89.3  Cancer associated pain   [x]   C50.212 Malignant neoplasm of breast, Left []   L03.115 LE Cellulitis, Right   []    []   L03.116 LE Cellulitis, Left                                                           Mulu Morris    1945  Page 2 of 2    Physician Order for DME for Diagnosis of Post-Mastectomy Lymphedema as Listed on Statement of Medical Necessity, Page 1        Recommended Product:  Units Upper Extremity Rt Lt Units Lower Extremity Rt Lt    Circ-Aid, Ready Wrap, Sigvaris Arm    Inelastic binders (Circ-Aid, Farrow)  []Foot   []Below Knee   []Knee   []Thigh      Circ-Aid Ready Wrap, Sigvaris hand    Evaristo Dima, night use []Full Leg  []Lower Leg      Tribute Arm, night use    Tribute, night use  []Full Leg  []Lower Leg      Evaristo Dima Arm, night use    Calin Sleeve Leg/ Foot, night use     3 Gradiant Compression Sleeves & Gloves  []Custom [x] RM Arm:  [x]CCL1 []CCL2 []CCL3  []Custom [] RM Glove: []CCL1 []CCL2 []CCL3    x  Gradient Compression Stockings   []Custom  []RM Lower Extremity:   []CCL1       []CCL2         []CCL3   []Knee       []Thigh        []Waist Length      Calin sleeve arm w/ hand, night use    Tribute Wrap, night use     2 Compression shirt  x       1 Compression swell spot  x       The above patient was referred for treatment of Lymphedema due to the diagnosis 
  5/1/23 2367.67 2502.96 5.71   10/31/22 2289.72 2475.84 8.13   4/28/22 2365.55 2564.92 8.43   2/18/22 2365.55 2872.95 21.45   10/14/20 2120.11 2160.48 1.9       Truncal girth measurements:  Mid-chest 92.0 cm  Waist 95.0 cm  Hips 107.5 cm    10 min [x]Eval - untimed                        Therapeutic Procedures:  Tx Min Billable or 1:1 Min (if diff from Tx Min) Procedure, Rationale, Specifics   15 15 97821 Self Care/Home Management (timed):  improve patient knowledge and understanding of activity modification, diagnosis/prognosis, and physical therapy expectations, procedures and progression  to improve patient's ability to progress to PLOF and address remaining functional goals.  (see flow sheet as applicable)    Details if applicable:    The patient was re-educated regarding the role and function of the lymphatic system, and instructed in the lymphedema management protocol of complete decongestive therapy (CDT).  This includes skin care to prevent breakdown or infection, lymphedema exercises, custom compression therapy options (bandaging, compression garments, compression pump, Farrow Wraps, JoViPak, Calin Sleeve, etc. as needed), and decongestion with manual lymphatic drainage as indicated.  We discussed the need for consistent compression system for lymphedema management.  Diaphragmatic breathing instruction and skin care education was performed, applying low pH lotion to extremity using upward strokes to stimulate lymphatic vessels. Reviewed limb volumes as above. Pt presents today with 5.87% limb volume difference compared to 8% limb volume difference in Oct 2022. Pt in need of replacement garments since hers are worn and less effective. Reviewed self-MLD to help reduce pocket of fluid in left chest wall / axilla.      25 15 37929 Orthotic Management and Training UE (timed): improve positioning of upper extremity during self care activities, improve pressure distrubution of the UE to improve patient's ability

## 2024-04-11 ENCOUNTER — HOSPITAL ENCOUNTER (OUTPATIENT)
Facility: HOSPITAL | Age: 79
Discharge: HOME | End: 2024-04-11
Payer: MEDICARE

## 2024-04-11 DIAGNOSIS — R93.89 ABNORMAL CHEST X-RAY: ICD-10-CM

## 2024-04-11 PROCEDURE — 71250 CT THORAX DX C-: CPT

## 2024-09-23 DIAGNOSIS — C50.112 MALIGNANT NEOPLASM OF CENTRAL PORTION OF LEFT BREAST IN FEMALE, ESTROGEN RECEPTOR POSITIVE (HCC): Primary | ICD-10-CM

## 2024-09-23 DIAGNOSIS — Z17.0 MALIGNANT NEOPLASM OF CENTRAL PORTION OF LEFT BREAST IN FEMALE, ESTROGEN RECEPTOR POSITIVE (HCC): Primary | ICD-10-CM

## 2024-09-23 DIAGNOSIS — I89.0 LYMPHEDEMA: ICD-10-CM

## 2024-10-03 ENCOUNTER — HOSPITAL ENCOUNTER (OUTPATIENT)
Facility: HOSPITAL | Age: 79
Setting detail: RECURRING SERIES
Discharge: HOME | End: 2024-10-06
Payer: MEDICARE

## 2024-10-03 PROCEDURE — 97760 ORTHOTIC MGMT&TRAING 1ST ENC: CPT

## 2024-10-03 PROCEDURE — 97161 PT EVAL LOW COMPLEX 20 MIN: CPT

## 2024-10-03 PROCEDURE — 97535 SELF CARE MNGMENT TRAINING: CPT

## 2024-10-03 NOTE — THERAPY EVALUATION
Statement of Medical Necessity    Page 1 of 2      Mulu Morris 1945 Today's Date: 10/3/2024 WICHO: Lifetime   Payor: MEDICARE / Plan: MEDICARE PART A AND B / Product Type: *No Product type* /  ME: TBD  Refills: 2               Diagnosis  [x]   I97.2 Post-Mastectomy Lymphedema []   I87.2 Venous Insufficiency   []   I89.0 Lymphedema, other secondary  []   I83.019 Venous Stasis Ulcer LE, Right   []   I89.9 Unspecified Lymphatic Disorder []   I83.029 Venous Stasis Ulcer LE, Left   [x]   R60.9 Swelling not relieved by elevation []   Q82.0 Hereditary/ Congenital Lymphedema   []   C50.211 Malignant neoplasm of breast, Right [x]   G89.3  Cancer associated pain   [x]   C50.212 Malignant neoplasm of breast, Left []   L03.115 LE Cellulitis, Right   []    []   L03.116 LE Cellulitis, Left                                                           Mulu Morris    1945  Page 2 of 2    Physician Order for DME for Diagnosis of Post-Mastectomy Lymphedema as Listed on Statement of Medical Necessity, Page 1        Recommended Product:  Units Upper Extremity Rt Lt Units Lower Extremity Rt Lt    Circ-Aid, Ready Wrap, Sigvaris Arm    Inelastic binders (Circ-Aid, Farrow)  []Foot   []Below Knee   []Knee   []Thigh      Circ-Aid Ready Wrap, Sigvaris hand    Evaristo Dima, night use []Full Leg  []Lower Leg      Tribute Arm, night use    Tribute, night use  []Full Leg  []Lower Leg      Evaristo Dima Arm, night use    Calin Sleeve Leg/ Foot, night use     3 Gradiant Compression Sleeves & Gloves  []Custom [x] RM Arm:  [x]CCL1 []CCL2 []CCL3  []Custom [] RM Glove: []CCL1 []CCL2 []CCL3    x  Gradient Compression Stockings   []Custom  []RM Lower Extremity:   []CCL1       []CCL2         []CCL3   []Knee       []Thigh        []Waist Length      Calin sleeve arm w/ hand, night use    Tribute Wrap, night use      Compression shirt          Compression swell spot         The above patient was referred for treatment of Lymphedema due to the diagnosis indicated 
the treatment plan and certify that this therapy is necessary.    Physician's Signature:_________________________   DATE:_________   TIME:________                           Reed Licona MD    ** Signature, Date and Time must be completed for valid certification **  Please sign and fax to [phone].  Thank you

## 2024-10-21 ENCOUNTER — OFFICE VISIT (OUTPATIENT)
Age: 79
End: 2024-10-21
Payer: MEDICARE

## 2024-10-21 VITALS
OXYGEN SATURATION: 97 % | TEMPERATURE: 97 F | SYSTOLIC BLOOD PRESSURE: 115 MMHG | DIASTOLIC BLOOD PRESSURE: 75 MMHG | RESPIRATION RATE: 18 BRPM | WEIGHT: 160.8 LBS | HEIGHT: 66 IN | HEART RATE: 77 BPM | BODY MASS INDEX: 25.84 KG/M2

## 2024-10-21 DIAGNOSIS — C50.112 MALIGNANT NEOPLASM OF CENTRAL PORTION OF LEFT BREAST IN FEMALE, ESTROGEN RECEPTOR POSITIVE (HCC): Primary | ICD-10-CM

## 2024-10-21 DIAGNOSIS — Z17.0 MALIGNANT NEOPLASM OF CENTRAL PORTION OF LEFT BREAST IN FEMALE, ESTROGEN RECEPTOR POSITIVE (HCC): Primary | ICD-10-CM

## 2024-10-21 PROCEDURE — G8484 FLU IMMUNIZE NO ADMIN: HCPCS | Performed by: INTERNAL MEDICINE

## 2024-10-21 PROCEDURE — G8419 CALC BMI OUT NRM PARAM NOF/U: HCPCS | Performed by: INTERNAL MEDICINE

## 2024-10-21 PROCEDURE — 4004F PT TOBACCO SCREEN RCVD TLK: CPT | Performed by: INTERNAL MEDICINE

## 2024-10-21 PROCEDURE — 1090F PRES/ABSN URINE INCON ASSESS: CPT | Performed by: INTERNAL MEDICINE

## 2024-10-21 PROCEDURE — 99214 OFFICE O/P EST MOD 30 MIN: CPT | Performed by: INTERNAL MEDICINE

## 2024-10-21 PROCEDURE — G8399 PT W/DXA RESULTS DOCUMENT: HCPCS | Performed by: INTERNAL MEDICINE

## 2024-10-21 PROCEDURE — 3078F DIAST BP <80 MM HG: CPT | Performed by: INTERNAL MEDICINE

## 2024-10-21 PROCEDURE — 3074F SYST BP LT 130 MM HG: CPT | Performed by: INTERNAL MEDICINE

## 2024-10-21 PROCEDURE — 1123F ACP DISCUSS/DSCN MKR DOCD: CPT | Performed by: INTERNAL MEDICINE

## 2024-10-21 PROCEDURE — G8427 DOCREV CUR MEDS BY ELIG CLIN: HCPCS | Performed by: INTERNAL MEDICINE

## 2024-10-21 ASSESSMENT — PATIENT HEALTH QUESTIONNAIRE - PHQ9
SUM OF ALL RESPONSES TO PHQ QUESTIONS 1-9: 0
2. FEELING DOWN, DEPRESSED OR HOPELESS: NOT AT ALL
SUM OF ALL RESPONSES TO PHQ QUESTIONS 1-9: 0
SUM OF ALL RESPONSES TO PHQ QUESTIONS 1-9: 0
1. LITTLE INTEREST OR PLEASURE IN DOING THINGS: NOT AT ALL
SUM OF ALL RESPONSES TO PHQ QUESTIONS 1-9: 0
SUM OF ALL RESPONSES TO PHQ9 QUESTIONS 1 & 2: 0

## 2024-10-21 NOTE — PROGRESS NOTES
Mulu Morris is a 79 y.o. female is here today for breast cancer follow up.    1. Have you been to the ER, urgent care clinic since your last visit?  Hospitalized since your last visit?No    2. Have you seen or consulted any other health care providers outside of the [facility] since your last visit?  Include any pap smears or colon screening. No

## 2024-10-21 NOTE — PROGRESS NOTES
[facility] [place] [facility]   [address] [address] [facility] [address] [facility], [address] [facility] [address]   W: [phone]  F: [phone]             Reason for Visit:     Mulu Morris is a 77 y.o. female who is seen in follow up for breast cancer.          Treatment History:      2/15/19 Left core bx:   IDC, gr 2, 1.1 cm, ER + at 100%, [place] + at 20%, HER 2 POSITIVE (IHC 2+; FISH ratio 3.9; sig/cell 9.2)    3/1/19 Left ax LN core bx:  + for breast cancer, ER + at 100%, [place] negative, HER 2 POSITIVE (IHC 2+, FISH ratio 2; sig/cell 5.8)    [facility] 3/6/19-7/10/19    #2 delayed due to diverticulitis abscess and surgery 4/17/19 8/5/19 Bilateral Mastecotomy: Right breast: lobular carcinomna in situ, fibrocystic changes including atypical and usual ductal hyperplasia, sclerosing adenosis apocrine metaplasia, microcalcification.  Left breast: No evidence of residual invasive carcinoma  or carcinoma in situ, no DCIS, 0/9 LNs; ypT0 yp N0 cM0, pCR    [employer] [facility] 8/28/19-3/25/2020    S/p XRT 11/27/19    Anastrozole 11/2019-3/25/20 (hand stiffness), resumed 4/9/2020- 10/21/24 (completed therapy)          History of Present Illness:     Pt noticed the left breast mass herself in Feb 2019, leading to the pathology above      Interval history:  In today for follow up on anastrozole. Reports gr 1 fatigue, gr 2 hot flashes, gr 1 cough, gr 1 sob, gr 2 itching, gr 1 rash       FH:  Paternal aunt breast cancer in her 80s; no ovarian cancer, no prostate or pancreas cancer     Review of systems was obtained and pertinent findings reviewed above. Past medical history, social history, family history, medications, and allergies are located in the electronic medical record.            Physical Exam:        Visit Vitals     Vitals:    10/21/24 1008   BP: 115/75   Pulse: 77   Resp: 18   Temp: 97 °F (36.1 °C)   SpO2: 97%          ECOG PS: 0   General: no distress   Respiratory: normal respiratory effort   CV: left arm with mild edema -

## 2025-03-28 DIAGNOSIS — C50.112 MALIGNANT NEOPLASM OF CENTRAL PORTION OF LEFT BREAST IN FEMALE, ESTROGEN RECEPTOR POSITIVE: Primary | ICD-10-CM

## 2025-03-28 DIAGNOSIS — Z17.0 MALIGNANT NEOPLASM OF CENTRAL PORTION OF LEFT BREAST IN FEMALE, ESTROGEN RECEPTOR POSITIVE: Primary | ICD-10-CM

## 2025-03-28 DIAGNOSIS — I89.0 LYMPHEDEMA: ICD-10-CM

## 2025-04-02 ENCOUNTER — HOSPITAL ENCOUNTER (OUTPATIENT)
Facility: HOSPITAL | Age: 80
Setting detail: RECURRING SERIES
Discharge: HOME | End: 2025-04-05
Payer: MEDICARE

## 2025-04-02 PROCEDURE — 97535 SELF CARE MNGMENT TRAINING: CPT

## 2025-04-02 PROCEDURE — 97161 PT EVAL LOW COMPLEX 20 MIN: CPT

## 2025-04-02 NOTE — THERAPY EVALUATION
Statement of Medical Necessity    Page 1 of 2      Mulu Morris 1945 Today's Date: 4/2/2025 WICHO: Lifetime   Payor: MEDICARE / Plan: MEDICARE PART A AND B / Product Type: *No Product type* /  ME: TBD  Refills: 2               Diagnosis  [x]   I97.2 Post-Mastectomy Lymphedema []   I87.2 Venous Insufficiency   []   I89.0 Lymphedema, other secondary  []   I83.019 Venous Stasis Ulcer LE, Right   []   I89.9 Unspecified Lymphatic Disorder []   I83.029 Venous Stasis Ulcer LE, Left   [x]   R60.9 Swelling not relieved by elevation []   Q82.0 Hereditary/ Congenital Lymphedema   []   C50.211 Malignant neoplasm of breast, Right [x]   G89.3  Cancer associated pain   [x]   C50.212 Malignant neoplasm of breast, Left []   L03.115 LE Cellulitis, Right   []    []   L03.116 LE Cellulitis, Left                                                           Mulu Morris    1945  Page 2 of 2    Physician Order for DME for Diagnosis of Post-Mastectomy Lymphedema as Listed on Statement of Medical Necessity, Page 1        Recommended Product:  Units Upper Extremity Rt Lt Units Lower Extremity Rt Lt    Circ-Aid, Ready Wrap, Sigvaris Arm    Inelastic binders (Circ-Aid, Farrow)  []Foot   []Below Knee   []Knee   []Thigh      Circ-Aid Ready Wrap, Sigvaris hand    Evaristo Dima, night use []Full Leg  []Lower Leg      Tribute Arm, night use    Tribute, night use  []Full Leg  []Lower Leg      Evaristo Dima Arm, night use    Calin Sleeve Leg/ Foot, night use     6 Gradiant Compression Sleeves & Gloves  [x]Custom [x] RM Arm:  [x]CCL1 []CCL2 []CCL3  [x]Custom [x] RM Glove: [x]CCL1 []CCL2 []CCL3    x  Gradient Compression Stockings   []Custom  []RM Lower Extremity:   []CCL1       []CCL2         []CCL3   []Knee       []Thigh        []Waist Length      Calin sleeve arm w/ hand, night use    Tribute Wrap, night use     2 Compression shirt  x       1 Compression swell spot  x       The above patient was referred for treatment of Lymphedema due to the diagnosis 
If volumes remain increased, may need to return to bandaging phase of treatment. Will defer ordering new garments at this time pending response in 4 weeks. Discussed possibly measured for custom garments for improved containment. Pt in agreement with plan. This care is medically necessary due to the infection risk with lymphedema and to improve functional activities. CDT is necessary to resolve swelling to allow patient to return to wearing normal clothes/footwear and prevent worsening of symptoms, such as infections and/or hospitalizations. Patient will be independent with home program strategies to allow improved ADL ability and mobility and to allow patient to return to greatest functional independence. Pt to fit garments on her own and return for re-evaluation in 6 months or sooner as needed.    Evaluation Complexity:  History:  HIGH Complexity :3+ comorbidities / personal factors will impact the outcome/ POC ; Examination:  LOW Complexity : 1-2 Standardized tests and measures addressing body structure, function, activity limitation and / or participation in recreation  ;Presentation:  MEDIUM Complexity : Evolving with changing characteristics  ;Clinical Decision Making:  LOW Complexity : FOTO score of  Overall Complexity Rating: LOW   Problem List: pain affecting function, decrease ROM, edema affection function, decrease ADL/functional abilities, decrease activity tolerance, and decrease flexibility/joint mobility   Treatment Plan may include any combination of the followin Therapeutic Exercise, 25083 Neuromuscular Re-Education, 73302 Manual Therapy, 44741 Therapeutic Activity, 63824 Self Care/Home Management, 37817 Orthotic Management and Training, and 13024 Orthotic Management and Training, Subsequent  Patient / Family readiness to learn indicated by: asking questions and interest  Persons(s) to be included in education: patient (P)  Barriers to Learning/Limitations: none  Measures taken if

## 2025-05-06 ENCOUNTER — HOSPITAL ENCOUNTER (OUTPATIENT)
Facility: HOSPITAL | Age: 80
Setting detail: RECURRING SERIES
Discharge: HOME | End: 2025-05-09
Payer: MEDICARE

## 2025-05-06 PROCEDURE — 97760 ORTHOTIC MGMT&TRAING 1ST ENC: CPT

## 2025-05-06 PROCEDURE — 97140 MANUAL THERAPY 1/> REGIONS: CPT

## 2025-05-06 NOTE — PROGRESS NOTES
PHYSICAL THERAPY/OCCUPATIONAL THERAPY - MEDICARE DAILY TREATMENT NOTE (updated 3/23)      Date: 2025          Patient Name:  Mulu Morris :  1945   Medical   Diagnosis:  Malignant neoplasm of central portion of left breast in female, estrogen receptor positive (HCC) [C50.112, Z17.0]  Lymphedema [I89.0] Treatment Diagnosis:  I97.2     Post-Mastectomy lymphedema syndrome, M25.512  Pain in LEFT shoulder, M25.612  Stiffness of LEFT shoulder, NOS, R60.9     Edema, unspecified, and Z48.3      Aftercare following surgery for neoplasm    Referral Source:  Reed Licona MD Insurance:   Payor: MEDICARE / Plan: MEDICARE PART A AND B / Product Type: *No Product type* /                     Patient  verified yes     Visit #   Current  / Total 2 16 per POC   Time   In / Out 1045 1115   Total Treatment Time 30   Total Timed Codes 25   1:1 Treatment Time 25      [facility] Totals Reminder:  bill using total billable   min of TIMED therapeutic procedures and modalities.   8-22 min = 1 unit; 23-37 min = 2 units; 38-52 min = 3 units;  53-67 min = 4 units; 68-82 min = 5 units         SUBJECTIVE  If an interpreting service was utilized for treatment of this patient, the contents of this document represent the material reviewed with the patient via the .     Pain Level (0-10 scale): pt denies pain     Any medication changes, allergies to medications, adverse drug reactions, diagnosis change, or new procedure performed?: [x] No    [] Yes (see summary sheet for update)  Medications: Verified on Patient Summary List    Subjective functional status/changes:     Pt arrives to appt wearing RM sleeve and gauntlet. Pt says she wears garments daily, trying to wear the newer/stronger garments before her older stretched out sleeves. Pt says she has been performing self-MLD daily and feels her left arm is less swollen. Pt agreeable to treatment.     OBJECTIVE      Therapeutic Procedures:  Tx Min Billable or 1:1 Min (if diff

## 2025-05-06 NOTE — THERAPY DISCHARGE
[facility]   a part of [facility]  [address], [address]   [place] [address]   Phone: [phone]  Fax: [phone]      DISCHARGE SUMMARY  Patient Name: Mulu Morris : 1945   Treatment/Medical Diagnosis: Malignant neoplasm of central portion of left breast in female, estrogen receptor positive (HCC) [C50.112, Z17.0]  Lymphedema [I89.0]   Referral Source: Reed Licona MD     Date of Initial Visit: 25 Attended Visits: 2 Missed Visits: 0     SUMMARY OF TREATMENT  Pt seen for volume recheck after making changes to home program. Pt continues with significant limb volume difference, however slightly reduced since last visit 1 month ago. Pt with 16% difference today, improved from 22% 1 month ago, but increased from 9% difference in 2024. Discussed returning to bandaging phase vs measuring for new garments today. Pt says she prefers to measure for new garments today since she feels hers are worn and less effective. Pt says she has been performing self-MLD daily. Pt has declined pursuing pump at this time, says she does not think she would use it. Encouraged daily wear of new compression garments after receiving, daily self-MLD, and daily remedial HEP. Pt to return in the fall and will consider switching to custom garments vs returning to bandaging should left UE volumes remain up. Pt to contact clinic sooner should swelling worsen. Pt in agreement with plan.     CURRENT STATUS    Patient will demonstrate decreased volumetric measurements from 2546 ml of left upper extremity to 2400 ml, in order to reduce risk for infection, decrease feeling of limb heaviness, and increase independence/tolerance for performing ADLs within 6 weeks.  Status at last Eval: not met   Current Status: Not met, pt with 2529 ml volume left UE today   Goal Met?  no    2.  Patient to perform 5/5 lymphedema remedial exercises in session with modified independence utilizing

## (undated) DEVICE — 3000CC GUARDIAN II: Brand: GUARDIAN

## (undated) DEVICE — SUTURE MCRYL SZ 3-0 L27IN ABSRB UD L26MM SH 1/2 CIR Y416H

## (undated) DEVICE — APPLICATOR BNDG 1MM ADH PREMIERPRO EXOFIN

## (undated) DEVICE — 3M™ WARMING BLANKET, FULL BODY, 10 PER CASE, 40068: Brand: BAIR HUGGER™

## (undated) DEVICE — CURVED, LARGE JAW, OPEN SEALER/DIVIDER NANO-COATED: Brand: LIGASURE IMPACT

## (undated) DEVICE — DRAPE,REIN 53X77,STERILE: Brand: MEDLINE

## (undated) DEVICE — COVER LT HNDL PLAS RIG 1 PER PK

## (undated) DEVICE — (D)PREP SKN CHLRAPRP APPL 26ML -- CONVERT TO ITEM 371833

## (undated) DEVICE — CHEST PACK: Brand: MEDLINE INDUSTRIES, INC.

## (undated) DEVICE — KENDALL SCD EXPRESS SLEEVES, KNEE LENGTH, MEDIUM: Brand: KENDALL SCD

## (undated) DEVICE — SPONGE DRAIN NONWOVEN 4X4IN -- 2/PK

## (undated) DEVICE — SUTURE VCRL SZ 3-0 L27IN ABSRB UD L26MM SH 1/2 CIR J416H

## (undated) DEVICE — SUTURE PROL SZ 0 L30IN NONABSORBABLE BLU L40MM CT 1/2 CIR 8434H

## (undated) DEVICE — SUTURE MCRYL SZ 3-0 L27IN ABSRB UD L19MM PS-2 3/8 CIR PRIM Y427H

## (undated) DEVICE — COVER US PRB W5XL48IN PUL UP 3D END KT

## (undated) DEVICE — STERILE POLYISOPRENE POWDER-FREE SURGICAL GLOVES: Brand: PROTEXIS

## (undated) DEVICE — SOL INJ SOD CL0.9% 10ML PREFIL --

## (undated) DEVICE — DEVON™ KNEE AND BODY STRAP 60" X 3" (1.5 M X 7.6 CM): Brand: DEVON

## (undated) DEVICE — GAUZE SPONGES,12 PLY: Brand: CURITY

## (undated) DEVICE — INFECTION CONTROL KIT SYS

## (undated) DEVICE — COLOSTOMY/ILEOSTOMY KIT, FLEXWEAR: Brand: NEW IMAGE

## (undated) DEVICE — SOLUTION IV 1000ML 0.9% SOD CHL

## (undated) DEVICE — TRAY CATH 16F DRN BG LTX -- CONVERT TO ITEM 363158

## (undated) DEVICE — SUT SLK 2-0SH 30IN BLK --

## (undated) DEVICE — INTENDED FOR TISSUE SEPARATION, AND OTHER PROCEDURES THAT REQUIRE A SHARP SURGICAL BLADE TO PUNCTURE OR CUT.: Brand: BARD-PARKER ® CARBON RIB-BACK BLADES

## (undated) DEVICE — DEVICE TRNSF SPIK STL 2008S] MICROTEK MEDICAL INC]

## (undated) DEVICE — NEEDLE HYPO 25GA L1.5IN BVL ORIENTED ECLIPSE

## (undated) DEVICE — ROCKER SWITCH PENCIL BLADE ELECTRODE, HOLSTER: Brand: EDGE

## (undated) DEVICE — SURGICAL PROCEDURE PACK BASIN MAJ SET CUST NO CAUT

## (undated) DEVICE — REM POLYHESIVE ADULT PATIENT RETURN ELECTRODE: Brand: VALLEYLAB

## (undated) DEVICE — SPONGE LAP 18X18IN STRL -- 5/PK

## (undated) DEVICE — SUTURE VCRL SZ 2-0 L36IN ABSRB UD L40MM CT 1/2 CIR J957H

## (undated) DEVICE — AEGIS 1" DISK 4MM HOLE, PEEL OPEN: Brand: MEDLINE

## (undated) DEVICE — ABDOMINAL PAD: Brand: DERMACEA

## (undated) DEVICE — DRAPE XR C ARM 41X74IN LF --

## (undated) DEVICE — DBD-PACK,LAPAROTOMY,2 REINFORCED GOWNS: Brand: MEDLINE

## (undated) DEVICE — SUTURE PROL SZ 0 L30IN NONABSORBABLE BLU L26MM CT-2 1/2 CIR 8412H

## (undated) DEVICE — SUTURE MCRYL SZ 4-0 L27IN ABSRB UD L19MM PS-2 1/2 CIR PRIM Y426H

## (undated) DEVICE — COVER US PRB W12XL244CM FLD IORT STR TIP

## (undated) DEVICE — LIGHT HANDLE: Brand: DEVON

## (undated) DEVICE — Z DUPLICATE USE 2716240 STAPLER INT CUT LN L40MM STPL L51MM GRN CRV HD B FRM

## (undated) DEVICE — TOWEL SURG W17XL27IN STD BLU COT NONFENESTRATED PREWASHED

## (undated) DEVICE — POOLE SUCTION INSTRUMENT WITH REMOVABLE SHEATH: Brand: POOLE

## (undated) DEVICE — RELOAD STAPLER REGULAR TISSUE GREEN ECHELON CONTOUR GST --

## (undated) DEVICE — DRAPE FLD WRM W44XL66IN C6L FOR INTRATEMP SYS THERMABASIN

## (undated) DEVICE — MAX-CORE® DISPOSABLE CORE BIOPSY INSTRUMENT, 18G X 10CM: Brand: MAX-CORE

## (undated) DEVICE — TELFA ADHESIVE ISLAND DRESSING: Brand: TELFA

## (undated) DEVICE — NDL FLTR TIP 5 MIC 18GX1.5IN --

## (undated) DEVICE — NEEDLE HYPO 22GA L1.5IN BLK S STL HUB POLYPR SHLD REG BVL

## (undated) DEVICE — 3M™ TEGADERM™ TRANSPARENT FILM DRESSING FRAME STYLE, 1624W, 2-3/8 IN X 2-3/4 IN (6 CM X 7 CM), 100/CT 4CT/CASE: Brand: 3M™ TEGADERM™

## (undated) DEVICE — Device

## (undated) DEVICE — SOLUTION IV 500ML 0.9% SOD CHL FLX CONT

## (undated) DEVICE — TELFA NON-ADHERENT PADS PREPAK: Brand: TELFA

## (undated) DEVICE — DRAIN WND PENRS RADPQ 0.25X18 -- CONVERT TO ITEM 360112

## (undated) DEVICE — SURGICAL PROCEDURE PACK TISS 3X5 IN ABSORBABLE SEPRAFILM

## (undated) DEVICE — STAPLER SKIN H3.9MM WIRE DIA0.58MM CRWN 6.9MM 35 STPL ROT

## (undated) DEVICE — DRAIN SURG W10MMXL20CM SIL FULL PERF HUBLESS FLAT RADPQ

## (undated) DEVICE — SYR 10ML LUER LOK 1/5ML GRAD --

## (undated) DEVICE — SUTURE PDS II SZ 1 L96IN ABSRB VLT TP-1 L65MM 1/2 CIR Z880G

## (undated) DEVICE — STRAP,POSITIONING,KNEE/BODY,FOAM,4X60": Brand: MEDLINE